# Patient Record
Sex: FEMALE | Race: WHITE | Employment: FULL TIME
[De-identification: names, ages, dates, MRNs, and addresses within clinical notes are randomized per-mention and may not be internally consistent; named-entity substitution may affect disease eponyms.]

---

## 2017-01-03 ENCOUNTER — OFFICE VISIT (OUTPATIENT)
Dept: OBGYN | Facility: CLINIC | Age: 42
End: 2017-01-03

## 2017-01-03 VITALS
TEMPERATURE: 96.3 F | WEIGHT: 214 LBS | DIASTOLIC BLOOD PRESSURE: 70 MMHG | HEART RATE: 80 BPM | BODY MASS INDEX: 37.91 KG/M2 | SYSTOLIC BLOOD PRESSURE: 121 MMHG

## 2017-01-03 DIAGNOSIS — Z48.89 POSTOPERATIVE VISIT: Primary | ICD-10-CM

## 2017-01-03 PROCEDURE — 99024 POSTOP FOLLOW-UP VISIT: CPT | Performed by: SPECIALIST

## 2017-01-03 RX ORDER — HYDROCODONE BITARTRATE AND ACETAMINOPHEN 5; 325 MG/1; MG/1
1-2 TABLET ORAL
COMMUNITY
Start: 2017-01-02 | End: 2017-02-23 | Stop reason: ALTCHOICE

## 2017-01-03 ASSESSMENT — ENCOUNTER SYMPTOMS
VOMITING: 0
EYE PAIN: 0
APNEA: 0
NAUSEA: 0
DIARRHEA: 0
ABDOMINAL DISTENTION: 0
CONSTIPATION: 0
COUGH: 0
ABDOMINAL PAIN: 0

## 2017-02-06 ENCOUNTER — HOSPITAL ENCOUNTER (OUTPATIENT)
Age: 42
Discharge: HOME | End: 2017-02-06
Payer: COMMERCIAL

## 2017-02-06 ENCOUNTER — HOSPITAL ENCOUNTER (OUTPATIENT)
Dept: GENERAL RADIOLOGY | Age: 42
Discharge: HOME | End: 2017-02-06
Payer: COMMERCIAL

## 2017-02-06 DIAGNOSIS — M25.511 RIGHT SHOULDER PAIN, UNSPECIFIED CHRONICITY: ICD-10-CM

## 2017-02-06 LAB
ABSOLUTE EOS #: 0.2 K/UL (ref 0–0.4)
ABSOLUTE LYMPH #: 2.4 K/UL (ref 1–4.8)
ABSOLUTE MONO #: 0.4 K/UL (ref 0.1–1.3)
ALBUMIN SERPL-MCNC: 4.3 G/DL (ref 3.5–5.2)
ALBUMIN/GLOBULIN RATIO: ABNORMAL (ref 1–2.5)
ALP BLD-CCNC: 96 U/L (ref 35–104)
ALT SERPL-CCNC: 30 U/L (ref 5–33)
ANION GAP SERPL CALCULATED.3IONS-SCNC: 15 MMOL/L (ref 9–17)
AST SERPL-CCNC: 23 U/L
BASOPHILS # BLD: 0 % (ref 0–2)
BASOPHILS ABSOLUTE: 0 K/UL (ref 0–0.2)
BILIRUB SERPL-MCNC: 0.37 MG/DL (ref 0.3–1.2)
BUN BLDV-MCNC: 12 MG/DL (ref 6–20)
BUN/CREAT BLD: ABNORMAL (ref 9–20)
CALCIUM SERPL-MCNC: 9.4 MG/DL (ref 8.6–10.4)
CHLORIDE BLD-SCNC: 102 MMOL/L (ref 98–107)
CHOLESTEROL/HDL RATIO: 3.6
CHOLESTEROL: 147 MG/DL
CO2: 20 MMOL/L (ref 20–31)
CREAT SERPL-MCNC: 0.62 MG/DL (ref 0.5–0.9)
DIFFERENTIAL TYPE: NORMAL
EOSINOPHILS RELATIVE PERCENT: 3 % (ref 0–4)
GFR AFRICAN AMERICAN: >60 ML/MIN
GFR NON-AFRICAN AMERICAN: >60 ML/MIN
GFR SERPL CREATININE-BSD FRML MDRD: ABNORMAL ML/MIN/{1.73_M2}
GFR SERPL CREATININE-BSD FRML MDRD: ABNORMAL ML/MIN/{1.73_M2}
GLUCOSE BLD-MCNC: 114 MG/DL (ref 70–99)
HCT VFR BLD CALC: 43.9 % (ref 36–46)
HDLC SERPL-MCNC: 41 MG/DL
HEMOGLOBIN: 14.9 G/DL (ref 12–16)
LDL CHOLESTEROL: 91 MG/DL (ref 0–130)
LYMPHOCYTES # BLD: 28 % (ref 24–44)
MCH RBC QN AUTO: 31.5 PG (ref 26–34)
MCHC RBC AUTO-ENTMCNC: 34 G/DL (ref 31–37)
MCV RBC AUTO: 92.8 FL (ref 80–100)
MONOCYTES # BLD: 5 % (ref 1–7)
PDW BLD-RTO: 13.4 % (ref 11.5–14.9)
PLATELET # BLD: 213 K/UL (ref 150–450)
PLATELET ESTIMATE: NORMAL
PMV BLD AUTO: 9.1 FL (ref 6–12)
POTASSIUM SERPL-SCNC: 4.2 MMOL/L (ref 3.7–5.3)
RBC # BLD: 4.73 M/UL (ref 4–5.2)
RBC # BLD: NORMAL 10*6/UL
SEG NEUTROPHILS: 64 % (ref 36–66)
SEGMENTED NEUTROPHILS ABSOLUTE COUNT: 5.4 K/UL (ref 1.3–9.1)
SODIUM BLD-SCNC: 137 MMOL/L (ref 135–144)
TOTAL PROTEIN: 7.4 G/DL (ref 6.4–8.3)
TRIGL SERPL-MCNC: 74 MG/DL
VLDLC SERPL CALC-MCNC: NORMAL MG/DL (ref 1–30)
WBC # BLD: 8.4 K/UL (ref 3.5–11)
WBC # BLD: NORMAL 10*3/UL

## 2017-02-06 PROCEDURE — 85025 COMPLETE CBC W/AUTO DIFF WBC: CPT

## 2017-02-06 PROCEDURE — 36415 COLL VENOUS BLD VENIPUNCTURE: CPT

## 2017-02-06 PROCEDURE — 80061 LIPID PANEL: CPT

## 2017-02-06 PROCEDURE — 80053 COMPREHEN METABOLIC PANEL: CPT

## 2017-02-06 PROCEDURE — 73030 X-RAY EXAM OF SHOULDER: CPT

## 2017-02-06 PROCEDURE — 73030 X-RAY EXAM OF SHOULDER: CPT | Performed by: RADIOLOGY

## 2017-02-08 ENCOUNTER — OFFICE VISIT (OUTPATIENT)
Dept: PODIATRY | Facility: CLINIC | Age: 42
End: 2017-02-08

## 2017-02-08 VITALS
WEIGHT: 216 LBS | BODY MASS INDEX: 38.27 KG/M2 | DIASTOLIC BLOOD PRESSURE: 87 MMHG | HEIGHT: 63 IN | HEART RATE: 85 BPM | SYSTOLIC BLOOD PRESSURE: 123 MMHG

## 2017-02-08 DIAGNOSIS — M77.31 HEEL SPUR, RIGHT: ICD-10-CM

## 2017-02-08 DIAGNOSIS — M77.8 ENTHESOPATHY OF FOOT: ICD-10-CM

## 2017-02-08 DIAGNOSIS — M72.2 PLANTAR FASCIITIS, RIGHT: Primary | ICD-10-CM

## 2017-02-08 PROCEDURE — L3020 FOOT LONGITUD/METATARSAL SUP: HCPCS | Performed by: PODIATRIST

## 2017-02-08 PROCEDURE — 99213 OFFICE O/P EST LOW 20 MIN: CPT | Performed by: PODIATRIST

## 2017-02-08 PROCEDURE — 73620 X-RAY EXAM OF FOOT: CPT | Performed by: PODIATRIST

## 2017-02-08 RX ORDER — IBUPROFEN AND FAMOTIDINE 26.6; 8 MG/1; MG/1
1 TABLET, FILM COATED ORAL 3 TIMES DAILY PRN
Qty: 90 TABLET | Refills: 1 | Status: SHIPPED | OUTPATIENT
Start: 2017-02-08 | End: 2017-02-23

## 2017-02-08 RX ORDER — IBUPROFEN AND FAMOTIDINE 26.6; 8 MG/1; MG/1
1 TABLET, FILM COATED ORAL 3 TIMES DAILY PRN
Qty: 27 TABLET | Refills: 0 | COMMUNITY
Start: 2017-02-08 | End: 2017-02-23

## 2017-02-08 ASSESSMENT — ENCOUNTER SYMPTOMS
DIARRHEA: 0
CONSTIPATION: 0
COLOR CHANGE: 0
NAUSEA: 0
VOMITING: 0

## 2017-02-09 ENCOUNTER — HOSPITAL ENCOUNTER (EMERGENCY)
Age: 42
Discharge: HOME | End: 2017-02-09
Attending: EMERGENCY MEDICINE
Payer: COMMERCIAL

## 2017-02-09 VITALS
OXYGEN SATURATION: 98 % | DIASTOLIC BLOOD PRESSURE: 79 MMHG | TEMPERATURE: 97.9 F | RESPIRATION RATE: 18 BRPM | BODY MASS INDEX: 38.27 KG/M2 | WEIGHT: 216 LBS | SYSTOLIC BLOOD PRESSURE: 150 MMHG | HEIGHT: 63 IN | HEART RATE: 80 BPM

## 2017-02-09 DIAGNOSIS — R21 RASH AND OTHER NONSPECIFIC SKIN ERUPTION: Primary | ICD-10-CM

## 2017-02-09 DIAGNOSIS — B37.9 YEAST INFECTION: ICD-10-CM

## 2017-02-09 PROCEDURE — 99282 EMERGENCY DEPT VISIT SF MDM: CPT

## 2017-02-09 PROCEDURE — 6370000000 HC RX 637 (ALT 250 FOR IP): Performed by: EMERGENCY MEDICINE

## 2017-02-09 RX ORDER — PREDNISONE 10 MG/1
1 TABLET ORAL DAILY
Qty: 21 EACH | Refills: 0 | Status: SHIPPED | OUTPATIENT
Start: 2017-02-09 | End: 2017-02-23 | Stop reason: ALTCHOICE

## 2017-02-09 RX ORDER — NYSTATIN 100000 U/G
CREAM TOPICAL
Qty: 1 TUBE | Refills: 0 | Status: SHIPPED | OUTPATIENT
Start: 2017-02-09 | End: 2017-02-23 | Stop reason: ALTCHOICE

## 2017-02-09 RX ORDER — NYSTATIN 100000 U/G
CREAM TOPICAL ONCE
Status: COMPLETED | OUTPATIENT
Start: 2017-02-09 | End: 2017-02-09

## 2017-02-09 RX ADMIN — NYSTATIN: 100000 CREAM TOPICAL at 22:31

## 2017-02-09 ASSESSMENT — ENCOUNTER SYMPTOMS
RESPIRATORY NEGATIVE: 1
GASTROINTESTINAL NEGATIVE: 1
BACK PAIN: 0
SHORTNESS OF BREATH: 0
ABDOMINAL PAIN: 0
COUGH: 0
EYES NEGATIVE: 1

## 2017-02-09 ASSESSMENT — PAIN DESCRIPTION - LOCATION: LOCATION: BREAST

## 2017-02-09 ASSESSMENT — PAIN SCALES - GENERAL: PAINLEVEL_OUTOF10: 6

## 2017-02-09 ASSESSMENT — PAIN DESCRIPTION - PAIN TYPE: TYPE: ACUTE PAIN

## 2017-02-09 ASSESSMENT — PAIN DESCRIPTION - ORIENTATION: ORIENTATION: RIGHT;LEFT

## 2017-02-14 DIAGNOSIS — M77.8 ENTHESOPATHY OF FOOT: ICD-10-CM

## 2017-02-14 DIAGNOSIS — M72.2 PLANTAR FASCIITIS, RIGHT: ICD-10-CM

## 2017-02-14 RX ORDER — PREDNISONE 10 MG/1
TABLET ORAL
Qty: 21 TABLET | OUTPATIENT
Start: 2017-02-14

## 2017-02-23 ENCOUNTER — HOSPITAL ENCOUNTER (EMERGENCY)
Age: 42
Discharge: HOME | End: 2017-02-23
Attending: EMERGENCY MEDICINE
Payer: COMMERCIAL

## 2017-02-23 ENCOUNTER — APPOINTMENT (OUTPATIENT)
Dept: CT IMAGING | Age: 42
End: 2017-02-23
Payer: COMMERCIAL

## 2017-02-23 VITALS
BODY MASS INDEX: 37.92 KG/M2 | WEIGHT: 214 LBS | OXYGEN SATURATION: 98 % | DIASTOLIC BLOOD PRESSURE: 94 MMHG | SYSTOLIC BLOOD PRESSURE: 138 MMHG | HEIGHT: 63 IN | RESPIRATION RATE: 16 BRPM | TEMPERATURE: 98 F | HEART RATE: 85 BPM

## 2017-02-23 VITALS
TEMPERATURE: 97.9 F | RESPIRATION RATE: 18 BRPM | SYSTOLIC BLOOD PRESSURE: 143 MMHG | BODY MASS INDEX: 37.92 KG/M2 | HEIGHT: 63 IN | HEART RATE: 94 BPM | OXYGEN SATURATION: 98 % | WEIGHT: 214 LBS | DIASTOLIC BLOOD PRESSURE: 98 MMHG

## 2017-02-23 DIAGNOSIS — R10.9 RIGHT FLANK PAIN: Primary | ICD-10-CM

## 2017-02-23 DIAGNOSIS — R74.8 ELEVATED LIVER ENZYMES: Primary | ICD-10-CM

## 2017-02-23 DIAGNOSIS — E27.8 ADRENAL MASS, LEFT (HCC): ICD-10-CM

## 2017-02-23 LAB
-: ABNORMAL
ABSOLUTE EOS #: 0.1 K/UL (ref 0–0.4)
ABSOLUTE LYMPH #: 2.1 K/UL (ref 1–4.8)
ABSOLUTE MONO #: 0.4 K/UL (ref 0.1–1.3)
ALBUMIN SERPL-MCNC: 4.1 G/DL (ref 3.5–5.2)
ALBUMIN/GLOBULIN RATIO: ABNORMAL (ref 1–2.5)
ALP BLD-CCNC: 83 U/L (ref 35–104)
ALT SERPL-CCNC: 57 U/L (ref 5–33)
AMORPHOUS: ABNORMAL
ANION GAP SERPL CALCULATED.3IONS-SCNC: 13 MMOL/L (ref 9–17)
AST SERPL-CCNC: 40 U/L
BACTERIA: ABNORMAL
BASOPHILS # BLD: 1 % (ref 0–2)
BASOPHILS ABSOLUTE: 0 K/UL (ref 0–0.2)
BILIRUB SERPL-MCNC: 0.56 MG/DL (ref 0.3–1.2)
BILIRUBIN URINE: ABNORMAL
BUN BLDV-MCNC: 10 MG/DL (ref 6–20)
BUN/CREAT BLD: ABNORMAL (ref 9–20)
CALCIUM SERPL-MCNC: 9.4 MG/DL (ref 8.6–10.4)
CASTS UA: ABNORMAL /LPF
CHLORIDE BLD-SCNC: 103 MMOL/L (ref 98–107)
CO2: 22 MMOL/L (ref 20–31)
COLOR: ABNORMAL
COMMENT UA: ABNORMAL
CREAT SERPL-MCNC: 0.6 MG/DL (ref 0.5–0.9)
CRYSTALS, UA: ABNORMAL /HPF
DIFFERENTIAL TYPE: NORMAL
EOSINOPHILS RELATIVE PERCENT: 2 % (ref 0–4)
EPITHELIAL CELLS UA: ABNORMAL /HPF
GFR AFRICAN AMERICAN: >60 ML/MIN
GFR NON-AFRICAN AMERICAN: >60 ML/MIN
GFR SERPL CREATININE-BSD FRML MDRD: ABNORMAL ML/MIN/{1.73_M2}
GFR SERPL CREATININE-BSD FRML MDRD: ABNORMAL ML/MIN/{1.73_M2}
GLUCOSE BLD-MCNC: 104 MG/DL (ref 70–99)
GLUCOSE URINE: NEGATIVE
HCG(URINE) PREGNANCY TEST: NEGATIVE
HCT VFR BLD CALC: 42.1 % (ref 36–46)
HEMOGLOBIN: 14.2 G/DL (ref 12–16)
KETONES, URINE: ABNORMAL
LEUKOCYTE ESTERASE, URINE: NEGATIVE
LYMPHOCYTES # BLD: 30 % (ref 24–44)
MCH RBC QN AUTO: 31.6 PG (ref 26–34)
MCHC RBC AUTO-ENTMCNC: 33.8 G/DL (ref 31–37)
MCV RBC AUTO: 93.6 FL (ref 80–100)
MONOCYTES # BLD: 6 % (ref 1–7)
MUCUS: ABNORMAL
NITRITE, URINE: NEGATIVE
OTHER OBSERVATIONS UA: ABNORMAL
PDW BLD-RTO: 13.1 % (ref 11.5–14.9)
PH UA: 5 (ref 5–8)
PLATELET # BLD: 208 K/UL (ref 150–450)
PLATELET ESTIMATE: NORMAL
PMV BLD AUTO: 8.8 FL (ref 6–12)
POTASSIUM SERPL-SCNC: 4.1 MMOL/L (ref 3.7–5.3)
PROTEIN UA: NEGATIVE
RBC # BLD: 4.5 M/UL (ref 4–5.2)
RBC # BLD: NORMAL 10*6/UL
RBC UA: ABNORMAL /HPF
RENAL EPITHELIAL, UA: ABNORMAL /HPF
SEG NEUTROPHILS: 61 % (ref 36–66)
SEGMENTED NEUTROPHILS ABSOLUTE COUNT: 4.5 K/UL (ref 1.3–9.1)
SODIUM BLD-SCNC: 138 MMOL/L (ref 135–144)
SPECIFIC GRAVITY UA: 1.04 (ref 1–1.03)
TOTAL PROTEIN: 7 G/DL (ref 6.4–8.3)
TRICHOMONAS: ABNORMAL
TURBIDITY: ABNORMAL
URINE HGB: NEGATIVE
UROBILINOGEN, URINE: ABNORMAL
WBC # BLD: 7.2 K/UL (ref 3.5–11)
WBC # BLD: NORMAL 10*3/UL
WBC UA: ABNORMAL /HPF
YEAST: ABNORMAL

## 2017-02-23 PROCEDURE — 84703 CHORIONIC GONADOTROPIN ASSAY: CPT

## 2017-02-23 PROCEDURE — 96372 THER/PROPH/DIAG INJ SC/IM: CPT

## 2017-02-23 PROCEDURE — 36415 COLL VENOUS BLD VENIPUNCTURE: CPT

## 2017-02-23 PROCEDURE — 80053 COMPREHEN METABOLIC PANEL: CPT

## 2017-02-23 PROCEDURE — 99283 EMERGENCY DEPT VISIT LOW MDM: CPT

## 2017-02-23 PROCEDURE — 74176 CT ABD & PELVIS W/O CONTRAST: CPT | Performed by: RADIOLOGY

## 2017-02-23 PROCEDURE — 6360000002 HC RX W HCPCS: Performed by: EMERGENCY MEDICINE

## 2017-02-23 PROCEDURE — 99284 EMERGENCY DEPT VISIT MOD MDM: CPT

## 2017-02-23 PROCEDURE — 85025 COMPLETE CBC W/AUTO DIFF WBC: CPT

## 2017-02-23 PROCEDURE — 74176 CT ABD & PELVIS W/O CONTRAST: CPT

## 2017-02-23 PROCEDURE — 81001 URINALYSIS AUTO W/SCOPE: CPT

## 2017-02-23 RX ORDER — ONDANSETRON 4 MG/1
4 TABLET, ORALLY DISINTEGRATING ORAL EVERY 8 HOURS PRN
Qty: 10 TABLET | Refills: 0 | Status: SHIPPED | OUTPATIENT
Start: 2017-02-23 | End: 2017-06-16 | Stop reason: ALTCHOICE

## 2017-02-23 RX ORDER — GABAPENTIN 300 MG/1
300 CAPSULE ORAL 3 TIMES DAILY
COMMUNITY
End: 2017-06-16 | Stop reason: ALTCHOICE

## 2017-02-23 RX ORDER — KETOROLAC TROMETHAMINE 30 MG/ML
30 INJECTION, SOLUTION INTRAMUSCULAR; INTRAVENOUS ONCE
Status: COMPLETED | OUTPATIENT
Start: 2017-02-23 | End: 2017-02-23

## 2017-02-23 RX ORDER — DICYCLOMINE HYDROCHLORIDE 10 MG/1
10 CAPSULE ORAL EVERY 6 HOURS PRN
Qty: 20 CAPSULE | Refills: 0 | Status: SHIPPED | OUTPATIENT
Start: 2017-02-23 | End: 2017-06-16 | Stop reason: ALTCHOICE

## 2017-02-23 RX ORDER — NAPROXEN 500 MG/1
500 TABLET ORAL 2 TIMES DAILY WITH MEALS
COMMUNITY
End: 2017-06-16 | Stop reason: ALTCHOICE

## 2017-02-23 RX ORDER — KETOROLAC TROMETHAMINE 30 MG/ML
30 INJECTION, SOLUTION INTRAMUSCULAR; INTRAVENOUS ONCE
Status: DISCONTINUED | OUTPATIENT
Start: 2017-02-23 | End: 2017-02-23

## 2017-02-23 RX ORDER — MELOXICAM 7.5 MG/1
7.5 TABLET ORAL DAILY
COMMUNITY
End: 2017-02-23 | Stop reason: ALTCHOICE

## 2017-02-23 RX ADMIN — KETOROLAC TROMETHAMINE 30 MG: 30 INJECTION, SOLUTION INTRAMUSCULAR at 19:36

## 2017-02-23 ASSESSMENT — ENCOUNTER SYMPTOMS
ABDOMINAL DISTENTION: 0
WHEEZING: 0
SHORTNESS OF BREATH: 0
CHEST TIGHTNESS: 0
BACK PAIN: 0
BLOOD IN STOOL: 0
SORE THROAT: 0
NAUSEA: 0
DIARRHEA: 0
COLOR CHANGE: 0
STRIDOR: 0
ABDOMINAL PAIN: 0
VOMITING: 0
COUGH: 0
PHOTOPHOBIA: 0

## 2017-02-23 ASSESSMENT — PAIN SCALES - GENERAL
PAINLEVEL_OUTOF10: 8
PAINLEVEL_OUTOF10: 8

## 2017-02-23 ASSESSMENT — PAIN DESCRIPTION - PAIN TYPE: TYPE: ACUTE PAIN

## 2017-02-23 ASSESSMENT — PAIN DESCRIPTION - FREQUENCY: FREQUENCY: CONTINUOUS

## 2017-02-23 ASSESSMENT — PAIN DESCRIPTION - ORIENTATION: ORIENTATION: RIGHT

## 2017-02-23 ASSESSMENT — PAIN DESCRIPTION - DESCRIPTORS: DESCRIPTORS: STABBING

## 2017-02-23 ASSESSMENT — PAIN DESCRIPTION - LOCATION: LOCATION: FLANK

## 2017-03-02 ENCOUNTER — NURSE ONLY (OUTPATIENT)
Dept: PODIATRY | Facility: CLINIC | Age: 42
End: 2017-03-02

## 2017-03-02 DIAGNOSIS — M77.8 ENTHESOPATHY OF FOOT: ICD-10-CM

## 2017-03-02 DIAGNOSIS — M79.674 PAIN IN TOES OF BOTH FEET: ICD-10-CM

## 2017-03-02 DIAGNOSIS — M77.31 HEEL SPUR, RIGHT: ICD-10-CM

## 2017-03-02 DIAGNOSIS — M72.2 PLANTAR FASCIITIS, RIGHT: Primary | ICD-10-CM

## 2017-03-02 DIAGNOSIS — M79.675 PAIN IN TOES OF BOTH FEET: ICD-10-CM

## 2017-06-16 ENCOUNTER — HOSPITAL ENCOUNTER (EMERGENCY)
Age: 42
Discharge: HOME | End: 2017-06-16
Attending: EMERGENCY MEDICINE
Payer: COMMERCIAL

## 2017-06-16 VITALS
WEIGHT: 206 LBS | BODY MASS INDEX: 36.5 KG/M2 | HEART RATE: 75 BPM | TEMPERATURE: 98.1 F | SYSTOLIC BLOOD PRESSURE: 134 MMHG | OXYGEN SATURATION: 97 % | HEIGHT: 63 IN | DIASTOLIC BLOOD PRESSURE: 85 MMHG | RESPIRATION RATE: 20 BRPM
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
## 2017-06-16 PROCEDURE — 99283 EMERGENCY DEPT VISIT LOW MDM: CPT

## 2017-06-16 PROCEDURE — 6370000000 HC RX 637 (ALT 250 FOR IP): Performed by: PHYSICIAN ASSISTANT

## 2017-06-16 RX ORDER — DOXYCYCLINE HYCLATE 100 MG
100 TABLET ORAL ONCE
Status: COMPLETED | OUTPATIENT
Start: 2017-06-16 | End: 2017-06-16

## 2017-06-16 RX ORDER — DOXYCYCLINE 100 MG/1
100 TABLET ORAL 2 TIMES DAILY
Qty: 14 TABLET | Refills: 0 | Status: SHIPPED | OUTPATIENT
Start: 2017-06-16 | End: 2017-06-23

## 2017-06-16 RX ADMIN — DOXYCYCLINE HYCLATE 100 MG: 100 TABLET, COATED ORAL at 20:07

## 2017-06-26 ENCOUNTER — OFFICE VISIT (OUTPATIENT)
Dept: OBGYN CLINIC | Age: 42
End: 2017-06-26
Payer: COMMERCIAL

## 2017-06-26 VITALS
HEIGHT: 63 IN | TEMPERATURE: 98.6 F | DIASTOLIC BLOOD PRESSURE: 82 MMHG | OXYGEN SATURATION: 99 % | RESPIRATION RATE: 17 BRPM | SYSTOLIC BLOOD PRESSURE: 116 MMHG | BODY MASS INDEX: 34.38 KG/M2 | HEART RATE: 78 BPM | WEIGHT: 194 LBS
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
## 2017-06-26 PROCEDURE — 99213 OFFICE O/P EST LOW 20 MIN: CPT | Performed by: SPECIALIST

## 2017-06-26 ASSESSMENT — ENCOUNTER SYMPTOMS
ABDOMINAL DISTENTION: 0
COUGH: 0
CONSTIPATION: 0
APNEA: 0
DIARRHEA: 0
NAUSEA: 0
ABDOMINAL PAIN: 0
VOMITING: 0
EYE PAIN: 0

## 2017-07-28 ENCOUNTER — HOSPITAL ENCOUNTER (EMERGENCY)
Age: 42
Discharge: HOME | End: 2017-07-28
Attending: EMERGENCY MEDICINE
Payer: COMMERCIAL

## 2017-07-28 VITALS
DIASTOLIC BLOOD PRESSURE: 83 MMHG | SYSTOLIC BLOOD PRESSURE: 134 MMHG | HEART RATE: 96 BPM | HEIGHT: 63 IN | TEMPERATURE: 98.2 F | BODY MASS INDEX: 34.91 KG/M2 | WEIGHT: 197 LBS | OXYGEN SATURATION: 98 % | RESPIRATION RATE: 16 BRPM

## 2017-07-28 DIAGNOSIS — M72.2 PLANTAR FASCIITIS, RIGHT: Primary | ICD-10-CM

## 2017-07-28 PROCEDURE — 99283 EMERGENCY DEPT VISIT LOW MDM: CPT

## 2017-07-28 RX ORDER — IBUPROFEN 800 MG/1
800 TABLET ORAL EVERY 8 HOURS PRN
Qty: 30 TABLET | Refills: 0 | Status: SHIPPED | OUTPATIENT
Start: 2017-07-28 | End: 2018-01-24

## 2017-07-28 ASSESSMENT — PAIN SCALES - GENERAL: PAINLEVEL_OUTOF10: 7

## 2017-07-28 ASSESSMENT — ENCOUNTER SYMPTOMS
ABDOMINAL PAIN: 0
BACK PAIN: 0
VOMITING: 0
SHORTNESS OF BREATH: 0
EYE REDNESS: 0
EYE DISCHARGE: 0
NAUSEA: 0
SORE THROAT: 0
COUGH: 0

## 2017-07-28 ASSESSMENT — PAIN DESCRIPTION - LOCATION: LOCATION: OTHER (COMMENT)

## 2017-07-28 ASSESSMENT — PAIN DESCRIPTION - DESCRIPTORS: DESCRIPTORS: SHOOTING

## 2017-07-28 ASSESSMENT — PAIN DESCRIPTION - ORIENTATION: ORIENTATION: RIGHT

## 2017-07-28 ASSESSMENT — PAIN DESCRIPTION - PAIN TYPE: TYPE: ACUTE PAIN

## 2017-07-28 ASSESSMENT — PAIN DESCRIPTION - FREQUENCY: FREQUENCY: INTERMITTENT

## 2017-07-31 ENCOUNTER — HOSPITAL ENCOUNTER (EMERGENCY)
Age: 42
Discharge: HOME | End: 2017-07-31
Attending: SPECIALIST
Payer: COMMERCIAL

## 2017-07-31 ENCOUNTER — APPOINTMENT (OUTPATIENT)
Dept: GENERAL RADIOLOGY | Age: 42
End: 2017-07-31
Payer: COMMERCIAL

## 2017-07-31 VITALS
DIASTOLIC BLOOD PRESSURE: 80 MMHG | RESPIRATION RATE: 16 BRPM | BODY MASS INDEX: 34.91 KG/M2 | SYSTOLIC BLOOD PRESSURE: 121 MMHG | HEART RATE: 94 BPM | HEIGHT: 63 IN | TEMPERATURE: 98.2 F | WEIGHT: 197 LBS | OXYGEN SATURATION: 96 %

## 2017-07-31 DIAGNOSIS — S90.32XA CONTUSION OF LEFT FOOT, INITIAL ENCOUNTER: Primary | ICD-10-CM

## 2017-07-31 PROCEDURE — 73630 X-RAY EXAM OF FOOT: CPT

## 2017-07-31 PROCEDURE — 99283 EMERGENCY DEPT VISIT LOW MDM: CPT

## 2017-07-31 ASSESSMENT — PAIN SCALES - GENERAL: PAINLEVEL_OUTOF10: 3

## 2017-07-31 ASSESSMENT — PAIN DESCRIPTION - DESCRIPTORS: DESCRIPTORS: THROBBING

## 2017-07-31 ASSESSMENT — PAIN DESCRIPTION - FREQUENCY: FREQUENCY: CONTINUOUS

## 2017-07-31 ASSESSMENT — PAIN DESCRIPTION - ORIENTATION: ORIENTATION: LEFT

## 2017-07-31 ASSESSMENT — PAIN DESCRIPTION - LOCATION: LOCATION: FOOT

## 2017-07-31 ASSESSMENT — PAIN DESCRIPTION - PAIN TYPE: TYPE: ACUTE PAIN

## 2017-08-07 ENCOUNTER — HOSPITAL ENCOUNTER (EMERGENCY)
Age: 42
Discharge: HOME | End: 2017-08-07
Attending: EMERGENCY MEDICINE
Payer: COMMERCIAL

## 2017-08-07 VITALS
WEIGHT: 185 LBS | HEART RATE: 71 BPM | HEIGHT: 63 IN | OXYGEN SATURATION: 99 % | SYSTOLIC BLOOD PRESSURE: 130 MMHG | BODY MASS INDEX: 32.78 KG/M2 | TEMPERATURE: 99 F | RESPIRATION RATE: 14 BRPM | DIASTOLIC BLOOD PRESSURE: 81 MMHG

## 2017-08-07 DIAGNOSIS — J02.9 ACUTE PHARYNGITIS, UNSPECIFIED ETIOLOGY: Primary | ICD-10-CM

## 2017-08-07 PROCEDURE — 99283 EMERGENCY DEPT VISIT LOW MDM: CPT

## 2017-08-07 PROCEDURE — 6370000000 HC RX 637 (ALT 250 FOR IP): Performed by: EMERGENCY MEDICINE

## 2017-08-07 RX ORDER — PREDNISONE 20 MG/1
60 TABLET ORAL ONCE
Status: COMPLETED | OUTPATIENT
Start: 2017-08-07 | End: 2017-08-07

## 2017-08-07 RX ORDER — AZITHROMYCIN 250 MG/1
500 TABLET, FILM COATED ORAL ONCE
Status: COMPLETED | OUTPATIENT
Start: 2017-08-07 | End: 2017-08-07

## 2017-08-07 RX ORDER — AZITHROMYCIN 250 MG/1
250 TABLET, FILM COATED ORAL DAILY
Qty: 4 TABLET | Refills: 0 | Status: SHIPPED | OUTPATIENT
Start: 2017-08-07 | End: 2018-02-25 | Stop reason: ALTCHOICE

## 2017-08-07 RX ORDER — PREDNISONE 20 MG/1
20 TABLET ORAL 2 TIMES DAILY
Qty: 20 TABLET | Refills: 0 | Status: SHIPPED | OUTPATIENT
Start: 2017-08-07 | End: 2017-08-17

## 2017-08-07 RX ADMIN — AZITHROMYCIN 500 MG: 250 TABLET, FILM COATED ORAL at 22:05

## 2017-08-07 RX ADMIN — PREDNISONE 60 MG: 20 TABLET ORAL at 22:05

## 2017-08-08 ASSESSMENT — ENCOUNTER SYMPTOMS
EYE REDNESS: 0
CHEST TIGHTNESS: 0
EYE PAIN: 0
COUGH: 0
SORE THROAT: 1
DIARRHEA: 0
EYE DISCHARGE: 0
WHEEZING: 0
NAUSEA: 0
SHORTNESS OF BREATH: 0
ABDOMINAL PAIN: 0
CONSTIPATION: 0
BACK PAIN: 1
RHINORRHEA: 0
COLOR CHANGE: 0

## 2017-08-11 ENCOUNTER — APPOINTMENT (OUTPATIENT)
Dept: GENERAL RADIOLOGY | Age: 42
End: 2017-08-11
Payer: COMMERCIAL

## 2017-08-11 ENCOUNTER — HOSPITAL ENCOUNTER (EMERGENCY)
Age: 42
Discharge: HOME | End: 2017-08-11
Attending: SPECIALIST
Payer: COMMERCIAL

## 2017-08-11 VITALS
WEIGHT: 180 LBS | OXYGEN SATURATION: 98 % | SYSTOLIC BLOOD PRESSURE: 142 MMHG | RESPIRATION RATE: 12 BRPM | TEMPERATURE: 97.9 F | DIASTOLIC BLOOD PRESSURE: 86 MMHG | HEART RATE: 64 BPM | BODY MASS INDEX: 31.89 KG/M2 | HEIGHT: 63 IN

## 2017-08-11 DIAGNOSIS — J02.9 VIRAL PHARYNGITIS: Primary | ICD-10-CM

## 2017-08-11 DIAGNOSIS — J20.8 VIRAL BRONCHITIS: ICD-10-CM

## 2017-08-11 DIAGNOSIS — J04.0 LARYNGITIS: ICD-10-CM

## 2017-08-11 LAB
DIRECT EXAM: NORMAL
Lab: NORMAL
SPECIMEN DESCRIPTION: NORMAL
STATUS: NORMAL

## 2017-08-11 PROCEDURE — 6370000000 HC RX 637 (ALT 250 FOR IP): Performed by: SPECIALIST

## 2017-08-11 PROCEDURE — 99283 EMERGENCY DEPT VISIT LOW MDM: CPT

## 2017-08-11 PROCEDURE — 87651 STREP A DNA AMP PROBE: CPT

## 2017-08-11 PROCEDURE — 71020 XR CHEST STANDARD TWO VW: CPT

## 2017-08-11 RX ORDER — ACETAMINOPHEN 325 MG/1
650 TABLET ORAL ONCE
Status: COMPLETED | OUTPATIENT
Start: 2017-08-11 | End: 2017-08-11

## 2017-08-11 RX ADMIN — ACETAMINOPHEN 650 MG: 325 TABLET ORAL at 15:50

## 2017-08-11 ASSESSMENT — PAIN SCALES - GENERAL
PAINLEVEL_OUTOF10: 5
PAINLEVEL_OUTOF10: 6

## 2017-08-11 ASSESSMENT — ENCOUNTER SYMPTOMS
TROUBLE SWALLOWING: 1
WHEEZING: 0
COUGH: 1
SORE THROAT: 1
SHORTNESS OF BREATH: 0
VOICE CHANGE: 1

## 2017-08-11 ASSESSMENT — PAIN DESCRIPTION - PROGRESSION: CLINICAL_PROGRESSION: GRADUALLY IMPROVING

## 2017-08-12 LAB
DIRECT EXAM: NORMAL
DIRECT EXAM: NORMAL
Lab: NORMAL
SPECIMEN DESCRIPTION: NORMAL
SPECIMEN DESCRIPTION: NORMAL
STATUS: NORMAL

## 2017-09-04 ENCOUNTER — HOSPITAL ENCOUNTER (EMERGENCY)
Age: 42
Discharge: HOME | End: 2017-09-04
Attending: EMERGENCY MEDICINE
Payer: COMMERCIAL

## 2017-09-04 ENCOUNTER — APPOINTMENT (OUTPATIENT)
Dept: GENERAL RADIOLOGY | Age: 42
End: 2017-09-04
Payer: COMMERCIAL

## 2017-09-04 VITALS
OXYGEN SATURATION: 99 % | WEIGHT: 194 LBS | HEART RATE: 82 BPM | RESPIRATION RATE: 16 BRPM | BODY MASS INDEX: 34.38 KG/M2 | SYSTOLIC BLOOD PRESSURE: 122 MMHG | DIASTOLIC BLOOD PRESSURE: 79 MMHG | TEMPERATURE: 98.2 F | HEIGHT: 63 IN

## 2017-09-04 DIAGNOSIS — M25.561 ACUTE PAIN OF RIGHT KNEE: Primary | ICD-10-CM

## 2017-09-04 PROCEDURE — 99283 EMERGENCY DEPT VISIT LOW MDM: CPT

## 2017-09-04 PROCEDURE — 73562 X-RAY EXAM OF KNEE 3: CPT

## 2017-09-04 RX ORDER — BUSPIRONE HYDROCHLORIDE 10 MG/1
45 TABLET ORAL 3 TIMES DAILY
COMMUNITY
End: 2021-02-02

## 2017-09-04 RX ORDER — NAPROXEN 500 MG/1
500 TABLET ORAL DAILY
Status: ON HOLD | COMMUNITY
End: 2018-11-29 | Stop reason: HOSPADM

## 2017-09-04 RX ORDER — KETOROLAC TROMETHAMINE 30 MG/ML
30 INJECTION, SOLUTION INTRAMUSCULAR; INTRAVENOUS ONCE
Status: DISCONTINUED | OUTPATIENT
Start: 2017-09-04 | End: 2017-09-04

## 2017-09-04 ASSESSMENT — PAIN DESCRIPTION - LOCATION: LOCATION: KNEE

## 2017-09-04 ASSESSMENT — PAIN DESCRIPTION - ORIENTATION: ORIENTATION: RIGHT

## 2017-09-04 ASSESSMENT — PAIN SCALES - GENERAL: PAINLEVEL_OUTOF10: 4

## 2017-09-27 ENCOUNTER — HOSPITAL ENCOUNTER (EMERGENCY)
Age: 42
Discharge: HOME | End: 2017-09-27
Attending: EMERGENCY MEDICINE
Payer: COMMERCIAL

## 2017-09-27 VITALS
SYSTOLIC BLOOD PRESSURE: 135 MMHG | HEART RATE: 93 BPM | TEMPERATURE: 99.6 F | WEIGHT: 198 LBS | HEIGHT: 63 IN | BODY MASS INDEX: 35.08 KG/M2 | RESPIRATION RATE: 16 BRPM | DIASTOLIC BLOOD PRESSURE: 77 MMHG | OXYGEN SATURATION: 96 %

## 2017-09-27 DIAGNOSIS — K29.70 VIRAL GASTRITIS: ICD-10-CM

## 2017-09-27 DIAGNOSIS — N39.0 URINARY TRACT INFECTION WITHOUT HEMATURIA, SITE UNSPECIFIED: Primary | ICD-10-CM

## 2017-09-27 LAB
-: ABNORMAL
AMORPHOUS: ABNORMAL
ANION GAP SERPL CALCULATED.3IONS-SCNC: 14 MMOL/L (ref 9–17)
BACTERIA: ABNORMAL
BILIRUBIN URINE: NEGATIVE
BUN BLDV-MCNC: 10 MG/DL (ref 6–20)
BUN/CREAT BLD: ABNORMAL (ref 9–20)
CALCIUM SERPL-MCNC: 9.3 MG/DL (ref 8.6–10.4)
CASTS UA: ABNORMAL /LPF (ref 0–8)
CHLORIDE BLD-SCNC: 100 MMOL/L (ref 98–107)
CO2: 19 MMOL/L (ref 20–31)
COLOR: YELLOW
CREAT SERPL-MCNC: 0.51 MG/DL (ref 0.5–0.9)
CRYSTALS, UA: ABNORMAL /HPF
EPITHELIAL CELLS UA: ABNORMAL /HPF (ref 0–5)
GFR AFRICAN AMERICAN: >60 ML/MIN
GFR NON-AFRICAN AMERICAN: >60 ML/MIN
GFR SERPL CREATININE-BSD FRML MDRD: ABNORMAL ML/MIN/{1.73_M2}
GFR SERPL CREATININE-BSD FRML MDRD: ABNORMAL ML/MIN/{1.73_M2}
GLUCOSE BLD-MCNC: 102 MG/DL (ref 70–99)
GLUCOSE URINE: NEGATIVE
KETONES, URINE: NEGATIVE
LEUKOCYTE ESTERASE, URINE: ABNORMAL
MUCUS: ABNORMAL
NITRITE, URINE: NEGATIVE
OTHER OBSERVATIONS UA: ABNORMAL
PH UA: 5 (ref 5–8)
POTASSIUM SERPL-SCNC: 4.2 MMOL/L (ref 3.7–5.3)
PROTEIN UA: NEGATIVE
RBC UA: ABNORMAL /HPF (ref 0–4)
RENAL EPITHELIAL, UA: ABNORMAL /HPF
SODIUM BLD-SCNC: 133 MMOL/L (ref 135–144)
SPECIFIC GRAVITY UA: 1.01 (ref 1–1.03)
TRICHOMONAS: ABNORMAL
TURBIDITY: CLEAR
URINE HGB: ABNORMAL
UROBILINOGEN, URINE: NORMAL
WBC UA: ABNORMAL /HPF (ref 0–5)
YEAST: ABNORMAL

## 2017-09-27 PROCEDURE — 96374 THER/PROPH/DIAG INJ IV PUSH: CPT

## 2017-09-27 PROCEDURE — 80048 BASIC METABOLIC PNL TOTAL CA: CPT

## 2017-09-27 PROCEDURE — 2580000003 HC RX 258: Performed by: EMERGENCY MEDICINE

## 2017-09-27 PROCEDURE — 6360000002 HC RX W HCPCS: Performed by: EMERGENCY MEDICINE

## 2017-09-27 PROCEDURE — 99284 EMERGENCY DEPT VISIT MOD MDM: CPT

## 2017-09-27 PROCEDURE — 6370000000 HC RX 637 (ALT 250 FOR IP): Performed by: EMERGENCY MEDICINE

## 2017-09-27 PROCEDURE — 81001 URINALYSIS AUTO W/SCOPE: CPT

## 2017-09-27 RX ORDER — ACETAMINOPHEN 500 MG
1000 TABLET ORAL ONCE
Status: COMPLETED | OUTPATIENT
Start: 2017-09-27 | End: 2017-09-27

## 2017-09-27 RX ORDER — 0.9 % SODIUM CHLORIDE 0.9 %
1000 INTRAVENOUS SOLUTION INTRAVENOUS ONCE
Status: COMPLETED | OUTPATIENT
Start: 2017-09-27 | End: 2017-09-27

## 2017-09-27 RX ORDER — CEPHALEXIN 500 MG/1
500 CAPSULE ORAL 3 TIMES DAILY
Qty: 30 CAPSULE | Refills: 0 | Status: SHIPPED | OUTPATIENT
Start: 2017-09-27 | End: 2017-10-07

## 2017-09-27 RX ORDER — CEPHALEXIN 500 MG/1
500 CAPSULE ORAL ONCE
Status: COMPLETED | OUTPATIENT
Start: 2017-09-27 | End: 2017-09-27

## 2017-09-27 RX ORDER — ONDANSETRON 2 MG/ML
4 INJECTION INTRAMUSCULAR; INTRAVENOUS ONCE
Status: COMPLETED | OUTPATIENT
Start: 2017-09-27 | End: 2017-09-27

## 2017-09-27 RX ORDER — ONDANSETRON 4 MG/1
4 TABLET, FILM COATED ORAL EVERY 8 HOURS PRN
Qty: 12 TABLET | Refills: 0 | Status: SHIPPED | OUTPATIENT
Start: 2017-09-27 | End: 2018-03-06

## 2017-09-27 RX ADMIN — SODIUM CHLORIDE 1000 ML: 9 INJECTION, SOLUTION INTRAVENOUS at 02:42

## 2017-09-27 RX ADMIN — ONDANSETRON 4 MG: 2 INJECTION, SOLUTION INTRAMUSCULAR; INTRAVENOUS at 02:42

## 2017-09-27 RX ADMIN — ACETAMINOPHEN 1000 MG: 500 TABLET ORAL at 04:23

## 2017-09-27 RX ADMIN — CEPHALEXIN 500 MG: 500 CAPSULE ORAL at 04:23

## 2017-09-27 ASSESSMENT — ENCOUNTER SYMPTOMS
SHORTNESS OF BREATH: 0
ABDOMINAL PAIN: 0
RHINORRHEA: 0
COUGH: 0
EYE REDNESS: 0
BLOOD IN STOOL: 0
DIARRHEA: 1
SORE THROAT: 0
NAUSEA: 1
EYE DISCHARGE: 0
CHEST TIGHTNESS: 0
VOMITING: 1

## 2017-10-15 ENCOUNTER — HOSPITAL ENCOUNTER (EMERGENCY)
Age: 42
Discharge: HOME | End: 2017-10-15
Attending: EMERGENCY MEDICINE
Payer: COMMERCIAL

## 2017-10-15 VITALS
HEART RATE: 88 BPM | HEIGHT: 63 IN | BODY MASS INDEX: 36.32 KG/M2 | RESPIRATION RATE: 18 BRPM | OXYGEN SATURATION: 100 % | TEMPERATURE: 98.4 F | SYSTOLIC BLOOD PRESSURE: 129 MMHG | WEIGHT: 205 LBS | DIASTOLIC BLOOD PRESSURE: 72 MMHG

## 2017-10-15 DIAGNOSIS — S91.312A LACERATION OF LEFT FOOT, INITIAL ENCOUNTER: Primary | ICD-10-CM

## 2017-10-15 PROCEDURE — 6370000000 HC RX 637 (ALT 250 FOR IP): Performed by: PHYSICIAN ASSISTANT

## 2017-10-15 PROCEDURE — 99283 EMERGENCY DEPT VISIT LOW MDM: CPT

## 2017-10-15 RX ORDER — IBUPROFEN 600 MG/1
600 TABLET ORAL ONCE
Status: COMPLETED | OUTPATIENT
Start: 2017-10-15 | End: 2017-10-15

## 2017-10-15 RX ADMIN — IBUPROFEN 600 MG: 600 TABLET, FILM COATED ORAL at 16:12

## 2017-10-15 ASSESSMENT — PAIN SCALES - GENERAL
PAINLEVEL_OUTOF10: 3
PAINLEVEL_OUTOF10: 3

## 2017-10-15 ASSESSMENT — ENCOUNTER SYMPTOMS: BACK PAIN: 0

## 2017-10-15 NOTE — ED NOTES
Pt c/o pain to right foot pt stepped on a piece of glass . Laceration noted to arch of foot approx 1 cm long .       Isis Luis RN  10/15/17 0526

## 2017-10-15 NOTE — ED PROVIDER NOTES
[address] ED  eMERGENCY dEPARTMENT eNCOUnter      Pt Name: Michael Andres  MRN: [id number]  [place] 1975  Date of evaluation: 10/15/17      CHIEF COMPLAINT       Chief Complaint   Patient presents with    Foreign Body in Skin     stepped on glass today; right foot         HISTORY OF PRESENT ILLNESS    Michael Andres is a 40 y.o. female who presents complaining of Stepped on a piece of glass at home earlier today   The history is provided by the patient. Foot Problem   Location:  Foot  Injury: yes    Mechanism of injury comment:  Patient reports she stepped on a piece of glass with her left foot causing a small laceration. Tetanus is up-to-date. She reports a glass was clear she was walking barefoot at home and her children must have broken a glass and not cleaned up all the mass  Foot location:  L foot  Pain details:     Quality:  Aching    Severity:  Mild    Onset quality:  Sudden    Duration:  1 hour    Timing:  Constant    Progression:  Unchanged  Chronicity:  New  Foreign body present:  No foreign bodies  Tetanus status:  Up to date  Prior injury to area:  Unable to specify  Relieved by:  Nothing  Worsened by:  Bearing weight  Ineffective treatments:  None tried  Associated symptoms: no back pain, no decreased ROM, no fatigue, no stiffness and no swelling        REVIEW OF SYSTEMS       Review of Systems   Constitutional: Negative for fatigue. Musculoskeletal: Negative for back pain and stiffness. Skin: Positive for wound (left medial plantar aspect of her foot stepped on a piece of glass just prior to arrival). All other systems reviewed and are negative.       PAST MEDICAL HISTORY     Past Medical History:   Diagnosis Date    Anxiety     Arthritis     OA    Depression     post partum    Diabetes, gestational     history of    Menarche @ 16 y/o    Neuropathy ([facility] [address]     CTS    Obesity     Parity       -    Restless leg syndrome     Sleep apnea     CPAP    UTI (lower urinary tract infection)     with pregnancy    Wears glasses        SURGICAL HISTORY       Past Surgical History:   Procedure Laterality Date    ANKLE SURGERY Right 1997     SECTION      CHOLECYSTECTOMY  06    COLONOSCOPY      with poly removal    DILATION AND CURETTAGE OF UTERUS  2014    ENDOMETRIAL ABLATION  14    with novasure    HYSTEROSCOPY  14    D&C    KNEE ARTHROSCOPY Left 6/29/15    KNEE SURGERY      left knee scoped on 17    MYRINGOTOMY Bilateral     with tubes    OTHER SURGICAL HISTORY  2016    removal multiple vulvar inclusion cysts    TONSILLECTOMY AND ADENOIDECTOMY      TUBAL LIGATION         CURRENT MEDICATIONS       Previous Medications    AZITHROMYCIN (ZITHROMAX) 250 MG TABLET    Take 1 tablet by mouth daily    BUSPIRONE (BUSPAR) 10 MG TABLET    Take 10 mg by mouth 2 times daily    FLUOXETINE (PROZAC) 20 MG CAPSULE    Take 60 mg by mouth nightly     IBUPROFEN (ADVIL;MOTRIN) 800 MG TABLET    Take 1 tablet by mouth every 8 hours as needed for Pain    NAPROXEN (NAPROSYN) 500 MG TABLET    Take 500 mg by mouth 2 times daily (with meals)    ONDANSETRON (ZOFRAN) 4 MG TABLET    Take 1 tablet by mouth every 8 hours as needed for Nausea    ROPINIROLE (REQUIP) 5 MG TABLET    Take 7.5 mg by mouth nightly        ALLERGIES     is allergic to bactrim [sulfamethoxazole-trimethoprim]; dicloxacillin; and pcn [penicillins]. FAMILY HISTORY     indicated that the status of her mother is unknown. She indicated that the status of her father is unknown. She indicated that the status of her paternal grandfather is unknown. SOCIAL HISTORY      reports that she has been smoking Cigarettes. She has a 9.00 pack-year smoking history. She has never used smokeless tobacco. She reports that she uses drugs, including Marijuana. She reports that she does not drink alcohol.     PHYSICAL EXAM     INITIAL VITALS: /72   Pulse 88   Temp 98.4 °F (36.9 °C) (Oral)   Resp 18   Ht

## 2017-11-18 NOTE — ED PROVIDER NOTES
[address] ED  eMERGENCY dEPARTMENT eNCOUnter   Independent Attestation     Pt Name: Sandrita Waters  MRN: [id number]  [place] 1975  Date of evaluation: 11/18/17       Sandrita Waters is a 40 y.o. female who presents with Foreign Body in Skin (stepped on glass today; right foot)        I was personally available for consultation in the Emergency Department. I have reviewed the chart and agree with the documentation as recorded by the [facility], including the assessment, treatment plan and disposition.     Steff Gomez MD, [facility]  Attending Emergency  Physician        Steff Gomez MD  11/18/17 9919

## 2018-01-24 ENCOUNTER — APPOINTMENT (OUTPATIENT)
Dept: CT IMAGING | Age: 43
End: 2018-01-24
Payer: COMMERCIAL

## 2018-01-24 ENCOUNTER — HOSPITAL ENCOUNTER (EMERGENCY)
Age: 43
Discharge: HOME | End: 2018-01-24
Attending: EMERGENCY MEDICINE
Payer: COMMERCIAL

## 2018-01-24 VITALS
TEMPERATURE: 98 F | HEART RATE: 91 BPM | WEIGHT: 204 LBS | RESPIRATION RATE: 16 BRPM | BODY MASS INDEX: 36.14 KG/M2 | OXYGEN SATURATION: 99 % | DIASTOLIC BLOOD PRESSURE: 87 MMHG | HEIGHT: 63 IN | SYSTOLIC BLOOD PRESSURE: 151 MMHG
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
## 2018-01-24 DIAGNOSIS — M54.2 NECK PAIN: Primary | ICD-10-CM

## 2018-01-24 DIAGNOSIS — S50.01XA CONTUSION OF RIGHT ELBOW, INITIAL ENCOUNTER: ICD-10-CM

## 2018-01-24 PROCEDURE — 72125 CT NECK SPINE W/O DYE: CPT

## 2018-01-24 PROCEDURE — 6370000000 HC RX 637 (ALT 250 FOR IP): Performed by: EMERGENCY MEDICINE

## 2018-01-24 PROCEDURE — 99283 EMERGENCY DEPT VISIT LOW MDM: CPT

## 2018-01-24 RX ORDER — IBUPROFEN 600 MG/1
600 TABLET ORAL EVERY 6 HOURS PRN
Qty: 30 TABLET | Refills: 0 | Status: SHIPPED | OUTPATIENT
Start: 2018-01-24 | End: 2018-03-13

## 2018-01-24 RX ORDER — IBUPROFEN 600 MG/1
600 TABLET ORAL ONCE
Status: COMPLETED | OUTPATIENT
Start: 2018-01-24 | End: 2018-01-24

## 2018-01-24 RX ADMIN — IBUPROFEN 600 MG: 600 TABLET, FILM COATED ORAL at 20:51

## 2018-01-24 ASSESSMENT — ENCOUNTER SYMPTOMS
RHINORRHEA: 0
NAUSEA: 0
COUGH: 0
BACK PAIN: 1
EYE PAIN: 0
SHORTNESS OF BREATH: 0
ABDOMINAL PAIN: 0
VOMITING: 0
EYE REDNESS: 0

## 2018-01-24 ASSESSMENT — PAIN DESCRIPTION - LOCATION: LOCATION: BACK;ELBOW;NECK

## 2018-01-24 ASSESSMENT — PAIN DESCRIPTION - PAIN TYPE: TYPE: ACUTE PAIN

## 2018-01-24 ASSESSMENT — PAIN SCALES - GENERAL
PAINLEVEL_OUTOF10: 7
PAINLEVEL_OUTOF10: 7

## 2018-01-25 NOTE — ED PROVIDER NOTES
urinary tract infection)     with pregnancy    Wears glasses        SURGICAL HISTORY       Past Surgical History:   Procedure Laterality Date    ANKLE SURGERY Right      SECTION      CHOLECYSTECTOMY  06    COLONOSCOPY      with poly removal    DILATION AND CURETTAGE OF UTERUS  2014    ENDOMETRIAL ABLATION  14    with novasure    HYSTEROSCOPY  14    D&C    KNEE ARTHROSCOPY Left 6/29/15    KNEE SURGERY      left knee scoped on 17    MYRINGOTOMY Bilateral     with tubes    OTHER SURGICAL HISTORY  2016    removal multiple vulvar inclusion cysts    TONSILLECTOMY AND ADENOIDECTOMY      TUBAL LIGATION         CURRENT MEDICATIONS       Previous Medications    AZITHROMYCIN (ZITHROMAX) 250 MG TABLET    Take 1 tablet by mouth daily    BUSPIRONE (BUSPAR) 10 MG TABLET    Take 10 mg by mouth 2 times daily    FLUOXETINE (PROZAC) 20 MG CAPSULE    Take 60 mg by mouth nightly     NAPROXEN (NAPROSYN) 500 MG TABLET    Take 500 mg by mouth 2 times daily (with meals)    ONDANSETRON (ZOFRAN) 4 MG TABLET    Take 1 tablet by mouth every 8 hours as needed for Nausea    ROPINIROLE (REQUIP) 5 MG TABLET    Take 7.5 mg by mouth 3 times daily        ALLERGIES     Bactrim [sulfamethoxazole-trimethoprim];  Dicloxacillin; and Pcn [penicillins]    FAMILY HISTORY       Family Status   Relation Status    Paternal Grandfather     Father     Mother       Family History   Problem Relation Age of Onset    Heart Disease Paternal Grandfather     Diabetes Father     Hypertension Father     Stroke Father     Hypertension Mother     Mental Illness Mother        SOCIAL HISTORY       Social History     Social History    Marital status:      Spouse name: N/A    Number of children: N/A    Years of education: N/A     Social History Main Topics    Smoking status: Current Every Day Smoker     Packs/day: 0.50     Years: 18.00     Types: Cigarettes    Smokeless tobacco: Never Used    Vitals:    01/24/18 1926   BP: (!) 151/87   Pulse: 91   Resp: 16   Temp: 98 °F (36.7 °C)   SpO2: 99%   Weight: 204 lb (92.5 kg)   Height: 5' 3\" (1.6 m)     -------------------------  BP: (!) 151/87, Temp: 98 °F (36.7 °C), Pulse: 91, Resp: 16      FINAL IMPRESSION      1. Neck pain    2. Fall, initial encounter    3.  Contusion of right elbow, initial encounter          DISPOSITION/PLAN    DISPOSITION Decision To Discharge 01/24/2018 08:51:18 PM      PATIENT REFERRED TO:  Mele Delcid, [address] [place] [address] [address]  [phone]    Call in 1 day        DISCHARGE MEDICATIONS:  New Prescriptions    IBUPROFEN (ADVIL;MOTRIN) 600 MG TABLET    Take 1 tablet by mouth every 6 hours as needed for Pain       (Please note that portions of this note were completed with a voice recognition program.  Efforts were made to edit the dictations but occasionally words are mis-transcribed.)    Miko Liz MD, MATT, [facility]  Attending Emergency Physician                     Miko Liz MD  01/24/18 2052

## 2018-02-19 ENCOUNTER — HOSPITAL ENCOUNTER (OUTPATIENT)
Age: 43
Discharge: HOME | End: 2018-02-19
Payer: COMMERCIAL

## 2018-02-19 LAB
ABSOLUTE EOS #: 0.2 K/UL (ref 0–0.4)
ABSOLUTE IMMATURE GRANULOCYTE: NORMAL K/UL (ref 0–0.3)
ABSOLUTE LYMPH #: 1.8 K/UL (ref 1–4.8)
ABSOLUTE MONO #: 0.3 K/UL (ref 0.1–1.3)
ALBUMIN SERPL-MCNC: 4.2 G/DL (ref 3.5–5.2)
ALBUMIN/GLOBULIN RATIO: ABNORMAL (ref 1–2.5)
ALP BLD-CCNC: 83 U/L (ref 35–104)
ALT SERPL-CCNC: 24 U/L (ref 5–33)
ANION GAP SERPL CALCULATED.3IONS-SCNC: 11 MMOL/L (ref 9–17)
AST SERPL-CCNC: 21 U/L
BASOPHILS # BLD: 0 % (ref 0–2)
BASOPHILS ABSOLUTE: 0 K/UL (ref 0–0.2)
BILIRUB SERPL-MCNC: 0.3 MG/DL (ref 0.3–1.2)
BUN BLDV-MCNC: 11 MG/DL (ref 6–20)
BUN/CREAT BLD: ABNORMAL (ref 9–20)
CALCIUM SERPL-MCNC: 9.1 MG/DL (ref 8.6–10.4)
CHLORIDE BLD-SCNC: 104 MMOL/L (ref 98–107)
CHOLESTEROL/HDL RATIO: 3.1
CHOLESTEROL: 147 MG/DL
CO2: 23 MMOL/L (ref 20–31)
CREAT SERPL-MCNC: 0.61 MG/DL (ref 0.5–0.9)
DIFFERENTIAL TYPE: NORMAL
EOSINOPHILS RELATIVE PERCENT: 4 % (ref 0–4)
ESTIMATED AVERAGE GLUCOSE: 111 MG/DL
GFR AFRICAN AMERICAN: >60 ML/MIN
GFR NON-AFRICAN AMERICAN: >60 ML/MIN
GFR SERPL CREATININE-BSD FRML MDRD: ABNORMAL ML/MIN/{1.73_M2}
GFR SERPL CREATININE-BSD FRML MDRD: ABNORMAL ML/MIN/{1.73_M2}
GLUCOSE BLD-MCNC: 104 MG/DL (ref 70–99)
HBA1C MFR BLD: 5.5 % (ref 4–6)
HCT VFR BLD CALC: 43.3 % (ref 36–46)
HDLC SERPL-MCNC: 48 MG/DL
HEMOGLOBIN: 14.7 G/DL (ref 12–16)
IMMATURE GRANULOCYTES: NORMAL %
INR BLD: 1
LDL CHOLESTEROL: 88 MG/DL (ref 0–130)
LYMPHOCYTES # BLD: 28 % (ref 24–44)
MCH RBC QN AUTO: 32.1 PG (ref 26–34)
MCHC RBC AUTO-ENTMCNC: 34 G/DL (ref 31–37)
MCV RBC AUTO: 94.6 FL (ref 80–100)
MONOCYTES # BLD: 5 % (ref 1–7)
NRBC AUTOMATED: NORMAL PER 100 WBC
PARTIAL THROMBOPLASTIN TIME: 29.1 SEC (ref 23–31)
PDW BLD-RTO: 13.5 % (ref 11.5–14.9)
PLATELET # BLD: 201 K/UL (ref 150–450)
PLATELET ESTIMATE: NORMAL
PMV BLD AUTO: 9.1 FL (ref 6–12)
POTASSIUM SERPL-SCNC: 4.8 MMOL/L (ref 3.7–5.3)
PROTHROMBIN TIME: 10.4 SEC (ref 9.7–12)
RBC # BLD: 4.57 M/UL (ref 4–5.2)
RBC # BLD: NORMAL 10*6/UL
SEG NEUTROPHILS: 63 % (ref 36–66)
SEGMENTED NEUTROPHILS ABSOLUTE COUNT: 4.1 K/UL (ref 1.3–9.1)
SODIUM BLD-SCNC: 138 MMOL/L (ref 135–144)
TOTAL PROTEIN: 7 G/DL (ref 6.4–8.3)
TRIGL SERPL-MCNC: 55 MG/DL
VLDLC SERPL CALC-MCNC: NORMAL MG/DL (ref 1–30)
WBC # BLD: 6.5 K/UL (ref 3.5–11)
WBC # BLD: NORMAL 10*3/UL

## 2018-02-19 PROCEDURE — 36415 COLL VENOUS BLD VENIPUNCTURE: CPT

## 2018-02-19 PROCEDURE — 80061 LIPID PANEL: CPT

## 2018-02-19 PROCEDURE — 85610 PROTHROMBIN TIME: CPT

## 2018-02-19 PROCEDURE — 85025 COMPLETE CBC W/AUTO DIFF WBC: CPT

## 2018-02-19 PROCEDURE — 85730 THROMBOPLASTIN TIME PARTIAL: CPT

## 2018-02-19 PROCEDURE — 80053 COMPREHEN METABOLIC PANEL: CPT

## 2018-02-19 PROCEDURE — 83036 HEMOGLOBIN GLYCOSYLATED A1C: CPT

## 2018-02-25 ENCOUNTER — HOSPITAL ENCOUNTER (EMERGENCY)
Age: 43
Discharge: HOME | End: 2018-02-25
Attending: EMERGENCY MEDICINE
Payer: COMMERCIAL

## 2018-02-25 ENCOUNTER — HOSPITAL ENCOUNTER (OUTPATIENT)
Dept: GENERAL RADIOLOGY | Age: 43
Discharge: HOME | End: 2018-02-27
Payer: COMMERCIAL

## 2018-02-25 ENCOUNTER — APPOINTMENT (OUTPATIENT)
Dept: GENERAL RADIOLOGY | Age: 43
End: 2018-02-25
Payer: COMMERCIAL

## 2018-02-25 VITALS
HEART RATE: 79 BPM | BODY MASS INDEX: 37.56 KG/M2 | WEIGHT: 212 LBS | TEMPERATURE: 98.1 F | DIASTOLIC BLOOD PRESSURE: 71 MMHG | OXYGEN SATURATION: 98 % | RESPIRATION RATE: 16 BRPM | SYSTOLIC BLOOD PRESSURE: 149 MMHG | HEIGHT: 63 IN

## 2018-02-25 DIAGNOSIS — S60.221A CONTUSION OF RIGHT HAND, INITIAL ENCOUNTER: Primary | ICD-10-CM

## 2018-02-25 DIAGNOSIS — M25.561 ACUTE PAIN OF RIGHT KNEE: ICD-10-CM

## 2018-02-25 DIAGNOSIS — M25.461 EFFUSION OF RIGHT KNEE: Primary | ICD-10-CM

## 2018-02-25 PROCEDURE — 99282 EMERGENCY DEPT VISIT SF MDM: CPT

## 2018-02-25 PROCEDURE — 73130 X-RAY EXAM OF HAND: CPT

## 2018-02-25 PROCEDURE — 99283 EMERGENCY DEPT VISIT LOW MDM: CPT

## 2018-02-25 PROCEDURE — 73562 X-RAY EXAM OF KNEE 3: CPT

## 2018-02-25 ASSESSMENT — PAIN SCALES - GENERAL: PAINLEVEL_OUTOF10: 5

## 2018-02-25 ASSESSMENT — PAIN DESCRIPTION - PAIN TYPE: TYPE: ACUTE PAIN

## 2018-02-25 ASSESSMENT — PAIN DESCRIPTION - ORIENTATION: ORIENTATION: RIGHT

## 2018-02-25 ASSESSMENT — PAIN DESCRIPTION - LOCATION: LOCATION: HAND

## 2018-02-25 NOTE — ED PROVIDER NOTES
Tonsillectomy and adenoidectomy; Ankle surgery (Right, 1997); Endometrial ablation (09/11/14); Knee arthroscopy (Left, 6/29/15); other surgical history (12/27/2016); and knee surgery. CURRENT MEDICATIONS       Discharge Medication List as of 2/25/2018  4:43 AM      CONTINUE these medications which have NOT CHANGED    Details   ibuprofen (ADVIL;MOTRIN) 600 MG tablet Take 1 tablet by mouth every 6 hours as needed for Pain, Disp-30 tablet, R-0Print      ondansetron (ZOFRAN) 4 MG tablet Take 1 tablet by mouth every 8 hours as needed for Nausea, Disp-12 tablet, R-0Print      busPIRone (BUSPAR) 10 MG tablet Take 10 mg by mouth 2 times dailyHistorical Med      naproxen (NAPROSYN) 500 MG tablet Take 500 mg by mouth 2 times daily (with meals)Historical Med      azithromycin (ZITHROMAX) 250 MG tablet Take 1 tablet by mouth daily, Disp-4 tablet, R-0Print      FLUoxetine (PROZAC) 20 MG capsule Take 60 mg by mouth nightly Historical Med      rOPINIRole (REQUIP) 5 MG tablet Take 7.5 mg by mouth 3 times daily Historical Med             ALLERGIES     is allergic to bactrim [sulfamethoxazole-trimethoprim]; dicloxacillin; and pcn [penicillins]. FAMILY HISTORY     indicated that the status of her mother is unknown. She indicated that the status of her father is unknown. She indicated that the status of her paternal grandfather is unknown.      family history includes Diabetes in her father; Heart Disease in her paternal grandfather; Hypertension in her father and mother; Mental Illness in her mother; Stroke in her father. SOCIAL HISTORY      reports that she has been smoking Cigarettes. She has a 9.00 pack-year smoking history. She has never used smokeless tobacco. She reports that she uses drugs, including Marijuana. She reports that she does not drink alcohol. PHYSICAL EXAM     INITIAL VITALS:  vitals were not taken for this visit.     Physical Exam   Constitutional: She is oriented to person, place, and time and well-developed, well-nourished, and in no distress. No distress. HENT:   Head: Normocephalic and atraumatic. Mouth/Throat: Oropharynx is clear and moist.   Eyes: Conjunctivae are normal. Pupils are equal, round, and reactive to light. Neck: Neck supple. Cardiovascular: Normal rate, regular rhythm, normal heart sounds and intact distal pulses. No murmur heard. Pulmonary/Chest: Effort normal and breath sounds normal. No respiratory distress. Abdominal: Soft. Bowel sounds are normal. She exhibits no distension. There is no tenderness. Musculoskeletal: She exhibits edema (Right knee) and tenderness. Right knee with very mild diffuse swelling with tenderness along the medial and lateral joint lines. No obvious fracture or dislocation. Skin is intact. Compartments are soft. MCL and LCL with no laxity. ACL with no laxity. Right Achilles tendon function is intact. Sensation is intact distally. DP and PT pulses are intact on the right side. Motor function is intact in right knee and right ankle. Lymphadenopathy:     She has no cervical adenopathy. Neurological: She is alert and oriented to person, place, and time. GCS score is 15. Skin: Skin is warm and dry. No rash noted. Psychiatric: Affect and judgment normal.   Nursing note and vitals reviewed. DIFFERENTIAL DIAGNOSIS/MDM:   Right knee sprain versus meniscal injury    80-year-old female presents with right knee pain and swelling. She had a mechanical fall onto the right knee. She does have mild swelling with tenderness along the medial and lateral joint lines. Suspect a soft tissue injury. We'll get x-ray and reassess.     DIAGNOSTIC RESULTS     EKG: All EKG's are interpreted by the Emergency Department Physician who either signs or Co-signs this chart in the absence of a cardiologist.        RADIOLOGY:   I directly visualized the following  images and reviewed the radiologist interpretations:  No orders to display     No

## 2018-02-25 NOTE — ED NOTES
Discharge instructions and paperwork given. Patient denies any questions or concerns.      Nat Paredes RN  02/25/18 0087

## 2018-02-25 NOTE — ED PROVIDER NOTES
[facility] ED  [address] [address]  Phone: [phone]  Fax: [phone]      eMERGENCY dEPARTMENT eNCOUnter      Pt Name: Alia Jones  MRN: [id number]  [place] 1975  Date of evaluation: 2018  Provider: Siddharth Kwong PA-C    CHIEF COMPLAINT       Chief Complaint   Patient presents with    Hand Injury     and wrist r/t fall yesterday           HISTORY OF PRESENT ILLNESS  (Location/Symptom, Timing/Onset, Context/Setting, Quality, Duration, Modifying Factors, Severity.)   Alia Jones is a 40 y.o. female who presents to the emergency department for evaluation of right hand pain/swelling that she noted after fall ~ 12 hrs prior to this. She had fall on right knee at 2pm yesterday, was evaluated at [facility] at 2am for her knee but didn't note any hand pain at that time. Pain was noticed once home from [facility]. She wrapped hand and went to bed, awoke to more pain and swelling in this area. No focal weakness/paresthesias. She had no other complaints. Nursing Notes were reviewed. REVIEW OF SYSTEMS    (2-9 systems for level 4, 10 or more for level 5)     Review of Systems   Constitutional: Negative. HENT: Negative. Eyes: Negative. Respiratory: Negative. Cardiovascular: Negative. Gastrointestinal: Negative. Musculoskeletal: Negative. Endocrine: Negative. Genitourinary: Negative. Skin: Negative. Allergic/Immunologic: Negative. Neurological: Negative. Hematological: Negative. Psychiatric/Behavioral: Negative. Except as noted above the remainder of the review of systems was reviewed and negative. PAST MEDICAL HISTORY   History reviewed.     Past Medical History:   Diagnosis Date    Anxiety     Arthritis     OA    Depression     post partum    Diabetes, gestational     history of    Menarche @ 16 y/o    Neuropathy ([facility] [address]     CTS    Obesity     Parity       -    Restless leg syndrome     Sleep apnea     CPAP    UTI (1.6 m)       [phone]  Attending discharged this patient after discussing all labwork/imaging results that were finalized. Treatment plan and recommended follow-up discussed with them as well. She declined ACE wrap. CONSULTS:  None    PROCEDURES:  None    Patient instructed to return to the emergency room if symptoms worsen, return, or any other concern right away which is agreed. FINAL IMPRESSION      1.  Contusion of right hand, initial encounter            DISPOSITION/PLAN   DISPOSITION Decision To Discharge      PATIENT REFERRED TO:  Janett Amador [facility] [address]  [phone]    Go to   for persistence or worsening of symptoms      DISCHARGE MEDICATIONS:  New Prescriptions    No medications on file       (Please note that portions of this note were completed with a voice recognition program.  Efforts were made to edit the dictations but occasionally words are mis-transcribed.)    ABRAHAM Boland PA-C  02/25/18 1531

## 2018-02-27 ENCOUNTER — HOSPITAL ENCOUNTER (EMERGENCY)
Age: 43
Discharge: HOME | End: 2018-02-27
Attending: EMERGENCY MEDICINE
Payer: COMMERCIAL

## 2018-02-27 VITALS
HEIGHT: 63 IN | SYSTOLIC BLOOD PRESSURE: 130 MMHG | OXYGEN SATURATION: 96 % | TEMPERATURE: 98.4 F | RESPIRATION RATE: 16 BRPM | BODY MASS INDEX: 37.56 KG/M2 | HEART RATE: 98 BPM | DIASTOLIC BLOOD PRESSURE: 89 MMHG | WEIGHT: 212 LBS

## 2018-02-27 DIAGNOSIS — J32.4 CHRONIC PANSINUSITIS: Primary | ICD-10-CM

## 2018-02-27 LAB
DIRECT EXAM: NORMAL
Lab: NORMAL
SPECIMEN DESCRIPTION: NORMAL
STATUS: NORMAL

## 2018-02-27 PROCEDURE — 6360000002 HC RX W HCPCS: Performed by: PHYSICIAN ASSISTANT

## 2018-02-27 PROCEDURE — 87804 INFLUENZA ASSAY W/OPTIC: CPT

## 2018-02-27 PROCEDURE — 96374 THER/PROPH/DIAG INJ IV PUSH: CPT

## 2018-02-27 PROCEDURE — 2580000003 HC RX 258: Performed by: PHYSICIAN ASSISTANT

## 2018-02-27 PROCEDURE — 99283 EMERGENCY DEPT VISIT LOW MDM: CPT

## 2018-02-27 RX ORDER — FLUTICASONE PROPIONATE 50 MCG
1 SPRAY, SUSPENSION (ML) NASAL DAILY
Qty: 1 BOTTLE | Refills: 0 | Status: SHIPPED | OUTPATIENT
Start: 2018-02-27 | End: 2018-05-02

## 2018-02-27 RX ORDER — KETOROLAC TROMETHAMINE 30 MG/ML
30 INJECTION, SOLUTION INTRAMUSCULAR; INTRAVENOUS ONCE
Status: COMPLETED | OUTPATIENT
Start: 2018-02-27 | End: 2018-02-27

## 2018-02-27 RX ORDER — 0.9 % SODIUM CHLORIDE 0.9 %
1000 INTRAVENOUS SOLUTION INTRAVENOUS ONCE
Status: COMPLETED | OUTPATIENT
Start: 2018-02-27 | End: 2018-02-27

## 2018-02-27 RX ORDER — BENZONATATE 100 MG/1
100 CAPSULE ORAL 3 TIMES DAILY PRN
Qty: 30 CAPSULE | Refills: 0 | Status: SHIPPED | OUTPATIENT
Start: 2018-02-27 | End: 2018-03-06

## 2018-02-27 RX ADMIN — KETOROLAC TROMETHAMINE 30 MG: 30 INJECTION, SOLUTION INTRAMUSCULAR; INTRAVENOUS at 21:46

## 2018-02-27 RX ADMIN — SODIUM CHLORIDE 1000 ML: 9 INJECTION, SOLUTION INTRAVENOUS at 21:47

## 2018-02-27 ASSESSMENT — PAIN DESCRIPTION - FREQUENCY: FREQUENCY: CONTINUOUS

## 2018-02-27 ASSESSMENT — PAIN SCALES - GENERAL
PAINLEVEL_OUTOF10: 3
PAINLEVEL_OUTOF10: 3

## 2018-02-27 ASSESSMENT — PAIN SCALES - WONG BAKER: WONGBAKER_NUMERICALRESPONSE: 2

## 2018-02-27 ASSESSMENT — PAIN DESCRIPTION - DESCRIPTORS: DESCRIPTORS: PRESSURE

## 2018-02-27 ASSESSMENT — PAIN DESCRIPTION - PAIN TYPE: TYPE: ACUTE PAIN

## 2018-02-27 ASSESSMENT — PAIN DESCRIPTION - LOCATION: LOCATION: HEAD

## 2018-02-28 NOTE — ED PROVIDER NOTES
SURGERY      left knee scoped on 1-2-17    MYRINGOTOMY Bilateral     with tubes    OTHER SURGICAL HISTORY  12/27/2016    removal multiple vulvar inclusion cysts    TONSILLECTOMY AND ADENOIDECTOMY      TUBAL LIGATION       None otherwise stated in nurses notes    CURRENT MEDICATIONS       Previous Medications    BUSPIRONE (BUSPAR) 10 MG TABLET    Take 10 mg by mouth 2 times daily    FLUOXETINE (PROZAC) 20 MG CAPSULE    Take 80 mg by mouth nightly     IBUPROFEN (ADVIL;MOTRIN) 600 MG TABLET    Take 1 tablet by mouth every 6 hours as needed for Pain    NAPROXEN (NAPROSYN) 500 MG TABLET    Take 500 mg by mouth 2 times daily (with meals)    ONDANSETRON (ZOFRAN) 4 MG TABLET    Take 1 tablet by mouth every 8 hours as needed for Nausea    ROPINIROLE (REQUIP) 5 MG TABLET    Take 7.5 mg by mouth 3 times daily        ALLERGIES     Bactrim [sulfamethoxazole-trimethoprim]; Dicloxacillin; and Pcn [penicillins]    FAMILY HISTORY           Problem Relation Age of Onset    Heart Disease Paternal Grandfather     Diabetes Father     Hypertension Father     Stroke Father     Hypertension Mother     Mental Illness Mother      Family Status   Relation Status    Paternal Grandfather     Father     Mother       None otherwise stated in nurses notes    SOCIAL HISTORY      reports that she has been smoking Cigarettes. She has a 9.00 pack-year smoking history. She has never used smokeless tobacco. She reports that she uses drugs, including Marijuana. She reports that she does not drink alcohol. lives at home with others     PHYSICAL EXAM    (up to 7 for level 4, 8 or more for level 5)     ED Triage Vitals [02/27/18 2122]   BP Temp Temp Source Pulse Resp SpO2 Height Weight   130/89 98.4 °F (36.9 °C) Oral 98 16 96 % 5' 3\" (1.6 m) 212 lb (96.2 kg)       Physical Exam   Nursing note and vitals reviewed. Constitutional: Oriented to person, place, and time and well-developed, well-nourished. HENT: Normocephalic and atraumatic. External ears normal. Nose normal and midline. there is tenderness to percussion to the frontal and maxillary sinuses bilaterally   Eyes: Conjunctivae and EOM are normal. Pupils are equal, round, and reactive to light. Throat: Posterior pharynx is without erythema or exudates, airway is patent, no swelling  Cardiovascular: Normal rate, regular rhythm, normal heart sounds and intact distal pulses. Pulmonary/Chest: Effort normal and breath sounds normal. No respiratory distress. no wheezes. No rales. No chest tenderness. Coughing during exam  Musculoskeletal: Normal range of motion. Skin: Skin is warm and dry. No rash noted. No erythema. No pallor. no diaphoresis             DIAGNOSTIC RESULTS     RADIOLOGY:   All plain film, CT, MRI, and formal ultrasound images (except ED bedside ultrasound) are read by the radiologist and the images and interpretations are directly viewed by the emergency physician. No orders to display           LABS:  Labs Reviewed   RAPID INFLUENZA A/B ANTIGENS       All other labs were within normal range or not returned as of this dictation. EMERGENCY DEPARTMENT COURSE and DIFFERENTIAL DIAGNOSIS/MDM:   Vitals:    Vitals:    02/27/18 2122   BP: 130/89   Pulse: 98   Resp: 16   Temp: 98.4 °F (36.9 °C)   TempSrc: Oral   SpO2: 96%   Weight: 212 lb (96.2 kg)   Height: 5' 3\" (1.6 m)       After treatment in the emergency room, patient's feeling much better, we'll discharge home, most likely viral sinusitis, we'll treat with medication and outpatient  Pt is resting comfortably, no difficulty breathing, no distress. Instructed to return to the emergency room if symptoms worsen, return, or any other concern right away which is agreed by the patient. Instructed to f/u with PCP in 2-3 days for re-evaluation.     ED MEDS:  Orders Placed This Encounter   Medications    0.9 % sodium chloride bolus    ketorolac (TORADOL) injection 30 mg    loratadine-pseudoephedrine (CLARITIN-D 12 HOUR) 5-120

## 2018-03-06 ENCOUNTER — HOSPITAL ENCOUNTER (EMERGENCY)
Age: 43
Discharge: HOME | End: 2018-03-06
Attending: EMERGENCY MEDICINE
Payer: COMMERCIAL

## 2018-03-06 ENCOUNTER — APPOINTMENT (OUTPATIENT)
Dept: GENERAL RADIOLOGY | Age: 43
End: 2018-03-06
Payer: COMMERCIAL

## 2018-03-06 ENCOUNTER — APPOINTMENT (OUTPATIENT)
Dept: CT IMAGING | Age: 43
End: 2018-03-06
Payer: COMMERCIAL

## 2018-03-06 VITALS
OXYGEN SATURATION: 98 % | WEIGHT: 212 LBS | RESPIRATION RATE: 20 BRPM | HEART RATE: 82 BPM | DIASTOLIC BLOOD PRESSURE: 80 MMHG | SYSTOLIC BLOOD PRESSURE: 118 MMHG | TEMPERATURE: 97.8 F | HEIGHT: 63 IN | BODY MASS INDEX: 37.56 KG/M2

## 2018-03-06 DIAGNOSIS — R05.8 PRODUCTIVE COUGH: ICD-10-CM

## 2018-03-06 DIAGNOSIS — R07.89 CHEST WALL PAIN: ICD-10-CM

## 2018-03-06 DIAGNOSIS — F17.200 SMOKING ADDICTION: ICD-10-CM

## 2018-03-06 DIAGNOSIS — J20.9 ACUTE BRONCHITIS, UNSPECIFIED ORGANISM: ICD-10-CM

## 2018-03-06 DIAGNOSIS — R07.9 CHEST PAIN, UNSPECIFIED TYPE: Primary | ICD-10-CM

## 2018-03-06 LAB
ABSOLUTE EOS #: 0.4 K/UL (ref 0–0.4)
ABSOLUTE IMMATURE GRANULOCYTE: ABNORMAL K/UL (ref 0–0.3)
ABSOLUTE LYMPH #: 3.6 K/UL (ref 1–4.8)
ABSOLUTE MONO #: 0.7 K/UL (ref 0.1–1.3)
ALBUMIN SERPL-MCNC: 4.2 G/DL (ref 3.5–5.2)
ALBUMIN/GLOBULIN RATIO: ABNORMAL (ref 1–2.5)
ALP BLD-CCNC: 85 U/L (ref 35–104)
ALT SERPL-CCNC: 24 U/L (ref 5–33)
ANION GAP SERPL CALCULATED.3IONS-SCNC: 12 MMOL/L (ref 9–17)
AST SERPL-CCNC: 22 U/L
BASOPHILS # BLD: 1 % (ref 0–2)
BASOPHILS ABSOLUTE: 0.1 K/UL (ref 0–0.2)
BILIRUB SERPL-MCNC: <0.15 MG/DL (ref 0.3–1.2)
BUN BLDV-MCNC: 13 MG/DL (ref 6–20)
BUN/CREAT BLD: ABNORMAL (ref 9–20)
CALCIUM SERPL-MCNC: 9.3 MG/DL (ref 8.6–10.4)
CHLORIDE BLD-SCNC: 104 MMOL/L (ref 98–107)
CO2: 21 MMOL/L (ref 20–31)
CREAT SERPL-MCNC: 0.57 MG/DL (ref 0.5–0.9)
D-DIMER QUANTITATIVE: 0.54 MG/L FEU
DIFFERENTIAL TYPE: ABNORMAL
EKG ATRIAL RATE: 86 BPM
EKG P AXIS: 37 DEGREES
EKG P-R INTERVAL: 146 MS
EKG Q-T INTERVAL: 338 MS
EKG QRS DURATION: 86 MS
EKG QTC CALCULATION (BAZETT): 404 MS
EKG R AXIS: 48 DEGREES
EKG T AXIS: 39 DEGREES
EKG VENTRICULAR RATE: 86 BPM
EOSINOPHILS RELATIVE PERCENT: 3 % (ref 0–4)
GFR AFRICAN AMERICAN: >60 ML/MIN
GFR NON-AFRICAN AMERICAN: >60 ML/MIN
GFR SERPL CREATININE-BSD FRML MDRD: ABNORMAL ML/MIN/{1.73_M2}
GFR SERPL CREATININE-BSD FRML MDRD: ABNORMAL ML/MIN/{1.73_M2}
GLUCOSE BLD-MCNC: 124 MG/DL (ref 70–99)
HCT VFR BLD CALC: 40.7 % (ref 36–46)
HEMOGLOBIN: 13.9 G/DL (ref 12–16)
IMMATURE GRANULOCYTES: ABNORMAL %
LYMPHOCYTES # BLD: 30 % (ref 24–44)
MCH RBC QN AUTO: 31.7 PG (ref 26–34)
MCHC RBC AUTO-ENTMCNC: 34 G/DL (ref 31–37)
MCV RBC AUTO: 93 FL (ref 80–100)
MONOCYTES # BLD: 5 % (ref 1–7)
NRBC AUTOMATED: ABNORMAL PER 100 WBC
PDW BLD-RTO: 13.3 % (ref 11.5–14.9)
PLATELET # BLD: 206 K/UL (ref 150–450)
PLATELET ESTIMATE: ABNORMAL
PMV BLD AUTO: 8.9 FL (ref 6–12)
POTASSIUM SERPL-SCNC: 3.4 MMOL/L (ref 3.7–5.3)
RBC # BLD: 4.38 M/UL (ref 4–5.2)
RBC # BLD: ABNORMAL 10*6/UL
SEG NEUTROPHILS: 61 % (ref 36–66)
SEGMENTED NEUTROPHILS ABSOLUTE COUNT: 7.3 K/UL (ref 1.3–9.1)
SODIUM BLD-SCNC: 137 MMOL/L (ref 135–144)
TOTAL PROTEIN: 6.8 G/DL (ref 6.4–8.3)
TROPONIN INTERP: NORMAL
TROPONIN INTERP: NORMAL
TROPONIN T: <0.03 NG/ML
TROPONIN T: <0.03 NG/ML
WBC # BLD: 12.1 K/UL (ref 3.5–11)
WBC # BLD: ABNORMAL 10*3/UL

## 2018-03-06 PROCEDURE — 36415 COLL VENOUS BLD VENIPUNCTURE: CPT

## 2018-03-06 PROCEDURE — 96374 THER/PROPH/DIAG INJ IV PUSH: CPT

## 2018-03-06 PROCEDURE — 80053 COMPREHEN METABOLIC PANEL: CPT

## 2018-03-06 PROCEDURE — 6370000000 HC RX 637 (ALT 250 FOR IP): Performed by: EMERGENCY MEDICINE

## 2018-03-06 PROCEDURE — 2580000003 HC RX 258: Performed by: EMERGENCY MEDICINE

## 2018-03-06 PROCEDURE — 84484 ASSAY OF TROPONIN QUANT: CPT

## 2018-03-06 PROCEDURE — 71260 CT THORAX DX C+: CPT

## 2018-03-06 PROCEDURE — 85379 FIBRIN DEGRADATION QUANT: CPT

## 2018-03-06 PROCEDURE — 99285 EMERGENCY DEPT VISIT HI MDM: CPT

## 2018-03-06 PROCEDURE — 6360000004 HC RX CONTRAST MEDICATION: Performed by: EMERGENCY MEDICINE

## 2018-03-06 PROCEDURE — 85025 COMPLETE CBC W/AUTO DIFF WBC: CPT

## 2018-03-06 PROCEDURE — 71046 X-RAY EXAM CHEST 2 VIEWS: CPT

## 2018-03-06 PROCEDURE — 6360000002 HC RX W HCPCS: Performed by: EMERGENCY MEDICINE

## 2018-03-06 PROCEDURE — 93005 ELECTROCARDIOGRAM TRACING: CPT

## 2018-03-06 RX ORDER — 0.9 % SODIUM CHLORIDE 0.9 %
100 INTRAVENOUS SOLUTION INTRAVENOUS ONCE
Status: COMPLETED | OUTPATIENT
Start: 2018-03-06 | End: 2018-03-06

## 2018-03-06 RX ORDER — AZITHROMYCIN 250 MG/1
250 TABLET, FILM COATED ORAL DAILY
Qty: 4 TABLET | Refills: 0 | Status: SHIPPED | OUTPATIENT
Start: 2018-03-06 | End: 2018-03-13

## 2018-03-06 RX ORDER — LORATADINE 10 MG/1
10 TABLET ORAL DAILY
Qty: 30 TABLET | Refills: 0 | Status: SHIPPED | OUTPATIENT
Start: 2018-03-06 | End: 2018-05-02

## 2018-03-06 RX ORDER — AZITHROMYCIN 250 MG/1
500 TABLET, FILM COATED ORAL ONCE
Status: COMPLETED | OUTPATIENT
Start: 2018-03-06 | End: 2018-03-06

## 2018-03-06 RX ORDER — BENZONATATE 100 MG/1
100 CAPSULE ORAL ONCE
Status: COMPLETED | OUTPATIENT
Start: 2018-03-06 | End: 2018-03-06

## 2018-03-06 RX ORDER — ALBUTEROL SULFATE 90 UG/1
2 AEROSOL, METERED RESPIRATORY (INHALATION) EVERY 6 HOURS PRN
Qty: 1 INHALER | Refills: 0 | Status: SHIPPED | OUTPATIENT
Start: 2018-03-06 | End: 2018-05-02

## 2018-03-06 RX ORDER — ONDANSETRON 2 MG/ML
4 INJECTION INTRAMUSCULAR; INTRAVENOUS ONCE
Status: COMPLETED | OUTPATIENT
Start: 2018-03-06 | End: 2018-03-06

## 2018-03-06 RX ORDER — BENZONATATE 100 MG/1
100 CAPSULE ORAL 3 TIMES DAILY PRN
Qty: 30 CAPSULE | Refills: 0 | Status: SHIPPED | OUTPATIENT
Start: 2018-03-06 | End: 2018-03-13

## 2018-03-06 RX ORDER — ASPIRIN 81 MG/1
324 TABLET, CHEWABLE ORAL ONCE
Status: COMPLETED | OUTPATIENT
Start: 2018-03-06 | End: 2018-03-06

## 2018-03-06 RX ORDER — SODIUM CHLORIDE 0.9 % (FLUSH) 0.9 %
10 SYRINGE (ML) INJECTION PRN
Status: DISCONTINUED | OUTPATIENT
Start: 2018-03-06 | End: 2018-03-06 | Stop reason: HOSPADM

## 2018-03-06 RX ORDER — MORPHINE SULFATE 4 MG/ML
4 INJECTION, SOLUTION INTRAMUSCULAR; INTRAVENOUS ONCE
Status: COMPLETED | OUTPATIENT
Start: 2018-03-06 | End: 2018-03-06

## 2018-03-06 RX ADMIN — Medication 10 ML: at 03:26

## 2018-03-06 RX ADMIN — SODIUM CHLORIDE 100 ML: 9 INJECTION, SOLUTION INTRAVENOUS at 03:23

## 2018-03-06 RX ADMIN — BENZONATATE 100 MG: 100 CAPSULE ORAL at 05:30

## 2018-03-06 RX ADMIN — MORPHINE SULFATE 4 MG: 4 INJECTION, SOLUTION INTRAMUSCULAR; INTRAVENOUS at 02:33

## 2018-03-06 RX ADMIN — IOPAMIDOL 100 ML: 755 INJECTION, SOLUTION INTRAVENOUS at 03:24

## 2018-03-06 RX ADMIN — ONDANSETRON 4 MG: 2 INJECTION INTRAMUSCULAR; INTRAVENOUS at 03:12

## 2018-03-06 RX ADMIN — ASPIRIN 81 MG 324 MG: 81 TABLET ORAL at 02:13

## 2018-03-06 RX ADMIN — AZITHROMYCIN 500 MG: 250 TABLET, FILM COATED ORAL at 06:03

## 2018-03-06 ASSESSMENT — ENCOUNTER SYMPTOMS
BACK PAIN: 0
DIARRHEA: 0
VOMITING: 0
COUGH: 1
ABDOMINAL PAIN: 0
SORE THROAT: 0
NAUSEA: 0
EYE PAIN: 0
SHORTNESS OF BREATH: 1

## 2018-03-06 ASSESSMENT — PAIN DESCRIPTION - DESCRIPTORS: DESCRIPTORS: CONSTANT

## 2018-03-06 ASSESSMENT — PAIN DESCRIPTION - LOCATION: LOCATION: CHEST

## 2018-03-06 ASSESSMENT — PAIN SCALES - GENERAL
PAINLEVEL_OUTOF10: 7
PAINLEVEL_OUTOF10: 8

## 2018-03-06 ASSESSMENT — PAIN DESCRIPTION - ORIENTATION: ORIENTATION: MID

## 2018-03-06 ASSESSMENT — PAIN DESCRIPTION - FREQUENCY: FREQUENCY: CONTINUOUS

## 2018-03-06 ASSESSMENT — PAIN DESCRIPTION - PAIN TYPE: TYPE: ACUTE PAIN

## 2018-03-06 NOTE — ED PROVIDER NOTES
Making:  Chest pain in the context of severe coughing and history of smoking. Patient is very demented at time of exam, therefore, We will broaden workup to include chest pain evaluation CBC BMP troponin EKG chest x-ray as well as d-dimer. The patient is nondiagnostic, normal EKG, borderline elevated white cell count, borderline glucose, mild hypokalemia, d-dimer is elevated we'll add on CT chest for pulmonary Embolism. Evaluation is nondiagnostic, low suspicion for ACS dissection or pulmonary embolism. Her scar shows low risk for ACS. Patient during his stay he has had significant coughing with episodes productive cough, in the context of smoking, concerning for bronchitis, we'll provide azithromycin, albuterol inhaler, Claritin, and Tessalon Perles. Patient instructed to follow-up with PCP or return to the nearest emergency department for uncontrolled fevers, vomiting, shortness of breath, chest pain, or any other concerns. Patient acknowledges plan, voices understanding of it and is in agreement with it. Pre-hypertension/Hypertension: Patient has been informed thatthey may have pre-hypertension or Hypertension based on a blood pressure reading in the Emergency Department. It was recommended patient calls the primary care provider listed on discharge instructions or a physician of patient's choice this week to arrange follow up for further evaluation of possible pre-hypertension or Hypertension. CRITICAL CARE:   The patient admits to smoking. 3 minutes of time was spent discussing how this can worsen underlying breathing problems, COPD hypertension and heart disease or cause them to develop.     HEART Risk Score for Chest Pain Patients                       Patient Score  History   Highly suspicious    2            Moderately suspicious   1    = 1    Slightly or non suspicious   0      ECG   Significant STD    2        Nonspecific repolarization   1     = 0    Normal (no change from

## 2018-03-13 ENCOUNTER — OFFICE VISIT (OUTPATIENT)
Dept: OBGYN CLINIC | Age: 43
End: 2018-03-13
Payer: COMMERCIAL

## 2018-03-13 ENCOUNTER — HOSPITAL ENCOUNTER (OUTPATIENT)
Age: 43
Setting detail: SPECIMEN
Discharge: HOME | End: 2018-03-13
Payer: COMMERCIAL

## 2018-03-13 VITALS
HEIGHT: 63 IN | WEIGHT: 215.6 LBS | HEART RATE: 82 BPM | SYSTOLIC BLOOD PRESSURE: 143 MMHG | BODY MASS INDEX: 38.2 KG/M2 | DIASTOLIC BLOOD PRESSURE: 97 MMHG

## 2018-03-13 DIAGNOSIS — N76.0 ACUTE VAGINITIS: ICD-10-CM

## 2018-03-13 DIAGNOSIS — Z01.419 WELL WOMAN EXAM: Primary | ICD-10-CM

## 2018-03-13 DIAGNOSIS — R32 URINARY INCONTINENCE, UNSPECIFIED TYPE: ICD-10-CM

## 2018-03-13 DIAGNOSIS — Z12.31 SCREENING MAMMOGRAM, ENCOUNTER FOR: ICD-10-CM

## 2018-03-13 DIAGNOSIS — Z12.4 CERVICAL CANCER SCREENING: ICD-10-CM

## 2018-03-13 PROCEDURE — 99396 PREV VISIT EST AGE 40-64: CPT | Performed by: SPECIALIST

## 2018-03-13 RX ORDER — METRONIDAZOLE 500 MG/1
500 TABLET ORAL 2 TIMES DAILY
Qty: 14 TABLET | Refills: 0 | Status: SHIPPED | OUTPATIENT
Start: 2018-03-13 | End: 2018-03-20

## 2018-03-13 RX ORDER — FLUOXETINE HYDROCHLORIDE 40 MG/1
80 CAPSULE ORAL DAILY
COMMUNITY
Start: 2018-02-19 | End: 2021-02-02

## 2018-03-13 RX ORDER — FLUCONAZOLE 100 MG/1
100 TABLET ORAL DAILY
Qty: 7 TABLET | Refills: 0 | Status: SHIPPED | OUTPATIENT
Start: 2018-03-13 | End: 2018-03-20

## 2018-03-13 ASSESSMENT — ENCOUNTER SYMPTOMS
NAUSEA: 0
CONSTIPATION: 0
ABDOMINAL DISTENTION: 0
ABDOMINAL PAIN: 0
DIARRHEA: 0
VOMITING: 0
EYE PAIN: 0
COUGH: 0
APNEA: 0

## 2018-03-13 NOTE — PROGRESS NOTES
PAP Smear    CHARO DIGITAL SCREEN W OR WO CAD BILATERAL    Acute vaginitis        Relevant Orders    PAP Smear    CHARO DIGITAL SCREEN W OR WO CAD BILATERAL    Urinary incontinence, unspecified type        Relevant Orders    PAP Smear    CHARO DIGITAL SCREEN W OR WO CAD BILATERAL    Screening mammogram, encounter for        Relevant Orders    PAP Smear    CHARO DIGITAL SCREEN W OR WO CAD BILATERAL    Cervical cancer screening        Relevant Orders    PAP Smear    CHARO DIGITAL SCREEN W OR WO CAD BILATERAL        Allergies   Allergen Reactions    Bactrim [Sulfamethoxazole-Trimethoprim] Hives and Itching    Dicloxacillin Hives    Pcn [Penicillins] Hives     Orders Placed This Encounter   Procedures    CHARO DIGITAL SCREEN W OR WO CAD BILATERAL     Standing Status:   Future     Standing Expiration Date:   2019    PAP Smear     Patient History:    No LMP recorded. Patient has had an ablation. OBGYN Status: Ablation  Past Surgical History:  : ANKLE SURGERY Right  No date:  SECTION  06: CHOLECYSTECTOMY  No date: COLONOSCOPY      Comment: with poly removal  2014: [address] UTERUS  14: ENDOMETRIAL ABLATION      Comment: with novasure  14: HYSTEROSCOPY      Comment: D&C  6/29/15: KNEE ARTHROSCOPY Left  No date: KNEE SURGERY      Comment: left knee scoped on 17  No date: MYRINGOTOMY Bilateral      Comment: with tubes  2016: OTHER SURGICAL HISTORY      Comment: removal multiple vulvar inclusion cysts  No date: TONSILLECTOMY AND ADENOIDECTOMY  No date: TUBAL LIGATION      Smoking status: Current Every Day Smoker                                                   Packs/day: 0.50      Years: 18.00        Types: Cigarettes  Smokeless tobacco: Never Used                           Order Specific Question:   Collection Type     Answer:    Thin Prep     Order Specific Question:   Prior Abnormal Pap Test     Answer:   No     Order Specific Question:   Screening or Diagnostic Answer:   Screening     Order Specific Question:   HPV Requested? Answer:   Yes -  If ASCUS Reflex HPV     Order Specific Question:   High Risk Patient     Answer:   N/A        Patient is here today for her annual exam.  Patient complains of peeing every time she coughs. She denies nausea, vomiting and fever. Review of Systems   Constitutional: Negative for activity change, appetite change and fever. HENT: Negative for ear discharge and ear pain. Eyes: Negative for pain and visual disturbance. Respiratory: Negative for apnea and cough. Cardiovascular: Negative for chest pain, palpitations and leg swelling. Gastrointestinal: Negative for abdominal distention, abdominal pain, constipation, diarrhea, nausea and vomiting. Endocrine: Negative. Genitourinary: Negative for difficulty urinating, dysuria, menstrual problem and pelvic pain. Urinary incontinence   Musculoskeletal: Negative for neck pain and neck stiffness. Skin: Negative. Neurological: Negative for light-headedness and numbness. Hematological: Negative. Does not bruise/bleed easily. Objective:   Physical Exam   Constitutional: She is oriented to person, place, and time. Vital signs are normal. She appears well-developed and well-nourished. HENT:   Head: Normocephalic and atraumatic. Neck: No thyroid mass and no thyromegaly present. Cardiovascular: Normal rate and regular rhythm. Pulmonary/Chest: Effort normal and breath sounds normal. Right breast exhibits no inverted nipple, no mass, no nipple discharge, no skin change and no tenderness. Left breast exhibits no inverted nipple, no mass, no nipple discharge, no skin change and no tenderness. Abdominal: Soft. Bowel sounds are normal. She exhibits no distension and no mass. There is no tenderness. There is no rebound and no guarding. Hernia confirmed negative in the right inguinal area and confirmed negative in the left inguinal area. kelly Leo. I reviewed the scribe's note and agree with the documented findings and plan of care. Any areas of disagreement are noted on the chart. I have personally evaluated this patient. Additional findings are as noted. I agree with the chief complaint, past medical history, past surgical history, allergies, medications, social and family history as documented unless otherwise noted below.      Electronically signed by Rachel Rapp MD on 3/14/2018 at 3:17 AM

## 2018-03-22 LAB — CYTOLOGY REPORT: NORMAL

## 2018-03-24 ENCOUNTER — HOSPITAL ENCOUNTER (EMERGENCY)
Age: 43
Discharge: HOME | End: 2018-03-24
Attending: EMERGENCY MEDICINE
Payer: COMMERCIAL

## 2018-03-24 ENCOUNTER — APPOINTMENT (OUTPATIENT)
Dept: GENERAL RADIOLOGY | Age: 43
End: 2018-03-24
Payer: COMMERCIAL

## 2018-03-24 VITALS
WEIGHT: 215 LBS | OXYGEN SATURATION: 97 % | BODY MASS INDEX: 38.09 KG/M2 | TEMPERATURE: 98.8 F | DIASTOLIC BLOOD PRESSURE: 91 MMHG | HEIGHT: 63 IN | SYSTOLIC BLOOD PRESSURE: 143 MMHG | HEART RATE: 83 BPM | RESPIRATION RATE: 16 BRPM

## 2018-03-24 DIAGNOSIS — S40.851A SUPERFICIAL FOREIGN BODY OF RIGHT UPPER ARM, INITIAL ENCOUNTER: ICD-10-CM
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
## 2018-03-24 PROCEDURE — 6360000002 HC RX W HCPCS: Performed by: PHYSICIAN ASSISTANT

## 2018-03-24 PROCEDURE — 73090 X-RAY EXAM OF FOREARM: CPT

## 2018-03-24 PROCEDURE — 90471 IMMUNIZATION ADMIN: CPT | Performed by: PHYSICIAN ASSISTANT

## 2018-03-24 PROCEDURE — 96372 THER/PROPH/DIAG INJ SC/IM: CPT

## 2018-03-24 PROCEDURE — 90675 RABIES VACCINE IM: CPT | Performed by: PHYSICIAN ASSISTANT

## 2018-03-24 PROCEDURE — 6370000000 HC RX 637 (ALT 250 FOR IP): Performed by: PHYSICIAN ASSISTANT

## 2018-03-24 PROCEDURE — 99283 EMERGENCY DEPT VISIT LOW MDM: CPT

## 2018-03-24 PROCEDURE — 90375 RABIES IG IM/SC: CPT | Performed by: PHYSICIAN ASSISTANT

## 2018-03-24 RX ORDER — DOXYCYCLINE HYCLATE 100 MG
100 TABLET ORAL 2 TIMES DAILY
Qty: 20 TABLET | Refills: 0 | Status: SHIPPED | OUTPATIENT
Start: 2018-03-24 | End: 2018-04-03

## 2018-03-24 RX ORDER — DOXYCYCLINE 100 MG/1
100 CAPSULE ORAL ONCE
Status: COMPLETED | OUTPATIENT
Start: 2018-03-24 | End: 2018-03-24

## 2018-03-24 RX ADMIN — RABIES VACCINE 1 ML: KIT at 22:57

## 2018-03-24 RX ADMIN — RABIES IMMUNE GLOBULIN (HUMAN) 1950 UNITS: 150 INJECTION INTRAMUSCULAR at 22:59

## 2018-03-24 RX ADMIN — DOXYCYCLINE 100 MG: 100 CAPSULE ORAL at 23:21

## 2018-03-24 ASSESSMENT — PAIN SCALES - GENERAL: PAINLEVEL_OUTOF10: 4

## 2018-03-25 NOTE — ED PROVIDER NOTES
with tubes    OTHER SURGICAL HISTORY  12/27/2016    removal multiple vulvar inclusion cysts    TONSILLECTOMY AND ADENOIDECTOMY      TUBAL LIGATION       None otherwise stated in nurses notes    CURRENT MEDICATIONS       Previous Medications    ALBUTEROL SULFATE HFA (VENTOLIN HFA) 108 (90 BASE) MCG/ACT INHALER    Inhale 2 puffs into the lungs every 6 hours as needed for Wheezing    BUSPIRONE (BUSPAR) 10 MG TABLET    Take 10 mg by mouth 2 times daily    FLUOXETINE (PROZAC) 40 MG CAPSULE        FLUTICASONE (FLONASE) 50 MCG/ACT NASAL SPRAY    1 spray by Nasal route daily    LORATADINE (CLARITIN) 10 MG TABLET    Take 1 tablet by mouth daily    NAPROXEN (NAPROSYN) 500 MG TABLET    Take 500 mg by mouth 2 times daily (with meals)    ROPINIROLE (REQUIP) 5 MG TABLET    Take 7.5 mg by mouth nightly        ALLERGIES     Bactrim [sulfamethoxazole-trimethoprim]; Dicloxacillin; and Pcn [penicillins]    FAMILY HISTORY           Problem Relation Age of Onset    Heart Disease Paternal Grandfather     Diabetes Father     Hypertension Father     Stroke Father     Hypertension Mother     Mental Illness Mother      Family Status   Relation Status    Paternal Grandfather     Father     Mother       None otherwise stated in nurses notes    SOCIAL HISTORY      reports that she has been smoking Cigarettes. She has a 9.00 pack-year smoking history. She has never used smokeless tobacco. She reports that she uses drugs, including Marijuana. She reports that she does not drink alcohol. lives at home with others     PHYSICAL EXAM    (up to 7 for level 4, 8 or more for level 5)     ED Triage Vitals [03/24/18 2147]   BP Temp Temp Source Pulse Resp SpO2 Height Weight   (!) 143/91 98.8 °F (37.1 °C) Oral 83 16 97 % 5' 3\" (1.6 m) 215 lb (97.5 kg)       Physical Exam   Nursing note and vitals reviewed. Constitutional: Oriented to person, place, and time and well-developed, well-nourished. Head: Normocephalic and atraumatic.    Ear: External ears normal.   Nose: Nose normal and midline. Eyes: Conjunctivae and EOM are normal. Pupils are equal, round, and reactive to light. Neck: Normal range of motion. Neck supple. Throat: Posterior pharynx is without erythema or exudates, airway is patent, no swelling  Cardiovascular: Normal rate, regular rhythm, normal heart sounds and intact distal pulses. Pulmonary/Chest: Effort normal and breath sounds normal. No respiratory distress. No wheezes. No rales. No chest tenderness. Abdominal: Soft. Bowel sounds are normal. No distension and no mass. There is no tenderness. There is no rebound and no guarding. Musculoskeletal: Examination of right arm reveals swelling, bruising, abrasion over dorsal aspect of distal forearm. Mild bleeding noted. Tenderness to palpitation. No hand, wrist, elbow pain. FROM. 5/5 strength. No visualized foreign body. 2/2 radial pulse. Distal sensation intact. Good  strength. Neurological: Alert and oriented to person, place, and time. GCS score is 15. Skin: Skin is warm and dry. No rash noted. No erythema. No pallor. see above. Psychiatric: Mood, memory, affect and judgment normal.           DIAGNOSTIC RESULTS     EKG: All EKG's are interpreted by the Emergency Department Physician who either signs or Co-signs this chart in the absence of a cardiologist.        RADIOLOGY:   All plain film, CT, MRI, and formal ultrasound images (except ED bedside ultrasound) are read by the radiologist, see reports below, unless otherwise noted in MDM or here. XR RADIUS ULNA RIGHT (2 VIEWS)   Final Result   No acute osseous abnormality.       Possible radiopaque foreign body measuring 4 mm in the lateral anterior soft   tissues             Xr Chest Standard (2 Vw)    Result Date: 3/6/2018  EXAMINATION: TWO VIEWS OF THE CHEST 3/6/2018 3:01 am COMPARISON: 08/11/2017 HISTORY: ORDERING SYSTEM PROVIDED HISTORY: CP TECHNOLOGIST PROVIDED HISTORY: Reason for exam:->CP Acuity:

## 2018-03-27 ENCOUNTER — HOSPITAL ENCOUNTER (EMERGENCY)
Age: 43
Discharge: HOME | End: 2018-03-27
Attending: EMERGENCY MEDICINE
Payer: COMMERCIAL

## 2018-03-27 VITALS
WEIGHT: 212 LBS | RESPIRATION RATE: 16 BRPM | HEIGHT: 63 IN | BODY MASS INDEX: 37.56 KG/M2 | TEMPERATURE: 97.9 F | DIASTOLIC BLOOD PRESSURE: 75 MMHG | OXYGEN SATURATION: 98 % | SYSTOLIC BLOOD PRESSURE: 134 MMHG | HEART RATE: 82 BPM

## 2018-03-27 DIAGNOSIS — Z23 NEED FOR PROPHYLACTIC VACCINATION AND INOCULATION AGAINST RABIES: Primary | ICD-10-CM

## 2018-03-27 PROCEDURE — 99281 EMR DPT VST MAYX REQ PHY/QHP: CPT

## 2018-03-27 PROCEDURE — 90675 RABIES VACCINE IM: CPT | Performed by: NURSE PRACTITIONER

## 2018-03-27 PROCEDURE — 90471 IMMUNIZATION ADMIN: CPT | Performed by: NURSE PRACTITIONER

## 2018-03-27 PROCEDURE — 6360000002 HC RX W HCPCS: Performed by: NURSE PRACTITIONER

## 2018-03-27 RX ADMIN — RABIES VACCINE 1 ML: KIT at 15:08

## 2018-03-27 ASSESSMENT — ENCOUNTER SYMPTOMS
VOMITING: 0
SHORTNESS OF BREATH: 0
NAUSEA: 0
TROUBLE SWALLOWING: 0
COUGH: 0

## 2018-03-27 NOTE — ED NOTES
Pt here for her second rabies vaccination .  Dog bite site to right forearm healing wnl , no s/s of infection      Ailyn Win RN  03/27/18 0850

## 2018-03-27 NOTE — ED PROVIDER NOTES
[address] ED  eMERGENCY dEPARTMENT eNCOUnter      Pt Name: Ninfa Dixon  MRN: [id number]  [place] 1975  Date of evaluation: 3/27/2018  Provider: John Kang CNP    CHIEF COMPLAINT     No chief complaint on file. HISTORY OF PRESENT ILLNESS  (Location/Symptom, Timing/Onset, Context/Setting, Quality, Duration, Modifying Factors, Severity.)   Ninfa Dixon is a 40 y.o. female who presents to the emergency department For second dose of rabies vaccine. Patient was bitten by a stray dog and rabies vaccination was initiated. Patient has dog bite to right forearm. States that wound is healing well. Denies any complaints at this time. Nursing Notes were reviewed and I agree. REVIEW OF SYSTEMS    (2-9 systems for level 4, 10 or more for level 5)     Review of Systems   Constitutional: Negative for chills and fever. HENT: Negative for trouble swallowing. Respiratory: Negative for cough and shortness of breath. Cardiovascular: Negative for chest pain and palpitations. Gastrointestinal: Negative for nausea and vomiting. Except as noted above the remainder of the review of systems was reviewed and negative. PAST MEDICAL HISTORY         Diagnosis Date    Anxiety     Arthritis     OA    Depression     post partum    Diabetes, gestational     history of    Menarche @ 16 y/o    Neuropathy ([address]     CTS    Obesity     Parity       -    Restless leg syndrome     Sleep apnea     CPAP    UTI (lower urinary tract infection)     with pregnancy    Wears glasses      Reviewed.   SURGICAL HISTORY           Procedure Laterality Date    ANKLE SURGERY Right      SECTION      CHOLECYSTECTOMY  06    COLONOSCOPY      with poly removal    DILATION AND CURETTAGE OF UTERUS  2014    ENDOMETRIAL ABLATION  14    with novasure    HYSTEROSCOPY  14    D&C    KNEE ARTHROSCOPY Left 6/29/15    KNEE SURGERY      left knee scoped on 17    [id number]  [phone]  In 4 days  For next dose of rabies vaccine      DISCHARGE MEDICATIONS:  New Prescriptions    No medications on file       (Please note that portions of this note were completed with a voice recognition program.  Efforts were made to edit the dictations but occasionally words are mis-transcribed.)    Yovana Piper 42, CNP  03/27/18 1836

## 2018-04-01 ENCOUNTER — HOSPITAL ENCOUNTER (EMERGENCY)
Age: 43
Discharge: HOME | End: 2018-04-01
Attending: EMERGENCY MEDICINE
Payer: COMMERCIAL

## 2018-04-01 VITALS
TEMPERATURE: 98.7 F | DIASTOLIC BLOOD PRESSURE: 76 MMHG | OXYGEN SATURATION: 97 % | BODY MASS INDEX: 37.56 KG/M2 | HEART RATE: 84 BPM | SYSTOLIC BLOOD PRESSURE: 128 MMHG | HEIGHT: 63 IN | WEIGHT: 212 LBS | RESPIRATION RATE: 16 BRPM

## 2018-04-01 DIAGNOSIS — Z20.3 NEED FOR POST EXPOSURE PROPHYLAXIS FOR RABIES: Primary | ICD-10-CM

## 2018-04-01 PROCEDURE — 99282 EMERGENCY DEPT VISIT SF MDM: CPT

## 2018-04-01 PROCEDURE — 6360000002 HC RX W HCPCS: Performed by: EMERGENCY MEDICINE

## 2018-04-01 PROCEDURE — 90675 RABIES VACCINE IM: CPT | Performed by: EMERGENCY MEDICINE

## 2018-04-01 PROCEDURE — 90471 IMMUNIZATION ADMIN: CPT | Performed by: EMERGENCY MEDICINE

## 2018-04-01 RX ADMIN — RABIES VACCINE 1 ML: KIT at 23:32

## 2018-04-01 ASSESSMENT — ENCOUNTER SYMPTOMS
RESPIRATORY NEGATIVE: 1
GASTROINTESTINAL NEGATIVE: 1
EYES NEGATIVE: 1
ALLERGIC/IMMUNOLOGIC NEGATIVE: 1

## 2018-04-05 ENCOUNTER — HOSPITAL ENCOUNTER (EMERGENCY)
Age: 43
Discharge: HOME | End: 2018-04-06
Attending: EMERGENCY MEDICINE
Payer: COMMERCIAL

## 2018-04-05 DIAGNOSIS — G44.209 ACUTE NON INTRACTABLE TENSION-TYPE HEADACHE: ICD-10-CM

## 2018-04-05 DIAGNOSIS — R11.2 NAUSEA AND VOMITING, INTRACTABILITY OF VOMITING NOT SPECIFIED, UNSPECIFIED VOMITING TYPE: Primary | ICD-10-CM

## 2018-04-05 DIAGNOSIS — D72.829 LEUKOCYTOSIS, UNSPECIFIED TYPE: ICD-10-CM

## 2018-04-05 LAB
ABSOLUTE EOS #: 0 K/UL (ref 0–0.4)
ABSOLUTE IMMATURE GRANULOCYTE: ABNORMAL K/UL (ref 0–0.3)
ABSOLUTE LYMPH #: 1.86 K/UL (ref 1–4.8)
ABSOLUTE MONO #: 0.74 K/UL (ref 0.1–1.3)
BASOPHILS # BLD: 0 % (ref 0–2)
BASOPHILS ABSOLUTE: 0 K/UL (ref 0–0.2)
DIFFERENTIAL TYPE: ABNORMAL
EOSINOPHILS RELATIVE PERCENT: 0 % (ref 0–4)
HCT VFR BLD CALC: 43.6 % (ref 36–46)
HEMOGLOBIN: 14.7 G/DL (ref 12–16)
IMMATURE GRANULOCYTES: ABNORMAL %
LACTIC ACID: 0.8 MMOL/L (ref 0.5–2.2)
LYMPHOCYTES # BLD: 10 % (ref 24–44)
MCH RBC QN AUTO: 31.4 PG (ref 26–34)
MCHC RBC AUTO-ENTMCNC: 33.8 G/DL (ref 31–37)
MCV RBC AUTO: 92.9 FL (ref 80–100)
MONOCYTES # BLD: 4 % (ref 1–7)
MORPHOLOGY: NORMAL
NRBC AUTOMATED: ABNORMAL PER 100 WBC
PDW BLD-RTO: 13 % (ref 11.5–14.9)
PLATELET # BLD: 191 K/UL (ref 150–450)
PLATELET ESTIMATE: ABNORMAL
PMV BLD AUTO: 8.5 FL (ref 6–12)
RBC # BLD: 4.69 M/UL (ref 4–5.2)
RBC # BLD: ABNORMAL 10*6/UL
SEG NEUTROPHILS: 86 % (ref 36–66)
SEGMENTED NEUTROPHILS ABSOLUTE COUNT: 16 K/UL (ref 1.3–9.1)
WBC # BLD: 18.6 K/UL (ref 3.5–11)
WBC # BLD: ABNORMAL 10*3/UL

## 2018-04-05 PROCEDURE — 80076 HEPATIC FUNCTION PANEL: CPT

## 2018-04-05 PROCEDURE — 99284 EMERGENCY DEPT VISIT MOD MDM: CPT

## 2018-04-05 PROCEDURE — 36415 COLL VENOUS BLD VENIPUNCTURE: CPT

## 2018-04-05 PROCEDURE — 6360000002 HC RX W HCPCS: Performed by: EMERGENCY MEDICINE

## 2018-04-05 PROCEDURE — 85025 COMPLETE CBC W/AUTO DIFF WBC: CPT

## 2018-04-05 PROCEDURE — 80048 BASIC METABOLIC PNL TOTAL CA: CPT

## 2018-04-05 PROCEDURE — 83605 ASSAY OF LACTIC ACID: CPT

## 2018-04-05 PROCEDURE — 81001 URINALYSIS AUTO W/SCOPE: CPT

## 2018-04-05 PROCEDURE — 96374 THER/PROPH/DIAG INJ IV PUSH: CPT

## 2018-04-05 PROCEDURE — 2580000003 HC RX 258: Performed by: EMERGENCY MEDICINE

## 2018-04-05 RX ORDER — FLUOXETINE HYDROCHLORIDE 20 MG/1
60 CAPSULE ORAL
COMMUNITY
Start: 2016-11-11 | End: 2018-05-17

## 2018-04-05 RX ORDER — PROMETHAZINE HYDROCHLORIDE 25 MG/ML
25 INJECTION, SOLUTION INTRAMUSCULAR; INTRAVENOUS ONCE
Status: COMPLETED | OUTPATIENT
Start: 2018-04-05 | End: 2018-04-05

## 2018-04-05 RX ORDER — 0.9 % SODIUM CHLORIDE 0.9 %
1000 INTRAVENOUS SOLUTION INTRAVENOUS ONCE
Status: COMPLETED | OUTPATIENT
Start: 2018-04-05 | End: 2018-04-06

## 2018-04-05 RX ADMIN — PROMETHAZINE HYDROCHLORIDE 25 MG: 25 INJECTION INTRAMUSCULAR; INTRAVENOUS at 23:39

## 2018-04-05 RX ADMIN — SODIUM CHLORIDE 1000 ML: 9 INJECTION, SOLUTION INTRAVENOUS at 23:40

## 2018-04-06 VITALS
TEMPERATURE: 98.4 F | WEIGHT: 212 LBS | DIASTOLIC BLOOD PRESSURE: 70 MMHG | SYSTOLIC BLOOD PRESSURE: 108 MMHG | OXYGEN SATURATION: 98 % | RESPIRATION RATE: 19 BRPM | BODY MASS INDEX: 37.55 KG/M2 | HEART RATE: 99 BPM

## 2018-04-06 LAB
-: ABNORMAL
-: NORMAL
ALBUMIN SERPL-MCNC: 4.2 G/DL (ref 3.5–5.2)
ALBUMIN/GLOBULIN RATIO: ABNORMAL (ref 1–2.5)
ALP BLD-CCNC: 74 U/L (ref 35–104)
ALT SERPL-CCNC: 32 U/L (ref 5–33)
AMORPHOUS: ABNORMAL
ANION GAP SERPL CALCULATED.3IONS-SCNC: 14 MMOL/L (ref 9–17)
AST SERPL-CCNC: 35 U/L
BACTERIA: ABNORMAL
BILIRUB SERPL-MCNC: 0.51 MG/DL (ref 0.3–1.2)
BILIRUBIN DIRECT: 0.17 MG/DL
BILIRUBIN URINE: NEGATIVE
BILIRUBIN, INDIRECT: 0.34 MG/DL (ref 0–1)
BUN BLDV-MCNC: 15 MG/DL (ref 6–20)
BUN/CREAT BLD: ABNORMAL (ref 9–20)
CALCIUM SERPL-MCNC: 8.9 MG/DL (ref 8.6–10.4)
CASTS UA: ABNORMAL /LPF
CHLORIDE BLD-SCNC: 102 MMOL/L (ref 98–107)
CO2: 20 MMOL/L (ref 20–31)
COLOR: YELLOW
COMMENT UA: ABNORMAL
CREAT SERPL-MCNC: 0.52 MG/DL (ref 0.5–0.9)
CRYSTALS, UA: ABNORMAL /HPF
EPITHELIAL CELLS UA: ABNORMAL /HPF
GFR AFRICAN AMERICAN: >60 ML/MIN
GFR NON-AFRICAN AMERICAN: >60 ML/MIN
GFR SERPL CREATININE-BSD FRML MDRD: ABNORMAL ML/MIN/{1.73_M2}
GFR SERPL CREATININE-BSD FRML MDRD: ABNORMAL ML/MIN/{1.73_M2}
GLOBULIN: ABNORMAL G/DL (ref 1.5–3.8)
GLUCOSE BLD-MCNC: 118 MG/DL (ref 70–99)
GLUCOSE URINE: NEGATIVE
HCG, PREGNANCY URINE (POC): NORMAL
KETONES, URINE: NEGATIVE
LEUKOCYTE ESTERASE, URINE: NEGATIVE
MUCUS: ABNORMAL
NITRITE, URINE: NEGATIVE
OTHER OBSERVATIONS UA: ABNORMAL
PH UA: 5.5 (ref 5–8)
POTASSIUM SERPL-SCNC: 3.7 MMOL/L (ref 3.7–5.3)
PROTEIN UA: NEGATIVE
RBC UA: ABNORMAL /HPF
RENAL EPITHELIAL, UA: ABNORMAL /HPF
SODIUM BLD-SCNC: 136 MMOL/L (ref 135–144)
SPECIFIC GRAVITY UA: 1.02 (ref 1–1.03)
TOTAL PROTEIN: 6.6 G/DL (ref 6.4–8.3)
TRICHOMONAS: ABNORMAL
TURBIDITY: ABNORMAL
URINE HGB: ABNORMAL
UROBILINOGEN, URINE: NORMAL
WBC UA: ABNORMAL /HPF
YEAST: ABNORMAL

## 2018-04-06 PROCEDURE — 6360000002 HC RX W HCPCS: Performed by: EMERGENCY MEDICINE

## 2018-04-06 PROCEDURE — 96375 TX/PRO/DX INJ NEW DRUG ADDON: CPT

## 2018-04-06 PROCEDURE — 84703 CHORIONIC GONADOTROPIN ASSAY: CPT

## 2018-04-06 RX ORDER — KETOROLAC TROMETHAMINE 30 MG/ML
30 INJECTION, SOLUTION INTRAMUSCULAR; INTRAVENOUS ONCE
Status: COMPLETED | OUTPATIENT
Start: 2018-04-06 | End: 2018-04-06

## 2018-04-06 RX ADMIN — KETOROLAC TROMETHAMINE 30 MG: 30 INJECTION, SOLUTION INTRAMUSCULAR; INTRAVENOUS at 00:30

## 2018-04-06 ASSESSMENT — ENCOUNTER SYMPTOMS
VOMITING: 1
DIARRHEA: 0
BACK PAIN: 0
CONSTIPATION: 0
SHORTNESS OF BREATH: 0
COUGH: 0
ABDOMINAL PAIN: 0
NAUSEA: 1

## 2018-04-06 ASSESSMENT — PAIN SCALES - GENERAL: PAINLEVEL_OUTOF10: 7

## 2018-04-07 ENCOUNTER — HOSPITAL ENCOUNTER (EMERGENCY)
Age: 43
Discharge: HOME | End: 2018-04-07
Attending: EMERGENCY MEDICINE
Payer: COMMERCIAL

## 2018-04-07 ENCOUNTER — APPOINTMENT (OUTPATIENT)
Dept: CT IMAGING | Age: 43
End: 2018-04-07
Payer: COMMERCIAL

## 2018-04-07 VITALS
DIASTOLIC BLOOD PRESSURE: 66 MMHG | HEIGHT: 63 IN | RESPIRATION RATE: 16 BRPM | HEART RATE: 76 BPM | WEIGHT: 205 LBS | SYSTOLIC BLOOD PRESSURE: 116 MMHG | TEMPERATURE: 97.5 F | BODY MASS INDEX: 36.32 KG/M2 | OXYGEN SATURATION: 95 %

## 2018-04-07 DIAGNOSIS — J18.9 COMMUNITY ACQUIRED PNEUMONIA, UNSPECIFIED LATERALITY: Primary | ICD-10-CM

## 2018-04-07 LAB
-: ABNORMAL
ABSOLUTE EOS #: 0.2 K/UL (ref 0–0.4)
ABSOLUTE IMMATURE GRANULOCYTE: NORMAL K/UL (ref 0–0.3)
ABSOLUTE LYMPH #: 2.2 K/UL (ref 1–4.8)
ABSOLUTE MONO #: 0.5 K/UL (ref 0.1–1.3)
ALBUMIN SERPL-MCNC: 4 G/DL (ref 3.5–5.2)
ALBUMIN/GLOBULIN RATIO: ABNORMAL (ref 1–2.5)
ALP BLD-CCNC: 61 U/L (ref 35–104)
ALT SERPL-CCNC: 27 U/L (ref 5–33)
AMORPHOUS: ABNORMAL
ANION GAP SERPL CALCULATED.3IONS-SCNC: 9 MMOL/L (ref 9–17)
AST SERPL-CCNC: 26 U/L
BACTERIA: ABNORMAL
BASOPHILS # BLD: 1 % (ref 0–2)
BASOPHILS ABSOLUTE: 0.1 K/UL (ref 0–0.2)
BILIRUB SERPL-MCNC: 0.63 MG/DL (ref 0.3–1.2)
BILIRUBIN URINE: NEGATIVE
BUN BLDV-MCNC: 10 MG/DL (ref 6–20)
BUN/CREAT BLD: ABNORMAL (ref 9–20)
CALCIUM SERPL-MCNC: 8.8 MG/DL (ref 8.6–10.4)
CASTS UA: ABNORMAL /LPF
CHLORIDE BLD-SCNC: 106 MMOL/L (ref 98–107)
CO2: 22 MMOL/L (ref 20–31)
COLOR: YELLOW
COMMENT UA: ABNORMAL
CREAT SERPL-MCNC: 0.48 MG/DL (ref 0.5–0.9)
CRYSTALS, UA: ABNORMAL /HPF
DIFFERENTIAL TYPE: NORMAL
EOSINOPHILS RELATIVE PERCENT: 2 % (ref 0–4)
EPITHELIAL CELLS UA: ABNORMAL /HPF
GFR AFRICAN AMERICAN: >60 ML/MIN
GFR NON-AFRICAN AMERICAN: >60 ML/MIN
GFR SERPL CREATININE-BSD FRML MDRD: ABNORMAL ML/MIN/{1.73_M2}
GFR SERPL CREATININE-BSD FRML MDRD: ABNORMAL ML/MIN/{1.73_M2}
GLUCOSE BLD-MCNC: 93 MG/DL (ref 70–99)
GLUCOSE URINE: NEGATIVE
HCG(URINE) PREGNANCY TEST: NEGATIVE
HCT VFR BLD CALC: 40.6 % (ref 36–46)
HEMOGLOBIN: 14 G/DL (ref 12–16)
IMMATURE GRANULOCYTES: NORMAL %
KETONES, URINE: NEGATIVE
LEUKOCYTE ESTERASE, URINE: ABNORMAL
LIPASE: 21 U/L (ref 13–60)
LYMPHOCYTES # BLD: 29 % (ref 24–44)
MCH RBC QN AUTO: 32.5 PG (ref 26–34)
MCHC RBC AUTO-ENTMCNC: 34.5 G/DL (ref 31–37)
MCV RBC AUTO: 94.2 FL (ref 80–100)
MONOCYTES # BLD: 7 % (ref 1–7)
MUCUS: ABNORMAL
NITRITE, URINE: NEGATIVE
NRBC AUTOMATED: NORMAL PER 100 WBC
OTHER OBSERVATIONS UA: ABNORMAL
PDW BLD-RTO: 13.2 % (ref 11.5–14.9)
PH UA: 5.5 (ref 5–8)
PLATELET # BLD: 194 K/UL (ref 150–450)
PLATELET ESTIMATE: NORMAL
PMV BLD AUTO: 8.9 FL (ref 6–12)
POTASSIUM SERPL-SCNC: 3.6 MMOL/L (ref 3.7–5.3)
PROTEIN UA: NEGATIVE
RBC # BLD: 4.31 M/UL (ref 4–5.2)
RBC # BLD: NORMAL 10*6/UL
RBC UA: ABNORMAL /HPF
RENAL EPITHELIAL, UA: ABNORMAL /HPF
SEG NEUTROPHILS: 61 % (ref 36–66)
SEGMENTED NEUTROPHILS ABSOLUTE COUNT: 4.7 K/UL (ref 1.3–9.1)
SODIUM BLD-SCNC: 137 MMOL/L (ref 135–144)
SPECIFIC GRAVITY UA: 1.02 (ref 1–1.03)
TOTAL PROTEIN: 6.6 G/DL (ref 6.4–8.3)
TRICHOMONAS: ABNORMAL
TURBIDITY: ABNORMAL
URINE HGB: ABNORMAL
UROBILINOGEN, URINE: ABNORMAL
WBC # BLD: 7.7 K/UL (ref 3.5–11)
WBC # BLD: NORMAL 10*3/UL
WBC UA: ABNORMAL /HPF
YEAST: ABNORMAL

## 2018-04-07 PROCEDURE — 96365 THER/PROPH/DIAG IV INF INIT: CPT

## 2018-04-07 PROCEDURE — 2580000003 HC RX 258: Performed by: EMERGENCY MEDICINE

## 2018-04-07 PROCEDURE — 36415 COLL VENOUS BLD VENIPUNCTURE: CPT

## 2018-04-07 PROCEDURE — 99284 EMERGENCY DEPT VISIT MOD MDM: CPT

## 2018-04-07 PROCEDURE — 6360000002 HC RX W HCPCS: Performed by: EMERGENCY MEDICINE

## 2018-04-07 PROCEDURE — 81001 URINALYSIS AUTO W/SCOPE: CPT

## 2018-04-07 PROCEDURE — 96366 THER/PROPH/DIAG IV INF ADDON: CPT

## 2018-04-07 PROCEDURE — 74176 CT ABD & PELVIS W/O CONTRAST: CPT

## 2018-04-07 PROCEDURE — 96375 TX/PRO/DX INJ NEW DRUG ADDON: CPT

## 2018-04-07 PROCEDURE — 85025 COMPLETE CBC W/AUTO DIFF WBC: CPT

## 2018-04-07 PROCEDURE — 80053 COMPREHEN METABOLIC PANEL: CPT

## 2018-04-07 PROCEDURE — 83690 ASSAY OF LIPASE: CPT

## 2018-04-07 PROCEDURE — 84703 CHORIONIC GONADOTROPIN ASSAY: CPT

## 2018-04-07 RX ORDER — LEVOFLOXACIN 5 MG/ML
750 INJECTION, SOLUTION INTRAVENOUS EVERY 24 HOURS
Status: DISCONTINUED | OUTPATIENT
Start: 2018-04-07 | End: 2018-04-07 | Stop reason: HOSPADM

## 2018-04-07 RX ORDER — KETOROLAC TROMETHAMINE 30 MG/ML
30 INJECTION, SOLUTION INTRAMUSCULAR; INTRAVENOUS ONCE
Status: DISCONTINUED | OUTPATIENT
Start: 2018-04-07 | End: 2018-04-07 | Stop reason: HOSPADM

## 2018-04-07 RX ORDER — 0.9 % SODIUM CHLORIDE 0.9 %
1000 INTRAVENOUS SOLUTION INTRAVENOUS ONCE
Status: COMPLETED | OUTPATIENT
Start: 2018-04-07 | End: 2018-04-07

## 2018-04-07 RX ORDER — LEVOFLOXACIN 750 MG/1
750 TABLET ORAL DAILY
Qty: 5 TABLET | Refills: 0 | Status: SHIPPED | OUTPATIENT
Start: 2018-04-07 | End: 2018-04-12

## 2018-04-07 RX ORDER — ONDANSETRON 2 MG/ML
4 INJECTION INTRAMUSCULAR; INTRAVENOUS ONCE
Status: COMPLETED | OUTPATIENT
Start: 2018-04-07 | End: 2018-04-07

## 2018-04-07 RX ADMIN — LEVOFLOXACIN 750 MG: 5 INJECTION, SOLUTION INTRAVENOUS at 15:20

## 2018-04-07 RX ADMIN — SODIUM CHLORIDE 1000 ML: 9 INJECTION, SOLUTION INTRAVENOUS at 14:25

## 2018-04-07 RX ADMIN — ONDANSETRON 4 MG: 2 INJECTION INTRAMUSCULAR; INTRAVENOUS at 15:09

## 2018-04-07 ASSESSMENT — ENCOUNTER SYMPTOMS
RHINORRHEA: 0
ABDOMINAL PAIN: 1
COUGH: 0
NAUSEA: 1
SHORTNESS OF BREATH: 0
VOMITING: 0
EYE REDNESS: 0
BACK PAIN: 0
EYE PAIN: 0

## 2018-04-07 ASSESSMENT — PAIN DESCRIPTION - LOCATION: LOCATION: ABDOMEN

## 2018-04-07 ASSESSMENT — PAIN SCALES - GENERAL: PAINLEVEL_OUTOF10: 2

## 2018-04-07 ASSESSMENT — PAIN DESCRIPTION - PAIN TYPE: TYPE: ACUTE PAIN

## 2018-04-09 ENCOUNTER — APPOINTMENT (OUTPATIENT)
Dept: GENERAL RADIOLOGY | Age: 43
End: 2018-04-09
Payer: COMMERCIAL

## 2018-04-09 ENCOUNTER — HOSPITAL ENCOUNTER (EMERGENCY)
Age: 43
Discharge: HOME | End: 2018-04-10
Attending: EMERGENCY MEDICINE
Payer: COMMERCIAL

## 2018-04-09 DIAGNOSIS — R06.02 SHORTNESS OF BREATH: ICD-10-CM

## 2018-04-09 DIAGNOSIS — B34.9 VIRAL ILLNESS: Primary | ICD-10-CM

## 2018-04-09 LAB
ABSOLUTE EOS #: 0.21 K/UL (ref 0–0.44)
ABSOLUTE IMMATURE GRANULOCYTE: <0.03 K/UL (ref 0–0.3)
ABSOLUTE LYMPH #: 2.34 K/UL (ref 1.1–3.7)
ABSOLUTE MONO #: 0.35 K/UL (ref 0.1–1.2)
ANION GAP SERPL CALCULATED.3IONS-SCNC: 11 MMOL/L (ref 9–17)
BASOPHILS # BLD: 1 % (ref 0–2)
BASOPHILS ABSOLUTE: 0.04 K/UL (ref 0–0.2)
BUN BLDV-MCNC: 14 MG/DL (ref 6–20)
BUN/CREAT BLD: ABNORMAL (ref 9–20)
CALCIUM SERPL-MCNC: 9.3 MG/DL (ref 8.6–10.4)
CHLORIDE BLD-SCNC: 104 MMOL/L (ref 98–107)
CO2: 19 MMOL/L (ref 20–31)
CREAT SERPL-MCNC: 0.49 MG/DL (ref 0.5–0.9)
DIFFERENTIAL TYPE: NORMAL
EKG ATRIAL RATE: 83 BPM
EKG P AXIS: 93 DEGREES
EKG P-R INTERVAL: 134 MS
EKG Q-T INTERVAL: 370 MS
EKG QRS DURATION: 80 MS
EKG QTC CALCULATION (BAZETT): 434 MS
EKG R AXIS: 47 DEGREES
EKG T AXIS: 54 DEGREES
EKG VENTRICULAR RATE: 83 BPM
EOSINOPHILS RELATIVE PERCENT: 3 % (ref 1–4)
GFR AFRICAN AMERICAN: >60 ML/MIN
GFR NON-AFRICAN AMERICAN: >60 ML/MIN
GFR SERPL CREATININE-BSD FRML MDRD: ABNORMAL ML/MIN/{1.73_M2}
GFR SERPL CREATININE-BSD FRML MDRD: ABNORMAL ML/MIN/{1.73_M2}
GLUCOSE BLD-MCNC: 105 MG/DL (ref 70–99)
HCT VFR BLD CALC: 40.2 % (ref 36.3–47.1)
HEMOGLOBIN: 13.3 G/DL (ref 11.9–15.1)
IMMATURE GRANULOCYTES: 0 %
LYMPHOCYTES # BLD: 29 % (ref 24–43)
MCH RBC QN AUTO: 30.7 PG (ref 25.2–33.5)
MCHC RBC AUTO-ENTMCNC: 33.1 G/DL (ref 28.4–34.8)
MCV RBC AUTO: 92.8 FL (ref 82.6–102.9)
MONOCYTES # BLD: 4 % (ref 3–12)
NRBC AUTOMATED: 0 PER 100 WBC
PDW BLD-RTO: 12.6 % (ref 11.8–14.4)
PLATELET # BLD: 243 K/UL (ref 138–453)
PLATELET ESTIMATE: NORMAL
PMV BLD AUTO: 10.7 FL (ref 8.1–13.5)
POTASSIUM SERPL-SCNC: 4.2 MMOL/L (ref 3.7–5.3)
RBC # BLD: 4.33 M/UL (ref 3.95–5.11)
RBC # BLD: NORMAL 10*6/UL
SEG NEUTROPHILS: 63 % (ref 36–65)
SEGMENTED NEUTROPHILS ABSOLUTE COUNT: 5.02 K/UL (ref 1.5–8.1)
SODIUM BLD-SCNC: 134 MMOL/L (ref 135–144)
WBC # BLD: 8 K/UL (ref 3.5–11.3)
WBC # BLD: NORMAL 10*3/UL

## 2018-04-09 PROCEDURE — 99285 EMERGENCY DEPT VISIT HI MDM: CPT

## 2018-04-09 PROCEDURE — 2580000003 HC RX 258: Performed by: EMERGENCY MEDICINE

## 2018-04-09 PROCEDURE — 71046 X-RAY EXAM CHEST 2 VIEWS: CPT

## 2018-04-09 PROCEDURE — 85025 COMPLETE CBC W/AUTO DIFF WBC: CPT

## 2018-04-09 PROCEDURE — 94640 AIRWAY INHALATION TREATMENT: CPT

## 2018-04-09 PROCEDURE — 80048 BASIC METABOLIC PNL TOTAL CA: CPT

## 2018-04-09 PROCEDURE — 6370000000 HC RX 637 (ALT 250 FOR IP): Performed by: EMERGENCY MEDICINE

## 2018-04-09 PROCEDURE — 93005 ELECTROCARDIOGRAM TRACING: CPT

## 2018-04-09 RX ORDER — 0.9 % SODIUM CHLORIDE 0.9 %
1000 INTRAVENOUS SOLUTION INTRAVENOUS ONCE
Status: COMPLETED | OUTPATIENT
Start: 2018-04-09 | End: 2018-04-10

## 2018-04-09 RX ORDER — BENZONATATE 100 MG/1
100 CAPSULE ORAL 3 TIMES DAILY PRN
Status: DISCONTINUED | OUTPATIENT
Start: 2018-04-09 | End: 2018-04-10 | Stop reason: HOSPADM

## 2018-04-09 RX ORDER — ACETAMINOPHEN 325 MG/1
650 TABLET ORAL ONCE
Status: COMPLETED | OUTPATIENT
Start: 2018-04-09 | End: 2018-04-09

## 2018-04-09 RX ORDER — ALBUTEROL SULFATE 90 UG/1
2 AEROSOL, METERED RESPIRATORY (INHALATION) EVERY 6 HOURS PRN
Status: DISCONTINUED | OUTPATIENT
Start: 2018-04-09 | End: 2018-04-10 | Stop reason: HOSPADM

## 2018-04-09 RX ORDER — BENZONATATE 100 MG/1
100 CAPSULE ORAL 3 TIMES DAILY PRN
Qty: 30 CAPSULE | Refills: 0 | Status: SHIPPED | OUTPATIENT
Start: 2018-04-09 | End: 2018-04-16

## 2018-04-09 RX ADMIN — BENZONATATE 100 MG: 100 CAPSULE ORAL at 22:35

## 2018-04-09 RX ADMIN — ALBUTEROL SULFATE 2 PUFF: 90 AEROSOL, METERED RESPIRATORY (INHALATION) at 22:43

## 2018-04-09 RX ADMIN — ACETAMINOPHEN 650 MG: 325 TABLET ORAL at 22:35

## 2018-04-09 RX ADMIN — SODIUM CHLORIDE 1000 ML: 9 INJECTION, SOLUTION INTRAVENOUS at 22:34

## 2018-04-09 ASSESSMENT — PAIN DESCRIPTION - FREQUENCY: FREQUENCY: CONTINUOUS

## 2018-04-09 ASSESSMENT — PAIN DESCRIPTION - ORIENTATION: ORIENTATION: MID

## 2018-04-09 ASSESSMENT — PAIN DESCRIPTION - PAIN TYPE: TYPE: ACUTE PAIN

## 2018-04-09 ASSESSMENT — PAIN SCALES - GENERAL
PAINLEVEL_OUTOF10: 3
PAINLEVEL_OUTOF10: 6

## 2018-04-09 ASSESSMENT — PAIN DESCRIPTION - LOCATION: LOCATION: CHEST

## 2018-04-10 VITALS
WEIGHT: 205 LBS | HEART RATE: 68 BPM | HEIGHT: 63 IN | OXYGEN SATURATION: 98 % | TEMPERATURE: 98.5 F | DIASTOLIC BLOOD PRESSURE: 83 MMHG | SYSTOLIC BLOOD PRESSURE: 125 MMHG | RESPIRATION RATE: 14 BRPM | BODY MASS INDEX: 36.32 KG/M2

## 2018-04-10 ASSESSMENT — ENCOUNTER SYMPTOMS
WHEEZING: 0
CONSTIPATION: 0
ABDOMINAL PAIN: 1
NAUSEA: 0
COLOR CHANGE: 0
COUGH: 1
SINUS PRESSURE: 0
VOMITING: 0
BACK PAIN: 0
SHORTNESS OF BREATH: 1
DIARRHEA: 0
CHEST TIGHTNESS: 0
SINUS PAIN: 0
PHOTOPHOBIA: 0
RHINORRHEA: 0

## 2018-04-19 ENCOUNTER — PROCEDURE VISIT (OUTPATIENT)
Dept: OBGYN CLINIC | Age: 43
End: 2018-04-19
Payer: COMMERCIAL

## 2018-04-19 DIAGNOSIS — R32 URINARY INCONTINENCE, UNSPECIFIED TYPE: Primary | ICD-10-CM

## 2018-04-19 PROCEDURE — 51726 COMPLEX CYSTOMETROGRAM: CPT | Performed by: SPECIALIST

## 2018-05-02 ENCOUNTER — APPOINTMENT (OUTPATIENT)
Dept: GENERAL RADIOLOGY | Age: 43
End: 2018-05-02
Payer: COMMERCIAL

## 2018-05-02 ENCOUNTER — HOSPITAL ENCOUNTER (EMERGENCY)
Age: 43
Discharge: HOME | End: 2018-05-02
Attending: EMERGENCY MEDICINE
Payer: COMMERCIAL

## 2018-05-02 VITALS
TEMPERATURE: 97.8 F | DIASTOLIC BLOOD PRESSURE: 85 MMHG | RESPIRATION RATE: 16 BRPM | HEART RATE: 86 BPM | HEIGHT: 63 IN | BODY MASS INDEX: 36.32 KG/M2 | SYSTOLIC BLOOD PRESSURE: 136 MMHG | WEIGHT: 205 LBS | OXYGEN SATURATION: 98 %

## 2018-05-02 DIAGNOSIS — J18.9 PNEUMONIA DUE TO ORGANISM: Primary | ICD-10-CM

## 2018-05-02 PROCEDURE — 71046 X-RAY EXAM CHEST 2 VIEWS: CPT

## 2018-05-02 PROCEDURE — 99283 EMERGENCY DEPT VISIT LOW MDM: CPT

## 2018-05-02 RX ORDER — AZITHROMYCIN 250 MG/1
TABLET, FILM COATED ORAL
Qty: 1 PACKET | Refills: 0 | Status: SHIPPED | OUTPATIENT
Start: 2018-05-02 | End: 2018-05-12

## 2018-05-02 ASSESSMENT — ENCOUNTER SYMPTOMS
CHEST TIGHTNESS: 0
BACK PAIN: 0
BLOOD IN STOOL: 0
VOICE CHANGE: 1
SHORTNESS OF BREATH: 0
TROUBLE SWALLOWING: 0
NAUSEA: 0
SINUS PRESSURE: 0
EYE REDNESS: 0
SORE THROAT: 1
WHEEZING: 0
COLOR CHANGE: 0
RHINORRHEA: 0
DIARRHEA: 0
EYE PAIN: 0
COUGH: 1
VOMITING: 0
FACIAL SWELLING: 0
EYE DISCHARGE: 0
CONSTIPATION: 0
ABDOMINAL PAIN: 0

## 2018-05-17 ENCOUNTER — OFFICE VISIT (OUTPATIENT)
Dept: OBGYN CLINIC | Age: 43
End: 2018-05-17
Payer: COMMERCIAL

## 2018-05-17 VITALS
WEIGHT: 207 LBS | SYSTOLIC BLOOD PRESSURE: 141 MMHG | HEART RATE: 82 BPM | DIASTOLIC BLOOD PRESSURE: 86 MMHG | BODY MASS INDEX: 36.68 KG/M2 | HEIGHT: 63 IN | RESPIRATION RATE: 18 BRPM

## 2018-05-17 DIAGNOSIS — N39.3 URINARY, INCONTINENCE, STRESS FEMALE: Primary | ICD-10-CM

## 2018-05-17 PROCEDURE — 99213 OFFICE O/P EST LOW 20 MIN: CPT | Performed by: SPECIALIST

## 2018-05-17 PROCEDURE — 4004F PT TOBACCO SCREEN RCVD TLK: CPT | Performed by: SPECIALIST

## 2018-05-17 PROCEDURE — G8417 CALC BMI ABV UP PARAM F/U: HCPCS | Performed by: SPECIALIST

## 2018-05-17 PROCEDURE — G8427 DOCREV CUR MEDS BY ELIG CLIN: HCPCS | Performed by: SPECIALIST

## 2018-05-17 RX ORDER — ROPINIROLE 1 MG/1
2 TABLET, FILM COATED ORAL 2 TIMES DAILY
COMMUNITY
Start: 2017-09-14 | End: 2020-05-24

## 2018-05-17 RX ORDER — TOLTERODINE 4 MG/1
4 CAPSULE, EXTENDED RELEASE ORAL DAILY
Qty: 30 CAPSULE | Refills: 2 | Status: SHIPPED | OUTPATIENT
Start: 2018-05-17 | End: 2018-10-25

## 2018-05-17 RX ORDER — ALBUTEROL SULFATE 90 UG/1
2 AEROSOL, METERED RESPIRATORY (INHALATION)
COMMUNITY
Start: 2018-05-03 | End: 2018-06-25 | Stop reason: HOSPADM

## 2018-05-17 ASSESSMENT — ENCOUNTER SYMPTOMS
VOMITING: 0
ABDOMINAL DISTENTION: 0
ABDOMINAL PAIN: 0
CONSTIPATION: 0
EYE PAIN: 0
NAUSEA: 0
APNEA: 0
DIARRHEA: 0
COUGH: 0

## 2018-05-26 ENCOUNTER — APPOINTMENT (OUTPATIENT)
Dept: GENERAL RADIOLOGY | Age: 43
End: 2018-05-26
Payer: COMMERCIAL

## 2018-05-26 ENCOUNTER — HOSPITAL ENCOUNTER (EMERGENCY)
Age: 43
Discharge: HOME | End: 2018-05-26
Attending: EMERGENCY MEDICINE
Payer: COMMERCIAL

## 2018-05-26 VITALS
SYSTOLIC BLOOD PRESSURE: 124 MMHG | WEIGHT: 210 LBS | HEART RATE: 86 BPM | HEIGHT: 63 IN | DIASTOLIC BLOOD PRESSURE: 77 MMHG | TEMPERATURE: 98.2 F | RESPIRATION RATE: 16 BRPM | BODY MASS INDEX: 37.21 KG/M2 | OXYGEN SATURATION: 98 %

## 2018-05-26 DIAGNOSIS — S80.11XA CONTUSION OF RIGHT LOWER EXTREMITY, INITIAL ENCOUNTER: Primary | ICD-10-CM

## 2018-05-26 PROCEDURE — 6370000000 HC RX 637 (ALT 250 FOR IP): Performed by: NURSE PRACTITIONER

## 2018-05-26 PROCEDURE — 73590 X-RAY EXAM OF LOWER LEG: CPT

## 2018-05-26 PROCEDURE — 73610 X-RAY EXAM OF ANKLE: CPT

## 2018-05-26 PROCEDURE — 99283 EMERGENCY DEPT VISIT LOW MDM: CPT

## 2018-05-26 RX ORDER — IBUPROFEN 800 MG/1
800 TABLET ORAL EVERY 8 HOURS PRN
Qty: 20 TABLET | Refills: 0 | Status: SHIPPED | OUTPATIENT
Start: 2018-05-26 | End: 2018-06-23

## 2018-05-26 RX ORDER — HYDROCODONE BITARTRATE AND ACETAMINOPHEN 5; 325 MG/1; MG/1
1 TABLET ORAL ONCE
Status: COMPLETED | OUTPATIENT
Start: 2018-05-26 | End: 2018-05-26

## 2018-05-26 RX ADMIN — HYDROCODONE BITARTRATE AND ACETAMINOPHEN 1 TABLET: 5; 325 TABLET ORAL at 19:48

## 2018-05-26 ASSESSMENT — PAIN SCALES - GENERAL
PAINLEVEL_OUTOF10: 4
PAINLEVEL_OUTOF10: 4

## 2018-05-26 ASSESSMENT — ENCOUNTER SYMPTOMS
NAUSEA: 0
COUGH: 0
VOMITING: 0
TROUBLE SWALLOWING: 0
SHORTNESS OF BREATH: 0

## 2018-06-05 ENCOUNTER — HOSPITAL ENCOUNTER (EMERGENCY)
Age: 43
Discharge: HOME | End: 2018-06-05
Attending: EMERGENCY MEDICINE
Payer: COMMERCIAL

## 2018-06-05 VITALS
SYSTOLIC BLOOD PRESSURE: 114 MMHG | WEIGHT: 204 LBS | RESPIRATION RATE: 16 BRPM | HEIGHT: 63 IN | BODY MASS INDEX: 36.14 KG/M2 | OXYGEN SATURATION: 100 % | TEMPERATURE: 98.4 F | HEART RATE: 98 BPM | DIASTOLIC BLOOD PRESSURE: 70 MMHG

## 2018-06-05 DIAGNOSIS — T30.0 ERYTHEMA DUE TO BURN (FIRST DEGREE): Primary | ICD-10-CM

## 2018-06-05 PROCEDURE — 99283 EMERGENCY DEPT VISIT LOW MDM: CPT

## 2018-06-05 PROCEDURE — 6370000000 HC RX 637 (ALT 250 FOR IP): Performed by: PHYSICIAN ASSISTANT

## 2018-06-05 RX ORDER — GINSENG 100 MG
CAPSULE ORAL 3 TIMES DAILY
Status: DISCONTINUED | OUTPATIENT
Start: 2018-06-05 | End: 2018-06-05 | Stop reason: HOSPADM

## 2018-06-05 RX ORDER — ACETAMINOPHEN AND CODEINE PHOSPHATE 300; 30 MG/1; MG/1
1 TABLET ORAL EVERY 4 HOURS PRN
Qty: 12 TABLET | Refills: 0 | Status: SHIPPED | OUTPATIENT
Start: 2018-06-05 | End: 2018-06-08

## 2018-06-05 RX ORDER — HYDROCODONE BITARTRATE AND ACETAMINOPHEN 5; 325 MG/1; MG/1
1 TABLET ORAL ONCE
Status: COMPLETED | OUTPATIENT
Start: 2018-06-05 | End: 2018-06-05

## 2018-06-05 RX ORDER — IBUPROFEN 800 MG/1
800 TABLET ORAL EVERY 8 HOURS PRN
Qty: 20 TABLET | Refills: 0 | Status: SHIPPED | OUTPATIENT
Start: 2018-06-05 | End: 2018-06-23

## 2018-06-05 RX ORDER — BACITRACIN ZINC AND POLYMYXIN B SULFATE 500; 1000 [USP'U]/G; [USP'U]/G
OINTMENT TOPICAL
Qty: 1 TUBE | Refills: 0 | Status: SHIPPED | OUTPATIENT
Start: 2018-06-05 | End: 2018-06-12

## 2018-06-05 RX ADMIN — HYDROCODONE BITARTRATE AND ACETAMINOPHEN 1 TABLET: 5; 325 TABLET ORAL at 19:34

## 2018-06-05 ASSESSMENT — PAIN DESCRIPTION - LOCATION: LOCATION: LEG

## 2018-06-05 ASSESSMENT — PAIN DESCRIPTION - DESCRIPTORS: DESCRIPTORS: BURNING

## 2018-06-05 ASSESSMENT — PAIN DESCRIPTION - PAIN TYPE: TYPE: ACUTE PAIN

## 2018-06-05 ASSESSMENT — PAIN DESCRIPTION - ORIENTATION: ORIENTATION: LEFT

## 2018-06-05 ASSESSMENT — PAIN SCALES - GENERAL
PAINLEVEL_OUTOF10: 7
PAINLEVEL_OUTOF10: 7

## 2018-06-23 ENCOUNTER — APPOINTMENT (OUTPATIENT)
Dept: GENERAL RADIOLOGY | Age: 43
DRG: 139 | End: 2018-06-23
Payer: COMMERCIAL

## 2018-06-23 ENCOUNTER — HOSPITAL ENCOUNTER (INPATIENT)
Age: 43
LOS: 2 days | Discharge: HOME | DRG: 139 | End: 2018-06-25
Attending: EMERGENCY MEDICINE | Admitting: INTERNAL MEDICINE
Payer: COMMERCIAL

## 2018-06-23 ENCOUNTER — APPOINTMENT (OUTPATIENT)
Dept: CT IMAGING | Age: 43
DRG: 139 | End: 2018-06-23
Payer: COMMERCIAL

## 2018-06-23 DIAGNOSIS — F17.200 SMOKING ADDICTION: ICD-10-CM

## 2018-06-23 DIAGNOSIS — R11.2 NAUSEA AND VOMITING IN ADULT: ICD-10-CM

## 2018-06-23 DIAGNOSIS — J18.9 PNEUMONIA OF BOTH LOWER LOBES DUE TO INFECTIOUS ORGANISM: Primary | ICD-10-CM

## 2018-06-23 DIAGNOSIS — D72.829 LEUKOCYTOSIS, UNSPECIFIED TYPE: ICD-10-CM

## 2018-06-23 DIAGNOSIS — F12.10 MARIJUANA ABUSE: ICD-10-CM

## 2018-06-23 LAB
-: ABNORMAL
ABSOLUTE EOS #: 0 K/UL (ref 0–0.4)
ABSOLUTE IMMATURE GRANULOCYTE: ABNORMAL K/UL (ref 0–0.3)
ABSOLUTE LYMPH #: 3.32 K/UL (ref 1–4.8)
ABSOLUTE MONO #: 0.78 K/UL (ref 0.1–1.3)
ALBUMIN SERPL-MCNC: 4.2 G/DL (ref 3.5–5.2)
ALBUMIN/GLOBULIN RATIO: ABNORMAL (ref 1–2.5)
ALP BLD-CCNC: 82 U/L (ref 35–104)
ALT SERPL-CCNC: 18 U/L (ref 5–33)
AMORPHOUS: ABNORMAL
AMPHETAMINE SCREEN URINE: NEGATIVE
ANION GAP SERPL CALCULATED.3IONS-SCNC: 13 MMOL/L (ref 9–17)
AST SERPL-CCNC: 24 U/L
BACTERIA: ABNORMAL
BARBITURATE SCREEN URINE: NEGATIVE
BASOPHILS # BLD: 1 % (ref 0–2)
BASOPHILS ABSOLUTE: 0.2 K/UL (ref 0–0.2)
BENZODIAZEPINE SCREEN, URINE: NEGATIVE
BILIRUB SERPL-MCNC: 0.32 MG/DL (ref 0.3–1.2)
BILIRUBIN URINE: NEGATIVE
BUN BLDV-MCNC: 12 MG/DL (ref 6–20)
BUN/CREAT BLD: ABNORMAL (ref 9–20)
BUPRENORPHINE URINE: ABNORMAL
C-REACTIVE PROTEIN: 58.5 MG/L (ref 0–5)
CALCIUM SERPL-MCNC: 9.3 MG/DL (ref 8.6–10.4)
CANNABINOID SCREEN URINE: POSITIVE
CASTS UA: ABNORMAL /LPF
CHLORIDE BLD-SCNC: 101 MMOL/L (ref 98–107)
CO2: 22 MMOL/L (ref 20–31)
COCAINE METABOLITE, URINE: NEGATIVE
COLOR: YELLOW
COMMENT UA: ABNORMAL
CREAT SERPL-MCNC: 0.69 MG/DL (ref 0.5–0.9)
CRYSTALS, UA: ABNORMAL /HPF
DIFFERENTIAL TYPE: ABNORMAL
DIRECT EXAM: NORMAL
EOSINOPHILS RELATIVE PERCENT: 0 % (ref 0–4)
EPITHELIAL CELLS UA: ABNORMAL /HPF
GFR AFRICAN AMERICAN: >60 ML/MIN
GFR NON-AFRICAN AMERICAN: >60 ML/MIN
GFR SERPL CREATININE-BSD FRML MDRD: ABNORMAL ML/MIN/{1.73_M2}
GFR SERPL CREATININE-BSD FRML MDRD: ABNORMAL ML/MIN/{1.73_M2}
GLUCOSE BLD-MCNC: 122 MG/DL (ref 70–99)
GLUCOSE URINE: NEGATIVE
HCT VFR BLD CALC: 40.7 % (ref 36–46)
HEMOGLOBIN: 14.2 G/DL (ref 12–16)
IMMATURE GRANULOCYTES: ABNORMAL %
KETONES, URINE: NEGATIVE
LEUKOCYTE ESTERASE, URINE: ABNORMAL
LYMPHOCYTES # BLD: 17 % (ref 24–44)
Lab: NORMAL
MCH RBC QN AUTO: 32.4 PG (ref 26–34)
MCHC RBC AUTO-ENTMCNC: 34.8 G/DL (ref 31–37)
MCV RBC AUTO: 93.1 FL (ref 80–100)
MDMA URINE: ABNORMAL
METHADONE SCREEN, URINE: NEGATIVE
METHAMPHETAMINE, URINE: ABNORMAL
MONOCYTES # BLD: 4 % (ref 1–7)
MORPHOLOGY: NORMAL
MUCUS: ABNORMAL
NITRITE, URINE: NEGATIVE
NRBC AUTOMATED: ABNORMAL PER 100 WBC
OPIATES, URINE: NEGATIVE
OTHER OBSERVATIONS UA: ABNORMAL
OXYCODONE SCREEN URINE: NEGATIVE
PDW BLD-RTO: 13.3 % (ref 11.5–14.9)
PH UA: 7 (ref 5–8)
PHENCYCLIDINE, URINE: NEGATIVE
PLATELET # BLD: 192 K/UL (ref 150–450)
PLATELET ESTIMATE: ABNORMAL
PMV BLD AUTO: 9.1 FL (ref 6–12)
POTASSIUM SERPL-SCNC: 3.6 MMOL/L (ref 3.7–5.3)
PROCALCITONIN: 0.26 NG/ML
PROPOXYPHENE, URINE: ABNORMAL
PROTEIN UA: NEGATIVE
RBC # BLD: 4.37 M/UL (ref 4–5.2)
RBC # BLD: ABNORMAL 10*6/UL
RBC UA: ABNORMAL /HPF
RENAL EPITHELIAL, UA: ABNORMAL /HPF
SEG NEUTROPHILS: 78 % (ref 36–66)
SEGMENTED NEUTROPHILS ABSOLUTE COUNT: 15.2 K/UL (ref 1.3–9.1)
SODIUM BLD-SCNC: 136 MMOL/L (ref 135–144)
SPECIFIC GRAVITY UA: 1.01 (ref 1–1.03)
SPECIMEN DESCRIPTION: NORMAL
STATUS: NORMAL
TEST INFORMATION: ABNORMAL
TOTAL PROTEIN: 6.9 G/DL (ref 6.4–8.3)
TRICHOMONAS: ABNORMAL
TRICYCLIC ANTIDEPRESSANTS, UR: ABNORMAL
TURBIDITY: CLEAR
URINE HGB: NEGATIVE
UROBILINOGEN, URINE: ABNORMAL
WBC # BLD: 19.5 K/UL (ref 3.5–11)
WBC # BLD: ABNORMAL 10*3/UL
WBC UA: ABNORMAL /HPF
YEAST: ABNORMAL

## 2018-06-23 PROCEDURE — 96375 TX/PRO/DX INJ NEW DRUG ADDON: CPT

## 2018-06-23 PROCEDURE — 80307 DRUG TEST PRSMV CHEM ANLYZR: CPT

## 2018-06-23 PROCEDURE — 87899 AGENT NOS ASSAY W/OPTIC: CPT

## 2018-06-23 PROCEDURE — 6360000002 HC RX W HCPCS: Performed by: STUDENT IN AN ORGANIZED HEALTH CARE EDUCATION/TRAINING PROGRAM

## 2018-06-23 PROCEDURE — 87205 SMEAR GRAM STAIN: CPT

## 2018-06-23 PROCEDURE — 96367 TX/PROPH/DG ADDL SEQ IV INF: CPT

## 2018-06-23 PROCEDURE — 2580000003 HC RX 258: Performed by: FAMILY MEDICINE

## 2018-06-23 PROCEDURE — 2580000003 HC RX 258: Performed by: EMERGENCY MEDICINE

## 2018-06-23 PROCEDURE — 87804 INFLUENZA ASSAY W/OPTIC: CPT

## 2018-06-23 PROCEDURE — 87086 URINE CULTURE/COLONY COUNT: CPT

## 2018-06-23 PROCEDURE — 99285 EMERGENCY DEPT VISIT HI MDM: CPT

## 2018-06-23 PROCEDURE — 86606 ASPERGILLUS ANTIBODY: CPT

## 2018-06-23 PROCEDURE — 96365 THER/PROPH/DIAG IV INF INIT: CPT

## 2018-06-23 PROCEDURE — 6370000000 HC RX 637 (ALT 250 FOR IP): Performed by: STUDENT IN AN ORGANIZED HEALTH CARE EDUCATION/TRAINING PROGRAM

## 2018-06-23 PROCEDURE — 86140 C-REACTIVE PROTEIN: CPT

## 2018-06-23 PROCEDURE — 86738 MYCOPLASMA ANTIBODY: CPT

## 2018-06-23 PROCEDURE — 99223 1ST HOSP IP/OBS HIGH 75: CPT | Performed by: INTERNAL MEDICINE

## 2018-06-23 PROCEDURE — 2580000003 HC RX 258: Performed by: STUDENT IN AN ORGANIZED HEALTH CARE EDUCATION/TRAINING PROGRAM

## 2018-06-23 PROCEDURE — 6360000002 HC RX W HCPCS: Performed by: EMERGENCY MEDICINE

## 2018-06-23 PROCEDURE — 6360000002 HC RX W HCPCS: Performed by: FAMILY MEDICINE

## 2018-06-23 PROCEDURE — 84145 PROCALCITONIN (PCT): CPT

## 2018-06-23 PROCEDURE — 87449 NOS EACH ORGANISM AG IA: CPT

## 2018-06-23 PROCEDURE — 85025 COMPLETE CBC W/AUTO DIFF WBC: CPT

## 2018-06-23 PROCEDURE — 81001 URINALYSIS AUTO W/SCOPE: CPT

## 2018-06-23 PROCEDURE — 87070 CULTURE OTHR SPECIMN AEROBIC: CPT

## 2018-06-23 PROCEDURE — 71046 X-RAY EXAM CHEST 2 VIEWS: CPT

## 2018-06-23 PROCEDURE — 6370000000 HC RX 637 (ALT 250 FOR IP): Performed by: INTERNAL MEDICINE

## 2018-06-23 PROCEDURE — G0378 HOSPITAL OBSERVATION PER HR: HCPCS

## 2018-06-23 PROCEDURE — 1200000000 HC SEMI PRIVATE

## 2018-06-23 PROCEDURE — 36415 COLL VENOUS BLD VENIPUNCTURE: CPT

## 2018-06-23 PROCEDURE — 71260 CT THORAX DX C+: CPT

## 2018-06-23 PROCEDURE — 86038 ANTINUCLEAR ANTIBODIES: CPT

## 2018-06-23 PROCEDURE — 80053 COMPREHEN METABOLIC PANEL: CPT

## 2018-06-23 PROCEDURE — 86480 TB TEST CELL IMMUN MEASURE: CPT

## 2018-06-23 PROCEDURE — 6360000004 HC RX CONTRAST MEDICATION: Performed by: EMERGENCY MEDICINE

## 2018-06-23 PROCEDURE — 6370000000 HC RX 637 (ALT 250 FOR IP): Performed by: EMERGENCY MEDICINE

## 2018-06-23 RX ORDER — BUSPIRONE HYDROCHLORIDE 10 MG/1
10 TABLET ORAL 2 TIMES DAILY
Status: DISCONTINUED | OUTPATIENT
Start: 2018-06-23 | End: 2018-06-23

## 2018-06-23 RX ORDER — KETOROLAC TROMETHAMINE 30 MG/ML
30 INJECTION, SOLUTION INTRAMUSCULAR; INTRAVENOUS ONCE
Status: COMPLETED | OUTPATIENT
Start: 2018-06-23 | End: 2018-06-23

## 2018-06-23 RX ORDER — ONDANSETRON 2 MG/ML
4 INJECTION INTRAMUSCULAR; INTRAVENOUS ONCE
Status: COMPLETED | OUTPATIENT
Start: 2018-06-23 | End: 2018-06-23

## 2018-06-23 RX ORDER — NAPROXEN 500 MG/1
500 TABLET ORAL 2 TIMES DAILY PRN
Status: DISCONTINUED | OUTPATIENT
Start: 2018-06-23 | End: 2018-06-25 | Stop reason: HOSPADM

## 2018-06-23 RX ORDER — ROPINIROLE 0.5 MG/1
1 TABLET, FILM COATED ORAL DAILY
Status: DISCONTINUED | OUTPATIENT
Start: 2018-06-23 | End: 2018-06-25 | Stop reason: HOSPADM

## 2018-06-23 RX ORDER — BUSPIRONE HYDROCHLORIDE 10 MG/1
10 TABLET ORAL DAILY
Status: DISCONTINUED | OUTPATIENT
Start: 2018-06-23 | End: 2018-06-25 | Stop reason: HOSPADM

## 2018-06-23 RX ORDER — ONDANSETRON 2 MG/ML
4 INJECTION INTRAMUSCULAR; INTRAVENOUS EVERY 6 HOURS PRN
Status: DISCONTINUED | OUTPATIENT
Start: 2018-06-23 | End: 2018-06-25 | Stop reason: HOSPADM

## 2018-06-23 RX ORDER — IPRATROPIUM BROMIDE AND ALBUTEROL SULFATE 2.5; .5 MG/3ML; MG/3ML
1 SOLUTION RESPIRATORY (INHALATION) EVERY 4 HOURS PRN
Status: DISCONTINUED | OUTPATIENT
Start: 2018-06-23 | End: 2018-06-25

## 2018-06-23 RX ORDER — FLUOXETINE HYDROCHLORIDE 20 MG/1
80 CAPSULE ORAL DAILY
Status: DISCONTINUED | OUTPATIENT
Start: 2018-06-23 | End: 2018-06-25 | Stop reason: HOSPADM

## 2018-06-23 RX ORDER — TROSPIUM CHLORIDE 20 MG/1
20 TABLET, FILM COATED ORAL NIGHTLY
Status: DISCONTINUED | OUTPATIENT
Start: 2018-06-23 | End: 2018-06-25 | Stop reason: HOSPADM

## 2018-06-23 RX ORDER — LEVOFLOXACIN 5 MG/ML
500 INJECTION, SOLUTION INTRAVENOUS EVERY 24 HOURS
Status: DISCONTINUED | OUTPATIENT
Start: 2018-06-23 | End: 2018-06-25 | Stop reason: HOSPADM

## 2018-06-23 RX ORDER — AZITHROMYCIN 250 MG/1
500 TABLET, FILM COATED ORAL ONCE
Status: COMPLETED | OUTPATIENT
Start: 2018-06-23 | End: 2018-06-23

## 2018-06-23 RX ORDER — SODIUM CHLORIDE 0.9 % (FLUSH) 0.9 %
10 SYRINGE (ML) INJECTION PRN
Status: DISCONTINUED | OUTPATIENT
Start: 2018-06-23 | End: 2018-06-25 | Stop reason: HOSPADM

## 2018-06-23 RX ORDER — SODIUM CHLORIDE 0.9 % (FLUSH) 0.9 %
10 SYRINGE (ML) INJECTION EVERY 12 HOURS SCHEDULED
Status: DISCONTINUED | OUTPATIENT
Start: 2018-06-23 | End: 2018-06-25 | Stop reason: HOSPADM

## 2018-06-23 RX ORDER — 0.9 % SODIUM CHLORIDE 0.9 %
1000 INTRAVENOUS SOLUTION INTRAVENOUS ONCE
Status: COMPLETED | OUTPATIENT
Start: 2018-06-23 | End: 2018-06-23

## 2018-06-23 RX ORDER — 0.9 % SODIUM CHLORIDE 0.9 %
80 INTRAVENOUS SOLUTION INTRAVENOUS ONCE
Status: COMPLETED | OUTPATIENT
Start: 2018-06-23 | End: 2018-06-23

## 2018-06-23 RX ADMIN — SODIUM CHLORIDE 80 ML: 9 INJECTION, SOLUTION INTRAVENOUS at 06:56

## 2018-06-23 RX ADMIN — TROSPIUM CHLORIDE 20 MG: 20 TABLET ORAL at 20:42

## 2018-06-23 RX ADMIN — AZITHROMYCIN MONOHYDRATE 500 MG: 250 TABLET ORAL at 07:45

## 2018-06-23 RX ADMIN — Medication 10 ML: at 06:56

## 2018-06-23 RX ADMIN — ROPINIROLE HYDROCHLORIDE 1 MG: 0.5 TABLET, FILM COATED ORAL at 10:24

## 2018-06-23 RX ADMIN — ROPINIROLE HYDROCHLORIDE 7.5 MG: 3 TABLET, FILM COATED ORAL at 20:41

## 2018-06-23 RX ADMIN — LEVOFLOXACIN 500 MG: 5 INJECTION, SOLUTION INTRAVENOUS at 16:25

## 2018-06-23 RX ADMIN — CEFEPIME HYDROCHLORIDE 2 G: 2 INJECTION, POWDER, FOR SOLUTION INTRAVENOUS at 20:25

## 2018-06-23 RX ADMIN — BUSPIRONE HYDROCHLORIDE 10 MG: 10 TABLET ORAL at 20:40

## 2018-06-23 RX ADMIN — CEFTRIAXONE SODIUM 1 G: 1 INJECTION, POWDER, FOR SOLUTION INTRAMUSCULAR; INTRAVENOUS at 07:45

## 2018-06-23 RX ADMIN — ENOXAPARIN SODIUM 40 MG: 40 INJECTION, SOLUTION INTRAVENOUS; SUBCUTANEOUS at 10:24

## 2018-06-23 RX ADMIN — FLUOXETINE 80 MG: 20 CAPSULE ORAL at 20:40

## 2018-06-23 RX ADMIN — Medication 10 ML: at 20:42

## 2018-06-23 RX ADMIN — IOPAMIDOL 75 ML: 755 INJECTION, SOLUTION INTRAVENOUS at 06:57

## 2018-06-23 RX ADMIN — SODIUM CHLORIDE 1000 ML: 9 INJECTION, SOLUTION INTRAVENOUS at 04:45

## 2018-06-23 RX ADMIN — KETOROLAC TROMETHAMINE 30 MG: 30 INJECTION, SOLUTION INTRAMUSCULAR at 04:45

## 2018-06-23 RX ADMIN — ONDANSETRON 4 MG: 2 INJECTION INTRAMUSCULAR; INTRAVENOUS at 04:45

## 2018-06-23 ASSESSMENT — ENCOUNTER SYMPTOMS
SORE THROAT: 0
DOUBLE VISION: 0
VOMITING: 1
BLURRED VISION: 0
HEMOPTYSIS: 0
EYE PAIN: 0
WHEEZING: 0
SHORTNESS OF BREATH: 0
BACK PAIN: 0
CONSTIPATION: 0
DIARRHEA: 0
NAUSEA: 1
SPUTUM PRODUCTION: 0
ABDOMINAL PAIN: 0
COUGH: 1

## 2018-06-23 ASSESSMENT — PAIN DESCRIPTION - PAIN TYPE: TYPE: ACUTE PAIN

## 2018-06-23 ASSESSMENT — PAIN SCALES - GENERAL
PAINLEVEL_OUTOF10: 5
PAINLEVEL_OUTOF10: 0
PAINLEVEL_OUTOF10: 3
PAINLEVEL_OUTOF10: 0

## 2018-06-23 ASSESSMENT — PAIN DESCRIPTION - LOCATION: LOCATION: HEAD

## 2018-06-23 ASSESSMENT — PAIN DESCRIPTION - DESCRIPTORS: DESCRIPTORS: ACHING

## 2018-06-24 LAB
ANION GAP SERPL CALCULATED.3IONS-SCNC: 12 MMOL/L (ref 9–17)
BUN BLDV-MCNC: 10 MG/DL (ref 6–20)
BUN/CREAT BLD: ABNORMAL (ref 9–20)
CALCIUM SERPL-MCNC: 9 MG/DL (ref 8.6–10.4)
CHLORIDE BLD-SCNC: 106 MMOL/L (ref 98–107)
CO2: 21 MMOL/L (ref 20–31)
CREAT SERPL-MCNC: 0.57 MG/DL (ref 0.5–0.9)
CULTURE: NORMAL
GFR AFRICAN AMERICAN: >60 ML/MIN
GFR NON-AFRICAN AMERICAN: >60 ML/MIN
GFR SERPL CREATININE-BSD FRML MDRD: ABNORMAL ML/MIN/{1.73_M2}
GFR SERPL CREATININE-BSD FRML MDRD: ABNORMAL ML/MIN/{1.73_M2}
GLUCOSE BLD-MCNC: 113 MG/DL (ref 70–99)
HCT VFR BLD CALC: 42.6 % (ref 36–46)
HEMOGLOBIN: 14.5 G/DL (ref 12–16)
Lab: NORMAL
MCH RBC QN AUTO: 32.4 PG (ref 26–34)
MCHC RBC AUTO-ENTMCNC: 34.1 G/DL (ref 31–37)
MCV RBC AUTO: 95 FL (ref 80–100)
NRBC AUTOMATED: NORMAL PER 100 WBC
PDW BLD-RTO: 13.2 % (ref 11.5–14.9)
PLATELET # BLD: 186 K/UL (ref 150–450)
PMV BLD AUTO: 8.9 FL (ref 6–12)
POTASSIUM SERPL-SCNC: 4.1 MMOL/L (ref 3.7–5.3)
RBC # BLD: 4.48 M/UL (ref 4–5.2)
SODIUM BLD-SCNC: 139 MMOL/L (ref 135–144)
SPECIMEN DESCRIPTION: NORMAL
STATUS: NORMAL
WBC # BLD: 7.6 K/UL (ref 3.5–11)

## 2018-06-24 PROCEDURE — 96367 TX/PROPH/DG ADDL SEQ IV INF: CPT

## 2018-06-24 PROCEDURE — G0378 HOSPITAL OBSERVATION PER HR: HCPCS

## 2018-06-24 PROCEDURE — 6360000002 HC RX W HCPCS: Performed by: STUDENT IN AN ORGANIZED HEALTH CARE EDUCATION/TRAINING PROGRAM

## 2018-06-24 PROCEDURE — 86738 MYCOPLASMA ANTIBODY: CPT

## 2018-06-24 PROCEDURE — 96366 THER/PROPH/DIAG IV INF ADDON: CPT

## 2018-06-24 PROCEDURE — 6370000000 HC RX 637 (ALT 250 FOR IP): Performed by: STUDENT IN AN ORGANIZED HEALTH CARE EDUCATION/TRAINING PROGRAM

## 2018-06-24 PROCEDURE — 6360000002 HC RX W HCPCS: Performed by: FAMILY MEDICINE

## 2018-06-24 PROCEDURE — 2580000003 HC RX 258: Performed by: STUDENT IN AN ORGANIZED HEALTH CARE EDUCATION/TRAINING PROGRAM

## 2018-06-24 PROCEDURE — 1200000000 HC SEMI PRIVATE

## 2018-06-24 PROCEDURE — 85027 COMPLETE CBC AUTOMATED: CPT

## 2018-06-24 PROCEDURE — 80048 BASIC METABOLIC PNL TOTAL CA: CPT

## 2018-06-24 PROCEDURE — 2580000003 HC RX 258: Performed by: FAMILY MEDICINE

## 2018-06-24 PROCEDURE — 36415 COLL VENOUS BLD VENIPUNCTURE: CPT

## 2018-06-24 PROCEDURE — 6370000000 HC RX 637 (ALT 250 FOR IP): Performed by: INTERNAL MEDICINE

## 2018-06-24 PROCEDURE — 99233 SBSQ HOSP IP/OBS HIGH 50: CPT | Performed by: INTERNAL MEDICINE

## 2018-06-24 RX ADMIN — ROPINIROLE HYDROCHLORIDE 7.5 MG: 3 TABLET, FILM COATED ORAL at 22:00

## 2018-06-24 RX ADMIN — Medication 10 ML: at 22:06

## 2018-06-24 RX ADMIN — FLUOXETINE 80 MG: 20 CAPSULE ORAL at 22:07

## 2018-06-24 RX ADMIN — BUSPIRONE HYDROCHLORIDE 10 MG: 10 TABLET ORAL at 22:08

## 2018-06-24 RX ADMIN — CEFEPIME HYDROCHLORIDE 2 G: 2 INJECTION, POWDER, FOR SOLUTION INTRAVENOUS at 21:58

## 2018-06-24 RX ADMIN — Medication 10 ML: at 22:01

## 2018-06-24 RX ADMIN — LEVOFLOXACIN 500 MG: 5 INJECTION, SOLUTION INTRAVENOUS at 16:10

## 2018-06-24 RX ADMIN — ROPINIROLE HYDROCHLORIDE 1 MG: 0.5 TABLET, FILM COATED ORAL at 08:25

## 2018-06-24 RX ADMIN — TROSPIUM CHLORIDE 20 MG: 20 TABLET ORAL at 22:00

## 2018-06-24 RX ADMIN — CEFEPIME HYDROCHLORIDE 2 G: 2 INJECTION, POWDER, FOR SOLUTION INTRAVENOUS at 08:25

## 2018-06-24 ASSESSMENT — PAIN SCALES - GENERAL: PAINLEVEL_OUTOF10: 0

## 2018-06-24 ASSESSMENT — ENCOUNTER SYMPTOMS
ABDOMINAL PAIN: 0
HEMOPTYSIS: 0
COUGH: 1
SHORTNESS OF BREATH: 1
SPUTUM PRODUCTION: 1
WHEEZING: 1

## 2018-06-25 VITALS
WEIGHT: 207 LBS | SYSTOLIC BLOOD PRESSURE: 116 MMHG | RESPIRATION RATE: 14 BRPM | OXYGEN SATURATION: 97 % | HEIGHT: 63 IN | DIASTOLIC BLOOD PRESSURE: 69 MMHG | BODY MASS INDEX: 36.68 KG/M2 | TEMPERATURE: 98.2 F | HEART RATE: 82 BPM

## 2018-06-25 LAB
ANION GAP SERPL CALCULATED.3IONS-SCNC: 10 MMOL/L (ref 9–17)
ANTI-NUCLEAR ANTIBODY (ANA): NEGATIVE
BUN BLDV-MCNC: 12 MG/DL (ref 6–20)
BUN/CREAT BLD: ABNORMAL (ref 9–20)
CALCIUM SERPL-MCNC: 9.4 MG/DL (ref 8.6–10.4)
CHLORIDE BLD-SCNC: 101 MMOL/L (ref 98–107)
CO2: 23 MMOL/L (ref 20–31)
CREAT SERPL-MCNC: 0.56 MG/DL (ref 0.5–0.9)
CULTURE: ABNORMAL
DIRECT EXAM: ABNORMAL
GFR AFRICAN AMERICAN: >60 ML/MIN
GFR NON-AFRICAN AMERICAN: >60 ML/MIN
GFR SERPL CREATININE-BSD FRML MDRD: ABNORMAL ML/MIN/{1.73_M2}
GFR SERPL CREATININE-BSD FRML MDRD: ABNORMAL ML/MIN/{1.73_M2}
GLUCOSE BLD-MCNC: 103 MG/DL (ref 70–99)
HCT VFR BLD CALC: 43.1 % (ref 36–46)
HEMOGLOBIN: 14.8 G/DL (ref 12–16)
Lab: ABNORMAL
MCH RBC QN AUTO: 32.5 PG (ref 26–34)
MCHC RBC AUTO-ENTMCNC: 34.3 G/DL (ref 31–37)
MCV RBC AUTO: 94.9 FL (ref 80–100)
MYCOPLASMA PNEUMONIAE IGM: 2.28
MYCOPLASMA PNEUMONIAE IGM: 2.3
NRBC AUTOMATED: NORMAL PER 100 WBC
PDW BLD-RTO: 13.3 % (ref 11.5–14.9)
PLATELET # BLD: 194 K/UL (ref 150–450)
PMV BLD AUTO: 9.1 FL (ref 6–12)
POTASSIUM SERPL-SCNC: 4.5 MMOL/L (ref 3.7–5.3)
RBC # BLD: 4.54 M/UL (ref 4–5.2)
SODIUM BLD-SCNC: 134 MMOL/L (ref 135–144)
SPECIMEN DESCRIPTION: ABNORMAL
STATUS: ABNORMAL
WBC # BLD: 8.5 K/UL (ref 3.5–11)

## 2018-06-25 PROCEDURE — G8997 SWALLOW GOAL STATUS: HCPCS

## 2018-06-25 PROCEDURE — 96366 THER/PROPH/DIAG IV INF ADDON: CPT

## 2018-06-25 PROCEDURE — 6370000000 HC RX 637 (ALT 250 FOR IP): Performed by: STUDENT IN AN ORGANIZED HEALTH CARE EDUCATION/TRAINING PROGRAM

## 2018-06-25 PROCEDURE — 92610 EVALUATE SWALLOWING FUNCTION: CPT

## 2018-06-25 PROCEDURE — G0378 HOSPITAL OBSERVATION PER HR: HCPCS

## 2018-06-25 PROCEDURE — G8996 SWALLOW CURRENT STATUS: HCPCS

## 2018-06-25 PROCEDURE — 6360000002 HC RX W HCPCS: Performed by: FAMILY MEDICINE

## 2018-06-25 PROCEDURE — 99239 HOSP IP/OBS DSCHRG MGMT >30: CPT | Performed by: INTERNAL MEDICINE

## 2018-06-25 PROCEDURE — 80048 BASIC METABOLIC PNL TOTAL CA: CPT

## 2018-06-25 PROCEDURE — 85027 COMPLETE CBC AUTOMATED: CPT

## 2018-06-25 PROCEDURE — G8998 SWALLOW D/C STATUS: HCPCS

## 2018-06-25 PROCEDURE — 2580000003 HC RX 258: Performed by: STUDENT IN AN ORGANIZED HEALTH CARE EDUCATION/TRAINING PROGRAM

## 2018-06-25 PROCEDURE — 2580000003 HC RX 258: Performed by: FAMILY MEDICINE

## 2018-06-25 PROCEDURE — 36415 COLL VENOUS BLD VENIPUNCTURE: CPT

## 2018-06-25 RX ORDER — ALBUTEROL SULFATE 90 UG/1
2 AEROSOL, METERED RESPIRATORY (INHALATION) EVERY 6 HOURS PRN
Status: DISCONTINUED | OUTPATIENT
Start: 2018-06-25 | End: 2018-06-25 | Stop reason: HOSPADM

## 2018-06-25 RX ORDER — LEVOFLOXACIN 750 MG/1
750 TABLET ORAL DAILY
Qty: 5 TABLET | Refills: 0 | Status: SHIPPED | OUTPATIENT
Start: 2018-06-25 | End: 2018-06-30

## 2018-06-25 RX ORDER — CEFUROXIME AXETIL 250 MG/1
250 TABLET ORAL 2 TIMES DAILY
Qty: 10 TABLET | Refills: 0 | Status: SHIPPED | OUTPATIENT
Start: 2018-06-25 | End: 2018-06-30

## 2018-06-25 RX ORDER — ALBUTEROL SULFATE 90 UG/1
2 AEROSOL, METERED RESPIRATORY (INHALATION) EVERY 6 HOURS PRN
Qty: 1 INHALER | Refills: 3 | Status: SHIPPED | OUTPATIENT
Start: 2018-06-25 | End: 2019-05-14

## 2018-06-25 RX ADMIN — CEFEPIME HYDROCHLORIDE 2 G: 2 INJECTION, POWDER, FOR SOLUTION INTRAVENOUS at 09:05

## 2018-06-25 RX ADMIN — NAPROXEN 500 MG: 500 TABLET ORAL at 02:09

## 2018-06-25 RX ADMIN — Medication 10 ML: at 09:05

## 2018-06-25 RX ADMIN — ROPINIROLE HYDROCHLORIDE 1 MG: 0.5 TABLET, FILM COATED ORAL at 09:05

## 2018-06-25 ASSESSMENT — ENCOUNTER SYMPTOMS
SHORTNESS OF BREATH: 1
COUGH: 1
SPUTUM PRODUCTION: 1
WHEEZING: 1
HEMOPTYSIS: 0
ABDOMINAL PAIN: 0

## 2018-06-25 ASSESSMENT — PAIN DESCRIPTION - PAIN TYPE: TYPE: ACUTE PAIN

## 2018-06-25 ASSESSMENT — PAIN DESCRIPTION - DESCRIPTORS: DESCRIPTORS: ACHING

## 2018-06-25 ASSESSMENT — PAIN SCALES - GENERAL
PAINLEVEL_OUTOF10: 0
PAINLEVEL_OUTOF10: 5
PAINLEVEL_OUTOF10: 0
PAINLEVEL_OUTOF10: 0

## 2018-06-25 ASSESSMENT — PAIN DESCRIPTION - LOCATION: LOCATION: SHOULDER

## 2018-06-25 ASSESSMENT — PAIN DESCRIPTION - ORIENTATION: ORIENTATION: LEFT

## 2018-06-27 LAB
ASPERGILLUS ANTIBODY BY ID: NORMAL
QUANTIFERON (R) TB GOLD (INCUBATED): NEGATIVE
QUANTIFERON MITOGEN: >10 IU/ML
QUANTIFERON NIL: 0.11 IU/ML
QUANTIFERON TB AG MINUS NIL: 0.02 IU/ML (ref 0–0.34)

## 2018-06-29 RX ORDER — CEFUROXIME AXETIL 250 MG/1
250 TABLET ORAL 2 TIMES DAILY
Qty: 10 TABLET | OUTPATIENT
Start: 2018-06-29 | End: 2018-07-04

## 2018-06-29 RX ORDER — LEVOFLOXACIN 750 MG/1
750 TABLET ORAL DAILY
Qty: 5 TABLET | OUTPATIENT
Start: 2018-06-29 | End: 2018-07-04

## 2018-07-07 ENCOUNTER — APPOINTMENT (OUTPATIENT)
Dept: GENERAL RADIOLOGY | Age: 43
End: 2018-07-07
Payer: COMMERCIAL

## 2018-07-07 ENCOUNTER — HOSPITAL ENCOUNTER (EMERGENCY)
Age: 43
Discharge: HOME | End: 2018-07-07
Attending: EMERGENCY MEDICINE
Payer: COMMERCIAL

## 2018-07-07 VITALS
RESPIRATION RATE: 22 BRPM | DIASTOLIC BLOOD PRESSURE: 36 MMHG | OXYGEN SATURATION: 95 % | SYSTOLIC BLOOD PRESSURE: 106 MMHG | WEIGHT: 209 LBS | HEART RATE: 96 BPM | BODY MASS INDEX: 37.02 KG/M2 | TEMPERATURE: 97.8 F

## 2018-07-07 DIAGNOSIS — J15.7 PNEUMONIA OF BOTH LOWER LOBES DUE TO MYCOPLASMA PNEUMONIAE: Primary | ICD-10-CM

## 2018-07-07 LAB
ABSOLUTE EOS #: 0.3 K/UL (ref 0–0.4)
ABSOLUTE IMMATURE GRANULOCYTE: ABNORMAL K/UL (ref 0–0.3)
ABSOLUTE LYMPH #: 1.4 K/UL (ref 1–4.8)
ABSOLUTE MONO #: 0.5 K/UL (ref 0.1–1.3)
ALBUMIN SERPL-MCNC: 4 G/DL (ref 3.5–5.2)
ALBUMIN/GLOBULIN RATIO: ABNORMAL (ref 1–2.5)
ALP BLD-CCNC: 80 U/L (ref 35–104)
ALT SERPL-CCNC: 20 U/L (ref 5–33)
ANION GAP SERPL CALCULATED.3IONS-SCNC: 12 MMOL/L (ref 9–17)
AST SERPL-CCNC: 21 U/L
BASOPHILS # BLD: 1 % (ref 0–2)
BASOPHILS ABSOLUTE: 0.1 K/UL (ref 0–0.2)
BILIRUB SERPL-MCNC: 0.22 MG/DL (ref 0.3–1.2)
BNP INTERPRETATION: NORMAL
BUN BLDV-MCNC: 14 MG/DL (ref 6–20)
BUN/CREAT BLD: ABNORMAL (ref 9–20)
CALCIUM SERPL-MCNC: 8.7 MG/DL (ref 8.6–10.4)
CHLORIDE BLD-SCNC: 101 MMOL/L (ref 98–107)
CO2: 24 MMOL/L (ref 20–31)
CREAT SERPL-MCNC: 0.55 MG/DL (ref 0.5–0.9)
DIFFERENTIAL TYPE: ABNORMAL
EKG ATRIAL RATE: 78 BPM
EKG P AXIS: 32 DEGREES
EKG P-R INTERVAL: 140 MS
EKG Q-T INTERVAL: 378 MS
EKG QRS DURATION: 86 MS
EKG QTC CALCULATION (BAZETT): 430 MS
EKG R AXIS: 55 DEGREES
EKG T AXIS: 57 DEGREES
EKG VENTRICULAR RATE: 78 BPM
EOSINOPHILS RELATIVE PERCENT: 2 % (ref 0–4)
GFR AFRICAN AMERICAN: >60 ML/MIN
GFR NON-AFRICAN AMERICAN: >60 ML/MIN
GFR SERPL CREATININE-BSD FRML MDRD: ABNORMAL ML/MIN/{1.73_M2}
GFR SERPL CREATININE-BSD FRML MDRD: ABNORMAL ML/MIN/{1.73_M2}
GLUCOSE BLD-MCNC: 110 MG/DL (ref 70–99)
HCT VFR BLD CALC: 40.8 % (ref 36–46)
HEMOGLOBIN: 13.7 G/DL (ref 12–16)
IMMATURE GRANULOCYTES: ABNORMAL %
LYMPHOCYTES # BLD: 10 % (ref 24–44)
MCH RBC QN AUTO: 31.4 PG (ref 26–34)
MCHC RBC AUTO-ENTMCNC: 33.7 G/DL (ref 31–37)
MCV RBC AUTO: 93.2 FL (ref 80–100)
MONOCYTES # BLD: 4 % (ref 1–7)
NRBC AUTOMATED: ABNORMAL PER 100 WBC
PDW BLD-RTO: 13.3 % (ref 11.5–14.9)
PLATELET # BLD: 207 K/UL (ref 150–450)
PLATELET ESTIMATE: ABNORMAL
PMV BLD AUTO: 8.4 FL (ref 6–12)
POTASSIUM SERPL-SCNC: 3.5 MMOL/L (ref 3.7–5.3)
PRO-BNP: 109 PG/ML
RBC # BLD: 4.38 M/UL (ref 4–5.2)
RBC # BLD: ABNORMAL 10*6/UL
SEG NEUTROPHILS: 83 % (ref 36–66)
SEGMENTED NEUTROPHILS ABSOLUTE COUNT: 12.3 K/UL (ref 1.3–9.1)
SODIUM BLD-SCNC: 137 MMOL/L (ref 135–144)
TOTAL PROTEIN: 6.5 G/DL (ref 6.4–8.3)
TROPONIN INTERP: NORMAL
TROPONIN T: <0.03 NG/ML
WBC # BLD: 14.6 K/UL (ref 3.5–11)
WBC # BLD: ABNORMAL 10*3/UL

## 2018-07-07 PROCEDURE — 36415 COLL VENOUS BLD VENIPUNCTURE: CPT

## 2018-07-07 PROCEDURE — 83880 ASSAY OF NATRIURETIC PEPTIDE: CPT

## 2018-07-07 PROCEDURE — 85025 COMPLETE CBC W/AUTO DIFF WBC: CPT

## 2018-07-07 PROCEDURE — 6370000000 HC RX 637 (ALT 250 FOR IP): Performed by: EMERGENCY MEDICINE

## 2018-07-07 PROCEDURE — 71046 X-RAY EXAM CHEST 2 VIEWS: CPT

## 2018-07-07 PROCEDURE — 94640 AIRWAY INHALATION TREATMENT: CPT

## 2018-07-07 PROCEDURE — 94664 DEMO&/EVAL PT USE INHALER: CPT

## 2018-07-07 PROCEDURE — 84484 ASSAY OF TROPONIN QUANT: CPT

## 2018-07-07 PROCEDURE — 80053 COMPREHEN METABOLIC PANEL: CPT

## 2018-07-07 PROCEDURE — 99285 EMERGENCY DEPT VISIT HI MDM: CPT

## 2018-07-07 PROCEDURE — 93005 ELECTROCARDIOGRAM TRACING: CPT

## 2018-07-07 RX ORDER — AZITHROMYCIN 250 MG/1
TABLET, FILM COATED ORAL
Qty: 1 PACKET | Refills: 0 | Status: SHIPPED | OUTPATIENT
Start: 2018-07-07 | End: 2018-07-17

## 2018-07-07 RX ORDER — IPRATROPIUM BROMIDE AND ALBUTEROL SULFATE 2.5; .5 MG/3ML; MG/3ML
1 SOLUTION RESPIRATORY (INHALATION) PRN
Status: DISCONTINUED | OUTPATIENT
Start: 2018-07-07 | End: 2018-07-07 | Stop reason: HOSPADM

## 2018-07-07 RX ORDER — AZITHROMYCIN 250 MG/1
500 TABLET, FILM COATED ORAL ONCE
Status: COMPLETED | OUTPATIENT
Start: 2018-07-07 | End: 2018-07-07

## 2018-07-07 RX ORDER — CODEINE PHOSPHATE AND GUAIFENESIN 10; 100 MG/5ML; MG/5ML
10 SOLUTION ORAL ONCE
Status: COMPLETED | OUTPATIENT
Start: 2018-07-07 | End: 2018-07-07

## 2018-07-07 RX ADMIN — AZITHROMYCIN MONOHYDRATE 500 MG: 250 TABLET ORAL at 04:18

## 2018-07-07 RX ADMIN — GUAIFENESIN AND CODEINE PHOSPHATE 10 ML: 100; 10 SOLUTION ORAL at 04:18

## 2018-07-07 RX ADMIN — IPRATROPIUM BROMIDE AND ALBUTEROL SULFATE 1 AMPULE: .5; 3 SOLUTION RESPIRATORY (INHALATION) at 02:52

## 2018-07-07 ASSESSMENT — ENCOUNTER SYMPTOMS
NAUSEA: 0
SORE THROAT: 1
COUGH: 1
ABDOMINAL PAIN: 0
VOMITING: 0
SHORTNESS OF BREATH: 1

## 2018-07-07 NOTE — ED PROVIDER NOTES
lmor - sent a letter of contact   ANKLE SURGERY Right 1997     SECTION      CHOLECYSTECTOMY  06    COLONOSCOPY      with poly removal    DILATION AND CURETTAGE OF UTERUS  2014    ENDOMETRIAL ABLATION  14    with novasure    HYSTEROSCOPY  14    D&C    KNEE ARTHROSCOPY Left 6/29/15    KNEE SURGERY      right knee scoped on 17    MYRINGOTOMY Bilateral     with tubes    OTHER SURGICAL HISTORY  2016    removal multiple vulvar inclusion cysts    TONSILLECTOMY AND ADENOIDECTOMY      TUBAL LIGATION         CURRENT MEDICATIONS       Previous Medications    ALBUTEROL SULFATE  (90 BASE) MCG/ACT INHALER    Inhale 2 puffs into the lungs every 6 hours as needed for Wheezing    BUSPIRONE (BUSPAR) 10 MG TABLET    Take 10 mg by mouth 2 times daily    FLUOXETINE (PROZAC) 40 MG CAPSULE    Take 80 mg by mouth daily     NAPROXEN (NAPROSYN) 500 MG TABLET    Take 500 mg by mouth daily     ROPINIROLE (REQUIP) 1 MG TABLET    Take 1 mg by mouth daily     ROPINIROLE (REQUIP) 5 MG TABLET    Take 7.5 mg by mouth nightly     TOLTERODINE (DETROL LA) 4 MG EXTENDED RELEASE CAPSULE    Take 1 capsule by mouth daily       ALLERGIES     is allergic to bactrim [sulfamethoxazole-trimethoprim]; dicloxacillin; and pcn [penicillins]. FAMILY HISTORY     indicated that the status of her mother is unknown. She indicated that the status of her father is unknown. She indicated that the status of her paternal grandfather is unknown. SOCIAL HISTORY      reports that she has been smoking Cigarettes. She has a 9.00 pack-year smoking history. She has never used smokeless tobacco. She reports that she uses drugs, including Marijuana. She reports that she does not drink alcohol.     PHYSICAL EXAM     INITIAL VITALS: BP (!) 106/36   Pulse 96   Temp 97.8 °F (36.6 °C) (Oral)   Resp 22   Wt 209 lb (94.8 kg)   SpO2 95%   BMI 37.02 kg/m²   Gen.: NAD  Head: Normocephalic, atraumatic  Eye: Pupils equal round reactive to light, no conjunctivitis  Neck: No adenopathy or JVD  Heart: Regular rate and rhythm no murmurs  Lungs: Clear to auscultation bilaterally, no respiratory distress  Chest wall: No crepitus, no tenderness palpation  Abdomen: Soft, nontender, nondistended, with no peritoneal signs  Neurologic: Patient is alert and oriented x3, motor and sensation is intact in all 4 extremities  Extremities: Full range of motion, no cyanosis, no edema, no signs of trauma, no tenderness to palpation    MEDICAL DECISION MAKING:     MDM 44year-old recent diagnosis of Mycoplasma pneumonia presenting with worsening cough and shortness of breath  Her vital signs are all stable here. We'll get an x-ray to make sure that there is no worsening of the infiltrates. We'll check her white count. We'll get a BNP EKG and troponin to make sure that there is no sign of heart failure. We'll give breathing treatments and then reassess. Hospital course:  After breathing treatment patient is resting comfortably. Chest x-ray actually showed resolving infiltrates. No other acute findings on chest x-ray. Laboratory studies reviewed the white count is 14.6. The remaining electrolytes are normal.  No BNP elevation. No troponin elevation, no sign of heart failure. I suspect that that the patient has a mild lingering pneumonia. We'll continue her on azithromycin for another 5 days. I advised that she stop smoking and not to prevent any secondhand smoke such as camp fires etc.    The findings of today's investigation and the importance of close follow up with their pcp and warning precautions which should prompt return to the ed were discussed with the patient. The patient verbalized understanding with teach back.     DIAGNOSTIC RESULTS     EKG: All EKG's are interpreted by the Emergency Department Physician who either signs or Co-signs this chart in the absence of a cardiologist.  EKG shows a normal sinus rhythm heart rate of 78, , QRS of 86, QTC of 430 no ST segment elevations or depressions no T-wave inversions and the axis is normal.      RADIOLOGY:All plain film, CT, MRI, and formal ultrasound images (except ED bedside ultrasound) are read by the radiologist and the images and interpretations are directly viewed by the emergency physician. XR CHEST STANDARD (2 VW)   Preliminary Result   No radiographic evidence of acute cardiopulmonary process. LABS: All lab results were reviewed by myself, and all abnormals are listed below. Labs Reviewed   CBC WITH AUTO DIFFERENTIAL - Abnormal; Notable for the following:        Result Value    WBC 14.6 (*)     Seg Neutrophils 83 (*)     Lymphocytes 10 (*)     Segs Absolute 12.30 (*)     All other components within normal limits   COMPREHENSIVE METABOLIC PANEL - Abnormal; Notable for the following:     Glucose 110 (*)     Potassium 3.5 (*)     Total Bilirubin 0.22 (*)     All other components within normal limits   BRAIN NATRIURETIC PEPTIDE   TROPONIN       EMERGENCY DEPARTMENT COURSE:   Vitals:    Vitals:    07/07/18 0315 07/07/18 0330 07/07/18 0345 07/07/18 0400   BP: 111/71 101/66 108/61 (!) 106/36   Pulse: 95 96 93 96   Resp: 22 18 18 22   Temp:       TempSrc:       SpO2: 94% 98% 93% 95%   Weight:           The patient was given the following medications while in the emergency department:  Orders Placed This Encounter   Medications    ipratropium-albuterol (DUONEB) nebulizer solution 1 ampule    guaiFENesin-codeine (GUAIFENESIN AC) 100-10 MG/5ML liquid 10 mL    azithromycin (ZITHROMAX) 250 MG tablet     Sig: Take 2 tablets (500 mg) on Day 1, followed by 1 tablet (250 mg) once daily on Days 2 through 5. Dispense:  1 packet     Refill:  0    azithromycin (ZITHROMAX) tablet 500 mg     -------------------------  CRITICAL CARE:   CONSULTS: None  PROCEDURES: Procedures     FINAL IMPRESSION      1.  Pneumonia of both lower lobes due to Mycoplasma pneumoniae          DISPOSITION/PLAN   DISPOSITION

## 2018-07-15 ENCOUNTER — APPOINTMENT (OUTPATIENT)
Dept: GENERAL RADIOLOGY | Age: 43
End: 2018-07-15
Payer: COMMERCIAL

## 2018-07-15 ENCOUNTER — HOSPITAL ENCOUNTER (EMERGENCY)
Age: 43
Discharge: HOME | End: 2018-07-15
Attending: EMERGENCY MEDICINE
Payer: COMMERCIAL

## 2018-07-15 VITALS
RESPIRATION RATE: 19 BRPM | TEMPERATURE: 98.1 F | SYSTOLIC BLOOD PRESSURE: 123 MMHG | WEIGHT: 209 LBS | HEART RATE: 83 BPM | DIASTOLIC BLOOD PRESSURE: 80 MMHG | BODY MASS INDEX: 37.03 KG/M2 | HEIGHT: 63 IN | OXYGEN SATURATION: 97 %

## 2018-07-15 DIAGNOSIS — M79.89 LEG SWELLING: Primary | ICD-10-CM

## 2018-07-15 LAB
ABSOLUTE EOS #: 0.3 K/UL (ref 0–0.4)
ABSOLUTE IMMATURE GRANULOCYTE: NORMAL K/UL (ref 0–0.3)
ABSOLUTE LYMPH #: 3.1 K/UL (ref 1–4.8)
ABSOLUTE MONO #: 0.6 K/UL (ref 0.1–1.3)
ANION GAP SERPL CALCULATED.3IONS-SCNC: 12 MMOL/L (ref 9–17)
BASOPHILS # BLD: 1 % (ref 0–2)
BASOPHILS ABSOLUTE: 0.1 K/UL (ref 0–0.2)
BNP INTERPRETATION: NORMAL
BUN BLDV-MCNC: 17 MG/DL (ref 6–20)
BUN/CREAT BLD: ABNORMAL (ref 9–20)
CALCIUM SERPL-MCNC: 8.9 MG/DL (ref 8.6–10.4)
CHLORIDE BLD-SCNC: 104 MMOL/L (ref 98–107)
CO2: 25 MMOL/L (ref 20–31)
CREAT SERPL-MCNC: 0.69 MG/DL (ref 0.5–0.9)
DIFFERENTIAL TYPE: NORMAL
EOSINOPHILS RELATIVE PERCENT: 2 % (ref 0–4)
GFR AFRICAN AMERICAN: >60 ML/MIN
GFR NON-AFRICAN AMERICAN: >60 ML/MIN
GFR SERPL CREATININE-BSD FRML MDRD: ABNORMAL ML/MIN/{1.73_M2}
GFR SERPL CREATININE-BSD FRML MDRD: ABNORMAL ML/MIN/{1.73_M2}
GLUCOSE BLD-MCNC: 90 MG/DL (ref 70–99)
HCT VFR BLD CALC: 39.7 % (ref 36–46)
HEMOGLOBIN: 13.5 G/DL (ref 12–16)
IMMATURE GRANULOCYTES: NORMAL %
LYMPHOCYTES # BLD: 29 % (ref 24–44)
MCH RBC QN AUTO: 32.1 PG (ref 26–34)
MCHC RBC AUTO-ENTMCNC: 34 G/DL (ref 31–37)
MCV RBC AUTO: 94.4 FL (ref 80–100)
MONOCYTES # BLD: 6 % (ref 1–7)
NRBC AUTOMATED: NORMAL PER 100 WBC
PDW BLD-RTO: 13.2 % (ref 11.5–14.9)
PLATELET # BLD: 218 K/UL (ref 150–450)
PLATELET ESTIMATE: NORMAL
PMV BLD AUTO: 9 FL (ref 6–12)
POTASSIUM SERPL-SCNC: 3.4 MMOL/L (ref 3.7–5.3)
PRO-BNP: 37 PG/ML
RBC # BLD: 4.21 M/UL (ref 4–5.2)
RBC # BLD: NORMAL 10*6/UL
SEG NEUTROPHILS: 62 % (ref 36–66)
SEGMENTED NEUTROPHILS ABSOLUTE COUNT: 6.9 K/UL (ref 1.3–9.1)
SODIUM BLD-SCNC: 141 MMOL/L (ref 135–144)
WBC # BLD: 11 K/UL (ref 3.5–11)
WBC # BLD: NORMAL 10*3/UL

## 2018-07-15 PROCEDURE — 80048 BASIC METABOLIC PNL TOTAL CA: CPT

## 2018-07-15 PROCEDURE — 85025 COMPLETE CBC W/AUTO DIFF WBC: CPT

## 2018-07-15 PROCEDURE — 99283 EMERGENCY DEPT VISIT LOW MDM: CPT

## 2018-07-15 PROCEDURE — 71046 X-RAY EXAM CHEST 2 VIEWS: CPT

## 2018-07-15 PROCEDURE — 36415 COLL VENOUS BLD VENIPUNCTURE: CPT

## 2018-07-15 PROCEDURE — 83880 ASSAY OF NATRIURETIC PEPTIDE: CPT

## 2018-07-15 ASSESSMENT — ENCOUNTER SYMPTOMS
CONSTIPATION: 0
BLURRED VISION: 0
COUGH: 1
WHEEZING: 0
VOMITING: 0
SHORTNESS OF BREATH: 0
ABDOMINAL PAIN: 0
SORE THROAT: 0
EYE DISCHARGE: 0
NAUSEA: 0
DIARRHEA: 0

## 2018-07-15 NOTE — ED PROVIDER NOTES
gestational; Menarche; Neuropathy ([facility] [address] Obesity; Parity; Restless leg syndrome; Sleep apnea; UTI (lower urinary tract infection); and Wears glasses. SURGICAL HISTORY      has a past surgical history that includes Cholecystectomy (06); Tubal ligation;  section; Colonoscopy; hysteroscopy (14); Dilation and curettage of uterus (2014); myringotomy (Bilateral); Tonsillectomy and adenoidectomy; Ankle surgery (Right, ); Endometrial ablation (14); Knee arthroscopy (Left, 6/29/15); other surgical history (2016); and knee surgery. CURRENT MEDICATIONS       Discharge Medication List as of 7/15/2018  4:17 AM      CONTINUE these medications which have NOT CHANGED    Details   azithromycin (ZITHROMAX) 250 MG tablet Take 2 tablets (500 mg) on Day 1, followed by 1 tablet (250 mg) once daily on Days 2 through 5., Disp-1 packet, R-0Print      albuterol sulfate  (90 Base) MCG/ACT inhaler Inhale 2 puffs into the lungs every 6 hours as needed for Wheezing, Disp-1 Inhaler, R-3Normal      !! rOPINIRole (REQUIP) 1 MG tablet Take 1 mg by mouth daily Historical Med      tolterodine (DETROL LA) 4 MG extended release capsule Take 1 capsule by mouth daily, Disp-30 capsule, R-2Normal      FLUoxetine (PROZAC) 40 MG capsule Take 80 mg by mouth daily Historical Med      busPIRone (BUSPAR) 10 MG tablet Take 10 mg by mouth 2 times dailyHistorical Med      naproxen (NAPROSYN) 500 MG tablet Take 500 mg by mouth daily Historical Med      !! rOPINIRole (REQUIP) 5 MG tablet Take 7.5 mg by mouth nightly Historical Med       !! - Potential duplicate medications found. Please discuss with provider. ALLERGIES     is allergic to bactrim [sulfamethoxazole-trimethoprim]; dicloxacillin; and pcn [penicillins]. FAMILY HISTORY     indicated that the status of her mother is unknown. She indicated that the status of her father is unknown.  She indicated that the status of her paternal grandfather is unknown.      family history includes Diabetes in her father; Heart Disease in her paternal grandfather; Hypertension in her father and mother; Mental Illness in her mother; Stroke in her father. SOCIAL HISTORY      reports that she has been smoking Cigarettes. She has a 9.00 pack-year smoking history. She has never used smokeless tobacco. She reports that she uses drugs, including Marijuana. She reports that she does not drink alcohol. PHYSICAL EXAM     INITIAL VITALS:  height is 5' 3\" (1.6 m) and weight is 209 lb (94.8 kg). Her oral temperature is 98.1 °F (36.7 °C). Her blood pressure is 123/80 and her pulse is 83. Her respiration is 19 and oxygen saturation is 97%. Physical Exam   Constitutional: She is oriented to person, place, and time and well-developed, well-nourished, and in no distress. No distress. HENT:   Head: Normocephalic and atraumatic. Eyes: Conjunctivae are normal. Pupils are equal, round, and reactive to light. Neck: Neck supple. Cardiovascular: Normal rate, regular rhythm, normal heart sounds and intact distal pulses. No murmur heard. Pulmonary/Chest: Effort normal and breath sounds normal. No respiratory distress. Abdominal: Soft. Bowel sounds are normal. She exhibits no distension. There is no tenderness. Musculoskeletal: She exhibits edema (Trace bilateral lower extremities). She exhibits no tenderness. Lymphadenopathy:     She has no cervical adenopathy. Neurological: She is alert and oriented to person, place, and time. GCS score is 15. Skin: Skin is warm and dry. No rash noted. Psychiatric: Affect and judgment normal.   Nursing note and vitals reviewed. DIFFERENTIAL DIAGNOSIS/MDM:   81-year-old female presents with bilateral leg swelling. She is afebrile and nontoxic in appearance. Vital signs within normal limits. She appears very well. She is playing a game on her phone while I am questioning her. She is in no acute distress.     Suspect as of 7/15/2018  4:17 AM          (Please note that portions of this note were completed with a voice recognition program.  Efforts were made to edit the dictations but occasionally words are mis-transcribed.)    Marcela Smyth DO  Attending Emergency Physician          Marcela Smyth DO  07/15/18 0530

## 2018-07-25 ENCOUNTER — HOSPITAL ENCOUNTER (EMERGENCY)
Age: 43
Discharge: HOME | End: 2018-07-25
Attending: EMERGENCY MEDICINE
Payer: COMMERCIAL

## 2018-07-25 VITALS
OXYGEN SATURATION: 97 % | BODY MASS INDEX: 37.03 KG/M2 | SYSTOLIC BLOOD PRESSURE: 130 MMHG | DIASTOLIC BLOOD PRESSURE: 82 MMHG | HEART RATE: 84 BPM | RESPIRATION RATE: 18 BRPM | WEIGHT: 209 LBS | TEMPERATURE: 97.9 F | HEIGHT: 63 IN

## 2018-07-25 DIAGNOSIS — S61.239A PUNCTURE WOUND OF FINGER OF RIGHT HAND, INITIAL ENCOUNTER: Primary | ICD-10-CM

## 2018-07-25 PROCEDURE — 6370000000 HC RX 637 (ALT 250 FOR IP): Performed by: PHYSICIAN ASSISTANT

## 2018-07-25 PROCEDURE — 99283 EMERGENCY DEPT VISIT LOW MDM: CPT

## 2018-07-25 RX ORDER — DOXYCYCLINE 100 MG/1
100 TABLET ORAL 2 TIMES DAILY
Qty: 20 TABLET | Refills: 0 | Status: SHIPPED | OUTPATIENT
Start: 2018-07-25 | End: 2018-08-04

## 2018-07-25 RX ORDER — DOXYCYCLINE 100 MG/1
100 CAPSULE ORAL ONCE
Status: COMPLETED | OUTPATIENT
Start: 2018-07-25 | End: 2018-07-25

## 2018-07-25 RX ORDER — IBUPROFEN 600 MG/1
600 TABLET ORAL ONCE
Status: COMPLETED | OUTPATIENT
Start: 2018-07-25 | End: 2018-07-25

## 2018-07-25 RX ORDER — IBUPROFEN 600 MG/1
600 TABLET ORAL EVERY 6 HOURS PRN
Qty: 30 TABLET | Refills: 0 | Status: SHIPPED | OUTPATIENT
Start: 2018-07-25 | End: 2018-10-25

## 2018-07-25 RX ADMIN — IBUPROFEN 600 MG: 600 TABLET ORAL at 00:43

## 2018-07-25 RX ADMIN — DOXYCYCLINE 100 MG: 100 CAPSULE ORAL at 00:43

## 2018-07-25 ASSESSMENT — PAIN SCALES - GENERAL
PAINLEVEL_OUTOF10: 6
PAINLEVEL_OUTOF10: 6

## 2018-07-25 ASSESSMENT — PAIN DESCRIPTION - DESCRIPTORS: DESCRIPTORS: SHOOTING

## 2018-07-25 ASSESSMENT — PAIN DESCRIPTION - LOCATION: LOCATION: HAND

## 2018-07-25 ASSESSMENT — PAIN DESCRIPTION - PAIN TYPE: TYPE: ACUTE PAIN

## 2018-07-25 ASSESSMENT — PAIN DESCRIPTION - FREQUENCY: FREQUENCY: CONTINUOUS

## 2018-07-25 ASSESSMENT — PAIN DESCRIPTION - ORIENTATION: ORIENTATION: RIGHT

## 2018-07-25 NOTE — ED PROVIDER NOTES
rate, regular rhythm and normal heart sounds. Pulmonary/Chest: Effort normal and breath sounds normal.   Musculoskeletal: She exhibits tenderness (small puncture to palm of right hand no bleeding, has full rom and strength distally). Neurological: She is alert and oriented to person, place, and time. Nursing note and vitals reviewed. MEDICAL DECISION MAKING:   Wound was cleansed extensively while in the emergency department with soap and water. He reports a dog's immunizations are up-to-date. She approached the dog stuck out her hand and dog bit her on the right hand causing a puncture to the palm of the right hand. Wound was examined no need for sutures at this time bulky dressing was applied after cleansing. Instructed take antibiotics as directed keep wound clean and watch for any signs of infection. Follow-up with primary care physician in one to 2 days for recheck. Return to emergency department for any worsening symptoms any other concerns. DIAGNOSTIC RESULTS     EKG: All EKG's are interpreted by the Emergency Department Physician who either signs or Co-signs this chart in the absence of a cardiologist.        RADIOLOGY:All plain film, CT, MRI, and formal ultrasound images (except ED bedside ultrasound) are read by the radiologist and the images and interpretations are directly viewed by the emergency physician. LABS: All lab results were reviewed by myself, and all abnormals are listed below.   Labs Reviewed - No data to display      EMERGENCY DEPARTMENT COURSE:   Vitals:    Vitals:    07/25/18 0008   BP: 130/82   Pulse: 84   Resp: 18   Temp: 97.9 °F (36.6 °C)   TempSrc: Oral   SpO2: 97%   Weight: 209 lb (94.8 kg)   Height: 5' 3\" (1.6 m)       The patient was given the following medications while in the emergency department:  Orders Placed This Encounter   Medications    doxycycline monohydrate (MONODOX) capsule 100 mg    ibuprofen (ADVIL;MOTRIN) tablet 600 mg    doxycycline

## 2018-07-25 NOTE — ED NOTES
Pt discharged in stable condition. Pt advised to follow up with PCP and return to ED if symptoms worsen. Pt is AOx4 and answering questions appropriately. Skin is PWD. Pt has steady gait upon discharge.       Diego Mancilla RN  07/25/18 4829

## 2018-08-09 ENCOUNTER — HOSPITAL ENCOUNTER (EMERGENCY)
Age: 43
Discharge: HOME | End: 2018-08-09
Payer: COMMERCIAL

## 2018-08-09 ENCOUNTER — HOSPITAL ENCOUNTER (EMERGENCY)
Age: 43
Discharge: HOME | End: 2018-08-09
Attending: EMERGENCY MEDICINE
Payer: COMMERCIAL

## 2018-08-09 ENCOUNTER — APPOINTMENT (OUTPATIENT)
Dept: GENERAL RADIOLOGY | Age: 43
End: 2018-08-09
Payer: COMMERCIAL

## 2018-08-09 VITALS
DIASTOLIC BLOOD PRESSURE: 86 MMHG | SYSTOLIC BLOOD PRESSURE: 132 MMHG | HEART RATE: 76 BPM | WEIGHT: 204 LBS | TEMPERATURE: 97.4 F | HEIGHT: 63 IN | OXYGEN SATURATION: 97 % | BODY MASS INDEX: 36.14 KG/M2 | RESPIRATION RATE: 18 BRPM

## 2018-08-09 VITALS
HEIGHT: 63 IN | HEART RATE: 78 BPM | DIASTOLIC BLOOD PRESSURE: 81 MMHG | OXYGEN SATURATION: 96 % | WEIGHT: 204 LBS | SYSTOLIC BLOOD PRESSURE: 130 MMHG | BODY MASS INDEX: 36.14 KG/M2 | TEMPERATURE: 98 F | RESPIRATION RATE: 16 BRPM

## 2018-08-09 DIAGNOSIS — J06.9 VIRAL URI: Primary | ICD-10-CM

## 2018-08-09 LAB
ABSOLUTE EOS #: 0.3 K/UL (ref 0–0.4)
ABSOLUTE IMMATURE GRANULOCYTE: NORMAL K/UL (ref 0–0.3)
ABSOLUTE LYMPH #: 2.5 K/UL (ref 1–4.8)
ABSOLUTE MONO #: 0.5 K/UL (ref 0.1–1.3)
ANION GAP SERPL CALCULATED.3IONS-SCNC: 10 MMOL/L (ref 9–17)
BASOPHILS # BLD: 1 % (ref 0–2)
BASOPHILS ABSOLUTE: 0 K/UL (ref 0–0.2)
BUN BLDV-MCNC: 12 MG/DL (ref 6–20)
BUN/CREAT BLD: ABNORMAL (ref 9–20)
CALCIUM SERPL-MCNC: 9.3 MG/DL (ref 8.6–10.4)
CHLORIDE BLD-SCNC: 105 MMOL/L (ref 98–107)
CO2: 24 MMOL/L (ref 20–31)
CREAT SERPL-MCNC: 0.62 MG/DL (ref 0.5–0.9)
DIFFERENTIAL TYPE: NORMAL
EKG ATRIAL RATE: 77 BPM
EKG P AXIS: 32 DEGREES
EKG P-R INTERVAL: 140 MS
EKG Q-T INTERVAL: 384 MS
EKG QRS DURATION: 82 MS
EKG QTC CALCULATION (BAZETT): 434 MS
EKG R AXIS: 39 DEGREES
EKG T AXIS: 37 DEGREES
EKG VENTRICULAR RATE: 77 BPM
EOSINOPHILS RELATIVE PERCENT: 3 % (ref 0–4)
GFR AFRICAN AMERICAN: >60 ML/MIN
GFR NON-AFRICAN AMERICAN: >60 ML/MIN
GFR SERPL CREATININE-BSD FRML MDRD: ABNORMAL ML/MIN/{1.73_M2}
GFR SERPL CREATININE-BSD FRML MDRD: ABNORMAL ML/MIN/{1.73_M2}
GLUCOSE BLD-MCNC: 133 MG/DL (ref 70–99)
HCT VFR BLD CALC: 41.1 % (ref 36–46)
HEMOGLOBIN: 14 G/DL (ref 12–16)
IMMATURE GRANULOCYTES: NORMAL %
LYMPHOCYTES # BLD: 29 % (ref 24–44)
MCH RBC QN AUTO: 32.2 PG (ref 26–34)
MCHC RBC AUTO-ENTMCNC: 34 G/DL (ref 31–37)
MCV RBC AUTO: 94.9 FL (ref 80–100)
MONOCYTES # BLD: 6 % (ref 1–7)
NRBC AUTOMATED: NORMAL PER 100 WBC
PDW BLD-RTO: 13.5 % (ref 11.5–14.9)
PLATELET # BLD: 227 K/UL (ref 150–450)
PLATELET ESTIMATE: NORMAL
PMV BLD AUTO: 8.4 FL (ref 6–12)
POTASSIUM SERPL-SCNC: 3.5 MMOL/L (ref 3.7–5.3)
RBC # BLD: 4.33 M/UL (ref 4–5.2)
RBC # BLD: NORMAL 10*6/UL
SEG NEUTROPHILS: 61 % (ref 36–66)
SEGMENTED NEUTROPHILS ABSOLUTE COUNT: 5.4 K/UL (ref 1.3–9.1)
SODIUM BLD-SCNC: 139 MMOL/L (ref 135–144)
TROPONIN INTERP: NORMAL
TROPONIN INTERP: NORMAL
TROPONIN T: <0.03 NG/ML
TROPONIN T: <0.03 NG/ML
WBC # BLD: 8.7 K/UL (ref 3.5–11)
WBC # BLD: NORMAL 10*3/UL

## 2018-08-09 PROCEDURE — 80048 BASIC METABOLIC PNL TOTAL CA: CPT

## 2018-08-09 PROCEDURE — 84484 ASSAY OF TROPONIN QUANT: CPT

## 2018-08-09 PROCEDURE — 6360000002 HC RX W HCPCS: Performed by: STUDENT IN AN ORGANIZED HEALTH CARE EDUCATION/TRAINING PROGRAM

## 2018-08-09 PROCEDURE — 93005 ELECTROCARDIOGRAM TRACING: CPT

## 2018-08-09 PROCEDURE — 96374 THER/PROPH/DIAG INJ IV PUSH: CPT

## 2018-08-09 PROCEDURE — 71046 X-RAY EXAM CHEST 2 VIEWS: CPT

## 2018-08-09 PROCEDURE — 85025 COMPLETE CBC W/AUTO DIFF WBC: CPT

## 2018-08-09 PROCEDURE — 99285 EMERGENCY DEPT VISIT HI MDM: CPT

## 2018-08-09 PROCEDURE — 36415 COLL VENOUS BLD VENIPUNCTURE: CPT

## 2018-08-09 RX ORDER — DEXAMETHASONE SODIUM PHOSPHATE 4 MG/ML
10 INJECTION, SOLUTION INTRA-ARTICULAR; INTRALESIONAL; INTRAMUSCULAR; INTRAVENOUS; SOFT TISSUE ONCE
Status: COMPLETED | OUTPATIENT
Start: 2018-08-09 | End: 2018-08-09

## 2018-08-09 RX ADMIN — DEXAMETHASONE SODIUM PHOSPHATE 10 MG: 4 INJECTION, SOLUTION INTRAMUSCULAR; INTRAVENOUS at 17:09

## 2018-08-09 ASSESSMENT — ENCOUNTER SYMPTOMS
EYE REDNESS: 0
COUGH: 1
RHINORRHEA: 0
DIARRHEA: 0
SHORTNESS OF BREATH: 1
BACK PAIN: 0
NAUSEA: 0
EYE ITCHING: 0
VOMITING: 0
ABDOMINAL PAIN: 0

## 2018-08-09 ASSESSMENT — PAIN SCALES - GENERAL: PAINLEVEL_OUTOF10: 3

## 2018-08-09 NOTE — ED PROVIDER NOTES
[address]  Emergency Department Encounter  Emergency Medicine Resident     Pt Name: Sherif Ramos  MRN: [id number]  [place] 1975  Date of evaluation: 18  PCP:  No primary care provider on file. CHIEF COMPLAINT       Chief Complaint   Patient presents with    Chest Pain    Shortness of Breath    Cough       HISTORY OF PRESENT ILLNESS  (Location/Symptom, Timing/Onset, Context/Setting, Quality, Duration, Modifying Factors, Severity.)      Sherif Ramos is a 44 y.o. female who presents with Three-day history of shortness of breath, intermittent coughing that is sometimes productive. Patient has a history of pneumonia, this is consistent with her prior episode of pneumonia last occurred in  of this year. She lost her voice this morning. Patient has no sick contacts. In addition she developed a substernal sharp chest pain that is worse with inspiration and cough over the last 2 hours. She has no leg swelling, history of venous thromboembolism or pulmonary embolism, no cancer diagnosis or recent immobilization. No fever, chills. No recent antibiotic use. PAST MEDICAL / SURGICAL / SOCIAL / FAMILY HISTORY      has a past medical history of Anxiety; Arthritis; Depression; Diabetes, gestational; Menarche; Neuropathy; Obesity; Parity; Restless leg syndrome; Sleep apnea; UTI (lower urinary tract infection); and Wears glasses. has a past surgical history that includes Cholecystectomy (06); Tubal ligation;  section; Colonoscopy; hysteroscopy (14); Dilation and curettage of uterus (2014); myringotomy (Bilateral); Tonsillectomy and adenoidectomy; Ankle surgery (Right, ); Endometrial ablation (14); Knee arthroscopy (Left, 6/29/15); other surgical history (2016); and knee surgery.     Social History     Social History    Marital status:      Spouse name: N/A    Number of children: N/A    Years of education: N/A     Occupational History  Not on file. Social History Main Topics    Smoking status: Current Every Day Smoker     Packs/day: 0.50     Years: 18.00     Types: Cigarettes    Smokeless tobacco: Never Used    Alcohol use No    Drug use: Yes     Types: Marijuana      Comment: occassionally     Sexual activity: Not on file     Other Topics Concern    Not on file     Social History Narrative    No narrative on file       Family History   Problem Relation Age of Onset    Heart Disease Paternal Grandfather     Diabetes Father     Hypertension Father     Stroke Father     Hypertension Mother     Mental Illness Mother        Allergies:  Bactrim [sulfamethoxazole-trimethoprim]; Dicloxacillin; and Pcn [penicillins]    Home Medications:  Prior to Admission medications    Medication Sig Start Date End Date Taking? Authorizing Provider   ibuprofen (ADVIL;MOTRIN) 600 MG tablet Take 1 tablet by mouth every 6 hours as needed for Pain 7/25/18  Yes Sagrario Cruz PA-C   rOPINIRole (REQUIP) 1 MG tablet Take 1 mg by mouth daily  9/14/17  Yes Historical Provider, MD   FLUoxetine (PROZAC) 40 MG capsule Take 80 mg by mouth daily  2/19/18  Yes Historical Provider, MD   busPIRone (BUSPAR) 10 MG tablet Take 10 mg by mouth 2 times daily   Yes Historical Provider, MD   naproxen (NAPROSYN) 500 MG tablet Take 500 mg by mouth daily    Yes Historical Provider, MD   rOPINIRole (REQUIP) 5 MG tablet Take 7.5 mg by mouth nightly  10/14/16  Yes Historical Provider, MD   albuterol sulfate  (90 Base) MCG/ACT inhaler Inhale 2 puffs into the lungs every 6 hours as needed for Wheezing 6/25/18   Ekaterina Mullen MD   tolterodine (DETROL LA) 4 MG extended release capsule Take 1 capsule by mouth daily 5/17/18   William Cui MD       REVIEW OF SYSTEMS    (2-9 systems for level 4, 10 or more for level 5)      Review of Systems   Constitutional: Negative for chills and fever. HENT: Negative for congestion and rhinorrhea.     Eyes: Negative for redness and itching. Respiratory: Positive for cough and shortness of breath. Cardiovascular: Positive for chest pain. Negative for palpitations and leg swelling. Gastrointestinal: Negative for abdominal pain, diarrhea, nausea and vomiting. Genitourinary: Negative for dysuria and frequency. Musculoskeletal: Negative for back pain, joint swelling and myalgias. Skin: Negative for pallor and rash. Neurological: Negative for dizziness, weakness, light-headedness, numbness and headaches. PHYSICAL EXAM   (up to 7 for level 4, 8 or more for level 5)      INITIAL VITALS:   /81   Pulse 78   Temp 98 °F (36.7 °C) (Oral)   Resp 16   Ht 5' 3\" (1.6 m)   Wt 204 lb (92.5 kg)   SpO2 96%   BMI 36.14 kg/m²     Physical Exam   Constitutional: She is oriented to person, place, and time. She appears well-developed and well-nourished. No distress. Soft voice without dysarthria or aphasia   HENT:   Head: Normocephalic and atraumatic. Eyes: EOM are normal. Pupils are equal, round, and reactive to light. Cardiovascular: Normal rate, regular rhythm and normal heart sounds. Pulmonary/Chest: Effort normal and breath sounds normal. No respiratory distress. Abdominal: Soft. Bowel sounds are normal. She exhibits no distension. There is no tenderness. Musculoskeletal: Normal range of motion. Neurological: She is alert and oriented to person, place, and time. Skin: Skin is warm. No rash noted.        DIFFERENTIAL  DIAGNOSIS     PLAN (LABS / IMAGING / EKG):  Orders Placed This Encounter   Procedures    XR CHEST STANDARD (2 VW)    Troponin    CBC Auto Differential    Basic Metabolic Panel    EKG 12 Lead       MEDICATIONS ORDERED:  Orders Placed This Encounter   Medications    Dexamethasone Sodium Phosphate injection 10 mg       DDX: Viral upper respiratory symptoms, ACS, pneumonia    DIAGNOSTIC RESULTS / EMERGENCY DEPARTMENT COURSE / MDM     LABS:  Results for orders placed or performed during the hospital Patient has clear lung sounds, does not appear to be restrained distress. Patient will receive Decadron for symptomatic relief of cough, BMP and CBC. If negative patient can be likely discharged, heart score of 2. EKG unremarkable. RADIOLOGY:  XR CHEST STANDARD (2 VW)   Final Result   No change from prior study. Stable bilateral perihilar opacities without   effusion. EKG  None    All EKG's are interpreted by the Emergency Department Physician who either signs or Co-signs this chart in the absence of a cardiologist.    EMERGENCY DEPARTMENT COURSE:  Chest x-ray unrevealing of a pneumonia. Troponin ×2 negative, laboratory workup unremarkable. Patient likely suffering from a viral upper respiratory infection, will be discharged to follow up with her PCP. PROCEDURES:  None    CONSULTS:  None    CRITICAL CARE:  None    FINAL IMPRESSION      1.  Viral URI          DISPOSITION / PLAN     DISPOSITION Decision To Discharge 08/09/2018 08:29:33 PM      PATIENT REFERRED TO:  Linda Tong MD  [address] [place] [phone]            DISCHARGE MEDICATIONS:  Discharge Medication List as of 8/9/2018  8:31 PM          Jesse Thomas MD  Emergency Medicine Resident    (Please note that portions of this note were completed with a voice recognition program.  Efforts were made to edit the dictations but occasionally words are mis-transcribed.)       Jesse Thomas MD  08/10/18 0221

## 2018-08-09 NOTE — ED NOTES
Pt states onset of s/s's was 3 days ago. Pt c/o nonproductive cough, midsternal chest pain, and hoarse voice. Pt arrives A+O x 4, GCS = 15, PMS x 4 intact, eupneic, and PWD. Lung sounds clear t/o bilat. Pulse is regular et strong. Pt speaks in complete sentences without difficulty.                Kristine Orourke RN  08/09/18 5831

## 2018-08-21 ENCOUNTER — APPOINTMENT (OUTPATIENT)
Dept: GENERAL RADIOLOGY | Age: 43
End: 2018-08-21
Payer: COMMERCIAL

## 2018-08-21 ENCOUNTER — HOSPITAL ENCOUNTER (EMERGENCY)
Age: 43
Discharge: HOME | End: 2018-08-21
Attending: EMERGENCY MEDICINE
Payer: COMMERCIAL

## 2018-08-21 VITALS
DIASTOLIC BLOOD PRESSURE: 79 MMHG | TEMPERATURE: 98.3 F | BODY MASS INDEX: 36.14 KG/M2 | SYSTOLIC BLOOD PRESSURE: 124 MMHG | RESPIRATION RATE: 16 BRPM | HEART RATE: 73 BPM | WEIGHT: 204 LBS | HEIGHT: 63 IN | OXYGEN SATURATION: 98 %

## 2018-08-21 DIAGNOSIS — S93.602A SPRAIN OF LEFT FOOT, INITIAL ENCOUNTER: Primary | ICD-10-CM

## 2018-08-21 PROCEDURE — 73630 X-RAY EXAM OF FOOT: CPT

## 2018-08-21 PROCEDURE — 99283 EMERGENCY DEPT VISIT LOW MDM: CPT

## 2018-08-21 ASSESSMENT — ENCOUNTER SYMPTOMS
BACK PAIN: 0
EYE PAIN: 0
EYE REDNESS: 0
NAUSEA: 0
VOMITING: 0
ABDOMINAL PAIN: 0
SHORTNESS OF BREATH: 0
RHINORRHEA: 0
COUGH: 0

## 2018-08-21 ASSESSMENT — PAIN - FUNCTIONAL ASSESSMENT: PAIN_FUNCTIONAL_ASSESSMENT: 0-10

## 2018-08-21 ASSESSMENT — PAIN DESCRIPTION - ORIENTATION: ORIENTATION: LEFT

## 2018-08-21 ASSESSMENT — PAIN DESCRIPTION - PAIN TYPE: TYPE: ACUTE PAIN

## 2018-08-21 ASSESSMENT — PAIN DESCRIPTION - LOCATION: LOCATION: FOOT;ANKLE

## 2018-08-21 ASSESSMENT — PAIN SCALES - GENERAL
PAINLEVEL_OUTOF10: 2
PAINLEVEL_OUTOF10: 4

## 2018-08-21 NOTE — ED PROVIDER NOTES
°C), Pulse: 73, Resp: 16      FINAL IMPRESSION      1.  Sprain of left foot, initial encounter          DISPOSITION/PLAN    DISPOSITION Decision To Discharge 08/21/2018 03:24:27 PM      PATIENT REFERRED TO:  your PCP            DISCHARGE MEDICATIONS:  New Prescriptions    No medications on file       (Please note that portions of this note were completed with a voice recognition program.  Efforts were made to edit the dictations but occasionally words are mis-transcribed.)    Gena Samayoa MD, MATT, [facility]  Attending Emergency Physician                     Gena Samayoa MD  08/21/18 [id number]

## 2018-08-30 ENCOUNTER — APPOINTMENT (OUTPATIENT)
Dept: GENERAL RADIOLOGY | Age: 43
End: 2018-08-30
Payer: COMMERCIAL

## 2018-08-30 ENCOUNTER — HOSPITAL ENCOUNTER (EMERGENCY)
Age: 43
Discharge: HOME | End: 2018-08-30
Attending: EMERGENCY MEDICINE
Payer: COMMERCIAL

## 2018-08-30 VITALS
SYSTOLIC BLOOD PRESSURE: 138 MMHG | TEMPERATURE: 98.5 F | WEIGHT: 204 LBS | OXYGEN SATURATION: 98 % | RESPIRATION RATE: 14 BRPM | HEIGHT: 63 IN | DIASTOLIC BLOOD PRESSURE: 72 MMHG | HEART RATE: 83 BPM | BODY MASS INDEX: 36.14 KG/M2

## 2018-08-30 DIAGNOSIS — M25.562 ACUTE PAIN OF LEFT KNEE: Primary | ICD-10-CM

## 2018-08-30 PROCEDURE — 99283 EMERGENCY DEPT VISIT LOW MDM: CPT

## 2018-08-30 PROCEDURE — 73564 X-RAY EXAM KNEE 4 OR MORE: CPT

## 2018-08-30 PROCEDURE — 6370000000 HC RX 637 (ALT 250 FOR IP): Performed by: EMERGENCY MEDICINE

## 2018-08-30 RX ORDER — ACETAMINOPHEN 500 MG
1000 TABLET ORAL ONCE
Status: COMPLETED | OUTPATIENT
Start: 2018-08-30 | End: 2018-08-30

## 2018-08-30 RX ADMIN — ACETAMINOPHEN 1000 MG: 500 TABLET, FILM COATED ORAL at 04:31

## 2018-08-30 ASSESSMENT — ENCOUNTER SYMPTOMS
SHORTNESS OF BREATH: 0
DIARRHEA: 0
COUGH: 0
EYE PAIN: 0
SORE THROAT: 0
CONSTIPATION: 0
VOMITING: 0
CHEST TIGHTNESS: 0
NAUSEA: 0
EYE REDNESS: 0
ABDOMINAL PAIN: 0
BACK PAIN: 0

## 2018-08-30 ASSESSMENT — PAIN DESCRIPTION - LOCATION: LOCATION: KNEE

## 2018-08-30 ASSESSMENT — PAIN SCALES - GENERAL
PAINLEVEL_OUTOF10: 3
PAINLEVEL_OUTOF10: 5

## 2018-08-30 ASSESSMENT — PAIN DESCRIPTION - ORIENTATION: ORIENTATION: LEFT

## 2018-08-30 ASSESSMENT — PAIN DESCRIPTION - PAIN TYPE: TYPE: ACUTE PAIN

## 2018-08-30 NOTE — ED NOTES
Pt discharged in stable condition with dc instructions. Pt ambulates to door with steady gait and without assistance.         Mo Sanchez RN  08/30/18 2772

## 2018-08-30 NOTE — ED PROVIDER NOTES
[address] ED  eMERGENCY dEPARTMENT eNCOUnter    Pt Name: Trae Lamar  MRN: [id number]  [place] 1975  Date of evaluation: 18  CHIEF COMPLAINT       Chief Complaint   Patient presents with    Knee Pain     left     HISTORY OF PRESENT ILLNESS   HPI   Patient presenting with left knee pain. Reports her knee gave out several times while walking overnight. Did not twist knee, fall or injure it otherwise. having pain throughout her knee and reports it feels swollen. Reports prior operations on the knee but can't tell me what. REVIEW OF SYSTEMS     Review of Systems   Constitutional: Negative for chills and fever. HENT: Negative for congestion, ear pain and sore throat. Eyes: Negative for pain, redness and visual disturbance. Respiratory: Negative for cough, chest tightness and shortness of breath. Cardiovascular: Negative for chest pain and palpitations. Gastrointestinal: Negative for abdominal pain, constipation, diarrhea, nausea and vomiting. Genitourinary: Negative for dysuria and vaginal discharge. Musculoskeletal: Positive for arthralgias. Negative for back pain and neck pain. Skin: Negative for rash and wound. Neurological: Negative for seizures, syncope and headaches.      PAST MEDICAL HISTORY     Past Medical History:   Diagnosis Date    Anxiety     Arthritis     OA    Depression     post partum    Diabetes, gestational     history of    Menarche @ 16 y/o    Neuropathy     CTS    Obesity     Parity       -    Restless leg syndrome     Sleep apnea     CPAP    UTI (lower urinary tract infection)     with pregnancy    Wears glasses      SURGICAL HISTORY       Past Surgical History:   Procedure Laterality Date    ANKLE SURGERY Right      SECTION      CHOLECYSTECTOMY  06    COLONOSCOPY      with poly removal    DILATION AND CURETTAGE OF UTERUS  2014    ENDOMETRIAL ABLATION  14    with novasure    HYSTEROSCOPY  14    D&C  KNEE ARTHROSCOPY Left 6/29/15    KNEE SURGERY      right knee scoped on 1-2-17    MYRINGOTOMY Bilateral     with tubes    OTHER SURGICAL HISTORY  12/27/2016    removal multiple vulvar inclusion cysts    TONSILLECTOMY AND ADENOIDECTOMY      TUBAL LIGATION       CURRENT MEDICATIONS       Discharge Medication List as of 8/30/2018  5:41 AM      CONTINUE these medications which have NOT CHANGED    Details   ibuprofen (ADVIL;MOTRIN) 600 MG tablet Take 1 tablet by mouth every 6 hours as needed for Pain, Disp-30 tablet, R-0Print      albuterol sulfate  (90 Base) MCG/ACT inhaler Inhale 2 puffs into the lungs every 6 hours as needed for Wheezing, Disp-1 Inhaler, R-3Normal      !! rOPINIRole (REQUIP) 1 MG tablet Take 1 mg by mouth daily Historical Med      tolterodine (DETROL LA) 4 MG extended release capsule Take 1 capsule by mouth daily, Disp-30 capsule, R-2Normal      FLUoxetine (PROZAC) 40 MG capsule Take 80 mg by mouth daily Historical Med      busPIRone (BUSPAR) 10 MG tablet Take 10 mg by mouth 2 times dailyHistorical Med      naproxen (NAPROSYN) 500 MG tablet Take 500 mg by mouth daily Historical Med      !! rOPINIRole (REQUIP) 5 MG tablet Take 7.5 mg by mouth nightly Historical Med       !! - Potential duplicate medications found. Please discuss with provider. ALLERGIES     is allergic to bactrim [sulfamethoxazole-trimethoprim]; dicloxacillin; and pcn [penicillins]. FAMILY HISTORY     indicated that the status of her mother is unknown. She indicated that the status of her father is unknown. She indicated that the status of her paternal grandfather is unknown. SOCIAL HISTORY      reports that she has been smoking Cigarettes. She has a 9.00 pack-year smoking history. She has never used smokeless tobacco. She reports that she uses drugs, including Marijuana. She reports that she does not drink alcohol.   PHYSICAL EXAM     INITIAL VITALS: /72   Pulse 83   Temp 98.5 °F (36.9 °C) (Oral)

## 2018-09-04 ENCOUNTER — APPOINTMENT (OUTPATIENT)
Dept: GENERAL RADIOLOGY | Age: 43
End: 2018-09-04
Payer: COMMERCIAL

## 2018-09-04 ENCOUNTER — HOSPITAL ENCOUNTER (EMERGENCY)
Age: 43
Discharge: HOME | End: 2018-09-04
Attending: EMERGENCY MEDICINE
Payer: COMMERCIAL

## 2018-09-04 VITALS
HEART RATE: 86 BPM | BODY MASS INDEX: 35.97 KG/M2 | DIASTOLIC BLOOD PRESSURE: 88 MMHG | TEMPERATURE: 98.3 F | RESPIRATION RATE: 16 BRPM | OXYGEN SATURATION: 97 % | WEIGHT: 203 LBS | SYSTOLIC BLOOD PRESSURE: 124 MMHG | HEIGHT: 63 IN

## 2018-09-04 DIAGNOSIS — M79.641 HAND PAIN, RIGHT: Primary | ICD-10-CM

## 2018-09-04 PROCEDURE — 73130 X-RAY EXAM OF HAND: CPT

## 2018-09-04 PROCEDURE — 6370000000 HC RX 637 (ALT 250 FOR IP): Performed by: PHYSICIAN ASSISTANT

## 2018-09-04 PROCEDURE — 99283 EMERGENCY DEPT VISIT LOW MDM: CPT

## 2018-09-04 RX ORDER — IBUPROFEN 800 MG/1
800 TABLET ORAL ONCE
Status: COMPLETED | OUTPATIENT
Start: 2018-09-04 | End: 2018-09-04

## 2018-09-04 RX ORDER — IBUPROFEN 800 MG/1
800 TABLET ORAL EVERY 8 HOURS PRN
Qty: 20 TABLET | Refills: 0 | Status: SHIPPED | OUTPATIENT
Start: 2018-09-04 | End: 2018-10-25

## 2018-09-04 RX ADMIN — IBUPROFEN 800 MG: 800 TABLET ORAL at 16:08

## 2018-09-04 ASSESSMENT — PAIN SCALES - GENERAL
PAINLEVEL_OUTOF10: 5
PAINLEVEL_OUTOF10: 5
PAINLEVEL_OUTOF10: 7

## 2018-09-04 ASSESSMENT — PAIN DESCRIPTION - ORIENTATION: ORIENTATION: RIGHT

## 2018-09-04 ASSESSMENT — PAIN DESCRIPTION - LOCATION: LOCATION: HAND

## 2018-09-04 ASSESSMENT — PAIN DESCRIPTION - PAIN TYPE: TYPE: ACUTE PAIN
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
## 2018-09-12 ENCOUNTER — HOSPITAL ENCOUNTER (EMERGENCY)
Age: 43
Discharge: HOME | End: 2018-09-12
Attending: EMERGENCY MEDICINE
Payer: COMMERCIAL

## 2018-09-12 VITALS
WEIGHT: 200 LBS | TEMPERATURE: 97.7 F | HEIGHT: 63 IN | OXYGEN SATURATION: 98 % | DIASTOLIC BLOOD PRESSURE: 90 MMHG | SYSTOLIC BLOOD PRESSURE: 141 MMHG | BODY MASS INDEX: 35.44 KG/M2 | RESPIRATION RATE: 16 BRPM | HEART RATE: 79 BPM

## 2018-09-12 DIAGNOSIS — R21 RASH AND OTHER NONSPECIFIC SKIN ERUPTION: Primary | ICD-10-CM

## 2018-09-12 PROCEDURE — 99282 EMERGENCY DEPT VISIT SF MDM: CPT

## 2018-09-12 RX ORDER — CLOTRIMAZOLE AND BETAMETHASONE DIPROPIONATE 10; .64 MG/G; MG/G
CREAM TOPICAL
Qty: 45 G | Refills: 0 | Status: SHIPPED | OUTPATIENT
Start: 2018-09-12 | End: 2018-10-25

## 2018-09-12 RX ORDER — PREDNISONE 20 MG/1
40 TABLET ORAL DAILY
Qty: 10 TABLET | Refills: 0 | Status: SHIPPED | OUTPATIENT
Start: 2018-09-12 | End: 2018-09-17

## 2018-09-12 RX ORDER — LORATADINE 10 MG/1
10 TABLET ORAL DAILY
Qty: 15 TABLET | Refills: 0 | Status: SHIPPED | OUTPATIENT
Start: 2018-09-12 | End: 2018-09-27

## 2018-09-12 ASSESSMENT — PAIN DESCRIPTION - LOCATION: LOCATION: GENERALIZED

## 2018-09-12 ASSESSMENT — PAIN SCALES - GENERAL: PAINLEVEL_OUTOF10: 5

## 2018-09-12 ASSESSMENT — PAIN DESCRIPTION - DESCRIPTORS: DESCRIPTORS: BURNING

## 2018-09-12 ASSESSMENT — PAIN DESCRIPTION - PAIN TYPE: TYPE: ACUTE PAIN

## 2018-09-13 ASSESSMENT — ENCOUNTER SYMPTOMS: WHEEZING: 0

## 2018-09-14 NOTE — ED PROVIDER NOTES
[address]  eMERGENCY dEPARTMENT eNCOUnter   Independent Attestation     Pt Name: Herman Mock  MRN: [id number]  [place] 1975  Date of evaluation: 9/14/18   Herman Mock is a 44 y.o. female who presents with Rash    Vitals:   Vitals:    09/12/18 1855   BP: (!) 141/90   Pulse: 79   Resp: 16   Temp: 97.7 °F (36.5 °C)   TempSrc: Oral   SpO2: 98%   Weight: 200 lb (90.7 kg)   Height: 5' 3\" (1.6 m)     Impression:   1. Rash and other nonspecific skin eruption      I was personally available for consultation in the Emergency Department. I have reviewed the chart and agree with the documentation as recorded by the [facility], including the assessment, treatment plan and disposition.   Filippo Boo MD  Attending Emergency  Physician                  Filippo Boo MD  09/14/18 4580

## 2018-09-14 NOTE — ED PROVIDER NOTES
 OTHER SURGICAL HISTORY  12/27/2016    removal multiple vulvar inclusion cysts    TONSILLECTOMY AND ADENOIDECTOMY      TUBAL LIGATION         CURRENT MEDICATIONS       Discharge Medication List as of 9/12/2018  7:03 PM      CONTINUE these medications which have NOT CHANGED    Details   !! ibuprofen (ADVIL;MOTRIN) 800 MG tablet Take 1 tablet by mouth every 8 hours as needed for Pain, Disp-20 tablet, R-0Print      !! ibuprofen (ADVIL;MOTRIN) 600 MG tablet Take 1 tablet by mouth every 6 hours as needed for Pain, Disp-30 tablet, R-0Print      albuterol sulfate  (90 Base) MCG/ACT inhaler Inhale 2 puffs into the lungs every 6 hours as needed for Wheezing, Disp-1 Inhaler, R-3Normal      !! rOPINIRole (REQUIP) 1 MG tablet Take 1 mg by mouth daily Historical Med      tolterodine (DETROL LA) 4 MG extended release capsule Take 1 capsule by mouth daily, Disp-30 capsule, R-2Normal      FLUoxetine (PROZAC) 40 MG capsule Take 80 mg by mouth daily Historical Med      busPIRone (BUSPAR) 10 MG tablet Take 10 mg by mouth 2 times dailyHistorical Med      naproxen (NAPROSYN) 500 MG tablet Take 500 mg by mouth daily Historical Med      !! rOPINIRole (REQUIP) 5 MG tablet Take 7.5 mg by mouth nightly Historical Med       !! - Potential duplicate medications found. Please discuss with provider. ALLERGIES     is allergic to bactrim [sulfamethoxazole-trimethoprim]; dicloxacillin; and pcn [penicillins]. FAMILY HISTORY     indicated that the status of her mother is unknown. She indicated that the status of her father is unknown. She indicated that the status of her paternal grandfather is unknown. SOCIAL HISTORY      reports that she has been smoking Cigarettes. She has a 9.00 pack-year smoking history. She has never used smokeless tobacco. She reports that she uses drugs, including Marijuana. She reports that she does not drink alcohol.     PHYSICAL EXAM     INITIAL VITALS: BP (!) 141/90   Pulse 79   Temp 97.7 °F (36.5 °C) (Oral)   Resp 16   Ht 5' 3\" (1.6 m)   Wt 200 lb (90.7 kg)   SpO2 98%   BMI 35.43 kg/m²      Physical Exam   Constitutional: She is oriented to person, place, and time. She appears well-developed and well-nourished. HENT:   Head: Normocephalic and atraumatic. Cardiovascular: Normal rate, regular rhythm and normal heart sounds. Pulmonary/Chest: Effort normal and breath sounds normal.   Neurological: She is alert and oriented to person, place, and time. Skin: Skin is warm and dry. There is erythema (redness to the midsternal area extends to the bilateral breast area. There is scattered vesicular rash noted to the forearm and right arm that is consistent with the plan dermatitis). Nursing note and vitals reviewed. MEDICAL DECISION MAKING:         DIAGNOSTIC RESULTS     EKG: All EKG's are interpreted by the Emergency Department Physician who either signs or Co-signs this chart in the absence of a cardiologist.        RADIOLOGY:All plain film, CT, MRI, and formal ultrasound images (except ED bedside ultrasound) are read by the radiologist and the images and interpretations are directly viewed by the emergency physician. LABS: All lab results were reviewed by myself, and all abnormals are listed below. Labs Reviewed - No data to display      EMERGENCY DEPARTMENT COURSE:   Vitals:    Vitals:    09/12/18 1855   BP: (!) 141/90   Pulse: 79   Resp: 16   Temp: 97.7 °F (36.5 °C)   TempSrc: Oral   SpO2: 98%   Weight: 200 lb (90.7 kg)   Height: 5' 3\" (1.6 m)       The patient was given the following medications while in the emergency department:  Orders Placed This Encounter   Medications    predniSONE (DELTASONE) 20 MG tablet     Sig: Take 2 tablets by mouth daily for 5 days     Dispense:  10 tablet     Refill:  0    clotrimazole-betamethasone (LOTRISONE) 1-0.05 % cream     Sig: Apply topically 2 times daily.      Dispense:  45 g     Refill:  0    loratadine (CLARITIN) 10 MG tablet Sig: Take 1 tablet by mouth daily for 15 days     Dispense:  15 tablet     Refill:  0       ------------------------    CRITICAL CARE:       CONSULTS:  None    PROCEDURES:  Procedures    FINAL IMPRESSION      1. Rash and other nonspecific skin eruption          DISPOSITION/PLAN   DISPOSITION Decision To Discharge 09/12/2018 07:00:48 PM      PATIENT REFERRED TO:  Lauren Weinstein    Schedule an appointment as soon as possible for a visit   If symptoms worsen      DISCHARGE MEDICATIONS:  Discharge Medication List as of 9/12/2018  7:03 PM      START taking these medications    Details   predniSONE (DELTASONE) 20 MG tablet Take 2 tablets by mouth daily for 5 days, Disp-10 tablet, R-0Print      clotrimazole-betamethasone (LOTRISONE) 1-0.05 % cream Apply topically 2 times daily. , Disp-45 g, R-0, Print      loratadine (CLARITIN) 10 MG tablet Take 1 tablet by mouth daily for 15 days, Disp-15 tablet, R-0Print             (Please note that portions of this note were completed with a voice recognition program.  Efforts were made to edit the dictations but occasionally words are mis-transcribed.)    ABRAHAM Castillo PA-C  09/13/18 [id number]

## 2018-10-01 ENCOUNTER — APPOINTMENT (OUTPATIENT)
Dept: GENERAL RADIOLOGY | Age: 43
End: 2018-10-01
Payer: COMMERCIAL

## 2018-10-01 ENCOUNTER — HOSPITAL ENCOUNTER (EMERGENCY)
Age: 43
Discharge: HOME | End: 2018-10-01
Attending: EMERGENCY MEDICINE
Payer: COMMERCIAL

## 2018-10-01 VITALS
SYSTOLIC BLOOD PRESSURE: 133 MMHG | RESPIRATION RATE: 18 BRPM | DIASTOLIC BLOOD PRESSURE: 88 MMHG | HEART RATE: 95 BPM | WEIGHT: 201 LBS | TEMPERATURE: 99 F | BODY MASS INDEX: 35.61 KG/M2 | OXYGEN SATURATION: 96 %

## 2018-10-01 DIAGNOSIS — J18.9 PNEUMONIA DUE TO ORGANISM: Primary | ICD-10-CM

## 2018-10-01 LAB
DIRECT EXAM: NORMAL
Lab: NORMAL
SPECIMEN DESCRIPTION: NORMAL
STATUS: NORMAL

## 2018-10-01 PROCEDURE — 99284 EMERGENCY DEPT VISIT MOD MDM: CPT

## 2018-10-01 PROCEDURE — 6370000000 HC RX 637 (ALT 250 FOR IP): Performed by: STUDENT IN AN ORGANIZED HEALTH CARE EDUCATION/TRAINING PROGRAM

## 2018-10-01 PROCEDURE — 71046 X-RAY EXAM CHEST 2 VIEWS: CPT

## 2018-10-01 PROCEDURE — 87804 INFLUENZA ASSAY W/OPTIC: CPT

## 2018-10-01 RX ORDER — GUAIFENESIN 600 MG/1
600 TABLET, EXTENDED RELEASE ORAL ONCE
Status: COMPLETED | OUTPATIENT
Start: 2018-10-01 | End: 2018-10-01

## 2018-10-01 RX ORDER — GUAIFENESIN 600 MG/1
600 TABLET, EXTENDED RELEASE ORAL 2 TIMES DAILY
Qty: 20 TABLET | Refills: 0 | Status: SHIPPED | OUTPATIENT
Start: 2018-10-01 | End: 2018-10-11

## 2018-10-01 RX ORDER — DOXYCYCLINE HYCLATE 100 MG
100 TABLET ORAL 2 TIMES DAILY
Qty: 14 TABLET | Refills: 0 | Status: SHIPPED | OUTPATIENT
Start: 2018-10-01 | End: 2018-10-08

## 2018-10-01 RX ORDER — DOXYCYCLINE HYCLATE 100 MG
100 TABLET ORAL ONCE
Status: COMPLETED | OUTPATIENT
Start: 2018-10-01 | End: 2018-10-01

## 2018-10-01 RX ORDER — ACETAMINOPHEN 500 MG
500 TABLET ORAL ONCE
Status: COMPLETED | OUTPATIENT
Start: 2018-10-01 | End: 2018-10-01

## 2018-10-01 RX ADMIN — ACETAMINOPHEN 500 MG: 500 TABLET ORAL at 03:35

## 2018-10-01 RX ADMIN — GUAIFENESIN 600 MG: 600 TABLET, EXTENDED RELEASE ORAL at 03:35

## 2018-10-01 RX ADMIN — DOXYCYCLINE HYCLATE 100 MG: 100 TABLET, COATED ORAL at 04:58

## 2018-10-01 ASSESSMENT — PAIN DESCRIPTION - DESCRIPTORS: DESCRIPTORS: POUNDING

## 2018-10-01 ASSESSMENT — ENCOUNTER SYMPTOMS
ABDOMINAL PAIN: 0
SORE THROAT: 0
BACK PAIN: 1
WHEEZING: 0
EYE ITCHING: 0
CONSTIPATION: 0
RHINORRHEA: 1
SHORTNESS OF BREATH: 1
NAUSEA: 1
COUGH: 1
VOMITING: 0
EYE PAIN: 0
SINUS PRESSURE: 0
DIARRHEA: 0
TROUBLE SWALLOWING: 0

## 2018-10-01 ASSESSMENT — PAIN DESCRIPTION - ORIENTATION: ORIENTATION: MID

## 2018-10-01 ASSESSMENT — PAIN DESCRIPTION - FREQUENCY: FREQUENCY: CONTINUOUS

## 2018-10-01 ASSESSMENT — PAIN SCALES - GENERAL
PAINLEVEL_OUTOF10: 4
PAINLEVEL_OUTOF10: 4

## 2018-10-01 ASSESSMENT — PAIN DESCRIPTION - LOCATION: LOCATION: CHEST

## 2018-10-01 ASSESSMENT — PAIN DESCRIPTION - PAIN TYPE: TYPE: ACUTE PAIN

## 2018-10-01 NOTE — ED NOTES
Pt. Medicated per orders, see MAR  Pt. Resting on stretcher, eyes closed, RR even and non-labored  Updated on POC will continue to monitor.       Monty Vega RN  10/01/18 1007

## 2018-10-01 NOTE — ED PROVIDER NOTES
tolterodine (DETROL LA) 4 MG extended release capsule Take 1 capsule by mouth daily 5/17/18   Octavio Concepcion MD   FLUoxetine (PROZAC) 40 MG capsule Take 80 mg by mouth daily  2/19/18   Historical Provider, MD   busPIRone (BUSPAR) 10 MG tablet Take 10 mg by mouth 2 times daily    Historical Provider, MD   naproxen (NAPROSYN) 500 MG tablet Take 500 mg by mouth daily     Historical Provider, MD   rOPINIRole (REQUIP) 5 MG tablet Take 7.5 mg by mouth nightly  10/14/16   Historical Provider, MD     REVIEW OF SYSTEMS    (2-9 systems for level 4, 10 or more for level 5)      Review of Systems   Constitutional: Positive for chills and fever. HENT: Positive for rhinorrhea. Negative for ear pain, sinus pressure, sore throat and trouble swallowing. Eyes: Negative for pain and itching. Respiratory: Positive for cough and shortness of breath. Negative for wheezing. Cardiovascular: Positive for chest pain. Negative for leg swelling. Gastrointestinal: Positive for nausea. Negative for abdominal pain, constipation, diarrhea and vomiting. Genitourinary: Negative for dysuria, frequency and hematuria. Musculoskeletal: Positive for back pain (low). Skin: Negative for rash. Neurological: Positive for weakness (generalized). Negative for numbness. Negative for saddle anesthesia. Negative for numbness/tingling/focal weakness     PHYSICAL EXAM   (up to 7 for level 4, 8 or more for level 5)      INITIAL VITALS:   ED Triage Vitals   BP Temp Temp Source Pulse Resp SpO2 Height Weight   10/01/18 0303 10/01/18 0303 10/01/18 0303 10/01/18 0303 10/01/18 0303 10/01/18 0303 -- 10/01/18 0302   133/88 99 °F (37.2 °C) Oral 95 18 96 %  201 lb (91.2 kg)     Physical Exam   Constitutional: She is oriented to person, place, and time. She appears well-developed and well-nourished. No distress. HENT:   Head: Normocephalic and atraumatic. Eyes: Conjunctivae are normal. No scleral icterus.    Cardiovascular: Normal rate, regular rhythm and normal heart sounds. Exam reveals no gallop and no friction rub. No murmur heard. Pulmonary/Chest: Effort normal and breath sounds normal. No stridor. No respiratory distress. She has no wheezes. She has no rales. She exhibits tenderness (reproducible). Patient reports that palpation of her chest reproduces her chest pain. Abdominal: Soft. She exhibits no distension and no mass. There is no tenderness. There is no rebound and no guarding. Musculoskeletal:   No lower show any edema. No calf pain to  palpation. Neurological: She is alert and oriented to person, place, and time. Skin: Skin is warm and dry. No pallor. Psychiatric: She has a normal mood and affect. Her behavior is normal.   Nursing note and vitals reviewed. DIFFERENTIAL  DIAGNOSIS     PLAN (LABS / IMAGING / EKG):  Orders Placed This Encounter   Procedures    RAPID INFLUENZA A/B ANTIGENS    XR CHEST STANDARD (2 VW)     MEDICATIONS ORDERED:  Orders Placed This Encounter   Medications    acetaminophen (TYLENOL) tablet 500 mg    guaiFENesin (MUCINEX) extended release tablet 600 mg    doxycycline hyclate (VIBRA-TABS) tablet 100 mg    doxycycline hyclate (VIBRA-TABS) 100 MG tablet     Sig: Take 1 tablet by mouth 2 times daily for 7 days     Dispense:  14 tablet     Refill:  0    guaiFENesin (MUCINEX) 600 MG extended release tablet     Sig: Take 1 tablet by mouth 2 times daily for 10 days     Dispense:  20 tablet     Refill:  0     Cough:  DDX: pneumonia, sinusitis, foreign body, URI, viral illness, asthma/ COPD, allergic rhinitis, GERD, ACE- inhibitor use, HIV (TB, PCP)    Initial MDM/Plan: 44 y.o. female who presents with  acute onset of occasional productive cough with associated upper respiratory tract infection symptoms. Low suspicion for ACS given chest pain is reproducible and is in the setting of URI symptoms. We'll obtain chest x-ray to rule out pneumonia. Flu swab to r/o influenza.     DIAGNOSTIC RESULTS

## 2018-10-01 NOTE — ED PROVIDER NOTES
therapy and follow-up. Melinda Lee.  Estefanía Valladares MD, [facility]  Attending Emergency  Physician                Willow Dockery MD  10/01/18 3388

## 2018-10-17 ENCOUNTER — HOSPITAL ENCOUNTER (OUTPATIENT)
Dept: PULMONOLOGY | Age: 43
Discharge: HOME | End: 2018-10-17
Payer: COMMERCIAL

## 2018-10-17 PROCEDURE — 94640 AIRWAY INHALATION TREATMENT: CPT

## 2018-10-17 PROCEDURE — 94729 DIFFUSING CAPACITY: CPT

## 2018-10-17 PROCEDURE — 94060 EVALUATION OF WHEEZING: CPT

## 2018-10-17 PROCEDURE — 94664 DEMO&/EVAL PT USE INHALER: CPT

## 2018-10-17 PROCEDURE — 94727 GAS DIL/WSHOT DETER LNG VOL: CPT

## 2018-10-17 PROCEDURE — 99406 BEHAV CHNG SMOKING 3-10 MIN: CPT

## 2018-10-17 PROCEDURE — 94726 PLETHYSMOGRAPHY LUNG VOLUMES: CPT

## 2018-10-22 ENCOUNTER — OFFICE VISIT (OUTPATIENT)
Dept: OBGYN CLINIC | Age: 43
End: 2018-10-22
Payer: COMMERCIAL

## 2018-10-22 VITALS
WEIGHT: 197.4 LBS | HEART RATE: 78 BPM | DIASTOLIC BLOOD PRESSURE: 85 MMHG | HEIGHT: 63 IN | BODY MASS INDEX: 34.98 KG/M2 | SYSTOLIC BLOOD PRESSURE: 119 MMHG

## 2018-10-22 DIAGNOSIS — R32 URINARY INCONTINENCE, UNSPECIFIED TYPE: Primary | ICD-10-CM

## 2018-10-22 PROCEDURE — 99213 OFFICE O/P EST LOW 20 MIN: CPT | Performed by: SPECIALIST

## 2018-10-22 PROCEDURE — 4004F PT TOBACCO SCREEN RCVD TLK: CPT | Performed by: SPECIALIST

## 2018-10-22 PROCEDURE — G8427 DOCREV CUR MEDS BY ELIG CLIN: HCPCS | Performed by: SPECIALIST

## 2018-10-22 PROCEDURE — G8484 FLU IMMUNIZE NO ADMIN: HCPCS | Performed by: SPECIALIST

## 2018-10-22 PROCEDURE — G8417 CALC BMI ABV UP PARAM F/U: HCPCS | Performed by: SPECIALIST

## 2018-10-22 ASSESSMENT — ENCOUNTER SYMPTOMS
COUGH: 0
EYE PAIN: 0
CONSTIPATION: 0
NAUSEA: 0
ABDOMINAL DISTENTION: 0
VOMITING: 0
DIARRHEA: 0
APNEA: 0
ABDOMINAL PAIN: 0

## 2018-10-25 ENCOUNTER — HOSPITAL ENCOUNTER (EMERGENCY)
Age: 43
Discharge: HOME | End: 2018-10-25
Attending: EMERGENCY MEDICINE
Payer: COMMERCIAL

## 2018-10-25 ENCOUNTER — APPOINTMENT (OUTPATIENT)
Dept: GENERAL RADIOLOGY | Age: 43
End: 2018-10-25
Payer: COMMERCIAL

## 2018-10-25 VITALS
BODY MASS INDEX: 34.91 KG/M2 | RESPIRATION RATE: 18 BRPM | HEART RATE: 75 BPM | WEIGHT: 197 LBS | HEIGHT: 63 IN | DIASTOLIC BLOOD PRESSURE: 67 MMHG | OXYGEN SATURATION: 96 % | SYSTOLIC BLOOD PRESSURE: 114 MMHG | TEMPERATURE: 98.1 F

## 2018-10-25 DIAGNOSIS — J18.9 PNEUMONIA DUE TO ORGANISM: Primary | ICD-10-CM

## 2018-10-25 DIAGNOSIS — R19.7 DIARRHEA, UNSPECIFIED TYPE: ICD-10-CM

## 2018-10-25 LAB
ABSOLUTE EOS #: 0.2 K/UL (ref 0–0.4)
ABSOLUTE IMMATURE GRANULOCYTE: ABNORMAL K/UL (ref 0–0.3)
ABSOLUTE LYMPH #: 2.4 K/UL (ref 1–4.8)
ABSOLUTE MONO #: 0.5 K/UL (ref 0.1–1.3)
ALBUMIN SERPL-MCNC: 3.9 G/DL (ref 3.5–5.2)
ALBUMIN/GLOBULIN RATIO: ABNORMAL (ref 1–2.5)
ALP BLD-CCNC: 83 U/L (ref 35–104)
ALT SERPL-CCNC: 17 U/L (ref 5–33)
ANION GAP SERPL CALCULATED.3IONS-SCNC: 11 MMOL/L (ref 9–17)
AST SERPL-CCNC: 25 U/L
BASOPHILS # BLD: 1 % (ref 0–2)
BASOPHILS ABSOLUTE: 0.1 K/UL (ref 0–0.2)
BILIRUB SERPL-MCNC: 0.38 MG/DL (ref 0.3–1.2)
BUN BLDV-MCNC: 14 MG/DL (ref 6–20)
BUN/CREAT BLD: ABNORMAL (ref 9–20)
CALCIUM SERPL-MCNC: 9.5 MG/DL (ref 8.6–10.4)
CHLORIDE BLD-SCNC: 103 MMOL/L (ref 98–107)
CO2: 24 MMOL/L (ref 20–31)
CREAT SERPL-MCNC: 0.7 MG/DL (ref 0.5–0.9)
DIFFERENTIAL TYPE: ABNORMAL
EKG ATRIAL RATE: 79 BPM
EKG P AXIS: 23 DEGREES
EKG P-R INTERVAL: 136 MS
EKG Q-T INTERVAL: 380 MS
EKG QRS DURATION: 86 MS
EKG QTC CALCULATION (BAZETT): 435 MS
EKG R AXIS: 36 DEGREES
EKG T AXIS: 29 DEGREES
EKG VENTRICULAR RATE: 79 BPM
EOSINOPHILS RELATIVE PERCENT: 2 % (ref 0–4)
GFR AFRICAN AMERICAN: >60 ML/MIN
GFR NON-AFRICAN AMERICAN: >60 ML/MIN
GFR SERPL CREATININE-BSD FRML MDRD: ABNORMAL ML/MIN/{1.73_M2}
GFR SERPL CREATININE-BSD FRML MDRD: ABNORMAL ML/MIN/{1.73_M2}
GLUCOSE BLD-MCNC: 127 MG/DL (ref 70–99)
HCT VFR BLD CALC: 40.5 % (ref 36–46)
HEMOGLOBIN: 14 G/DL (ref 12–16)
IMMATURE GRANULOCYTES: ABNORMAL %
LACTIC ACID, WHOLE BLOOD: NORMAL MMOL/L (ref 0.7–2.1)
LACTIC ACID: 1 MMOL/L (ref 0.5–2.2)
LIPASE: 27 U/L (ref 13–60)
LYMPHOCYTES # BLD: 18 % (ref 24–44)
MCH RBC QN AUTO: 32.2 PG (ref 26–34)
MCHC RBC AUTO-ENTMCNC: 34.6 G/DL (ref 31–37)
MCV RBC AUTO: 93.1 FL (ref 80–100)
MONOCYTES # BLD: 4 % (ref 1–7)
NRBC AUTOMATED: ABNORMAL PER 100 WBC
PDW BLD-RTO: 13.6 % (ref 11.5–14.9)
PLATELET # BLD: 210 K/UL (ref 150–450)
PLATELET ESTIMATE: ABNORMAL
PMV BLD AUTO: 8.6 FL (ref 6–12)
POTASSIUM SERPL-SCNC: 3.8 MMOL/L (ref 3.7–5.3)
RBC # BLD: 4.36 M/UL (ref 4–5.2)
RBC # BLD: ABNORMAL 10*6/UL
SEG NEUTROPHILS: 75 % (ref 36–66)
SEGMENTED NEUTROPHILS ABSOLUTE COUNT: 10.2 K/UL (ref 1.3–9.1)
SODIUM BLD-SCNC: 138 MMOL/L (ref 135–144)
TOTAL PROTEIN: 6.7 G/DL (ref 6.4–8.3)
TROPONIN INTERP: NORMAL
TROPONIN INTERP: NORMAL
TROPONIN T: <0.03 NG/ML
TROPONIN T: <0.03 NG/ML
WBC # BLD: 13.4 K/UL (ref 3.5–11)
WBC # BLD: ABNORMAL 10*3/UL

## 2018-10-25 PROCEDURE — 83605 ASSAY OF LACTIC ACID: CPT

## 2018-10-25 PROCEDURE — 99285 EMERGENCY DEPT VISIT HI MDM: CPT

## 2018-10-25 PROCEDURE — 83690 ASSAY OF LIPASE: CPT

## 2018-10-25 PROCEDURE — 84484 ASSAY OF TROPONIN QUANT: CPT

## 2018-10-25 PROCEDURE — 80053 COMPREHEN METABOLIC PANEL: CPT

## 2018-10-25 PROCEDURE — 85025 COMPLETE CBC W/AUTO DIFF WBC: CPT

## 2018-10-25 PROCEDURE — 36415 COLL VENOUS BLD VENIPUNCTURE: CPT

## 2018-10-25 PROCEDURE — 2580000003 HC RX 258: Performed by: EMERGENCY MEDICINE

## 2018-10-25 PROCEDURE — 71046 X-RAY EXAM CHEST 2 VIEWS: CPT

## 2018-10-25 PROCEDURE — 6360000002 HC RX W HCPCS: Performed by: EMERGENCY MEDICINE

## 2018-10-25 PROCEDURE — 96374 THER/PROPH/DIAG INJ IV PUSH: CPT

## 2018-10-25 PROCEDURE — 93005 ELECTROCARDIOGRAM TRACING: CPT

## 2018-10-25 RX ORDER — DICYCLOMINE HYDROCHLORIDE 10 MG/1
10 CAPSULE ORAL EVERY 6 HOURS PRN
Qty: 20 CAPSULE | Refills: 0 | Status: SHIPPED | OUTPATIENT
Start: 2018-10-25 | End: 2018-11-15

## 2018-10-25 RX ORDER — 0.9 % SODIUM CHLORIDE 0.9 %
1000 INTRAVENOUS SOLUTION INTRAVENOUS ONCE
Status: COMPLETED | OUTPATIENT
Start: 2018-10-25 | End: 2018-10-25

## 2018-10-25 RX ORDER — LEVOFLOXACIN 750 MG/1
750 TABLET ORAL DAILY
Qty: 7 TABLET | Refills: 0 | Status: SHIPPED | OUTPATIENT
Start: 2018-10-25 | End: 2018-11-01

## 2018-10-25 RX ORDER — ONDANSETRON 2 MG/ML
4 INJECTION INTRAMUSCULAR; INTRAVENOUS ONCE
Status: COMPLETED | OUTPATIENT
Start: 2018-10-25 | End: 2018-10-25

## 2018-10-25 RX ADMIN — SODIUM CHLORIDE 1000 ML: 9 INJECTION, SOLUTION INTRAVENOUS at 01:38

## 2018-10-25 RX ADMIN — ONDANSETRON 4 MG: 2 INJECTION INTRAMUSCULAR; INTRAVENOUS at 01:38

## 2018-10-25 ASSESSMENT — ENCOUNTER SYMPTOMS
TROUBLE SWALLOWING: 0
CONSTIPATION: 0
ABDOMINAL PAIN: 0
COUGH: 1
SHORTNESS OF BREATH: 1
COLOR CHANGE: 0
BLOOD IN STOOL: 0
VOMITING: 0
BACK PAIN: 0
SORE THROAT: 0
DIARRHEA: 1
NAUSEA: 0

## 2018-10-25 ASSESSMENT — PAIN DESCRIPTION - DESCRIPTORS: DESCRIPTORS: CONSTANT;HEAVINESS

## 2018-10-25 ASSESSMENT — PAIN DESCRIPTION - FREQUENCY: FREQUENCY: INTERMITTENT

## 2018-10-25 ASSESSMENT — PAIN DESCRIPTION - LOCATION: LOCATION: CHEST

## 2018-10-25 ASSESSMENT — PAIN SCALES - GENERAL: PAINLEVEL_OUTOF10: 4

## 2018-10-25 ASSESSMENT — PAIN DESCRIPTION - PAIN TYPE: TYPE: ACUTE PAIN

## 2018-10-25 NOTE — ED PROVIDER NOTES
[address] ED  eMERGENCY dEPARTMENT eNCOUnter    Pt Name: Dennie Minium  MRN: [id number]  YOB: 1975  Date of evaluation:10/25/18  PCP: Rajat Moreno       Chief Complaint   Patient presents with    Emesis    Diarrhea    Shortness of Breath       HISTORY OF PRESENT ILLNESS    Dennie Minium is a 44 y.o. female who presents With multiple complaints including a productive cough, shortness of breath and diarrhea. Patient states 2 weeks ago she was diagnosed with pneumonia. States she finished her antibiotics last week. It's her shorts of breath and cough has gotten worse over the past several days. She is unclear whether or not she really ever had total improvement of her symptoms. States that over the past 24 hours she is also developed nonbloody diarrhea. Denies any actual abdominal pain. She has had intermittent chest pain as well but she is not expressing chest pain at this time. Symptoms are 4 out of 10 in severity. She reports a fever of 100°F at home. Nothing makes her symptoms better or worse. Symptoms are acute on chronic. Patient has no other additional complaints at this time. REVIEW OF SYSTEMS       Review of Systems   Constitutional: Positive for fever. Negative for chills and fatigue. HENT: Negative for congestion, ear pain, sore throat and trouble swallowing. Eyes: Negative for visual disturbance. Respiratory: Positive for cough and shortness of breath. Cardiovascular: Negative for chest pain, palpitations and leg swelling. Gastrointestinal: Positive for diarrhea. Negative for abdominal pain, blood in stool, constipation, nausea and vomiting. Genitourinary: Negative for dysuria and flank pain. Musculoskeletal: Negative for arthralgias, back pain, myalgias and neck pain. Skin: Negative for color change, rash and wound. Neurological: Negative for dizziness, weakness, light-headedness, numbness and headaches. mother; Mental Illness in her mother; Stroke in her father. SOCIAL HISTORY      reports that she has been smoking Cigarettes. She has a 18.00 pack-year smoking history. She has never used smokeless tobacco. She reports that she uses drugs, including Marijuana. She reports that she does not drink alcohol. PHYSICAL EXAM     INITIAL VITALS:  height is 5' 3\" (1.6 m) and weight is 197 lb (89.4 kg). Her oral temperature is 97.9 °F (36.6 °C). Her blood pressure is 102/64 and her pulse is 76. Her respiration is 16 and oxygen saturation is 96%. Physical Exam   Constitutional: She is oriented to person, place, and time. No distress. HENT:   Head: Normocephalic and atraumatic. Eyes: Pupils are equal, round, and reactive to light. Conjunctivae are normal.   Neck: Neck supple. Cardiovascular: Normal rate, regular rhythm, normal heart sounds and intact distal pulses. No murmur heard. Pulmonary/Chest: Effort normal. No respiratory distress. She has decreased breath sounds. She has no wheezes. Abdominal: Soft. Bowel sounds are normal. She exhibits no distension. There is no tenderness. Musculoskeletal: She exhibits no edema or tenderness. Lymphadenopathy:     She has no cervical adenopathy. Neurological: She is alert and oriented to person, place, and time. Skin: Skin is warm and dry. No rash noted. Psychiatric: Judgment normal.   Nursing note and vitals reviewed. DIFFERENTIAL DIAGNOSIS/MDM:   78-year-old female presents with shortness of breath, cough and diarrhea. She is afebrile and nontoxic. Vital signs are within normal limits. She does appear ill but nontoxic. Differential includes pneumonia, sepsis, viral syndrome, pulmonary edema, diarrhea secondary to antibiotics, dehydration, about abnormality. Will get broad septic and metabolic workup. We'll give IV fluids, antiemetics and reassess.     DIAGNOSTIC RESULTS     EKG: All EKG's are interpreted by the Emergency Department Physician who either signs or Co-signs this chart in the absence of a cardiologist.        RADIOLOGY:   I directly visualized the following  images and reviewed the radiologist interpretations:  XR CHEST STANDARD (2 VW)   Final Result   Stable bilateral airspace disease compatible with pneumonia or edema. ED BEDSIDE ULTRASOUND:      LABS:  Labs Reviewed   CBC WITH AUTO DIFFERENTIAL - Abnormal; Notable for the following:        Result Value    WBC 13.4 (*)     Seg Neutrophils 75 (*)     Lymphocytes 18 (*)     Segs Absolute 10.20 (*)     All other components within normal limits   COMPREHENSIVE METABOLIC PANEL - Abnormal; Notable for the following:     Glucose 127 (*)     All other components within normal limits   LIPASE   TROPONIN   TROPONIN   LACTIC ACID, PLASMA         EMERGENCY DEPARTMENT COURSE:   Vitals:    Vitals:    10/25/18 0330 10/25/18 0345 10/25/18 0400 10/25/18 0415   BP: 103/70 108/66 113/70 102/64   Pulse:       Resp:       Temp:       TempSrc:       SpO2: 94% 96% 97% 96%   Weight:       Height:         4:30 AM  Chest x-ray similar to prior. Patient still with pneumonia on chest x-ray. She is well appearing otherwise. Blood work is unremarkable. She is resting comfortably on the examination. We'll switch antibiotics to Levaquin. I did strongly advised patient to follow up with her PCP and return over the next several days if she does not begin to feel better. Also prescribe Bentyl for diarrhea. Patient is agreeable to plan will be discharged home today. CRITICALCARE:      CONSULTS:  None      PROCEDURES:      FINAL IMPRESSION      1. Pneumonia due to organism    2.  Diarrhea, unspecified type            DISPOSITION/PLAN   DISPOSITION Decision To Discharge 10/25/2018 04:20:42 AM          PATIENT REFERRED TO:  Bright De León    Schedule an appointment as soon as possible for a visit       [facility] ED  [address]  [address] [facility] [address]  [phone]    As needed, If symptoms worsen      DISCHARGE MEDICATIONS:  New Prescriptions    DICYCLOMINE (BENTYL) 10 MG CAPSULE    Take 1 capsule by mouth every 6 hours as needed (cramps)    LEVOFLOXACIN (LEVAQUIN) 750 MG TABLET    Take 1 tablet by mouth daily for 7 days       (Please note that portions ofthis note were completed with a voice recognition program.  Efforts were made to edit the dictations but occasionally words are mis-transcribed.)    Atif Garcia DO  Attending Emergency Physician          Atif Garcia DO  10/25/18 5099

## 2018-10-29 ENCOUNTER — TELEPHONE (OUTPATIENT)
Dept: OBGYN CLINIC | Age: 43
End: 2018-10-29

## 2018-11-15 ENCOUNTER — HOSPITAL ENCOUNTER (OUTPATIENT)
Dept: PREADMISSION TESTING | Age: 43
Discharge: HOME | End: 2018-11-19
Payer: COMMERCIAL

## 2018-11-15 VITALS
RESPIRATION RATE: 22 BRPM | BODY MASS INDEX: 34.73 KG/M2 | HEART RATE: 80 BPM | TEMPERATURE: 97.9 F | HEIGHT: 63 IN | SYSTOLIC BLOOD PRESSURE: 124 MMHG | WEIGHT: 196 LBS | OXYGEN SATURATION: 98 % | DIASTOLIC BLOOD PRESSURE: 72 MMHG

## 2018-11-15 LAB
ABSOLUTE EOS #: 0.3 K/UL (ref 0–0.4)
ABSOLUTE IMMATURE GRANULOCYTE: NORMAL K/UL (ref 0–0.3)
ABSOLUTE LYMPH #: 2.1 K/UL (ref 1–4.8)
ABSOLUTE MONO #: 0.3 K/UL (ref 0.1–1.3)
ANION GAP SERPL CALCULATED.3IONS-SCNC: 11 MMOL/L (ref 9–17)
BASOPHILS # BLD: 1 % (ref 0–2)
BASOPHILS ABSOLUTE: 0 K/UL (ref 0–0.2)
BUN BLDV-MCNC: 12 MG/DL (ref 6–20)
BUN/CREAT BLD: ABNORMAL (ref 9–20)
CALCIUM SERPL-MCNC: 9.2 MG/DL (ref 8.6–10.4)
CHLORIDE BLD-SCNC: 104 MMOL/L (ref 98–107)
CO2: 24 MMOL/L (ref 20–31)
CREAT SERPL-MCNC: 0.63 MG/DL (ref 0.5–0.9)
DIFFERENTIAL TYPE: NORMAL
EOSINOPHILS RELATIVE PERCENT: 4 % (ref 0–4)
GFR AFRICAN AMERICAN: >60 ML/MIN
GFR NON-AFRICAN AMERICAN: >60 ML/MIN
GFR SERPL CREATININE-BSD FRML MDRD: ABNORMAL ML/MIN/{1.73_M2}
GFR SERPL CREATININE-BSD FRML MDRD: ABNORMAL ML/MIN/{1.73_M2}
GLUCOSE BLD-MCNC: 145 MG/DL (ref 70–99)
HCT VFR BLD CALC: 43.9 % (ref 36–46)
HEMOGLOBIN: 14.8 G/DL (ref 12–16)
IMMATURE GRANULOCYTES: NORMAL %
LYMPHOCYTES # BLD: 30 % (ref 24–44)
MCH RBC QN AUTO: 32.2 PG (ref 26–34)
MCHC RBC AUTO-ENTMCNC: 33.7 G/DL (ref 31–37)
MCV RBC AUTO: 95.6 FL (ref 80–100)
MONOCYTES # BLD: 5 % (ref 1–7)
NRBC AUTOMATED: NORMAL PER 100 WBC
PDW BLD-RTO: 13.5 % (ref 11.5–14.9)
PLATELET # BLD: 220 K/UL (ref 150–450)
PLATELET ESTIMATE: NORMAL
PMV BLD AUTO: 9.1 FL (ref 6–12)
POTASSIUM SERPL-SCNC: 4.3 MMOL/L (ref 3.7–5.3)
RBC # BLD: 4.59 M/UL (ref 4–5.2)
RBC # BLD: NORMAL 10*6/UL
SEG NEUTROPHILS: 60 % (ref 36–66)
SEGMENTED NEUTROPHILS ABSOLUTE COUNT: 4.2 K/UL (ref 1.3–9.1)
SODIUM BLD-SCNC: 139 MMOL/L (ref 135–144)
WBC # BLD: 7 K/UL (ref 3.5–11)
WBC # BLD: NORMAL 10*3/UL

## 2018-11-15 PROCEDURE — 85025 COMPLETE CBC W/AUTO DIFF WBC: CPT

## 2018-11-15 PROCEDURE — 36415 COLL VENOUS BLD VENIPUNCTURE: CPT

## 2018-11-15 PROCEDURE — 80048 BASIC METABOLIC PNL TOTAL CA: CPT

## 2018-11-15 NOTE — H&P
HISTORY and [address]       NAME:  Brandan Haji  MRN: [id number]   YOB: 1975   Date: 11/15/2018   Age: 44 y.o. Gender: female       COMPLAINT AND PRESENT HISTORY:     Brandan Haji is 44 y.o.,  female, here for    [address] Patient has a hx of Urinary incontinence. Patient has had several GYN surgeries in the past.   Patient reports urinary urgency with  leaking urine all the time and often unable to make it to the bathroom without having a container underneath her. Pt  Reports stress incontinence with coughing and sneezing. Pt is on Detrol LA. No burning on micturition. No hesitancy or  discoloration of urine. No pelvic pain. No fever or chills. The symptoms started 1 year. No recent falls or trauma. No redness, swelling or rashes. Pt denies any other symptoms. Patient is a current smoker. PAST MEDICAL HISTORY     Past Medical History:   Diagnosis Date    Anxiety     Arthritis     OA    Depression     post partum    Diabetes, gestational     history of    Menarche @ 16 y/o    Neuropathy     CTS    Obesity     Parity       -    Pneumonia 2018    several times this year    Restless leg syndrome     Sleep apnea     doesn't use CPAP    UTI (lower urinary tract infection)     with pregnancy    Wears glasses      Pt denies any history of Diabetes mellitus type 2, hypertension, stroke, heart disease, COPD, Asthma, GERD, HLD, Cancer, Seizures,Thyroid disease, Kidney Disease, Hepatitis, TB or Substance abuse.     SURGICAL HISTORY       Past Surgical History:   Procedure Laterality Date    ANKLE SURGERY Right      SECTION      CHOLECYSTECTOMY  06    COLONOSCOPY      with poly removal    DILATION AND CURETTAGE OF UTERUS  2014    ENDOMETRIAL ABLATION  14    with novasure    HYSTEROSCOPY  14    D&C    KNEE ARTHROSCOPY Left 6/29/15    KNEE SURGERY      right knee scoped on 18 or sore throat. Neck:  No pain, stiffness or masses. Cardiovascular/Respiratory system:  No chest pain, palpitation or shortness of breath. Gastrointestinal tract: No abdominal pain, nausea, vomiting, diarrhea or constipation. Genitourinary:  See HPI. Locomotor:  No bone or joint pains. No swelling. Neuropsychiatric:  No referable complaints. GENERAL PHYSICAL EXAM:     Vitals: /72   Pulse 80   Temp 97.9 °F (36.6 °C)   Resp 22   Ht 5' 3\" (1.6 m)   Wt 196 lb (88.9 kg)   LMP 09/15/2014   SpO2 98%   BMI 34.72 kg/m²  Body mass index is 34.72 kg/m². GENERAL APPEARANCE:   Lauren Siddiqui is 44 y.o.,  female, moderately obese, nourished, conscious, alert. Does not appear to be distress or pain at this time. Poor dental care. SKIN:  Warm, dry, no cyanosis or jaundice. Scattered red spots to the chest.               HEAD:  Normocephalic, atraumatic, no swelling or tenderness. EYES:  Pupils equal, reactive to light. EARS:  No discharge, no marked hearing loss. Excessive cerumen benjy. NOSE:  No rhinorrhea, epistaxis or septal deformity. THROAT:  Not congested. No ulceration bleeding or discharge. NECK:  No stiffness, trachea central.  No palpable masses or L.N.                 CHEST:  Symmetrical and equal on expansion. HEART:  RRR S1 > S2. No audible murmurs or gallops. LUNGS:  Equal on expansion, normal breath sounds. No adventitious sounds. ABDOMEN:  Obese. Soft on palpation. No localized tenderness. No guarding or rigidity. No palpable hepatosplenomegaly. LYMPHATICS:  No palpable cervical lymphadenopathy. LOCOMOTOR, BACK AND SPINE:  No tenderness or deformities. EXTREMITIES:  Symmetrical, no pretibial edema.

## 2018-11-28 ENCOUNTER — ANESTHESIA EVENT (OUTPATIENT)
Dept: OPERATING ROOM | Age: 43
End: 2018-11-28
Payer: COMMERCIAL

## 2018-11-29 ENCOUNTER — ANESTHESIA (OUTPATIENT)
Dept: OPERATING ROOM | Age: 43
End: 2018-11-29
Payer: COMMERCIAL

## 2018-11-29 ENCOUNTER — HOSPITAL ENCOUNTER (OUTPATIENT)
Age: 43
Setting detail: OUTPATIENT SURGERY
Discharge: HOME | End: 2018-11-29
Attending: SPECIALIST | Admitting: SPECIALIST
Payer: COMMERCIAL

## 2018-11-29 VITALS
HEIGHT: 63 IN | HEART RATE: 72 BPM | RESPIRATION RATE: 16 BRPM | WEIGHT: 196 LBS | SYSTOLIC BLOOD PRESSURE: 143 MMHG | TEMPERATURE: 97.4 F | BODY MASS INDEX: 34.73 KG/M2 | DIASTOLIC BLOOD PRESSURE: 77 MMHG | OXYGEN SATURATION: 97 %

## 2018-11-29 VITALS — DIASTOLIC BLOOD PRESSURE: 63 MMHG | OXYGEN SATURATION: 96 % | TEMPERATURE: 94.6 F | SYSTOLIC BLOOD PRESSURE: 99 MMHG

## 2018-11-29 DIAGNOSIS — Z87.448 HISTORY OF BLADDER SUSPENSION PROCEDURE: Primary | ICD-10-CM

## 2018-11-29 DIAGNOSIS — Z98.890 HISTORY OF BLADDER SUSPENSION PROCEDURE: Primary | ICD-10-CM

## 2018-11-29 LAB
-: NORMAL
HCG, PREGNANCY URINE (POC): NEGATIVE

## 2018-11-29 PROCEDURE — 6360000002 HC RX W HCPCS: Performed by: ANESTHESIOLOGY

## 2018-11-29 PROCEDURE — 7100000030 HC ASPR PHASE II RECOVERY - FIRST 15 MIN: Performed by: SPECIALIST

## 2018-11-29 PROCEDURE — 3700000000 HC ANESTHESIA ATTENDED CARE: Performed by: SPECIALIST

## 2018-11-29 PROCEDURE — C1771 REP DEV, URINARY, W/SLING: HCPCS | Performed by: SPECIALIST

## 2018-11-29 PROCEDURE — 3600000003 HC SURGERY LEVEL 3 BASE: Performed by: SPECIALIST

## 2018-11-29 PROCEDURE — 3600000013 HC SURGERY LEVEL 3 ADDTL 15MIN: Performed by: SPECIALIST

## 2018-11-29 PROCEDURE — 2709999900 HC NON-CHARGEABLE SUPPLY: Performed by: SPECIALIST

## 2018-11-29 PROCEDURE — 6370000000 HC RX 637 (ALT 250 FOR IP): Performed by: ANESTHESIOLOGY

## 2018-11-29 PROCEDURE — 2580000003 HC RX 258: Performed by: ANESTHESIOLOGY

## 2018-11-29 PROCEDURE — 7100000000 HC PACU RECOVERY - FIRST 15 MIN: Performed by: SPECIALIST

## 2018-11-29 PROCEDURE — 7100000031 HC ASPR PHASE II RECOVERY - ADDTL 15 MIN: Performed by: SPECIALIST

## 2018-11-29 PROCEDURE — 6360000002 HC RX W HCPCS: Performed by: NURSE ANESTHETIST, CERTIFIED REGISTERED

## 2018-11-29 PROCEDURE — 7100000011 HC PHASE II RECOVERY - ADDTL 15 MIN: Performed by: SPECIALIST

## 2018-11-29 PROCEDURE — 2500000003 HC RX 250 WO HCPCS: Performed by: NURSE ANESTHETIST, CERTIFIED REGISTERED

## 2018-11-29 PROCEDURE — 3700000001 HC ADD 15 MINUTES (ANESTHESIA): Performed by: SPECIALIST

## 2018-11-29 PROCEDURE — 7100000001 HC PACU RECOVERY - ADDTL 15 MIN: Performed by: SPECIALIST

## 2018-11-29 PROCEDURE — 7100000010 HC PHASE II RECOVERY - FIRST 15 MIN: Performed by: SPECIALIST

## 2018-11-29 PROCEDURE — 84703 CHORIONIC GONADOTROPIN ASSAY: CPT

## 2018-11-29 DEVICE — TRANSOBTURATOR MID-URETHRAL SYSTEM
Type: IMPLANTABLE DEVICE | Status: FUNCTIONAL
Brand: OBTRYX™ SYSTEM - HALO

## 2018-11-29 RX ORDER — SODIUM CHLORIDE, SODIUM LACTATE, POTASSIUM CHLORIDE, CALCIUM CHLORIDE 600; 310; 30; 20 MG/100ML; MG/100ML; MG/100ML; MG/100ML
INJECTION, SOLUTION INTRAVENOUS CONTINUOUS
Status: DISCONTINUED | OUTPATIENT
Start: 2018-11-29 | End: 2018-11-29 | Stop reason: HOSPADM

## 2018-11-29 RX ORDER — HYDRALAZINE HYDROCHLORIDE 20 MG/ML
5 INJECTION INTRAMUSCULAR; INTRAVENOUS EVERY 10 MIN PRN
Status: DISCONTINUED | OUTPATIENT
Start: 2018-11-29 | End: 2018-11-29 | Stop reason: HOSPADM

## 2018-11-29 RX ORDER — FENTANYL CITRATE 50 UG/ML
25 INJECTION, SOLUTION INTRAMUSCULAR; INTRAVENOUS EVERY 5 MIN PRN
Status: DISCONTINUED | OUTPATIENT
Start: 2018-11-29 | End: 2018-11-29 | Stop reason: HOSPADM

## 2018-11-29 RX ORDER — KETOROLAC TROMETHAMINE 30 MG/ML
INJECTION, SOLUTION INTRAMUSCULAR; INTRAVENOUS PRN
Status: DISCONTINUED | OUTPATIENT
Start: 2018-11-29 | End: 2018-11-29 | Stop reason: SDUPTHER

## 2018-11-29 RX ORDER — FENTANYL CITRATE 50 UG/ML
50 INJECTION, SOLUTION INTRAMUSCULAR; INTRAVENOUS EVERY 5 MIN PRN
Status: DISCONTINUED | OUTPATIENT
Start: 2018-11-29 | End: 2018-11-29 | Stop reason: HOSPADM

## 2018-11-29 RX ORDER — ROCURONIUM BROMIDE 10 MG/ML
INJECTION, SOLUTION INTRAVENOUS PRN
Status: DISCONTINUED | OUTPATIENT
Start: 2018-11-29 | End: 2018-11-29 | Stop reason: SDUPTHER

## 2018-11-29 RX ORDER — EPHEDRINE SULFATE 50 MG/ML
INJECTION, SOLUTION INTRAVENOUS PRN
Status: DISCONTINUED | OUTPATIENT
Start: 2018-11-29 | End: 2018-11-29 | Stop reason: SDUPTHER

## 2018-11-29 RX ORDER — IBUPROFEN 600 MG/1
600 TABLET ORAL EVERY 6 HOURS PRN
Qty: 30 TABLET | Refills: 1 | Status: ON HOLD | OUTPATIENT
Start: 2018-11-29 | End: 2019-01-18 | Stop reason: HOSPADM

## 2018-11-29 RX ORDER — SODIUM CHLORIDE 0.9 % (FLUSH) 0.9 %
10 SYRINGE (ML) INJECTION PRN
Status: DISCONTINUED | OUTPATIENT
Start: 2018-11-29 | End: 2018-11-29 | Stop reason: HOSPADM

## 2018-11-29 RX ORDER — METOCLOPRAMIDE HYDROCHLORIDE 5 MG/ML
10 INJECTION INTRAMUSCULAR; INTRAVENOUS
Status: DISCONTINUED | OUTPATIENT
Start: 2018-11-29 | End: 2018-11-29 | Stop reason: HOSPADM

## 2018-11-29 RX ORDER — NEOSTIGMINE METHYLSULFATE 1 MG/ML
INJECTION, SOLUTION INTRAVENOUS PRN
Status: DISCONTINUED | OUTPATIENT
Start: 2018-11-29 | End: 2018-11-29 | Stop reason: SDUPTHER

## 2018-11-29 RX ORDER — LIDOCAINE HYDROCHLORIDE 10 MG/ML
INJECTION, SOLUTION EPIDURAL; INFILTRATION; INTRACAUDAL; PERINEURAL PRN
Status: DISCONTINUED | OUTPATIENT
Start: 2018-11-29 | End: 2018-11-29 | Stop reason: SDUPTHER

## 2018-11-29 RX ORDER — MIDAZOLAM HYDROCHLORIDE 1 MG/ML
INJECTION INTRAMUSCULAR; INTRAVENOUS PRN
Status: DISCONTINUED | OUTPATIENT
Start: 2018-11-29 | End: 2018-11-29 | Stop reason: SDUPTHER

## 2018-11-29 RX ORDER — HYDROCODONE BITARTRATE AND ACETAMINOPHEN 5; 325 MG/1; MG/1
2 TABLET ORAL PRN
Status: COMPLETED | OUTPATIENT
Start: 2018-11-29 | End: 2018-11-29

## 2018-11-29 RX ORDER — GLYCOPYRROLATE 1 MG/5 ML
SYRINGE (ML) INTRAVENOUS PRN
Status: DISCONTINUED | OUTPATIENT
Start: 2018-11-29 | End: 2018-11-29 | Stop reason: SDUPTHER

## 2018-11-29 RX ORDER — HYDROCODONE BITARTRATE AND ACETAMINOPHEN 5; 325 MG/1; MG/1
1 TABLET ORAL PRN
Status: COMPLETED | OUTPATIENT
Start: 2018-11-29 | End: 2018-11-29

## 2018-11-29 RX ORDER — DIPHENHYDRAMINE HYDROCHLORIDE 50 MG/ML
12.5 INJECTION INTRAMUSCULAR; INTRAVENOUS
Status: DISCONTINUED | OUTPATIENT
Start: 2018-11-29 | End: 2018-11-29 | Stop reason: HOSPADM

## 2018-11-29 RX ORDER — DEXAMETHASONE SODIUM PHOSPHATE 4 MG/ML
INJECTION, SOLUTION INTRA-ARTICULAR; INTRALESIONAL; INTRAMUSCULAR; INTRAVENOUS; SOFT TISSUE PRN
Status: DISCONTINUED | OUTPATIENT
Start: 2018-11-29 | End: 2018-11-29 | Stop reason: SDUPTHER

## 2018-11-29 RX ORDER — SODIUM CHLORIDE 0.9 % (FLUSH) 0.9 %
10 SYRINGE (ML) INJECTION EVERY 12 HOURS SCHEDULED
Status: DISCONTINUED | OUTPATIENT
Start: 2018-11-29 | End: 2018-11-29 | Stop reason: HOSPADM

## 2018-11-29 RX ORDER — ONDANSETRON 2 MG/ML
INJECTION INTRAMUSCULAR; INTRAVENOUS PRN
Status: DISCONTINUED | OUTPATIENT
Start: 2018-11-29 | End: 2018-11-29 | Stop reason: SDUPTHER

## 2018-11-29 RX ORDER — MEPERIDINE HYDROCHLORIDE 50 MG/ML
12.5 INJECTION INTRAMUSCULAR; INTRAVENOUS; SUBCUTANEOUS EVERY 5 MIN PRN
Status: DISCONTINUED | OUTPATIENT
Start: 2018-11-29 | End: 2018-11-29 | Stop reason: HOSPADM

## 2018-11-29 RX ORDER — HYDROCODONE BITARTRATE AND ACETAMINOPHEN 5; 325 MG/1; MG/1
1 TABLET ORAL EVERY 6 HOURS PRN
Qty: 12 TABLET | Refills: 0 | Status: SHIPPED | OUTPATIENT
Start: 2018-11-29 | End: 2018-12-02

## 2018-11-29 RX ORDER — ONDANSETRON 2 MG/ML
4 INJECTION INTRAMUSCULAR; INTRAVENOUS
Status: DISCONTINUED | OUTPATIENT
Start: 2018-11-29 | End: 2018-11-29 | Stop reason: HOSPADM

## 2018-11-29 RX ORDER — MORPHINE SULFATE 2 MG/ML
2 INJECTION, SOLUTION INTRAMUSCULAR; INTRAVENOUS EVERY 5 MIN PRN
Status: DISCONTINUED | OUTPATIENT
Start: 2018-11-29 | End: 2018-11-29 | Stop reason: HOSPADM

## 2018-11-29 RX ORDER — SUCCINYLCHOLINE CHLORIDE 20 MG/ML
INJECTION INTRAMUSCULAR; INTRAVENOUS PRN
Status: DISCONTINUED | OUTPATIENT
Start: 2018-11-29 | End: 2018-11-29 | Stop reason: SDUPTHER

## 2018-11-29 RX ORDER — FENTANYL CITRATE 50 UG/ML
INJECTION, SOLUTION INTRAMUSCULAR; INTRAVENOUS PRN
Status: DISCONTINUED | OUTPATIENT
Start: 2018-11-29 | End: 2018-11-29 | Stop reason: SDUPTHER

## 2018-11-29 RX ORDER — PROPOFOL 10 MG/ML
INJECTION, EMULSION INTRAVENOUS PRN
Status: DISCONTINUED | OUTPATIENT
Start: 2018-11-29 | End: 2018-11-29 | Stop reason: SDUPTHER

## 2018-11-29 RX ORDER — LABETALOL HYDROCHLORIDE 5 MG/ML
5 INJECTION, SOLUTION INTRAVENOUS EVERY 10 MIN PRN
Status: DISCONTINUED | OUTPATIENT
Start: 2018-11-29 | End: 2018-11-29 | Stop reason: HOSPADM

## 2018-11-29 RX ADMIN — HYDROCODONE BITARTRATE AND ACETAMINOPHEN 1 TABLET: 5; 325 TABLET ORAL at 11:04

## 2018-11-29 RX ADMIN — PROPOFOL 150 MG: 10 INJECTION, EMULSION INTRAVENOUS at 08:23

## 2018-11-29 RX ADMIN — KETOROLAC TROMETHAMINE 30 MG: 30 INJECTION, SOLUTION INTRAMUSCULAR at 09:22

## 2018-11-29 RX ADMIN — MEPERIDINE HYDROCHLORIDE 12.5 MG: 50 INJECTION INTRAMUSCULAR; INTRAVENOUS; SUBCUTANEOUS at 10:11

## 2018-11-29 RX ADMIN — EPHEDRINE SULFATE 10 MG: 50 INJECTION INTRAMUSCULAR; INTRAVENOUS; SUBCUTANEOUS at 09:20

## 2018-11-29 RX ADMIN — ROCURONIUM BROMIDE 40 MG: 10 INJECTION INTRAVENOUS at 08:27

## 2018-11-29 RX ADMIN — Medication 0.6 MG: at 09:22

## 2018-11-29 RX ADMIN — FENTANYL CITRATE 50 MCG: 50 INJECTION, SOLUTION INTRAMUSCULAR; INTRAVENOUS at 09:30

## 2018-11-29 RX ADMIN — DEXAMETHASONE SODIUM PHOSPHATE 4 MG: 4 INJECTION, SOLUTION INTRAMUSCULAR; INTRAVENOUS at 08:27

## 2018-11-29 RX ADMIN — FENTANYL CITRATE 50 MCG: 50 INJECTION, SOLUTION INTRAMUSCULAR; INTRAVENOUS at 08:51

## 2018-11-29 RX ADMIN — LIDOCAINE HYDROCHLORIDE 50 MG: 10 INJECTION, SOLUTION EPIDURAL; INFILTRATION; INTRACAUDAL; PERINEURAL at 08:23

## 2018-11-29 RX ADMIN — SODIUM CHLORIDE, POTASSIUM CHLORIDE, SODIUM LACTATE AND CALCIUM CHLORIDE: 600; 310; 30; 20 INJECTION, SOLUTION INTRAVENOUS at 11:30

## 2018-11-29 RX ADMIN — ROCURONIUM BROMIDE 10 MG: 10 INJECTION INTRAVENOUS at 08:23

## 2018-11-29 RX ADMIN — FENTANYL CITRATE 50 MCG: 50 INJECTION, SOLUTION INTRAMUSCULAR; INTRAVENOUS at 08:40

## 2018-11-29 RX ADMIN — NEOSTIGMINE METHYLSULFATE 4 MG: 1 INJECTION, SOLUTION INTRAVENOUS at 09:22

## 2018-11-29 RX ADMIN — MIDAZOLAM 2 MG: 1 INJECTION INTRAMUSCULAR; INTRAVENOUS at 08:14

## 2018-11-29 RX ADMIN — ONDANSETRON 4 MG: 2 INJECTION INTRAMUSCULAR; INTRAVENOUS at 09:22

## 2018-11-29 RX ADMIN — SODIUM CHLORIDE, POTASSIUM CHLORIDE, SODIUM LACTATE AND CALCIUM CHLORIDE: 600; 310; 30; 20 INJECTION, SOLUTION INTRAVENOUS at 06:57

## 2018-11-29 RX ADMIN — SODIUM CHLORIDE, POTASSIUM CHLORIDE, SODIUM LACTATE AND CALCIUM CHLORIDE: 600; 310; 30; 20 INJECTION, SOLUTION INTRAVENOUS at 09:20

## 2018-11-29 RX ADMIN — SUCCINYLCHOLINE CHLORIDE 120 MG: 20 INJECTION INTRAMUSCULAR; INTRAVENOUS at 08:23

## 2018-11-29 ASSESSMENT — PULMONARY FUNCTION TESTS
PIF_VALUE: 21
PIF_VALUE: 23
PIF_VALUE: 21
PIF_VALUE: 22
PIF_VALUE: 21
PIF_VALUE: 21
PIF_VALUE: 22
PIF_VALUE: 20
PIF_VALUE: 5
PIF_VALUE: 20
PIF_VALUE: 21
PIF_VALUE: 20
PIF_VALUE: 4
PIF_VALUE: 0
PIF_VALUE: 23
PIF_VALUE: 20
PIF_VALUE: 23
PIF_VALUE: 21
PIF_VALUE: 20
PIF_VALUE: 23
PIF_VALUE: 20
PIF_VALUE: 22
PIF_VALUE: 27
PIF_VALUE: 23
PIF_VALUE: 23
PIF_VALUE: 1
PIF_VALUE: 22
PIF_VALUE: 27
PIF_VALUE: 23
PIF_VALUE: 22
PIF_VALUE: 3
PIF_VALUE: 23
PIF_VALUE: 21
PIF_VALUE: 22
PIF_VALUE: 23
PIF_VALUE: 3
PIF_VALUE: 21
PIF_VALUE: 23
PIF_VALUE: 22
PIF_VALUE: 22
PIF_VALUE: 20
PIF_VALUE: 23
PIF_VALUE: 22
PIF_VALUE: 23
PIF_VALUE: 1
PIF_VALUE: 22
PIF_VALUE: 22
PIF_VALUE: 21
PIF_VALUE: 22
PIF_VALUE: 20
PIF_VALUE: 23
PIF_VALUE: 22
PIF_VALUE: 22
PIF_VALUE: 23
PIF_VALUE: 0
PIF_VALUE: 22
PIF_VALUE: 22
PIF_VALUE: 23
PIF_VALUE: 21
PIF_VALUE: 22
PIF_VALUE: 23
PIF_VALUE: 22
PIF_VALUE: 21
PIF_VALUE: 2
PIF_VALUE: 0
PIF_VALUE: 20
PIF_VALUE: 1
PIF_VALUE: 22
PIF_VALUE: 22
PIF_VALUE: 1
PIF_VALUE: 22
PIF_VALUE: 1

## 2018-11-29 ASSESSMENT — PAIN SCALES - GENERAL
PAINLEVEL_OUTOF10: 6

## 2018-11-29 ASSESSMENT — PAIN DESCRIPTION - LOCATION: LOCATION: VAGINA

## 2018-11-29 ASSESSMENT — PAIN - FUNCTIONAL ASSESSMENT: PAIN_FUNCTIONAL_ASSESSMENT: 0-10

## 2018-11-29 ASSESSMENT — PAIN DESCRIPTION - PAIN TYPE
TYPE: SURGICAL PAIN
TYPE: SURGICAL PAIN

## 2018-11-29 NOTE — H&P
HISTORY and [address]47       NAME:  Radha Xie  MRN: [id number]   YOB: 1975   Date: 11/29/2018   Age: 44 y.o. Gender: female     H&P Update Note    H&P from 11/15/2018 reviewed and updated. Patient examined. Patient has some scattered pimples with erythema and urticaria. INTERVAL HISTORY:     Patient is feeling well today, denies any fever/chills, chest pain, shortness of breath. No interval changes. GENERAL PHYSICAL EXAM:     Vitals: /79   Pulse 72   Temp 97.5 °F (36.4 °C) (Oral)   Resp 18   Ht 5' 3\" (1.6 m)   Wt 196 lb (88.9 kg)   LMP 09/15/2014 Comment: urine pregnancy negative 11/29/18  SpO2 98%   BMI 34.72 kg/m²  Body mass index is 34.72 kg/m². Patient is alert and oriented, in no distress. Normotensive. Heart rate and rhythm are regular. Lungs clear to auscultation bilaterally. No pedal edema. No interval changes. I concur with the findings. BRICE AMADOR - CNP on 11/29/2018 at 7:07 AM    BRICE العلي CNP Advanced Practice Nurse Signed Internal Medicine  H&P Date of Service: 11/15/2018  8:39 AM   Related encounter: Pre-Admission Testing Visit from 11/15/2018 in [address] [place] [address] Collapse All    []Manual[]Template  []Copied        HISTORY and Juliann Rocha [address]47         NAME:  Radha Xie  MRN: [id number]   YOB: 1975   Date: 11/15/2018   Age: 44 y.o. Gender: female         COMPLAINT AND PRESENT HISTORY:      Radha Xie is 44 y.o.,  female, here for    BLADDER SUSPENSION. Patient has a hx of Urinary incontinence. Patient has had several GYN surgeries in the past.   Patient reports urinary urgency with  leaking urine all the time and often unable to make it to the bathroom without having a container underneath her. Pt  Reports stress incontinence with coughing and sneezing. Pt is on Detrol LA. No burning on micturition.   No hesitancy    GENERAL PHYSICAL EXAM:      Vitals: /72   Pulse 80   Temp 97.9 °F (36.6 °C)   Resp 22   Ht 5' 3\" (1.6 m)   Wt 196 lb (88.9 kg)   LMP 09/15/2014   SpO2 98%   BMI 34.72 kg/m²  Body mass index is 34.72 kg/m².         GENERAL APPEARANCE:   Brandan Haji is 44 y.o.,  female, moderately obese, nourished, conscious, alert. Does not appear to be distress or pain at this time. Poor dental care. SKIN:  Warm, dry, no cyanosis or jaundice. Scattered red spots to the chest.               HEAD:  Normocephalic, atraumatic, no swelling or tenderness. EYES:  Pupils equal, reactive to light. EARS:  No discharge, no marked hearing loss. Excessive cerumen benjy. NOSE:  No rhinorrhea, epistaxis or septal deformity. THROAT:  Not congested. No ulceration bleeding or discharge. NECK:  No stiffness, trachea central.  No palpable masses or L.N.                 CHEST:  Symmetrical and equal on expansion. HEART:  RRR S1 > S2. No audible murmurs or gallops. LUNGS:  Equal on expansion, normal breath sounds. No adventitious sounds. ABDOMEN:  Obese. Soft on palpation. No localized tenderness. No guarding or rigidity. No palpable hepatosplenomegaly. LYMPHATICS:  No palpable cervical lymphadenopathy.      LOCOMOTOR, BACK AND SPINE:  No tenderness or deformities. EXTREMITIES:  Symmetrical, no pretibial edema. Candys sign negative. No discoloration or ulcerations.     NEUROLOGIC:  The patient is conscious, alert, oriented, No apparent focal sensory or motor deficits.                                                                                      PROVISIONAL DIAGNOSES / SURGERY:        Urinary incontinence,         BLADDER SUSPENSION TRANSOBTURATOR TAPE          Patient Active Problem List     Diagnosis Date Noted    Pneumonia of both

## 2018-11-30 ENCOUNTER — HOSPITAL ENCOUNTER (EMERGENCY)
Age: 43
Discharge: HOME | End: 2018-11-30
Attending: EMERGENCY MEDICINE
Payer: COMMERCIAL

## 2018-11-30 VITALS
BODY MASS INDEX: 34.73 KG/M2 | WEIGHT: 196 LBS | RESPIRATION RATE: 18 BRPM | HEIGHT: 63 IN | DIASTOLIC BLOOD PRESSURE: 89 MMHG | OXYGEN SATURATION: 97 % | SYSTOLIC BLOOD PRESSURE: 155 MMHG | HEART RATE: 72 BPM | TEMPERATURE: 97.6 F

## 2018-11-30 DIAGNOSIS — H01.001 BLEPHARITIS OF RIGHT UPPER EYELID, UNSPECIFIED TYPE: Primary | ICD-10-CM

## 2018-11-30 PROCEDURE — 99283 EMERGENCY DEPT VISIT LOW MDM: CPT

## 2018-11-30 RX ORDER — ERYTHROMYCIN 5 MG/G
OINTMENT OPHTHALMIC
Qty: 1 TUBE | Refills: 0 | Status: ON HOLD | OUTPATIENT
Start: 2018-11-30 | End: 2019-01-18 | Stop reason: ALTCHOICE

## 2018-11-30 ASSESSMENT — ENCOUNTER SYMPTOMS
COLOR CHANGE: 0
VOMITING: 0
ABDOMINAL PAIN: 0
SHORTNESS OF BREATH: 0
EYE DISCHARGE: 0
EYE REDNESS: 0
DIARRHEA: 0
COUGH: 0
CONSTIPATION: 0
FACIAL SWELLING: 0

## 2018-11-30 NOTE — ED NOTES
Patient education flyer [facility] ([facility]), Taking antibiotics: What you need to know was provided and reviewed. Questions answered and understanding was verbalized by the patient and/or family.         Alicia Lomax RN  11/30/18 2458

## 2018-11-30 NOTE — ED PROVIDER NOTES
-    Pneumonia 2018    several times this year    Restless leg syndrome     Sleep apnea     doesn't use CPAP    UTI (lower urinary tract infection)     with pregnancy    Wears glasses        SURGICAL HISTORY           Procedure Laterality Date    ANKLE SURGERY Right 1997     SECTION      CHOLECYSTECTOMY  06    COLONOSCOPY      with poly removal    DILATION AND CURETTAGE OF UTERUS  2014    ENDOMETRIAL ABLATION  14    with novasure    HYSTEROSCOPY  14    D&C    KNEE ARTHROSCOPY Left 6/29/15    KNEE SURGERY      right knee scoped on 17    MYRINGOTOMY Bilateral     with tubes    OTHER SURGICAL HISTORY  2016    removal multiple vulvar inclusion cysts    SD SLING OPER STRES INCONTINENCE N/A 2018    BLADDER SUSPENSION TRANSOBTURATOR TAPE AND CYSTO performed by Nevaeh Graham MD at [address] [facility] HISTORY       Family History   Problem Relation Age of Onset    Heart Disease Paternal Grandfather     Diabetes Father     Hypertension Father     Stroke Father     Hypertension Mother     Mental Illness Mother      Family Status   Relation Status    PGF (Not Specified)    Father (Not Specified)    Mother (Not Specified)        SOCIAL HISTORY      reports that she has been smoking Cigarettes. She has a 9.00 pack-year smoking history. She has never used smokeless tobacco. She reports that she uses drugs, including Marijuana. She reports that she does not drink alcohol. REVIEW OF SYSTEMS    (2-9 systems for level 4, 10 or more for level 5)     Review of Systems   Constitutional: Negative for chills, fatigue and fever. HENT: Negative for congestion, ear discharge and facial swelling. Eyes: Negative for discharge and redness. Respiratory: Negative for cough and shortness of breath. Cardiovascular: Negative for chest pain.    Gastrointestinal: Negative for abdominal pain,

## 2018-12-06 ENCOUNTER — APPOINTMENT (OUTPATIENT)
Dept: CT IMAGING | Age: 43
End: 2018-12-06
Payer: COMMERCIAL

## 2018-12-06 ENCOUNTER — APPOINTMENT (OUTPATIENT)
Dept: GENERAL RADIOLOGY | Age: 43
End: 2018-12-06
Payer: COMMERCIAL

## 2018-12-06 ENCOUNTER — HOSPITAL ENCOUNTER (EMERGENCY)
Age: 43
Discharge: HOME | End: 2018-12-06
Attending: EMERGENCY MEDICINE
Payer: COMMERCIAL

## 2018-12-06 VITALS
OXYGEN SATURATION: 98 % | WEIGHT: 195 LBS | BODY MASS INDEX: 34.55 KG/M2 | HEIGHT: 63 IN | DIASTOLIC BLOOD PRESSURE: 88 MMHG | SYSTOLIC BLOOD PRESSURE: 141 MMHG | HEART RATE: 89 BPM | RESPIRATION RATE: 16 BRPM | TEMPERATURE: 98.4 F

## 2018-12-06 DIAGNOSIS — R05.9 COUGH: ICD-10-CM

## 2018-12-06 DIAGNOSIS — R42 DIZZINESS: Primary | ICD-10-CM

## 2018-12-06 LAB
-: ABNORMAL
ABSOLUTE EOS #: 0.31 K/UL (ref 0–0.4)
ABSOLUTE IMMATURE GRANULOCYTE: ABNORMAL K/UL (ref 0–0.3)
ABSOLUTE LYMPH #: 2.03 K/UL (ref 1–4.8)
ABSOLUTE MONO #: 0.94 K/UL (ref 0.1–1.3)
ALBUMIN SERPL-MCNC: 4.1 G/DL (ref 3.5–5.2)
ALBUMIN/GLOBULIN RATIO: ABNORMAL (ref 1–2.5)
ALP BLD-CCNC: 90 U/L (ref 35–104)
ALT SERPL-CCNC: 19 U/L (ref 5–33)
AMORPHOUS: ABNORMAL
ANION GAP SERPL CALCULATED.3IONS-SCNC: 8 MMOL/L (ref 9–17)
AST SERPL-CCNC: 18 U/L
BACTERIA: ABNORMAL
BASOPHILS # BLD: 0 % (ref 0–2)
BASOPHILS ABSOLUTE: 0 K/UL (ref 0–0.2)
BILIRUB SERPL-MCNC: 0.25 MG/DL (ref 0.3–1.2)
BILIRUBIN URINE: NEGATIVE
BNP INTERPRETATION: NORMAL
BUN BLDV-MCNC: 15 MG/DL (ref 6–20)
BUN/CREAT BLD: ABNORMAL (ref 9–20)
CALCIUM SERPL-MCNC: 9 MG/DL (ref 8.6–10.4)
CASTS UA: ABNORMAL /LPF
CHLORIDE BLD-SCNC: 105 MMOL/L (ref 98–107)
CO2: 22 MMOL/L (ref 20–31)
COLOR: YELLOW
COMMENT UA: ABNORMAL
CREAT SERPL-MCNC: 0.63 MG/DL (ref 0.5–0.9)
CRYSTALS, UA: ABNORMAL /HPF
D-DIMER QUANTITATIVE: 0.58 MG/L FEU
DIFFERENTIAL TYPE: ABNORMAL
EKG ATRIAL RATE: 87 BPM
EKG P AXIS: 25 DEGREES
EKG P-R INTERVAL: 132 MS
EKG Q-T INTERVAL: 350 MS
EKG QRS DURATION: 84 MS
EKG QTC CALCULATION (BAZETT): 421 MS
EKG R AXIS: 21 DEGREES
EKG T AXIS: 31 DEGREES
EKG VENTRICULAR RATE: 87 BPM
EOSINOPHILS RELATIVE PERCENT: 2 % (ref 0–4)
EPITHELIAL CELLS UA: ABNORMAL /HPF
GFR AFRICAN AMERICAN: >60 ML/MIN
GFR NON-AFRICAN AMERICAN: >60 ML/MIN
GFR SERPL CREATININE-BSD FRML MDRD: ABNORMAL ML/MIN/{1.73_M2}
GFR SERPL CREATININE-BSD FRML MDRD: ABNORMAL ML/MIN/{1.73_M2}
GLUCOSE BLD-MCNC: 105 MG/DL (ref 70–99)
GLUCOSE URINE: NEGATIVE
HCG QUALITATIVE: NEGATIVE
HCT VFR BLD CALC: 42.4 % (ref 36–46)
HEMOGLOBIN: 14.4 G/DL (ref 12–16)
IMMATURE GRANULOCYTES: ABNORMAL %
KETONES, URINE: NEGATIVE
LEUKOCYTE ESTERASE, URINE: ABNORMAL
LYMPHOCYTES # BLD: 13 % (ref 24–44)
MCH RBC QN AUTO: 31.9 PG (ref 26–34)
MCHC RBC AUTO-ENTMCNC: 34.1 G/DL (ref 31–37)
MCV RBC AUTO: 93.5 FL (ref 80–100)
MONOCYTES # BLD: 6 % (ref 1–7)
MORPHOLOGY: NORMAL
MUCUS: ABNORMAL
NITRITE, URINE: NEGATIVE
NRBC AUTOMATED: ABNORMAL PER 100 WBC
OTHER OBSERVATIONS UA: ABNORMAL
PDW BLD-RTO: 13.4 % (ref 11.5–14.9)
PH UA: 6.5 (ref 5–8)
PLATELET # BLD: 219 K/UL (ref 150–450)
PLATELET ESTIMATE: ABNORMAL
PMV BLD AUTO: 8.6 FL (ref 6–12)
POTASSIUM SERPL-SCNC: 4.1 MMOL/L (ref 3.7–5.3)
PRO-BNP: 62 PG/ML
PROTEIN UA: NEGATIVE
RBC # BLD: 4.53 M/UL (ref 4–5.2)
RBC # BLD: ABNORMAL 10*6/UL
RBC UA: ABNORMAL /HPF
RENAL EPITHELIAL, UA: ABNORMAL /HPF
SEG NEUTROPHILS: 79 % (ref 36–66)
SEGMENTED NEUTROPHILS ABSOLUTE COUNT: 12.32 K/UL (ref 1.3–9.1)
SODIUM BLD-SCNC: 135 MMOL/L (ref 135–144)
SPECIFIC GRAVITY UA: 1.02 (ref 1–1.03)
TOTAL PROTEIN: 6.7 G/DL (ref 6.4–8.3)
TRICHOMONAS: ABNORMAL
TROPONIN INTERP: NORMAL
TROPONIN T: <0.03 NG/ML
TURBIDITY: ABNORMAL
URINE HGB: ABNORMAL
UROBILINOGEN, URINE: NORMAL
WBC # BLD: 15.6 K/UL (ref 3.5–11)
WBC # BLD: ABNORMAL 10*3/UL
WBC UA: ABNORMAL /HPF
YEAST: ABNORMAL

## 2018-12-06 PROCEDURE — 81001 URINALYSIS AUTO W/SCOPE: CPT

## 2018-12-06 PROCEDURE — 99284 EMERGENCY DEPT VISIT MOD MDM: CPT

## 2018-12-06 PROCEDURE — 84484 ASSAY OF TROPONIN QUANT: CPT

## 2018-12-06 PROCEDURE — 2580000003 HC RX 258: Performed by: EMERGENCY MEDICINE

## 2018-12-06 PROCEDURE — 93005 ELECTROCARDIOGRAM TRACING: CPT

## 2018-12-06 PROCEDURE — 85379 FIBRIN DEGRADATION QUANT: CPT

## 2018-12-06 PROCEDURE — 6360000004 HC RX CONTRAST MEDICATION: Performed by: EMERGENCY MEDICINE

## 2018-12-06 PROCEDURE — 83880 ASSAY OF NATRIURETIC PEPTIDE: CPT

## 2018-12-06 PROCEDURE — 6370000000 HC RX 637 (ALT 250 FOR IP): Performed by: EMERGENCY MEDICINE

## 2018-12-06 PROCEDURE — 87086 URINE CULTURE/COLONY COUNT: CPT

## 2018-12-06 PROCEDURE — 84703 CHORIONIC GONADOTROPIN ASSAY: CPT

## 2018-12-06 PROCEDURE — 36415 COLL VENOUS BLD VENIPUNCTURE: CPT

## 2018-12-06 PROCEDURE — 71260 CT THORAX DX C+: CPT

## 2018-12-06 PROCEDURE — 80053 COMPREHEN METABOLIC PANEL: CPT

## 2018-12-06 PROCEDURE — 71046 X-RAY EXAM CHEST 2 VIEWS: CPT

## 2018-12-06 PROCEDURE — 85025 COMPLETE CBC W/AUTO DIFF WBC: CPT

## 2018-12-06 RX ORDER — SODIUM CHLORIDE, SODIUM LACTATE, POTASSIUM CHLORIDE, CALCIUM CHLORIDE 600; 310; 30; 20 MG/100ML; MG/100ML; MG/100ML; MG/100ML
1000 INJECTION, SOLUTION INTRAVENOUS ONCE
Status: COMPLETED | OUTPATIENT
Start: 2018-12-06 | End: 2018-12-06

## 2018-12-06 RX ORDER — ROPINIROLE 0.5 MG/1
0.5 TABLET, FILM COATED ORAL 3 TIMES DAILY
Status: DISCONTINUED | OUTPATIENT
Start: 2018-12-06 | End: 2018-12-06 | Stop reason: HOSPADM

## 2018-12-06 RX ORDER — ROPINIROLE 0.5 MG/1
0.5 TABLET, FILM COATED ORAL 3 TIMES DAILY
Status: DISCONTINUED | OUTPATIENT
Start: 2018-12-06 | End: 2018-12-06

## 2018-12-06 RX ORDER — PREDNISONE 20 MG/1
60 TABLET ORAL DAILY
Qty: 15 TABLET | Refills: 0 | Status: SHIPPED | OUTPATIENT
Start: 2018-12-06 | End: 2018-12-07

## 2018-12-06 RX ORDER — DOXYCYCLINE HYCLATE 100 MG
100 TABLET ORAL 2 TIMES DAILY
Qty: 14 TABLET | Refills: 0 | Status: SHIPPED | OUTPATIENT
Start: 2018-12-06 | End: 2018-12-07

## 2018-12-06 RX ORDER — SODIUM CHLORIDE 0.9 % (FLUSH) 0.9 %
10 SYRINGE (ML) INJECTION PRN
Status: DISCONTINUED | OUTPATIENT
Start: 2018-12-06 | End: 2018-12-06 | Stop reason: HOSPADM

## 2018-12-06 RX ORDER — 0.9 % SODIUM CHLORIDE 0.9 %
80 INTRAVENOUS SOLUTION INTRAVENOUS ONCE
Status: COMPLETED | OUTPATIENT
Start: 2018-12-06 | End: 2018-12-06

## 2018-12-06 RX ADMIN — SODIUM CHLORIDE 80 ML: 9 INJECTION, SOLUTION INTRAVENOUS at 02:30

## 2018-12-06 RX ADMIN — SODIUM CHLORIDE, POTASSIUM CHLORIDE, SODIUM LACTATE AND CALCIUM CHLORIDE 1000 ML: 600; 310; 30; 20 INJECTION, SOLUTION INTRAVENOUS at 01:34

## 2018-12-06 RX ADMIN — IOPAMIDOL 100 ML: 755 INJECTION, SOLUTION INTRAVENOUS at 02:30

## 2018-12-06 RX ADMIN — ROPINIROLE HYDROCHLORIDE 0.5 MG: 0.5 TABLET, FILM COATED ORAL at 02:07

## 2018-12-06 RX ADMIN — Medication 10 ML: at 02:30

## 2018-12-07 ENCOUNTER — OFFICE VISIT (OUTPATIENT)
Dept: PULMONOLOGY | Age: 43
End: 2018-12-07
Payer: COMMERCIAL

## 2018-12-07 VITALS
HEART RATE: 75 BPM | SYSTOLIC BLOOD PRESSURE: 130 MMHG | BODY MASS INDEX: 34.2 KG/M2 | RESPIRATION RATE: 16 BRPM | WEIGHT: 193 LBS | DIASTOLIC BLOOD PRESSURE: 89 MMHG | OXYGEN SATURATION: 97 % | HEIGHT: 63 IN

## 2018-12-07 DIAGNOSIS — F17.210 LIGHT CIGARETTE SMOKER (1-9 CIGARETTES PER DAY): ICD-10-CM

## 2018-12-07 DIAGNOSIS — G25.81 RESTLESS LEG SYNDROME: ICD-10-CM

## 2018-12-07 DIAGNOSIS — Z87.01 HISTORY OF MYCOPLASMA PNEUMONIA: ICD-10-CM

## 2018-12-07 DIAGNOSIS — R91.8 GROUND GLASS OPACITY PRESENT ON IMAGING OF LUNG: Primary | ICD-10-CM

## 2018-12-07 DIAGNOSIS — G47.33 OSA (OBSTRUCTIVE SLEEP APNEA): ICD-10-CM

## 2018-12-07 DIAGNOSIS — E66.9 OBESITY (BMI 30.0-34.9): ICD-10-CM

## 2018-12-07 LAB
CULTURE: NORMAL
Lab: NORMAL
SPECIMEN DESCRIPTION: NORMAL
STATUS: NORMAL

## 2018-12-07 PROCEDURE — 99204 OFFICE O/P NEW MOD 45 MIN: CPT | Performed by: INTERNAL MEDICINE

## 2018-12-07 PROCEDURE — 90471 IMMUNIZATION ADMIN: CPT | Performed by: INTERNAL MEDICINE

## 2018-12-07 PROCEDURE — G8417 CALC BMI ABV UP PARAM F/U: HCPCS | Performed by: INTERNAL MEDICINE

## 2018-12-07 PROCEDURE — 4004F PT TOBACCO SCREEN RCVD TLK: CPT | Performed by: INTERNAL MEDICINE

## 2018-12-07 PROCEDURE — G8427 DOCREV CUR MEDS BY ELIG CLIN: HCPCS | Performed by: INTERNAL MEDICINE

## 2018-12-07 PROCEDURE — 90732 PPSV23 VACC 2 YRS+ SUBQ/IM: CPT | Performed by: INTERNAL MEDICINE

## 2018-12-07 PROCEDURE — G8484 FLU IMMUNIZE NO ADMIN: HCPCS | Performed by: INTERNAL MEDICINE

## 2018-12-07 RX ORDER — BUPROPION HYDROCHLORIDE 150 MG/1
150 TABLET, EXTENDED RELEASE ORAL 2 TIMES DAILY
Qty: 60 TABLET | Refills: 3 | Status: SHIPPED | OUTPATIENT
Start: 2018-12-07 | End: 2019-06-06 | Stop reason: ALTCHOICE

## 2018-12-11 ENCOUNTER — HOSPITAL ENCOUNTER (EMERGENCY)
Age: 43
Discharge: HOME | End: 2018-12-11
Attending: EMERGENCY MEDICINE
Payer: COMMERCIAL

## 2018-12-11 ENCOUNTER — APPOINTMENT (OUTPATIENT)
Dept: GENERAL RADIOLOGY | Age: 43
End: 2018-12-11
Payer: COMMERCIAL

## 2018-12-11 VITALS
OXYGEN SATURATION: 99 % | DIASTOLIC BLOOD PRESSURE: 93 MMHG | HEART RATE: 78 BPM | RESPIRATION RATE: 18 BRPM | SYSTOLIC BLOOD PRESSURE: 143 MMHG | TEMPERATURE: 98 F

## 2018-12-11 DIAGNOSIS — J06.9 UPPER RESPIRATORY TRACT INFECTION, UNSPECIFIED TYPE: Primary | ICD-10-CM

## 2018-12-11 LAB
DIRECT EXAM: NORMAL
Lab: NORMAL
Lab: NORMAL
SPECIMEN DESCRIPTION: NORMAL
SPECIMEN DESCRIPTION: NORMAL
STATUS: NORMAL
STATUS: NORMAL

## 2018-12-11 PROCEDURE — 87804 INFLUENZA ASSAY W/OPTIC: CPT

## 2018-12-11 PROCEDURE — 99285 EMERGENCY DEPT VISIT HI MDM: CPT

## 2018-12-11 PROCEDURE — 87880 STREP A ASSAY W/OPTIC: CPT

## 2018-12-11 PROCEDURE — 71046 X-RAY EXAM CHEST 2 VIEWS: CPT

## 2018-12-11 RX ORDER — PREDNISONE 20 MG/1
20 TABLET ORAL DAILY
Qty: 5 TABLET | Refills: 0 | Status: SHIPPED | OUTPATIENT
Start: 2018-12-11 | End: 2018-12-16

## 2018-12-11 RX ORDER — FLUTICASONE PROPIONATE 50 MCG
1 SPRAY, SUSPENSION (ML) NASAL DAILY
Qty: 1 BOTTLE | Refills: 0 | Status: SHIPPED | OUTPATIENT
Start: 2018-12-11 | End: 2019-05-14

## 2018-12-11 RX ORDER — LORATADINE 10 MG/1
10 TABLET ORAL DAILY
Qty: 30 TABLET | Refills: 0 | Status: SHIPPED | OUTPATIENT
Start: 2018-12-11 | End: 2019-05-14

## 2018-12-11 RX ORDER — AZITHROMYCIN 250 MG/1
TABLET, FILM COATED ORAL
Qty: 1 PACKET | Refills: 0 | Status: SHIPPED | OUTPATIENT
Start: 2018-12-11 | End: 2018-12-21

## 2018-12-11 ASSESSMENT — ENCOUNTER SYMPTOMS
SORE THROAT: 1
WHEEZING: 0
RHINORRHEA: 1
COUGH: 1

## 2018-12-11 NOTE — ED NOTES
Preformed SBIRT screening with the patient and the patient states that she smokes marijuana occasionally for sleep. The patient declined drug treatment resources.       [facility]  12/11/18 2620

## 2018-12-11 NOTE — ED PROVIDER NOTES
Resp 18   LMP 09/15/2014 Comment: urine pregnancy negative 11/29/18  SpO2 99%      Physical Exam   Constitutional: She appears well-developed. No distress. HENT:   Head: Normocephalic. Right Ear: Hearing, tympanic membrane, external ear and ear canal normal.   Left Ear: Hearing, tympanic membrane, external ear and ear canal normal.   Nose: Mucosal edema (worse on the left) and rhinorrhea present. Mouth/Throat: Uvula is midline, oropharynx is clear and moist and mucous membranes are normal. No oropharyngeal exudate, posterior oropharyngeal edema, posterior oropharyngeal erythema or tonsillar abscesses. Neck: Normal range of motion. Cardiovascular: Normal rate, regular rhythm and normal heart sounds. Pulmonary/Chest: Effort normal. No respiratory distress. Abdominal: Soft. Musculoskeletal: Normal range of motion. She exhibits no edema or tenderness. Lymphadenopathy:     She has no cervical adenopathy. Neurological: She is alert. Skin: Skin is warm and dry. She is not diaphoretic. Nursing note and vitals reviewed. MEDICAL DECISION MAKING:   Persistent cough and runny nose and sore throat and nasal congestion. Patient was seen in the emergency department promptly 5 days ago a  CT of the chest shows some groundglass opacities bilateral is similar to x-ray that she has done previously. She was placed on antibiotics and did not get them filled she did not take the medication she did see her pulmonologist recommend she not take the medication at this time. Repeat chest x-ray today shows groundglass opacities bilateral.  We did check for influenza a and B which were negative also strep as she's had a sore throat. Physical exam shows some mucosal edema especially on the left with some postnasal drip noted. This may be contributing to the cough. Will try Zithromycin and low dose steroid. We will start her on Flonase Claritin recommend cool mist humidifier in room at night.   Avoid smoking hot tea and honey for cough and drink plenty of fluids. Reviewed last lab results and CT chest x-ray that she had done probably 5 days ago. Follow-up with primary care provider or pulmonology in one to 2 days for recheck. Please return to emergency department for any worsening symptoms or any other concerns. Patient verbalizes understanding of discharge instructions and is agreeable to plan. DIAGNOSTIC RESULTS     EKG: All EKG's are interpreted by the Emergency Department Physician who either signs or Co-signs this chart in the absence of acardiologist.        RADIOLOGY:[place] film, CT, MRI, and formal ultrasound images (except ED bedside ultrasound) are read by the radiologist and the images and interpretations are directly viewed by the emergency physician. LABS:All lab results were reviewed by myself, and all abnormals are listed below. Labs Reviewed   STREP SCREEN GROUP A THROAT   RAPID INFLUENZA A/B ANTIGENS         EMERGENCY DEPARTMENT COURSE:   Vitals:    Vitals:    18 1740   BP: (!) 143/93   Pulse: 78   Resp: 18   Temp: 98 °F (36.7 °C)   TempSrc: Oral   SpO2: 99%       The patient was given the following medications while in the emergency department:  Orders Placed This Encounter   Medications    fluticasone (FLONASE) 50 MCG/ACT nasal spray     Si spray by Each Nare route daily     Dispense:  1 Bottle     Refill:  0    loratadine (CLARITIN) 10 MG tablet     Sig: Take 1 tablet by mouth daily     Dispense:  30 tablet     Refill:  0    azithromycin (ZITHROMAX) 250 MG tablet     Sig: Take 2 tablets (500 mg) on Day 1, followed by 1 tablet (250 mg) once daily on Days 2 through 5. Dispense:  1 packet     Refill:  0    predniSONE (DELTASONE) 20 MG tablet     Sig: Take 1 tablet by mouth daily for 5 days     Dispense:  5 tablet     Refill:  0       -------------------------      CRITICAL CARE:    CONSULTS:  None    PROCEDURES:  Procedures    FINAL IMPRESSION      1.  Upper respiratory tract infection, unspecified type          DISPOSITION/PLAN   DISPOSITION Decision To Discharge 12/11/2018 07:02:37 PM      PATIENT REFERREDTO:  Tori Cox    Schedule an appointment as soon as possible for a visit in 1 day      [address] [facility] [address] [id number]  [phone]    If symptoms worsen      DISCHARGEMEDICATIONS:  New Prescriptions    AZITHROMYCIN (ZITHROMAX) 250 MG TABLET    Take 2 tablets (500 mg) on Day 1, followed by 1 tablet (250 mg) once daily on Days 2 through 5.     FLUTICASONE (FLONASE) 50 MCG/ACT NASAL SPRAY    1 spray by Each Nare route daily    LORATADINE (CLARITIN) 10 MG TABLET    Take 1 tablet by mouth daily    PREDNISONE (DELTASONE) 20 MG TABLET    Take 1 tablet by mouth daily for 5 days       (Please note that portions of this note were completed with a voice recognition program.  Efforts were made to edit thedictations but occasionally words are mis-transcribed.)    ABRAHAM Arce PA-C  12/11/18 1908

## 2018-12-17 ENCOUNTER — TELEPHONE (OUTPATIENT)
Dept: PULMONOLOGY | Age: 43
End: 2018-12-17

## 2018-12-19 ENCOUNTER — HOSPITAL ENCOUNTER (EMERGENCY)
Age: 43
Discharge: HOME | End: 2018-12-19
Attending: EMERGENCY MEDICINE
Payer: COMMERCIAL

## 2018-12-19 ENCOUNTER — APPOINTMENT (OUTPATIENT)
Dept: CT IMAGING | Age: 43
End: 2018-12-19
Payer: COMMERCIAL

## 2018-12-19 ENCOUNTER — OFFICE VISIT (OUTPATIENT)
Dept: OBGYN CLINIC | Age: 43
End: 2018-12-19

## 2018-12-19 VITALS
RESPIRATION RATE: 20 BRPM | DIASTOLIC BLOOD PRESSURE: 67 MMHG | WEIGHT: 193.2 LBS | HEART RATE: 74 BPM | HEIGHT: 63 IN | SYSTOLIC BLOOD PRESSURE: 98 MMHG | BODY MASS INDEX: 34.23 KG/M2

## 2018-12-19 VITALS
HEART RATE: 84 BPM | WEIGHT: 193 LBS | SYSTOLIC BLOOD PRESSURE: 135 MMHG | HEIGHT: 63 IN | TEMPERATURE: 97.8 F | DIASTOLIC BLOOD PRESSURE: 81 MMHG | OXYGEN SATURATION: 97 % | RESPIRATION RATE: 17 BRPM | BODY MASS INDEX: 34.2 KG/M2

## 2018-12-19 DIAGNOSIS — Z48.89 POSTOPERATIVE VISIT: Primary | ICD-10-CM

## 2018-12-19 DIAGNOSIS — N93.9 VAGINAL BLEEDING: Primary | ICD-10-CM

## 2018-12-19 LAB
ABO/RH: NORMAL
ABSOLUTE EOS #: 0.1 K/UL (ref 0–0.4)
ABSOLUTE IMMATURE GRANULOCYTE: ABNORMAL K/UL (ref 0–0.3)
ABSOLUTE LYMPH #: 2.04 K/UL (ref 1–4.8)
ABSOLUTE MONO #: 0.39 K/UL (ref 0.1–1.3)
ALBUMIN SERPL-MCNC: 4 G/DL (ref 3.5–5.2)
ALBUMIN/GLOBULIN RATIO: ABNORMAL (ref 1–2.5)
ALP BLD-CCNC: 102 U/L (ref 35–104)
ALT SERPL-CCNC: 16 U/L (ref 5–33)
ANION GAP SERPL CALCULATED.3IONS-SCNC: 11 MMOL/L (ref 9–17)
ANTIBODY SCREEN: NEGATIVE
ARM BAND NUMBER: NORMAL
AST SERPL-CCNC: 19 U/L
BASOPHILS # BLD: 0 % (ref 0–2)
BASOPHILS ABSOLUTE: 0 K/UL (ref 0–0.2)
BILIRUB SERPL-MCNC: 0.28 MG/DL (ref 0.3–1.2)
BUN BLDV-MCNC: 13 MG/DL (ref 6–20)
BUN/CREAT BLD: ABNORMAL (ref 9–20)
CALCIUM SERPL-MCNC: 9.1 MG/DL (ref 8.6–10.4)
CHLORIDE BLD-SCNC: 105 MMOL/L (ref 98–107)
CO2: 23 MMOL/L (ref 20–31)
CREAT SERPL-MCNC: 0.77 MG/DL (ref 0.5–0.9)
DIFFERENTIAL TYPE: ABNORMAL
DIRECT EXAM: NORMAL
EOSINOPHILS RELATIVE PERCENT: 1 % (ref 0–4)
EXPIRATION DATE: NORMAL
GFR AFRICAN AMERICAN: >60 ML/MIN
GFR NON-AFRICAN AMERICAN: >60 ML/MIN
GFR SERPL CREATININE-BSD FRML MDRD: ABNORMAL ML/MIN/{1.73_M2}
GFR SERPL CREATININE-BSD FRML MDRD: ABNORMAL ML/MIN/{1.73_M2}
GLUCOSE BLD-MCNC: 154 MG/DL (ref 70–99)
HCG QUANTITATIVE: <1 IU/L
HCT VFR BLD CALC: 42.4 % (ref 36–46)
HEMOGLOBIN: 14.5 G/DL (ref 12–16)
IMMATURE GRANULOCYTES: ABNORMAL %
INR BLD: 1
LYMPHOCYTES # BLD: 21 % (ref 24–44)
Lab: NORMAL
MAGNESIUM: 2.3 MG/DL (ref 1.6–2.6)
MCH RBC QN AUTO: 32.6 PG (ref 26–34)
MCHC RBC AUTO-ENTMCNC: 34.3 G/DL (ref 31–37)
MCV RBC AUTO: 95.2 FL (ref 80–100)
MONOCYTES # BLD: 4 % (ref 1–7)
MORPHOLOGY: NORMAL
NRBC AUTOMATED: ABNORMAL PER 100 WBC
PARTIAL THROMBOPLASTIN TIME: 28.5 SEC (ref 23–31)
PDW BLD-RTO: 13.5 % (ref 11.5–14.9)
PLATELET # BLD: 214 K/UL (ref 150–450)
PLATELET ESTIMATE: ABNORMAL
PMV BLD AUTO: 8.6 FL (ref 6–12)
POTASSIUM SERPL-SCNC: 3.4 MMOL/L (ref 3.7–5.3)
PROTHROMBIN TIME: 10.6 SEC (ref 9.7–12)
RBC # BLD: 4.45 M/UL (ref 4–5.2)
RBC # BLD: ABNORMAL 10*6/UL
SEG NEUTROPHILS: 74 % (ref 36–66)
SEGMENTED NEUTROPHILS ABSOLUTE COUNT: 7.17 K/UL (ref 1.3–9.1)
SODIUM BLD-SCNC: 139 MMOL/L (ref 135–144)
SPECIMEN DESCRIPTION: NORMAL
STATUS: NORMAL
TOTAL PROTEIN: 6.8 G/DL (ref 6.4–8.3)
WBC # BLD: 9.7 K/UL (ref 3.5–11)
WBC # BLD: ABNORMAL 10*3/UL

## 2018-12-19 PROCEDURE — 85730 THROMBOPLASTIN TIME PARTIAL: CPT

## 2018-12-19 PROCEDURE — 86900 BLOOD TYPING SEROLOGIC ABO: CPT

## 2018-12-19 PROCEDURE — 87480 CANDIDA DNA DIR PROBE: CPT

## 2018-12-19 PROCEDURE — 83735 ASSAY OF MAGNESIUM: CPT

## 2018-12-19 PROCEDURE — 99284 EMERGENCY DEPT VISIT MOD MDM: CPT

## 2018-12-19 PROCEDURE — 36415 COLL VENOUS BLD VENIPUNCTURE: CPT

## 2018-12-19 PROCEDURE — 87660 TRICHOMONAS VAGIN DIR PROBE: CPT

## 2018-12-19 PROCEDURE — 4004F PT TOBACCO SCREEN RCVD TLK: CPT | Performed by: SPECIALIST

## 2018-12-19 PROCEDURE — 99024 POSTOP FOLLOW-UP VISIT: CPT | Performed by: SPECIALIST

## 2018-12-19 PROCEDURE — 84702 CHORIONIC GONADOTROPIN TEST: CPT

## 2018-12-19 PROCEDURE — 85610 PROTHROMBIN TIME: CPT

## 2018-12-19 PROCEDURE — 80053 COMPREHEN METABOLIC PANEL: CPT

## 2018-12-19 PROCEDURE — G8427 DOCREV CUR MEDS BY ELIG CLIN: HCPCS | Performed by: SPECIALIST

## 2018-12-19 PROCEDURE — G8484 FLU IMMUNIZE NO ADMIN: HCPCS | Performed by: SPECIALIST

## 2018-12-19 PROCEDURE — 86850 RBC ANTIBODY SCREEN: CPT

## 2018-12-19 PROCEDURE — 87491 CHLMYD TRACH DNA AMP PROBE: CPT

## 2018-12-19 PROCEDURE — 87591 N.GONORRHOEAE DNA AMP PROB: CPT

## 2018-12-19 PROCEDURE — 85025 COMPLETE CBC W/AUTO DIFF WBC: CPT

## 2018-12-19 PROCEDURE — G8417 CALC BMI ABV UP PARAM F/U: HCPCS | Performed by: SPECIALIST

## 2018-12-19 PROCEDURE — 87510 GARDNER VAG DNA DIR PROBE: CPT

## 2018-12-19 PROCEDURE — 86901 BLOOD TYPING SEROLOGIC RH(D): CPT

## 2018-12-19 RX ORDER — SODIUM CHLORIDE 0.9 % (FLUSH) 0.9 %
10 SYRINGE (ML) INJECTION PRN
Status: DISCONTINUED | OUTPATIENT
Start: 2018-12-19 | End: 2018-12-19

## 2018-12-19 RX ORDER — 0.9 % SODIUM CHLORIDE 0.9 %
80 INTRAVENOUS SOLUTION INTRAVENOUS ONCE
Status: DISCONTINUED | OUTPATIENT
Start: 2018-12-19 | End: 2018-12-19 | Stop reason: HOSPADM

## 2018-12-19 RX ORDER — NAPROXEN 500 MG/1
500 TABLET ORAL DAILY
Status: ON HOLD | COMMUNITY
End: 2019-01-18 | Stop reason: HOSPADM

## 2018-12-19 ASSESSMENT — PAIN DESCRIPTION - DESCRIPTORS: DESCRIPTORS: BURNING

## 2018-12-19 ASSESSMENT — ENCOUNTER SYMPTOMS
VOMITING: 0
ABDOMINAL PAIN: 0
APNEA: 0
CONSTIPATION: 0
DIARRHEA: 0
NAUSEA: 0
EYE PAIN: 0
COUGH: 0
ABDOMINAL DISTENTION: 0

## 2018-12-19 ASSESSMENT — PAIN SCALES - GENERAL: PAINLEVEL_OUTOF10: 4

## 2018-12-19 ASSESSMENT — PAIN DESCRIPTION - PAIN TYPE: TYPE: ACUTE PAIN

## 2018-12-19 ASSESSMENT — PAIN DESCRIPTION - LOCATION: LOCATION: VAGINA

## 2018-12-19 NOTE — ED PROVIDER NOTES
eMERGENCY dEPARTMENT eNCOUnter    Pt Name: Maribel Paez  MRN: [id number]  [place] 1975  Date of evaluation: 12/19/18  CHIEF COMPLAINT       Chief Complaint   Patient presents with    Vaginal Bleeding     HISTORY OF PRESENT ILLNESS   HPI  HISTORY OF PRESENT ILLNESS:  Past medical history of recent bladder surgery presents for chief complaint of vaginal plain and hematuria. For the past few hours. Patient states she is not supposed to have intercourse but did and then is now experiencing vaginal pain. She denies any fevers or chills. No diffuse abdominal pain. Pain is only located in the vaginal canal.  Severity is moderate. No aggravating or relieving factors. Timing is one day. Course is constant.   Context is recent surgery  -----------------------  -----------------------  REVIEW OF SYSTEMS  ED Caveat: [none]  Gen:  No fever, no chills  CV: No CP, no palpitations  Resp: No SOB, no respiratory distress  GI: No V/D  : No dysuria, no increased frequency  Skin: No rash, no purulent lesions  Eyes: No blurry vision, No double vision  MSK: No back pain, no joint pain  Neuro: No HA, no sensation changes  Psych: No SI/HI  -----------------------  -----------------------  ALLERGIES  -per nursing records, reviewed    PAST MEDICAL HISTORY  -See HPI    SOCIAL HISTORY  -No daily drinking, no IV drugs  -----------------------  -----------------------  PHYSICAL EXAM  Gen: Alert, no acute distress  Skin: Warm, no rashes  Head: Normocephalic, atraumatic  Neck: No midline tenderness, no nuchal rigidity  Eye: EOMI, PERRLA, normal conjunctiva  ENT: Mucous membranes moist, no pharyngeal erythema  CV: Normal rate, no rubs  Resp: Respirations unlabored, lungs clear to auscultation  GI: Soft, non distended, no large abdominal masses, non tender  MSK: No midline back pain, no large joint effusions  Neuro: Alert and oriented, no focal neurological deficits observed  Psych: Cooperative, appropriate mood and affect  -----------------------  -----------------------  MEDICAL DECISION MAKING  Differential Diagnosis:  - Consideration is given for   appendicitis, cholecystitis,  diverticulitis, SBO, urinary tract infection, nephrolithiasis, pancreatitis, dissection, ischemia to reproductive organs, STD, ischemic colitis, perforation, intra abdominal bleeding, GI bleed, postop complication, vaginal laceration  -  #Impression/Plan:  -Clinically most consistent with a postop complication secondary to intercourse. Given patient's presentation spoke with ObGyn who will evaluate the patient and give recommendations  -  ##Reevaluation/Conversations on care:  - per OB resident, Dr. Francisco Mcgrath wants pt in their office at [address] [place] [address] today  -   ##Diagnosis:  -Abdominal pain  -  -----------------------  -----------------------  Yumiko Morrow MD, [facility]  Emergency Medicine Attending  Questions? Please contact my cell phone anytime.   [phone]  *This charting supersedes any ED resident or staff charting and was written using speech recognition software  PASTMEDICAL HISTORY     Past Medical History:   Diagnosis Date    Anxiety     Arthritis     OA    Depression     post partum    Diabetes, gestational     history of    Menarche @ 16 y/o    Neuropathy     CTS    Obesity     Parity       -    Pneumonia 2018    several times this year    Restless leg syndrome     Sleep apnea     doesn't use CPAP    UTI (lower urinary tract infection)     with pregnancy    Wears glasses      SURGICAL HISTORY       Past Surgical History:   Procedure Laterality Date    ANKLE SURGERY Right     BLADDER SUSPENSION       SECTION      CHOLECYSTECTOMY  06    COLONOSCOPY      with poly removal    DILATION AND CURETTAGE OF UTERUS  2014    ENDOMETRIAL ABLATION  14    with novasure    HYSTEROSCOPY  14    D&C    KNEE ARTHROSCOPY Left 6/29/15    KNEE SURGERY      right knee scoped on 17    MYRINGOTOMY Bilateral

## 2018-12-19 NOTE — CONSULTS
MG extended release tablet Take 1 tablet by mouth 2 times daily Use is 150 mg daily for 3 day then 150 mg twice daily 60 tablet 3    albuterol sulfate  (90 Base) MCG/ACT inhaler Inhale 2 puffs into the lungs every 6 hours as needed for Wheezing 1 Inhaler 3    rOPINIRole (REQUIP) 1 MG tablet Take 1 mg by mouth 2 times daily Morning and lunch      FLUoxetine (PROZAC) 40 MG capsule Take 80 mg by mouth daily       busPIRone (BUSPAR) 10 MG tablet Take 10 mg by mouth 2 times daily      rOPINIRole (REQUIP) 5 MG tablet Take 7.5 mg by mouth nightly       azithromycin (ZITHROMAX) 250 MG tablet Take 2 tablets (500 mg) on Day 1, followed by 1 tablet (250 mg) once daily on Days 2 through 5. 1 packet 0    erythromycin (ROMYCIN) 5 MG/GM ophthalmic ointment Apply to affected eye 3 times a day for 5-7 days until symptoms clear. 1 Tube 0    ibuprofen (ADVIL;MOTRIN) 600 MG tablet Take 1 tablet by mouth every 6 hours as needed for Pain 30 tablet 1       FAMILY HISTORY:  family history includes Diabetes in her father; Heart Disease in her paternal grandfather; Hypertension in her father and mother; Mental Illness in her mother; Stroke in her father. SOCIAL HISTORY:   reports that she has been smoking Cigarettes. She started smoking about 27 years ago. She has a 9.00 pack-year smoking history. She has never used smokeless tobacco. She reports that she uses drugs, including Marijuana. She reports that she does not drink alcohol.  _________________________                                    Rachelle Recio:  Vitals:    12/19/18 0444   BP: 135/81   Pulse: 84   Resp: 17   Temp: 97.8 °F (36.6 °C)   TempSrc: Oral   SpO2: 97%   Weight: 193 lb (87.5 kg)   Height: 5' 3\" (1.6 m)                                                  INPUT/OUTPUT:  No intake/output data recorded. No intake/output data recorded.                                                                                                                                PHYSICAL EXAM:     General Appearance: Appears healthy. Alert; in no acute distress. Pleasant. Skin: Skin color, texture, turgor normal. No rashes or lesions. Lymphatic: No abnormally enlarged lymph nodes. Neck and EENT: normal atraumatic, no neck masses, normal thyroid, no jvd  Respiratory: Normal expansion. Clear to auscultation. No rales, rhonchi, or wheezing. Cardiovascular: regular rate and rhythm  Abdomen: soft, mild tenderness to palpation in suprapubic region, non-distended, no right upper quadrant tenderness and no CVA tenderness  Pelvic Exam:   External genitalia: General appearance; normal, Hair distribution; normal, Lesions absent  Urinary system: urethral meatus normal  Vaginal: normal appearing vaginal mucosa, scant amount of dark old blood noted in vaginal vault, no active bleeding noted, mesh visualized in anterior aspect of vaginal canal  Cervix: normal appearing cervix without discharge or lesions, no active bleeding noted from cervical os   Adnexa: normal adnexa in size, nontender and no masses  Uterus: normal single, nontender  Rectal Exam: exam declined by patient  Musculoskeletal: no gross abnormalities  Extremities: non-tender BLE and non-edematous  Psych:  oriented to time, place and person     OMM EXAM:  The patient did not complain of a Chief complaint requiring OMM.   Chief Complaint:none    Structural Exam: No Interest    LAB RESULTS:    O POSITIVE      Lab Results   Component Value Date    PREGTESTUR NEGATIVE 04/07/2018    HCG NEGATIVE 11/29/2018    HCGQUANT <1 12/19/2018       Lab Results   Component Value Date    WBC 9.7 12/19/2018    HGB 14.5 12/19/2018    HCT 42.4 12/19/2018    MCV 95.2 12/19/2018     12/19/2018       Lab Results   Component Value Date     12/19/2018    K 3.4 12/19/2018     12/19/2018    CO2 23 12/19/2018    BUN 13 12/19/2018    CREATININE 0.77 12/19/2018    GLUCOSE 154 12/19/2018    CALCIUM 9.1 12/19/2018        DIAGNOSTICS:    Xr Chest Standard (2

## 2018-12-19 NOTE — PROGRESS NOTES
MG tablet Take 1 mg by mouth 2 times daily Morning and lunch      FLUoxetine (PROZAC) 40 MG capsule Take 80 mg by mouth daily       busPIRone (BUSPAR) 10 MG tablet Take 10 mg by mouth 2 times daily      rOPINIRole (REQUIP) 5 MG tablet Take 7.5 mg by mouth nightly        No current Epic-ordered facility-administered medications on file. Problem List Items Addressed This Visit     None      Visit Diagnoses     Postoperative visit    -  Primary        Allergies   Allergen Reactions    Bactrim [Sulfamethoxazole-Trimethoprim] Hives and Itching    Dicloxacillin Hives    Pcn [Penicillins] Hives     No orders of the defined types were placed in this encounter. Patient is here following Bladder Suspension Transobturator Tape on 11/29/18; 3 weeks ago. Patient states that she had sex last night and it really started hurting. She went to the ER and was told to come to the office today for an exam.  She says that she is still in a lot of pain. Patient states that her partner also experienced discomfort and hickey like sore on his penis. Patient states that she is urinating normally without leakage, except for one time when she waited to long and then coughed and she peed everywhere. She denies nausea, vomiting and fever. Review of Systems   Constitutional: Negative for activity change, appetite change and fever. HENT: Negative for ear discharge and ear pain. Eyes: Negative for pain and visual disturbance. Respiratory: Negative for apnea and cough. Cardiovascular: Negative for chest pain, palpitations and leg swelling. Gastrointestinal: Negative for abdominal distention, abdominal pain, constipation, diarrhea, nausea and vomiting. Endocrine: Negative. Genitourinary: Negative for difficulty urinating, dysuria, menstrual problem and pelvic pain. Pain   Musculoskeletal: Negative for neck pain and neck stiffness. Skin: Negative.     Neurological: Negative for light-headedness and

## 2018-12-20 LAB
C TRACH DNA GENITAL QL NAA+PROBE: NEGATIVE
N. GONORRHOEAE DNA: NEGATIVE

## 2019-01-07 ENCOUNTER — OFFICE VISIT (OUTPATIENT)
Dept: OBGYN CLINIC | Age: 44
End: 2019-01-07

## 2019-01-07 VITALS
RESPIRATION RATE: 18 BRPM | BODY MASS INDEX: 34.62 KG/M2 | WEIGHT: 195.4 LBS | DIASTOLIC BLOOD PRESSURE: 85 MMHG | HEIGHT: 63 IN | SYSTOLIC BLOOD PRESSURE: 132 MMHG | HEART RATE: 88 BPM

## 2019-01-07 DIAGNOSIS — Z48.89 POSTOPERATIVE VISIT: Primary | ICD-10-CM

## 2019-01-07 DIAGNOSIS — N76.0 ACUTE VAGINITIS: ICD-10-CM

## 2019-01-07 PROCEDURE — 99024 POSTOP FOLLOW-UP VISIT: CPT | Performed by: SPECIALIST

## 2019-01-07 RX ORDER — FLUCONAZOLE 100 MG/1
100 TABLET ORAL DAILY
Qty: 7 TABLET | Refills: 0 | Status: SHIPPED | OUTPATIENT
Start: 2019-01-07 | End: 2019-01-14

## 2019-01-07 RX ORDER — METRONIDAZOLE 500 MG/1
500 TABLET ORAL 2 TIMES DAILY
Qty: 14 TABLET | Refills: 0 | Status: SHIPPED | OUTPATIENT
Start: 2019-01-07 | End: 2019-01-14

## 2019-01-07 ASSESSMENT — ENCOUNTER SYMPTOMS
VOMITING: 0
ABDOMINAL DISTENTION: 0
EYE PAIN: 0
APNEA: 0
CONSTIPATION: 0
ABDOMINAL PAIN: 0
NAUSEA: 0
DIARRHEA: 0
COUGH: 0

## 2019-01-08 ENCOUNTER — TELEPHONE (OUTPATIENT)
Dept: OBGYN CLINIC | Age: 44
End: 2019-01-08

## 2019-01-14 ENCOUNTER — APPOINTMENT (OUTPATIENT)
Dept: GENERAL RADIOLOGY | Age: 44
End: 2019-01-14
Payer: COMMERCIAL

## 2019-01-14 ENCOUNTER — HOSPITAL ENCOUNTER (EMERGENCY)
Age: 44
Discharge: HOME | End: 2019-01-14
Attending: EMERGENCY MEDICINE
Payer: COMMERCIAL

## 2019-01-14 VITALS
WEIGHT: 193 LBS | SYSTOLIC BLOOD PRESSURE: 131 MMHG | RESPIRATION RATE: 16 BRPM | HEART RATE: 89 BPM | DIASTOLIC BLOOD PRESSURE: 85 MMHG | TEMPERATURE: 98.1 F | BODY MASS INDEX: 34.2 KG/M2 | HEIGHT: 63 IN | OXYGEN SATURATION: 97 %

## 2019-01-14 DIAGNOSIS — S80.02XA CONTUSION OF LEFT KNEE, INITIAL ENCOUNTER: Primary | ICD-10-CM

## 2019-01-14 PROCEDURE — 73562 X-RAY EXAM OF KNEE 3: CPT

## 2019-01-14 PROCEDURE — 99283 EMERGENCY DEPT VISIT LOW MDM: CPT

## 2019-01-14 RX ORDER — IBUPROFEN 600 MG/1
600 TABLET ORAL ONCE
Status: DISCONTINUED | OUTPATIENT
Start: 2019-01-14 | End: 2019-01-14 | Stop reason: HOSPADM

## 2019-01-14 ASSESSMENT — PAIN DESCRIPTION - PAIN TYPE: TYPE: ACUTE PAIN

## 2019-01-14 ASSESSMENT — PAIN SCALES - GENERAL: PAINLEVEL_OUTOF10: 5

## 2019-01-14 ASSESSMENT — PAIN DESCRIPTION - LOCATION: LOCATION: KNEE

## 2019-01-14 ASSESSMENT — PAIN DESCRIPTION - ORIENTATION: ORIENTATION: LEFT

## 2019-01-15 ENCOUNTER — TELEPHONE (OUTPATIENT)
Dept: OBGYN CLINIC | Age: 44
End: 2019-01-15

## 2019-01-17 ENCOUNTER — ANESTHESIA EVENT (OUTPATIENT)
Dept: OPERATING ROOM | Age: 44
End: 2019-01-17
Payer: COMMERCIAL

## 2019-01-18 ENCOUNTER — HOSPITAL ENCOUNTER (OUTPATIENT)
Age: 44
Setting detail: OUTPATIENT SURGERY
Discharge: HOME | End: 2019-01-18
Attending: SPECIALIST | Admitting: SPECIALIST
Payer: COMMERCIAL

## 2019-01-18 ENCOUNTER — ANESTHESIA (OUTPATIENT)
Dept: OPERATING ROOM | Age: 44
End: 2019-01-18
Payer: COMMERCIAL

## 2019-01-18 VITALS — OXYGEN SATURATION: 93 % | TEMPERATURE: 96.6 F | SYSTOLIC BLOOD PRESSURE: 142 MMHG | DIASTOLIC BLOOD PRESSURE: 87 MMHG

## 2019-01-18 VITALS
HEART RATE: 65 BPM | BODY MASS INDEX: 34.2 KG/M2 | WEIGHT: 193 LBS | SYSTOLIC BLOOD PRESSURE: 119 MMHG | TEMPERATURE: 97.7 F | RESPIRATION RATE: 18 BRPM | OXYGEN SATURATION: 94 % | DIASTOLIC BLOOD PRESSURE: 70 MMHG | HEIGHT: 63 IN

## 2019-01-18 DIAGNOSIS — G89.18 POSTOPERATIVE PAIN: Primary | ICD-10-CM

## 2019-01-18 LAB
-: NORMAL
HCG, PREGNANCY URINE (POC): NEGATIVE

## 2019-01-18 PROCEDURE — 6360000002 HC RX W HCPCS: Performed by: NURSE ANESTHETIST, CERTIFIED REGISTERED

## 2019-01-18 PROCEDURE — 7100000001 HC PACU RECOVERY - ADDTL 15 MIN: Performed by: SPECIALIST

## 2019-01-18 PROCEDURE — 2500000003 HC RX 250 WO HCPCS: Performed by: NURSE ANESTHETIST, CERTIFIED REGISTERED

## 2019-01-18 PROCEDURE — 3600000003 HC SURGERY LEVEL 3 BASE: Performed by: SPECIALIST

## 2019-01-18 PROCEDURE — 84703 CHORIONIC GONADOTROPIN ASSAY: CPT

## 2019-01-18 PROCEDURE — 3700000000 HC ANESTHESIA ATTENDED CARE: Performed by: SPECIALIST

## 2019-01-18 PROCEDURE — 3700000001 HC ADD 15 MINUTES (ANESTHESIA): Performed by: SPECIALIST

## 2019-01-18 PROCEDURE — 57287 REVISE/REMOVE SLING REPAIR: CPT | Performed by: SPECIALIST

## 2019-01-18 PROCEDURE — 7100000031 HC ASPR PHASE II RECOVERY - ADDTL 15 MIN: Performed by: SPECIALIST

## 2019-01-18 PROCEDURE — 3600000013 HC SURGERY LEVEL 3 ADDTL 15MIN: Performed by: SPECIALIST

## 2019-01-18 PROCEDURE — 7100000000 HC PACU RECOVERY - FIRST 15 MIN: Performed by: SPECIALIST

## 2019-01-18 PROCEDURE — 2709999900 HC NON-CHARGEABLE SUPPLY: Performed by: SPECIALIST

## 2019-01-18 PROCEDURE — 2580000003 HC RX 258: Performed by: ANESTHESIOLOGY

## 2019-01-18 PROCEDURE — 7100000030 HC ASPR PHASE II RECOVERY - FIRST 15 MIN: Performed by: SPECIALIST

## 2019-01-18 PROCEDURE — 6370000000 HC RX 637 (ALT 250 FOR IP): Performed by: ANESTHESIOLOGY

## 2019-01-18 RX ORDER — HYDRALAZINE HYDROCHLORIDE 20 MG/ML
5 INJECTION INTRAMUSCULAR; INTRAVENOUS EVERY 10 MIN PRN
Status: DISCONTINUED | OUTPATIENT
Start: 2019-01-18 | End: 2019-01-18 | Stop reason: HOSPADM

## 2019-01-18 RX ORDER — HYDROCODONE BITARTRATE AND ACETAMINOPHEN 5; 325 MG/1; MG/1
2 TABLET ORAL PRN
Status: COMPLETED | OUTPATIENT
Start: 2019-01-18 | End: 2019-01-18

## 2019-01-18 RX ORDER — MORPHINE SULFATE 2 MG/ML
2 INJECTION, SOLUTION INTRAMUSCULAR; INTRAVENOUS EVERY 5 MIN PRN
Status: DISCONTINUED | OUTPATIENT
Start: 2019-01-18 | End: 2019-01-18 | Stop reason: HOSPADM

## 2019-01-18 RX ORDER — SODIUM CHLORIDE 0.9 % (FLUSH) 0.9 %
10 SYRINGE (ML) INJECTION PRN
Status: DISCONTINUED | OUTPATIENT
Start: 2019-01-18 | End: 2019-01-18 | Stop reason: HOSPADM

## 2019-01-18 RX ORDER — LIDOCAINE HYDROCHLORIDE 10 MG/ML
INJECTION, SOLUTION EPIDURAL; INFILTRATION; INTRACAUDAL; PERINEURAL PRN
Status: DISCONTINUED | OUTPATIENT
Start: 2019-01-18 | End: 2019-01-18 | Stop reason: SDUPTHER

## 2019-01-18 RX ORDER — SODIUM CHLORIDE, SODIUM LACTATE, POTASSIUM CHLORIDE, CALCIUM CHLORIDE 600; 310; 30; 20 MG/100ML; MG/100ML; MG/100ML; MG/100ML
INJECTION, SOLUTION INTRAVENOUS CONTINUOUS
Status: DISCONTINUED | OUTPATIENT
Start: 2019-01-18 | End: 2019-01-18 | Stop reason: HOSPADM

## 2019-01-18 RX ORDER — HYDROCODONE BITARTRATE AND ACETAMINOPHEN 5; 325 MG/1; MG/1
1 TABLET ORAL PRN
Status: COMPLETED | OUTPATIENT
Start: 2019-01-18 | End: 2019-01-18

## 2019-01-18 RX ORDER — MEPERIDINE HYDROCHLORIDE 50 MG/ML
12.5 INJECTION INTRAMUSCULAR; INTRAVENOUS; SUBCUTANEOUS EVERY 5 MIN PRN
Status: DISCONTINUED | OUTPATIENT
Start: 2019-01-18 | End: 2019-01-18 | Stop reason: HOSPADM

## 2019-01-18 RX ORDER — FENTANYL CITRATE 50 UG/ML
INJECTION, SOLUTION INTRAMUSCULAR; INTRAVENOUS PRN
Status: DISCONTINUED | OUTPATIENT
Start: 2019-01-18 | End: 2019-01-18 | Stop reason: SDUPTHER

## 2019-01-18 RX ORDER — SODIUM CHLORIDE 0.9 % (FLUSH) 0.9 %
10 SYRINGE (ML) INJECTION EVERY 12 HOURS SCHEDULED
Status: DISCONTINUED | OUTPATIENT
Start: 2019-01-18 | End: 2019-01-18 | Stop reason: HOSPADM

## 2019-01-18 RX ORDER — IBUPROFEN 800 MG/1
800 TABLET ORAL EVERY 8 HOURS PRN
Qty: 30 TABLET | Refills: 0 | Status: SHIPPED | OUTPATIENT
Start: 2019-01-18 | End: 2019-05-14

## 2019-01-18 RX ORDER — FENTANYL CITRATE 50 UG/ML
25 INJECTION, SOLUTION INTRAMUSCULAR; INTRAVENOUS EVERY 5 MIN PRN
Status: DISCONTINUED | OUTPATIENT
Start: 2019-01-18 | End: 2019-01-18 | Stop reason: HOSPADM

## 2019-01-18 RX ORDER — GLYCOPYRROLATE 1 MG/5 ML
SYRINGE (ML) INTRAVENOUS PRN
Status: DISCONTINUED | OUTPATIENT
Start: 2019-01-18 | End: 2019-01-18 | Stop reason: SDUPTHER

## 2019-01-18 RX ORDER — MIDAZOLAM HYDROCHLORIDE 1 MG/ML
INJECTION INTRAMUSCULAR; INTRAVENOUS PRN
Status: DISCONTINUED | OUTPATIENT
Start: 2019-01-18 | End: 2019-01-18 | Stop reason: SDUPTHER

## 2019-01-18 RX ORDER — HYDROCODONE BITARTRATE AND ACETAMINOPHEN 5; 325 MG/1; MG/1
1 TABLET ORAL EVERY 6 HOURS PRN
Qty: 10 TABLET | Refills: 0 | Status: SHIPPED | OUTPATIENT
Start: 2019-01-18 | End: 2019-01-23

## 2019-01-18 RX ORDER — METOCLOPRAMIDE HYDROCHLORIDE 5 MG/ML
10 INJECTION INTRAMUSCULAR; INTRAVENOUS
Status: DISCONTINUED | OUTPATIENT
Start: 2019-01-18 | End: 2019-01-18 | Stop reason: HOSPADM

## 2019-01-18 RX ORDER — LABETALOL HYDROCHLORIDE 5 MG/ML
5 INJECTION, SOLUTION INTRAVENOUS EVERY 10 MIN PRN
Status: DISCONTINUED | OUTPATIENT
Start: 2019-01-18 | End: 2019-01-18 | Stop reason: HOSPADM

## 2019-01-18 RX ORDER — DEXAMETHASONE SODIUM PHOSPHATE 4 MG/ML
INJECTION, SOLUTION INTRA-ARTICULAR; INTRALESIONAL; INTRAMUSCULAR; INTRAVENOUS; SOFT TISSUE PRN
Status: DISCONTINUED | OUTPATIENT
Start: 2019-01-18 | End: 2019-01-18 | Stop reason: SDUPTHER

## 2019-01-18 RX ORDER — ONDANSETRON 2 MG/ML
INJECTION INTRAMUSCULAR; INTRAVENOUS PRN
Status: DISCONTINUED | OUTPATIENT
Start: 2019-01-18 | End: 2019-01-18 | Stop reason: SDUPTHER

## 2019-01-18 RX ORDER — DIPHENHYDRAMINE HYDROCHLORIDE 50 MG/ML
12.5 INJECTION INTRAMUSCULAR; INTRAVENOUS
Status: DISCONTINUED | OUTPATIENT
Start: 2019-01-18 | End: 2019-01-18 | Stop reason: HOSPADM

## 2019-01-18 RX ORDER — FENTANYL CITRATE 50 UG/ML
50 INJECTION, SOLUTION INTRAMUSCULAR; INTRAVENOUS EVERY 5 MIN PRN
Status: DISCONTINUED | OUTPATIENT
Start: 2019-01-18 | End: 2019-01-18 | Stop reason: HOSPADM

## 2019-01-18 RX ORDER — ROCURONIUM BROMIDE 10 MG/ML
INJECTION, SOLUTION INTRAVENOUS PRN
Status: DISCONTINUED | OUTPATIENT
Start: 2019-01-18 | End: 2019-01-18 | Stop reason: SDUPTHER

## 2019-01-18 RX ORDER — ONDANSETRON 2 MG/ML
4 INJECTION INTRAMUSCULAR; INTRAVENOUS
Status: DISCONTINUED | OUTPATIENT
Start: 2019-01-18 | End: 2019-01-18 | Stop reason: HOSPADM

## 2019-01-18 RX ORDER — PROPOFOL 10 MG/ML
INJECTION, EMULSION INTRAVENOUS PRN
Status: DISCONTINUED | OUTPATIENT
Start: 2019-01-18 | End: 2019-01-18 | Stop reason: SDUPTHER

## 2019-01-18 RX ORDER — NEOSTIGMINE METHYLSULFATE 1 MG/ML
INJECTION, SOLUTION INTRAVENOUS PRN
Status: DISCONTINUED | OUTPATIENT
Start: 2019-01-18 | End: 2019-01-18 | Stop reason: SDUPTHER

## 2019-01-18 RX ADMIN — ROCURONIUM BROMIDE 10 MG: 10 INJECTION INTRAVENOUS at 08:20

## 2019-01-18 RX ADMIN — SODIUM CHLORIDE, POTASSIUM CHLORIDE, SODIUM LACTATE AND CALCIUM CHLORIDE: 600; 310; 30; 20 INJECTION, SOLUTION INTRAVENOUS at 06:43

## 2019-01-18 RX ADMIN — FENTANYL CITRATE 50 MCG: 50 INJECTION, SOLUTION INTRAMUSCULAR; INTRAVENOUS at 08:20

## 2019-01-18 RX ADMIN — FENTANYL CITRATE 150 MCG: 50 INJECTION, SOLUTION INTRAMUSCULAR; INTRAVENOUS at 07:40

## 2019-01-18 RX ADMIN — Medication 0.4 MG: at 09:08

## 2019-01-18 RX ADMIN — ROCURONIUM BROMIDE 40 MG: 10 INJECTION INTRAVENOUS at 07:40

## 2019-01-18 RX ADMIN — LIDOCAINE HYDROCHLORIDE 50 MG: 10 INJECTION, SOLUTION EPIDURAL; INFILTRATION; INTRACAUDAL; PERINEURAL at 07:40

## 2019-01-18 RX ADMIN — MIDAZOLAM 1 MG: 1 INJECTION INTRAMUSCULAR; INTRAVENOUS at 07:33

## 2019-01-18 RX ADMIN — MIDAZOLAM 1 MG: 1 INJECTION INTRAMUSCULAR; INTRAVENOUS at 07:36

## 2019-01-18 RX ADMIN — FENTANYL CITRATE 50 MCG: 50 INJECTION, SOLUTION INTRAMUSCULAR; INTRAVENOUS at 09:05

## 2019-01-18 RX ADMIN — ONDANSETRON 4 MG: 2 INJECTION INTRAMUSCULAR; INTRAVENOUS at 09:08

## 2019-01-18 RX ADMIN — PROPOFOL 140 MG: 10 INJECTION, EMULSION INTRAVENOUS at 07:40

## 2019-01-18 RX ADMIN — DEXAMETHASONE SODIUM PHOSPHATE 8 MG: 4 INJECTION, SOLUTION INTRAMUSCULAR; INTRAVENOUS at 07:40

## 2019-01-18 RX ADMIN — HYDROCODONE BITARTRATE AND ACETAMINOPHEN 1 TABLET: 5; 325 TABLET ORAL at 10:49

## 2019-01-18 RX ADMIN — NEOSTIGMINE METHYLSULFATE 3 MG: 1 INJECTION, SOLUTION INTRAVENOUS at 09:08

## 2019-01-18 RX ADMIN — PROPOFOL 40 MG: 10 INJECTION, EMULSION INTRAVENOUS at 07:41

## 2019-01-18 ASSESSMENT — PULMONARY FUNCTION TESTS
PIF_VALUE: 25
PIF_VALUE: 3
PIF_VALUE: 25
PIF_VALUE: 21
PIF_VALUE: 25
PIF_VALUE: 24
PIF_VALUE: 21
PIF_VALUE: 21
PIF_VALUE: 30
PIF_VALUE: 20
PIF_VALUE: 26
PIF_VALUE: 24
PIF_VALUE: 25
PIF_VALUE: 25
PIF_VALUE: 24
PIF_VALUE: 25
PIF_VALUE: 21
PIF_VALUE: 25
PIF_VALUE: 19
PIF_VALUE: 25
PIF_VALUE: 24
PIF_VALUE: 4
PIF_VALUE: 24
PIF_VALUE: 21
PIF_VALUE: 24
PIF_VALUE: 25
PIF_VALUE: 25
PIF_VALUE: 26
PIF_VALUE: 25
PIF_VALUE: 3
PIF_VALUE: 25
PIF_VALUE: 20
PIF_VALUE: 23
PIF_VALUE: 20
PIF_VALUE: 26
PIF_VALUE: 27
PIF_VALUE: 25
PIF_VALUE: 1
PIF_VALUE: 25
PIF_VALUE: 24
PIF_VALUE: 25
PIF_VALUE: 21
PIF_VALUE: 24
PIF_VALUE: 18
PIF_VALUE: 24
PIF_VALUE: 25
PIF_VALUE: 5
PIF_VALUE: 24
PIF_VALUE: 25
PIF_VALUE: 25
PIF_VALUE: 20
PIF_VALUE: 18
PIF_VALUE: 24
PIF_VALUE: 20
PIF_VALUE: 25
PIF_VALUE: 25
PIF_VALUE: 28
PIF_VALUE: 24
PIF_VALUE: 1
PIF_VALUE: 18
PIF_VALUE: 25
PIF_VALUE: 24
PIF_VALUE: 30
PIF_VALUE: 4
PIF_VALUE: 20
PIF_VALUE: 27
PIF_VALUE: 28
PIF_VALUE: 0
PIF_VALUE: 20
PIF_VALUE: 25
PIF_VALUE: 26
PIF_VALUE: 24
PIF_VALUE: 24
PIF_VALUE: 25
PIF_VALUE: 24
PIF_VALUE: 1
PIF_VALUE: 25
PIF_VALUE: 26
PIF_VALUE: 25
PIF_VALUE: 24
PIF_VALUE: 26
PIF_VALUE: 21
PIF_VALUE: 24
PIF_VALUE: 22
PIF_VALUE: 17
PIF_VALUE: 19
PIF_VALUE: 21

## 2019-01-18 ASSESSMENT — PAIN DESCRIPTION - ORIENTATION
ORIENTATION: ANTERIOR
ORIENTATION: ANTERIOR

## 2019-01-18 ASSESSMENT — PAIN SCALES - GENERAL
PAINLEVEL_OUTOF10: 5
PAINLEVEL_OUTOF10: 0
PAINLEVEL_OUTOF10: 5
PAINLEVEL_OUTOF10: 0
PAINLEVEL_OUTOF10: 2
PAINLEVEL_OUTOF10: 0

## 2019-01-18 ASSESSMENT — PAIN DESCRIPTION - DESCRIPTORS
DESCRIPTORS: PRESSURE
DESCRIPTORS: PRESSURE

## 2019-01-18 ASSESSMENT — LIFESTYLE VARIABLES: SMOKING_STATUS: 1

## 2019-01-18 ASSESSMENT — PAIN DESCRIPTION - PAIN TYPE
TYPE: SURGICAL PAIN
TYPE: SURGICAL PAIN

## 2019-01-18 ASSESSMENT — PAIN - FUNCTIONAL ASSESSMENT: PAIN_FUNCTIONAL_ASSESSMENT: 0-10

## 2019-01-18 ASSESSMENT — PAIN DESCRIPTION - LOCATION
LOCATION: ABDOMEN
LOCATION: ABDOMEN

## 2019-01-26 ENCOUNTER — HOSPITAL ENCOUNTER (EMERGENCY)
Age: 44
Discharge: HOME | End: 2019-01-26
Attending: EMERGENCY MEDICINE
Payer: COMMERCIAL

## 2019-01-26 ENCOUNTER — APPOINTMENT (OUTPATIENT)
Dept: CT IMAGING | Age: 44
End: 2019-01-26
Payer: COMMERCIAL

## 2019-01-26 VITALS
SYSTOLIC BLOOD PRESSURE: 127 MMHG | HEART RATE: 90 BPM | BODY MASS INDEX: 34.2 KG/M2 | DIASTOLIC BLOOD PRESSURE: 75 MMHG | TEMPERATURE: 97.9 F | OXYGEN SATURATION: 98 % | RESPIRATION RATE: 16 BRPM | HEIGHT: 63 IN | WEIGHT: 193 LBS

## 2019-01-26 DIAGNOSIS — R10.31 RIGHT LOWER QUADRANT ABDOMINAL PAIN: Primary | ICD-10-CM

## 2019-01-26 LAB
-: ABNORMAL
ABSOLUTE EOS #: 0.3 K/UL (ref 0–0.4)
ABSOLUTE IMMATURE GRANULOCYTE: ABNORMAL K/UL (ref 0–0.3)
ABSOLUTE LYMPH #: 2.6 K/UL (ref 1–4.8)
ABSOLUTE MONO #: 0.8 K/UL (ref 0.1–1.3)
ALBUMIN SERPL-MCNC: 3.9 G/DL (ref 3.5–5.2)
ALBUMIN/GLOBULIN RATIO: ABNORMAL (ref 1–2.5)
ALP BLD-CCNC: 97 U/L (ref 35–104)
ALT SERPL-CCNC: 13 U/L (ref 5–33)
AMORPHOUS: ABNORMAL
ANION GAP SERPL CALCULATED.3IONS-SCNC: 12 MMOL/L (ref 9–17)
AST SERPL-CCNC: 18 U/L
BACTERIA: ABNORMAL
BASOPHILS # BLD: 1 % (ref 0–2)
BASOPHILS ABSOLUTE: 0.1 K/UL (ref 0–0.2)
BILIRUB SERPL-MCNC: 0.25 MG/DL (ref 0.3–1.2)
BILIRUBIN URINE: NEGATIVE
BUN BLDV-MCNC: 13 MG/DL (ref 6–20)
BUN/CREAT BLD: ABNORMAL (ref 9–20)
CALCIUM SERPL-MCNC: 9.4 MG/DL (ref 8.6–10.4)
CASTS UA: ABNORMAL /LPF
CHLORIDE BLD-SCNC: 101 MMOL/L (ref 98–107)
CO2: 23 MMOL/L (ref 20–31)
COLOR: YELLOW
COMMENT UA: ABNORMAL
CREAT SERPL-MCNC: 0.59 MG/DL (ref 0.5–0.9)
CRYSTALS, UA: ABNORMAL /HPF
DIFFERENTIAL TYPE: ABNORMAL
EOSINOPHILS RELATIVE PERCENT: 2 % (ref 0–4)
EPITHELIAL CELLS UA: ABNORMAL /HPF
GFR AFRICAN AMERICAN: >60 ML/MIN
GFR NON-AFRICAN AMERICAN: >60 ML/MIN
GFR SERPL CREATININE-BSD FRML MDRD: ABNORMAL ML/MIN/{1.73_M2}
GFR SERPL CREATININE-BSD FRML MDRD: ABNORMAL ML/MIN/{1.73_M2}
GLUCOSE BLD-MCNC: 97 MG/DL (ref 70–99)
GLUCOSE URINE: NEGATIVE
HCG QUALITATIVE: NEGATIVE
HCT VFR BLD CALC: 41.7 % (ref 36–46)
HEMOGLOBIN: 14.1 G/DL (ref 12–16)
IMMATURE GRANULOCYTES: ABNORMAL %
KETONES, URINE: NEGATIVE
LACTIC ACID: 0.9 MMOL/L (ref 0.5–2.2)
LEUKOCYTE ESTERASE, URINE: ABNORMAL
LIPASE: 41 U/L (ref 13–60)
LYMPHOCYTES # BLD: 17 % (ref 24–44)
MCH RBC QN AUTO: 31.9 PG (ref 26–34)
MCHC RBC AUTO-ENTMCNC: 33.8 G/DL (ref 31–37)
MCV RBC AUTO: 94.5 FL (ref 80–100)
MONOCYTES # BLD: 5 % (ref 1–7)
MUCUS: ABNORMAL
NITRITE, URINE: NEGATIVE
NRBC AUTOMATED: ABNORMAL PER 100 WBC
OTHER OBSERVATIONS UA: ABNORMAL
PDW BLD-RTO: 13.3 % (ref 11.5–14.9)
PH UA: 6.5 (ref 5–8)
PLATELET # BLD: 218 K/UL (ref 150–450)
PLATELET ESTIMATE: ABNORMAL
PMV BLD AUTO: 8.3 FL (ref 6–12)
POTASSIUM SERPL-SCNC: 4.2 MMOL/L (ref 3.7–5.3)
PROTEIN UA: NEGATIVE
RBC # BLD: 4.41 M/UL (ref 4–5.2)
RBC # BLD: ABNORMAL 10*6/UL
RBC UA: ABNORMAL /HPF
RENAL EPITHELIAL, UA: ABNORMAL /HPF
SEG NEUTROPHILS: 75 % (ref 36–66)
SEGMENTED NEUTROPHILS ABSOLUTE COUNT: 11.1 K/UL (ref 1.3–9.1)
SODIUM BLD-SCNC: 136 MMOL/L (ref 135–144)
SPECIFIC GRAVITY UA: 1.02 (ref 1–1.03)
TOTAL PROTEIN: 6.9 G/DL (ref 6.4–8.3)
TRICHOMONAS: ABNORMAL
TURBIDITY: ABNORMAL
URINE HGB: ABNORMAL
UROBILINOGEN, URINE: NORMAL
WBC # BLD: 14.9 K/UL (ref 3.5–11)
WBC # BLD: ABNORMAL 10*3/UL
WBC UA: ABNORMAL /HPF
YEAST: ABNORMAL

## 2019-01-26 PROCEDURE — 83690 ASSAY OF LIPASE: CPT

## 2019-01-26 PROCEDURE — 6360000002 HC RX W HCPCS: Performed by: EMERGENCY MEDICINE

## 2019-01-26 PROCEDURE — 99284 EMERGENCY DEPT VISIT MOD MDM: CPT

## 2019-01-26 PROCEDURE — 81001 URINALYSIS AUTO W/SCOPE: CPT

## 2019-01-26 PROCEDURE — 84703 CHORIONIC GONADOTROPIN ASSAY: CPT

## 2019-01-26 PROCEDURE — 74177 CT ABD & PELVIS W/CONTRAST: CPT

## 2019-01-26 PROCEDURE — 87086 URINE CULTURE/COLONY COUNT: CPT

## 2019-01-26 PROCEDURE — 6360000004 HC RX CONTRAST MEDICATION: Performed by: EMERGENCY MEDICINE

## 2019-01-26 PROCEDURE — 80053 COMPREHEN METABOLIC PANEL: CPT

## 2019-01-26 PROCEDURE — 2580000003 HC RX 258: Performed by: EMERGENCY MEDICINE

## 2019-01-26 PROCEDURE — 83605 ASSAY OF LACTIC ACID: CPT

## 2019-01-26 PROCEDURE — 96375 TX/PRO/DX INJ NEW DRUG ADDON: CPT

## 2019-01-26 PROCEDURE — 96374 THER/PROPH/DIAG INJ IV PUSH: CPT

## 2019-01-26 PROCEDURE — 36415 COLL VENOUS BLD VENIPUNCTURE: CPT

## 2019-01-26 PROCEDURE — 85025 COMPLETE CBC W/AUTO DIFF WBC: CPT

## 2019-01-26 RX ORDER — NAPROXEN 500 MG/1
500 TABLET ORAL NIGHTLY
COMMUNITY

## 2019-01-26 RX ORDER — 0.9 % SODIUM CHLORIDE 0.9 %
1000 INTRAVENOUS SOLUTION INTRAVENOUS ONCE
Status: COMPLETED | OUTPATIENT
Start: 2019-01-26 | End: 2019-01-26

## 2019-01-26 RX ORDER — 0.9 % SODIUM CHLORIDE 0.9 %
80 INTRAVENOUS SOLUTION INTRAVENOUS ONCE
Status: COMPLETED | OUTPATIENT
Start: 2019-01-26 | End: 2019-01-26

## 2019-01-26 RX ORDER — KETOROLAC TROMETHAMINE 30 MG/ML
30 INJECTION, SOLUTION INTRAMUSCULAR; INTRAVENOUS ONCE
Status: COMPLETED | OUTPATIENT
Start: 2019-01-26 | End: 2019-01-26

## 2019-01-26 RX ORDER — SODIUM CHLORIDE 0.9 % (FLUSH) 0.9 %
10 SYRINGE (ML) INJECTION PRN
Status: DISCONTINUED | OUTPATIENT
Start: 2019-01-26 | End: 2019-01-27 | Stop reason: HOSPADM

## 2019-01-26 RX ORDER — ONDANSETRON 2 MG/ML
4 INJECTION INTRAMUSCULAR; INTRAVENOUS ONCE
Status: DISCONTINUED | OUTPATIENT
Start: 2019-01-26 | End: 2019-01-27 | Stop reason: HOSPADM

## 2019-01-26 RX ADMIN — SODIUM CHLORIDE 80 ML: 9 INJECTION, SOLUTION INTRAVENOUS at 21:27

## 2019-01-26 RX ADMIN — SODIUM CHLORIDE 1000 ML: 9 INJECTION, SOLUTION INTRAVENOUS at 20:46

## 2019-01-26 RX ADMIN — IOVERSOL 75 ML: 741 INJECTION INTRA-ARTERIAL; INTRAVENOUS at 21:27

## 2019-01-26 RX ADMIN — Medication 10 ML: at 21:27

## 2019-01-26 RX ADMIN — KETOROLAC TROMETHAMINE 30 MG: 30 INJECTION, SOLUTION INTRAMUSCULAR; INTRAVENOUS at 20:46

## 2019-01-26 ASSESSMENT — PAIN DESCRIPTION - ORIENTATION: ORIENTATION: LOWER

## 2019-01-26 ASSESSMENT — PAIN SCALES - GENERAL
PAINLEVEL_OUTOF10: 5
PAINLEVEL_OUTOF10: 5

## 2019-01-26 ASSESSMENT — PAIN DESCRIPTION - LOCATION: LOCATION: ABDOMEN

## 2019-01-26 ASSESSMENT — PAIN DESCRIPTION - FREQUENCY: FREQUENCY: CONTINUOUS

## 2019-01-26 ASSESSMENT — PAIN DESCRIPTION - DESCRIPTORS: DESCRIPTORS: ACHING

## 2019-01-27 LAB
CULTURE: NORMAL
Lab: NORMAL
SPECIMEN DESCRIPTION: NORMAL
STATUS: NORMAL

## 2019-03-06 ENCOUNTER — APPOINTMENT (OUTPATIENT)
Dept: GENERAL RADIOLOGY | Age: 44
End: 2019-03-06
Payer: COMMERCIAL

## 2019-03-06 ENCOUNTER — HOSPITAL ENCOUNTER (EMERGENCY)
Age: 44
Discharge: HOME | End: 2019-03-06
Attending: EMERGENCY MEDICINE
Payer: COMMERCIAL

## 2019-03-06 VITALS
WEIGHT: 192 LBS | DIASTOLIC BLOOD PRESSURE: 85 MMHG | HEART RATE: 102 BPM | OXYGEN SATURATION: 96 % | TEMPERATURE: 98.6 F | RESPIRATION RATE: 18 BRPM | BODY MASS INDEX: 34.02 KG/M2 | HEIGHT: 63 IN | SYSTOLIC BLOOD PRESSURE: 146 MMHG

## 2019-03-06 DIAGNOSIS — J10.1 INFLUENZA A: Primary | ICD-10-CM

## 2019-03-06 LAB
DIRECT EXAM: ABNORMAL
DIRECT EXAM: ABNORMAL
Lab: ABNORMAL
SPECIMEN DESCRIPTION: ABNORMAL

## 2019-03-06 PROCEDURE — 96372 THER/PROPH/DIAG INJ SC/IM: CPT

## 2019-03-06 PROCEDURE — 99284 EMERGENCY DEPT VISIT MOD MDM: CPT

## 2019-03-06 PROCEDURE — 6360000002 HC RX W HCPCS: Performed by: EMERGENCY MEDICINE

## 2019-03-06 PROCEDURE — 87804 INFLUENZA ASSAY W/OPTIC: CPT

## 2019-03-06 PROCEDURE — 71046 X-RAY EXAM CHEST 2 VIEWS: CPT

## 2019-03-06 RX ORDER — DEXAMETHASONE 4 MG/1
10 TABLET ORAL ONCE
Status: DISCONTINUED | OUTPATIENT
Start: 2019-03-06 | End: 2019-03-06 | Stop reason: HOSPADM

## 2019-03-06 RX ORDER — KETOROLAC TROMETHAMINE 30 MG/ML
30 INJECTION, SOLUTION INTRAMUSCULAR; INTRAVENOUS ONCE
Status: COMPLETED | OUTPATIENT
Start: 2019-03-06 | End: 2019-03-06

## 2019-03-06 RX ADMIN — KETOROLAC TROMETHAMINE 30 MG: 30 INJECTION, SOLUTION INTRAMUSCULAR; INTRAVENOUS at 01:07

## 2019-03-06 ASSESSMENT — ENCOUNTER SYMPTOMS
EYES NEGATIVE: 1
COUGH: 1
ABDOMINAL PAIN: 0
GASTROINTESTINAL NEGATIVE: 1
BACK PAIN: 0
RHINORRHEA: 1

## 2019-03-06 ASSESSMENT — PAIN DESCRIPTION - PAIN TYPE: TYPE: ACUTE PAIN

## 2019-03-06 ASSESSMENT — PAIN SCALES - GENERAL
PAINLEVEL_OUTOF10: 2
PAINLEVEL_OUTOF10: 1
PAINLEVEL_OUTOF10: 2

## 2019-03-06 ASSESSMENT — PAIN DESCRIPTION - FREQUENCY: FREQUENCY: CONTINUOUS

## 2019-03-06 ASSESSMENT — PAIN DESCRIPTION - LOCATION: LOCATION: GENERALIZED

## 2019-03-06 ASSESSMENT — PAIN DESCRIPTION - DESCRIPTORS: DESCRIPTORS: ACHING

## 2019-05-12 ENCOUNTER — HOSPITAL ENCOUNTER (EMERGENCY)
Age: 44
Discharge: HOME | End: 2019-05-12
Attending: EMERGENCY MEDICINE
Payer: COMMERCIAL

## 2019-05-12 ENCOUNTER — APPOINTMENT (OUTPATIENT)
Dept: CT IMAGING | Age: 44
End: 2019-05-12
Payer: COMMERCIAL

## 2019-05-12 VITALS
HEART RATE: 87 BPM | TEMPERATURE: 97.9 F | BODY MASS INDEX: 34.93 KG/M2 | DIASTOLIC BLOOD PRESSURE: 86 MMHG | OXYGEN SATURATION: 93 % | WEIGHT: 197.13 LBS | HEIGHT: 63 IN | RESPIRATION RATE: 16 BRPM | SYSTOLIC BLOOD PRESSURE: 140 MMHG

## 2019-05-12 DIAGNOSIS — N76.0 VAGINITIS AND VULVOVAGINITIS: Primary | ICD-10-CM

## 2019-05-12 DIAGNOSIS — R10.30 LOWER ABDOMINAL PAIN: ICD-10-CM

## 2019-05-12 DIAGNOSIS — N72 CERVICITIS AND ENDOCERVICITIS: ICD-10-CM

## 2019-05-12 LAB
-: ABNORMAL
ABSOLUTE EOS #: 0.29 K/UL (ref 0–0.44)
ABSOLUTE IMMATURE GRANULOCYTE: 0.05 K/UL (ref 0–0.3)
ABSOLUTE LYMPH #: 2.51 K/UL (ref 1.1–3.7)
ABSOLUTE MONO #: 0.79 K/UL (ref 0.1–1.2)
AMORPHOUS: ABNORMAL
ANION GAP SERPL CALCULATED.3IONS-SCNC: 11 MMOL/L (ref 9–17)
BACTERIA: ABNORMAL
BASOPHILS # BLD: 1 % (ref 0–2)
BASOPHILS ABSOLUTE: 0.06 K/UL (ref 0–0.2)
BILIRUBIN URINE: NEGATIVE
BUN BLDV-MCNC: 17 MG/DL (ref 6–20)
BUN/CREAT BLD: 21 (ref 9–20)
CALCIUM SERPL-MCNC: 9.5 MG/DL (ref 8.6–10.4)
CASTS UA: ABNORMAL /LPF
CHLORIDE BLD-SCNC: 104 MMOL/L (ref 98–107)
CHP ED QC CHECK: NORMAL
CO2: 24 MMOL/L (ref 20–31)
COLOR: YELLOW
COMMENT UA: ABNORMAL
CREAT SERPL-MCNC: 0.81 MG/DL (ref 0.5–0.9)
CRYSTALS, UA: ABNORMAL /HPF
DIFFERENTIAL TYPE: ABNORMAL
DIRECT EXAM: ABNORMAL
EOSINOPHILS RELATIVE PERCENT: 2 % (ref 1–4)
EPITHELIAL CELLS UA: ABNORMAL /HPF (ref 0–5)
GFR AFRICAN AMERICAN: >60 ML/MIN
GFR NON-AFRICAN AMERICAN: >60 ML/MIN
GFR SERPL CREATININE-BSD FRML MDRD: ABNORMAL ML/MIN/{1.73_M2}
GFR SERPL CREATININE-BSD FRML MDRD: ABNORMAL ML/MIN/{1.73_M2}
GLUCOSE BLD-MCNC: 100 MG/DL (ref 70–99)
GLUCOSE URINE: NEGATIVE
HCT VFR BLD CALC: 43.4 % (ref 36.3–47.1)
HEMOGLOBIN: 14.4 G/DL (ref 11.9–15.1)
IMMATURE GRANULOCYTES: 0 %
KETONES, URINE: NEGATIVE
LEUKOCYTE ESTERASE, URINE: ABNORMAL
LYMPHOCYTES # BLD: 19 % (ref 24–43)
Lab: ABNORMAL
MCH RBC QN AUTO: 31.2 PG (ref 25.2–33.5)
MCHC RBC AUTO-ENTMCNC: 33.2 G/DL (ref 28.4–34.8)
MCV RBC AUTO: 94.1 FL (ref 82.6–102.9)
MONOCYTES # BLD: 6 % (ref 3–12)
MUCUS: ABNORMAL
NITRITE, URINE: NEGATIVE
NRBC AUTOMATED: 0 PER 100 WBC
OTHER OBSERVATIONS UA: ABNORMAL
PDW BLD-RTO: 12.9 % (ref 11.8–14.4)
PH UA: 5 (ref 5–8)
PLATELET # BLD: 230 K/UL (ref 138–453)
PLATELET ESTIMATE: ABNORMAL
PMV BLD AUTO: 10.1 FL (ref 8.1–13.5)
POTASSIUM SERPL-SCNC: 3.8 MMOL/L (ref 3.7–5.3)
PREGNANCY TEST URINE, POC: NORMAL
PROTEIN UA: NEGATIVE
RBC # BLD: 4.61 M/UL (ref 3.95–5.11)
RBC # BLD: ABNORMAL 10*6/UL
RBC UA: ABNORMAL /HPF (ref 0–2)
RENAL EPITHELIAL, UA: ABNORMAL /HPF
SEG NEUTROPHILS: 72 % (ref 36–65)
SEGMENTED NEUTROPHILS ABSOLUTE COUNT: 9.27 K/UL (ref 1.5–8.1)
SODIUM BLD-SCNC: 139 MMOL/L (ref 135–144)
SPECIFIC GRAVITY UA: 1.02 (ref 1–1.03)
SPECIMEN DESCRIPTION: ABNORMAL
TRICHOMONAS: ABNORMAL
TURBIDITY: CLEAR
URINE HGB: ABNORMAL
UROBILINOGEN, URINE: NORMAL
WBC # BLD: 13 K/UL (ref 3.5–11.3)
WBC # BLD: ABNORMAL 10*3/UL
WBC UA: ABNORMAL /HPF (ref 0–5)
YEAST: ABNORMAL

## 2019-05-12 PROCEDURE — 87480 CANDIDA DNA DIR PROBE: CPT

## 2019-05-12 PROCEDURE — 6360000004 HC RX CONTRAST MEDICATION: Performed by: EMERGENCY MEDICINE

## 2019-05-12 PROCEDURE — 6370000000 HC RX 637 (ALT 250 FOR IP): Performed by: EMERGENCY MEDICINE

## 2019-05-12 PROCEDURE — 96376 TX/PRO/DX INJ SAME DRUG ADON: CPT

## 2019-05-12 PROCEDURE — 96375 TX/PRO/DX INJ NEW DRUG ADDON: CPT

## 2019-05-12 PROCEDURE — 74177 CT ABD & PELVIS W/CONTRAST: CPT

## 2019-05-12 PROCEDURE — 85025 COMPLETE CBC W/AUTO DIFF WBC: CPT

## 2019-05-12 PROCEDURE — 81001 URINALYSIS AUTO W/SCOPE: CPT

## 2019-05-12 PROCEDURE — 6360000002 HC RX W HCPCS: Performed by: EMERGENCY MEDICINE

## 2019-05-12 PROCEDURE — 84703 CHORIONIC GONADOTROPIN ASSAY: CPT

## 2019-05-12 PROCEDURE — 96374 THER/PROPH/DIAG INJ IV PUSH: CPT

## 2019-05-12 PROCEDURE — 80048 BASIC METABOLIC PNL TOTAL CA: CPT

## 2019-05-12 PROCEDURE — 87591 N.GONORRHOEAE DNA AMP PROB: CPT

## 2019-05-12 PROCEDURE — 87510 GARDNER VAG DNA DIR PROBE: CPT

## 2019-05-12 PROCEDURE — 87086 URINE CULTURE/COLONY COUNT: CPT

## 2019-05-12 PROCEDURE — 2580000003 HC RX 258: Performed by: EMERGENCY MEDICINE

## 2019-05-12 PROCEDURE — 99284 EMERGENCY DEPT VISIT MOD MDM: CPT

## 2019-05-12 PROCEDURE — 87491 CHLMYD TRACH DNA AMP PROBE: CPT

## 2019-05-12 PROCEDURE — 87660 TRICHOMONAS VAGIN DIR PROBE: CPT

## 2019-05-12 RX ORDER — METRONIDAZOLE 500 MG/1
500 TABLET ORAL 2 TIMES DAILY
Qty: 14 TABLET | Refills: 0 | Status: SHIPPED | OUTPATIENT
Start: 2019-05-12 | End: 2019-05-19

## 2019-05-12 RX ORDER — SODIUM CHLORIDE 0.9 % (FLUSH) 0.9 %
10 SYRINGE (ML) INJECTION PRN
Status: DISCONTINUED | OUTPATIENT
Start: 2019-05-12 | End: 2019-05-12 | Stop reason: HOSPADM

## 2019-05-12 RX ORDER — 0.9 % SODIUM CHLORIDE 0.9 %
80 INTRAVENOUS SOLUTION INTRAVENOUS ONCE
Status: COMPLETED | OUTPATIENT
Start: 2019-05-12 | End: 2019-05-12

## 2019-05-12 RX ORDER — HYDROMORPHONE HYDROCHLORIDE 1 MG/ML
1 INJECTION, SOLUTION INTRAMUSCULAR; INTRAVENOUS; SUBCUTANEOUS ONCE
Status: COMPLETED | OUTPATIENT
Start: 2019-05-12 | End: 2019-05-12

## 2019-05-12 RX ORDER — 0.9 % SODIUM CHLORIDE 0.9 %
1000 INTRAVENOUS SOLUTION INTRAVENOUS ONCE
Status: DISCONTINUED | OUTPATIENT
Start: 2019-05-12 | End: 2019-05-12 | Stop reason: HOSPADM

## 2019-05-12 RX ORDER — DICYCLOMINE HYDROCHLORIDE 10 MG/1
10 CAPSULE ORAL EVERY 6 HOURS PRN
Qty: 20 CAPSULE | Refills: 0 | Status: SHIPPED | OUTPATIENT
Start: 2019-05-12 | End: 2019-06-06 | Stop reason: ALTCHOICE

## 2019-05-12 RX ORDER — ONDANSETRON 2 MG/ML
4 INJECTION INTRAMUSCULAR; INTRAVENOUS ONCE
Status: COMPLETED | OUTPATIENT
Start: 2019-05-12 | End: 2019-05-12

## 2019-05-12 RX ORDER — AZITHROMYCIN 250 MG/1
1000 TABLET, FILM COATED ORAL ONCE
Status: COMPLETED | OUTPATIENT
Start: 2019-05-12 | End: 2019-05-12

## 2019-05-12 RX ADMIN — IOPAMIDOL 75 ML: 755 INJECTION, SOLUTION INTRAVENOUS at 01:13

## 2019-05-12 RX ADMIN — AZITHROMYCIN MONOHYDRATE 1000 MG: 250 TABLET ORAL at 02:04

## 2019-05-12 RX ADMIN — HYDROMORPHONE HYDROCHLORIDE 1 MG: 1 INJECTION, SOLUTION INTRAMUSCULAR; INTRAVENOUS; SUBCUTANEOUS at 01:03

## 2019-05-12 RX ADMIN — SODIUM CHLORIDE 80 ML: 9 INJECTION, SOLUTION INTRAVENOUS at 01:14

## 2019-05-12 RX ADMIN — ONDANSETRON 4 MG: 2 INJECTION INTRAMUSCULAR; INTRAVENOUS at 02:03

## 2019-05-12 RX ADMIN — Medication 10 ML: at 01:14

## 2019-05-12 RX ADMIN — ONDANSETRON 4 MG: 2 INJECTION INTRAMUSCULAR; INTRAVENOUS at 01:03

## 2019-05-12 ASSESSMENT — PAIN SCALES - GENERAL: PAINLEVEL_OUTOF10: 7

## 2019-05-12 NOTE — ED PROVIDER NOTES
[address] [place] [address] ED  eMERGENCY dEPARTMENT eNCOUnter      Pt Name: Brittnee Lockhart  MRN: [id number]  [place] 1975  Date of evaluation: 5/12/2019  Provider: Steve Hadley MD    [address] [place] [address]       Chief Complaint   Patient presents with    Vaginal Bleeding    Pelvic Pain         HISTORY OF PRESENT ILLNESS  (Location/Symptom, Timing/Onset, Context/Setting, Quality, Duration, Modifying Factors, Severity.)   Brittnee Lockhart is a 44 y.o. female who presents to the emergency department  for evaluation lower abdominal pain. Patient states she was having relations with her significant other. He stated he \"felt something that shouldn't be there\". She had onset of severe pain. She is concerned that she has had previous bladder procedure and mesh placement's she also has IUD. She states that she feels something \"tearing\". Nursing Notes were reviewed. ALLERGIES     Bactrim [sulfamethoxazole-trimethoprim];  Dicloxacillin; and Pcn [penicillins]    CURRENT MEDICATIONS       Previous Medications    ALBUTEROL SULFATE  (90 BASE) MCG/ACT INHALER    Inhale 2 puffs into the lungs every 6 hours as needed for Wheezing    BUPROPION (WELLBUTRIN SR) 150 MG EXTENDED RELEASE TABLET    Take 1 tablet by mouth 2 times daily Use is 150 mg daily for 3 day then 150 mg twice daily    BUSPIRONE (BUSPAR) 10 MG TABLET    Take 15 mg by mouth 2 times daily     FLUOXETINE (PROZAC) 40 MG CAPSULE    Take 80 mg by mouth daily     FLUTICASONE (FLONASE) 50 MCG/ACT NASAL SPRAY    1 spray by Each Nare route daily    IBUPROFEN (ADVIL;MOTRIN) 800 MG TABLET    Take 1 tablet by mouth every 8 hours as needed for Pain    LORATADINE (CLARITIN) 10 MG TABLET    Take 1 tablet by mouth daily    NAPROXEN (NAPROSYN) 500 MG TABLET    Take 500 mg by mouth daily    ROPINIROLE (REQUIP) 1 MG TABLET    Take 1 mg by mouth 2 times daily Morning and lunch    ROPINIROLE (REQUIP) 5 MG TABLET    Take 7.5 mg by mouth nightly        PAST MEDICAL HISTORY Diagnosis Date    Anxiety     Arthritis     OA    Depression     post partum    Diabetes, gestational     history of    Ground glass opacity present on imaging of lung     History of mycoplasma pneumonia     Light cigarette smoker (1-9 cigarettes per day)     Menarche @ 16 y/o    Neuropathy     CTS    Obesity     Obesity (BMI 30.0-34. 9)     Parity       -    Pneumonia 2018    several times this year    Restless leg syndrome     Sleep apnea     doesn't use CPAP    UTI (lower urinary tract infection)     with pregnancy    Wears glasses        SURGICAL HISTORY           Procedure Laterality Date    ANKLE SURGERY Right 1997    BLADDER SUSPENSION       SECTION      CHOLECYSTECTOMY  06    COLONOSCOPY      with poly removal    DILATION AND CURETTAGE OF UTERUS  2014    ENDOMETRIAL ABLATION  14    with novasure    HYSTEROSCOPY  14    D&C    KNEE ARTHROSCOPY Left 6/29/15    KNEE SURGERY      right knee scoped on 17    MYRINGOTOMY Bilateral     with tubes    OTHER SURGICAL HISTORY  2016    removal multiple vulvar inclusion cysts    [place] SLING OPER STRES INCONTINENCE N/A 2018    BLADDER SUSPENSION TRANSOBTURATOR TAPE AND CYSTO performed by Husam Restrepo MD at [address] [place] [address] Drive N/A 2019    VAGINAL SLING DISSECTION OF VAGINAL MUCOSA WITH REINSERTION OF  TRANSOBTURATOR TAPE  CYSTOSCOPY performed by [facility]am [facility] MD at [facility]           Problem Relation Age of Onset    Heart Disease Paternal Grandfather     Diabetes Father     Hypertension Father     Stroke Father     Hypertension Mother     Mental Illness Mother      Family Status   Relation Name Status    PGF  (Not Specified)    Father  (Not Specified)    Mother  (Not Specified)        SOCIAL HISTORY      reports that she has been smoking cigarettes.   She started smoking about 28 years ago. She has a 9.00 pack-year smoking history. She has never used smokeless tobacco. She reports that she has current or past drug history. Drug: Marijuana. She reports that she does not drink alcohol. REVIEW OF SYSTEMS    (2-9 systems for level 4, 10 or more for level 5)     Review of Systems   All other systems reviewed and are negative. Except as noted above the remainder of the review of systems was reviewed and negative. PHYSICAL EXAM    (up to 7 for level 4, 8 or more for level 5)     Vitals:    05/12/19 0018 05/12/19 0023   BP: (!) 140/86    Pulse: 87    Resp: 16    Temp: 97.9 °F (36.6 °C)    TempSrc: Oral    SpO2: 93%    Weight:  197 lb 2 oz (89.4 kg)   Height:  5' 3\" (1.6 m)       Physical exam reflects an agitated female. She does appear to be very uncomfortable. She is afebrile with otherwise stable vital signs to include pulse ox of 100% on room air. She is not hypoxic. She is alert and conversive. Oropharyngeal exam is without lesion. Heart regular rate and rhythm normal S1-S2 no murmurs rubs gallops. Abdominal exam is mildly obese she has severe pain to palpation anywhere. Pelvic exam demonstrates scattered chronic skin lesions on the lower extremities and perineal region. No focal abscess. Pelvic exam demonstrates a thick white vaginal discharge although no evidence of vaginal bleeding vaginal foreign body or cervical abnormality.   She however endorses severe pain on exam.      DIAGNOSTIC RESULTS       RADIOLOGY:   Non-plain film images such as CT, Ultrasound and MRI are read by the radiologist. Plain radiographic images are visualized and preliminarily interpreted by the emergency physician with the below findings:    CT ABDOMEN PELVIS W IV CONTRAST   Status: Final result   Order Providers     Authorizing Billing   MD Jhonathan Dong MD          Signed by     Signed Date/Time  Phone Pager   Yamini BLANCO 5/12/2019 01:36 [phone]    [facility] Radiologists     Read Date Phone Pager   Teddy Atkinson May 12, 2019 [phone]    Radiation Dose Estimates     No radiation information found for this patient   Narrative   EXAMINATION:   CT OF THE ABDOMEN AND PELVIS WITH CONTRAST 5/12/2019 12:50 am       TECHNIQUE:   CT of the abdomen and pelvis was performed with the administration of   intravenous contrast. Multiplanar reformatted images are provided for review. Dose modulation, iterative reconstruction, and/or weight based adjustment of   the mA/kV was utilized to reduce the radiation dose to as low as reasonably   achievable.       COMPARISON:   01/26/2019       HISTORY:   ORDERING SYSTEM PROVIDED HISTORY: ABDOMINAL PAIN   TECHNOLOGIST PROVIDED HISTORY:   IV Only Contrast       FINDINGS:   Lower Chest: Minor atelectatic changes in the dependent lung bases.       Organs: Cholecystectomy.  Enhanced parenchymal attenuation pattern the liver   spleen pancreas and adrenal shows no acute findings.  Stable thickening of   the adrenal glands.  The kidneys appear normal in size and demonstrate   symmetric enhancement.  No focal renal mass.  No hydronephrosis. No   perinephric stranding.       GI/Bowel: No bowel dilatation to suggest obstruction.  No evidence of acute   appendicitis.       Pelvis: The urinary bladder appears unremarkable.  The pelvic organs   demonstrate no acute abnormality.  There has been tubal occlusion.       Peritoneum/Retroperitoneum: The abdominal aorta is normal in caliber.  No   gross lymphadenopathy.  No fluid collection.  No free air.       Bones/Soft Tissues: No acute findings.           Impression   No acute findings.                 Interpretation per the Radiologist below, if available at the time of this note:    CT ABDOMEN PELVIS W IV CONTRAST   Final Result   No acute findings.                LABS:  Labs Reviewed   VAGINITIS DNA PROBE - Abnormal; Notable for the following components:       Result Value    Direct Exam POSITIVE for Gardnerella vaginalis. (*)     Direct Exam POSITIVE for Candida sp. (*)     All other components within normal limits   BASIC METABOLIC PANEL - Abnormal; Notable for the following components:    Glucose 100 (*)     Bun/Cre Ratio 21 (*)     All other components within normal limits   CBC WITH AUTO DIFFERENTIAL - Abnormal; Notable for the following components:    WBC 13.0 (*)     Seg Neutrophils 72 (*)     Lymphocytes 19 (*)     Segs Absolute 9.27 (*)     All other components within normal limits   URINALYSIS - Abnormal; Notable for the following components:    Urine Hgb 3+ (*)     Leukocyte Esterase, Urine SMALL (*)     All other components within normal limits   MICROSCOPIC URINALYSIS - Abnormal; Notable for the following components:    Bacteria, UA FEW (*)     All other components within normal limits   POCT URINE PREGNANCY - Normal   URINE CULTURE   C.TRACHOMATIS N.GONORRHOEAE DNA   Results for Marianela Aguilera (MRN [id number]) as of 5/12/2019 01:39   Ref. Range 5/12/2019 00:24 5/12/2019 00:51 5/12/2019 01:05 5/12/2019 01:14   Sodium Latest Ref Range: 135 - 144 mmol/L   139    Potassium Latest Ref Range: 3.7 - 5.3 mmol/L   3.8    Chloride Latest Ref Range: 98 - 107 mmol/L   104    CO2 Latest Ref Range: 20 - 31 mmol/L   24    BUN Latest Ref Range: 6 - 20 mg/dL   17    Creatinine Latest Ref Range: 0.50 - 0.90 mg/dL   0.81    Bun/Cre Ratio Latest Ref Range: 9 - 20    21 (H)    Anion Gap Latest Ref Range: 9 - 17 mmol/L   11    GFR Non- Latest Ref Range: >60 mL/min   >60    GFR  Latest Ref Range: >60 mL/min   >60    Glucose Latest Ref Range: 70 - 99 mg/dL   100 (H)    Calcium Latest Ref Range: 8.6 - 10.4 mg/dL   9.5    GFR Comment Unknown   Pend    GFR Staging Unknown   NOT REPORTED    QC OK?  Unknown  ok     WBC Latest Ref Range: 3.5 - 11.3 k/uL   13.0 (H)    RBC Latest Ref Range: 3.95 - 5.11 m/uL   4.61    Hemoglobin Quant Latest Ref Range: 11.9 - 15.1 g/dL   14.4    Hematocrit Latest Ref Range: 36.3 - 47.1 %   43.4    MCV Latest Ref Range: 82.6 - 102.9 fL   94.1    MCH Latest Ref Range: 25.2 - 33.5 pg   31.2    MCHC Latest Ref Range: 28.4 - 34.8 g/dL   33.2    MPV Latest Ref Range: 8.1 - 13.5 fL   10.1    RDW Latest Ref Range: 11.8 - 14.4 %   12.9    Platelet Count Latest Ref Range: 138 - 453 k/uL   230    Platelet Estimate Unknown   NOT REPORTED    Absolute Mono # Latest Ref Range: 0.10 - 1.20 k/uL   0.79    Eosinophils % Latest Ref Range: 1 - 4 %   2    Basophils # Latest Ref Range: 0.00 - 0.20 k/uL   0.06    Differential Type Unknown   NOT REPORTED    Seg Neutrophils Latest Ref Range: 36 - 65 %   72 (H)    Segs Absolute Latest Ref Range: 1.50 - 8.10 k/uL   9.27 (H)    Lymphocytes Latest Ref Range: 24 - 43 %   19 (L)    Absolute Lymph # Latest Ref Range: 1.10 - 3.70 k/uL   2.51    Monocytes Latest Ref Range: 3 - 12 %   6    Absolute Eos # Latest Ref Range: 0.00 - 0.44 k/uL   0.29    Basophils Latest Ref Range: 0 - 2 %   1    Immature Granulocytes Latest Ref Range: 0 %   0    WBC Morphology Unknown   NOT REPORTED    RBC Morphology Unknown   NOT REPORTED    URINE CULTURE Unknown Rpt      Color, UA Latest Ref Range: YELLOW  YELLOW      Turbidity UA Latest Ref Range: CLEAR  CLEAR      Glucose, UA Latest Ref Range: NEGATIVE  NEGATIVE      Bilirubin, Urine Latest Ref Range: NEGATIVE  NEGATIVE      Ketones, Urine Latest Ref Range: NEGATIVE  NEGATIVE      Specific Gravity, UA Latest Ref Range: 1.005 - 1.030  1.023      pH, UA Latest Ref Range: 5.0 - 8.0  5.0      Protein, UA Latest Ref Range: NEGATIVE  NEGATIVE      Urobilinogen, Urine Latest Ref Range: Normal  Normal      Nitrite, Urine Latest Ref Range: NEGATIVE  NEGATIVE      Leukocyte Esterase, Urine Latest Ref Range: NEGATIVE  SMALL (A)      Urinalysis Comments Unknown NOT REPORTED      Casts UA Latest Units: /LPF NOT REPORTED      Mucus, UA Latest Ref Range: None  NOT REPORTED      WBC, UA Latest Ref Range: 0 - 5 /HPF 2 TO 5      RBC, UA Latest Ref

## 2019-05-12 NOTE — ED NOTES
Patient education flyer \"[facility] What you [facility] to know\" was provided and reviewed. Questions answered and understanding was verbalized by the patient and/or family.           Marquis Harper RN  05/12/19 9193

## 2019-05-13 LAB
C TRACH DNA GENITAL QL NAA+PROBE: NEGATIVE
CULTURE: NORMAL
HCG, PREGNANCY URINE (POC): NEGATIVE
Lab: NORMAL
N. GONORRHOEAE DNA: NEGATIVE
SPECIMEN DESCRIPTION: NORMAL
SPECIMEN DESCRIPTION: NORMAL

## 2019-05-14 ENCOUNTER — OFFICE VISIT (OUTPATIENT)
Dept: OBGYN CLINIC | Age: 44
End: 2019-05-14
Payer: COMMERCIAL

## 2019-05-14 VITALS
BODY MASS INDEX: 34.98 KG/M2 | HEART RATE: 78 BPM | TEMPERATURE: 98.1 F | HEIGHT: 63 IN | RESPIRATION RATE: 18 BRPM | DIASTOLIC BLOOD PRESSURE: 78 MMHG | WEIGHT: 197.4 LBS | SYSTOLIC BLOOD PRESSURE: 115 MMHG

## 2019-05-14 DIAGNOSIS — T83.721A EXPOSURE OF IMPLANTED VAGINAL MESH, INITIAL ENCOUNTER (HCC): Primary | ICD-10-CM

## 2019-05-14 PROCEDURE — G8427 DOCREV CUR MEDS BY ELIG CLIN: HCPCS | Performed by: CLINICAL NURSE SPECIALIST

## 2019-05-14 PROCEDURE — 99213 OFFICE O/P EST LOW 20 MIN: CPT | Performed by: CLINICAL NURSE SPECIALIST

## 2019-05-14 PROCEDURE — 4004F PT TOBACCO SCREEN RCVD TLK: CPT | Performed by: CLINICAL NURSE SPECIALIST

## 2019-05-14 PROCEDURE — G8417 CALC BMI ABV UP PARAM F/U: HCPCS | Performed by: CLINICAL NURSE SPECIALIST

## 2019-05-14 ASSESSMENT — ENCOUNTER SYMPTOMS
ALLERGIC/IMMUNOLOGIC NEGATIVE: 1
EYES NEGATIVE: 1
GASTROINTESTINAL NEGATIVE: 1
RESPIRATORY NEGATIVE: 1

## 2019-05-14 NOTE — PROGRESS NOTES
normal mood and affect. Her behavior is normal. Judgment and thought content normal.   Vitals reviewed. Assessment:      Diagnosis Orders   1. Exposure of implanted vaginal mesh, initial encounter [facility])             Plan:    Dr. Tony Gillis to room and bimanual done and mesh exposure noted  Plan is to take her back to surgery and remove mesh and leave out and if she continues to have issues will refer to Dr. Gonsalez [facility] [phone]    Return for as needed. Patient was seen with total face to face time of 15 minutes. More than 50% of this visit was on counseling andeducation regarding the problems listed below and her options. She was also counseled on her preventative health maintenance recommendations and follow-up.     Electronically signed by: Tereza Mack CNP

## 2019-05-17 ENCOUNTER — TELEPHONE (OUTPATIENT)
Dept: OBGYN CLINIC | Age: 44
End: 2019-05-17

## 2019-05-17 NOTE — TELEPHONE ENCOUNTER
Pt. Called with concerns about results in her Mychart. Lab results were from an ED visit on 5/12/2019. Pt states she is unable to see scanned report. With proper verification , results were given to pt. Results were that of a UA specimen. Pt verbalized understanding, and states she feels more at ease now.

## 2019-06-05 ENCOUNTER — TELEPHONE (OUTPATIENT)
Dept: OBGYN CLINIC | Age: 44
End: 2019-06-05

## 2019-06-05 NOTE — TELEPHONE ENCOUNTER
Patients surgery time needed to change. Patient was informed to be at [facility] on 6.20 at 6:30am for surgery at 830am. Patient understood.

## 2019-06-06 ENCOUNTER — HOSPITAL ENCOUNTER (OUTPATIENT)
Dept: PREADMISSION TESTING | Age: 44
Discharge: HOME | End: 2019-06-10
Payer: COMMERCIAL

## 2019-06-06 VITALS
HEIGHT: 63 IN | HEART RATE: 73 BPM | WEIGHT: 199.3 LBS | BODY MASS INDEX: 35.31 KG/M2 | SYSTOLIC BLOOD PRESSURE: 119 MMHG | DIASTOLIC BLOOD PRESSURE: 75 MMHG | OXYGEN SATURATION: 99 % | TEMPERATURE: 97.7 F | RESPIRATION RATE: 18 BRPM

## 2019-06-06 LAB
ABSOLUTE EOS #: 0.2 K/UL (ref 0–0.4)
ABSOLUTE IMMATURE GRANULOCYTE: ABNORMAL K/UL (ref 0–0.3)
ABSOLUTE LYMPH #: 2 K/UL (ref 1–4.8)
ABSOLUTE MONO #: 0.3 K/UL (ref 0.1–1.3)
ANION GAP SERPL CALCULATED.3IONS-SCNC: 11 MMOL/L (ref 9–17)
BASOPHILS # BLD: 1 % (ref 0–2)
BASOPHILS ABSOLUTE: 0 K/UL (ref 0–0.2)
BUN BLDV-MCNC: 12 MG/DL (ref 6–20)
BUN/CREAT BLD: NORMAL (ref 9–20)
CALCIUM SERPL-MCNC: 9 MG/DL (ref 8.6–10.4)
CHLORIDE BLD-SCNC: 105 MMOL/L (ref 98–107)
CO2: 23 MMOL/L (ref 20–31)
CREAT SERPL-MCNC: 0.66 MG/DL (ref 0.5–0.9)
DIFFERENTIAL TYPE: ABNORMAL
EOSINOPHILS RELATIVE PERCENT: 2 % (ref 0–4)
GFR AFRICAN AMERICAN: >60 ML/MIN
GFR NON-AFRICAN AMERICAN: >60 ML/MIN
GFR SERPL CREATININE-BSD FRML MDRD: NORMAL ML/MIN/{1.73_M2}
GFR SERPL CREATININE-BSD FRML MDRD: NORMAL ML/MIN/{1.73_M2}
GLUCOSE BLD-MCNC: 95 MG/DL (ref 70–99)
HCT VFR BLD CALC: 40.6 % (ref 36–46)
HEMOGLOBIN: 14.1 G/DL (ref 12–16)
IMMATURE GRANULOCYTES: ABNORMAL %
LYMPHOCYTES # BLD: 22 % (ref 24–44)
MCH RBC QN AUTO: 32.9 PG (ref 26–34)
MCHC RBC AUTO-ENTMCNC: 34.6 G/DL (ref 31–37)
MCV RBC AUTO: 95 FL (ref 80–100)
MONOCYTES # BLD: 4 % (ref 1–7)
NRBC AUTOMATED: ABNORMAL PER 100 WBC
PDW BLD-RTO: 13.4 % (ref 11.5–14.9)
PLATELET # BLD: 216 K/UL (ref 150–450)
PLATELET ESTIMATE: ABNORMAL
PMV BLD AUTO: 8.9 FL (ref 6–12)
POTASSIUM SERPL-SCNC: 4.1 MMOL/L (ref 3.7–5.3)
RBC # BLD: 4.27 M/UL (ref 4–5.2)
RBC # BLD: ABNORMAL 10*6/UL
SEG NEUTROPHILS: 71 % (ref 36–66)
SEGMENTED NEUTROPHILS ABSOLUTE COUNT: 6.4 K/UL (ref 1.3–9.1)
SODIUM BLD-SCNC: 139 MMOL/L (ref 135–144)
WBC # BLD: 9 K/UL (ref 3.5–11)
WBC # BLD: ABNORMAL 10*3/UL

## 2019-06-06 PROCEDURE — 36415 COLL VENOUS BLD VENIPUNCTURE: CPT

## 2019-06-06 PROCEDURE — 85025 COMPLETE CBC W/AUTO DIFF WBC: CPT

## 2019-06-06 PROCEDURE — 80048 BASIC METABOLIC PNL TOTAL CA: CPT

## 2019-06-06 PROCEDURE — 93005 ELECTROCARDIOGRAM TRACING: CPT | Performed by: ANESTHESIOLOGY

## 2019-06-06 NOTE — H&P
HISTORY and Juliann Rocha 5747         NAME:  Mc Person  MRN: [id number]   YOB: 1975   Date: 2019   Age: 44 y.o. Gender: female       COMPLAINT AND PRESENT HISTORY:    Jerrell Calderon is a 44 yr old female who is to have urinary bladder mesh removed on    She had it placed in 2018 along with a bladder suspension, , She had intercourse very soon post op before she was healed,  She did not gain urinary control, She had a second surgery in , That time  she avoided intercourse until May , The first time she had intercourse she had vaginal bleeding , That was May 11 2019 . She went to the ED, She then saw Dr Jacek Willingham she said the Mesh has slid down . Last UTI May 11 2019  G 2 P 2 LMP 4 yr ago, Had an ablation done    DIAGNOSTIC RESULTS   Radiology:     EKG:   Labs:    U/A:  Lab Results   Component Value Date    COLORU YELLOW 2019    WBCUA 2 TO 5 2019    RBCUA 10 TO 20 2019    MUCUS NOT REPORTED 2019    BACTERIA FEW 2019    SPECGRAV 1.023 2019    LEUKOCYTESUR SMALL 2019    GLUCOSEU NEGATIVE 2019    AMORPHOUS NOT REPORTED 2019       PAST MEDICAL HISTORY     Past Medical History:   Diagnosis Date    Anxiety     Arthritis     OA    Depression     post partum    Diabetes, gestational     history of    Ground glass opacity present on imaging of lung     History of mycoplasma pneumonia     Light cigarette smoker (1-9 cigarettes per day)     Menarche @ 16 y/o    Neuropathy     CTS    Obesity     Obesity (BMI 30.0-34. 9)     Parity       -    Pneumonia 2018    several times this year, had pneumonia last 10-    Restless leg syndrome     Sleep apnea     doesn't use CPAP    UTI (lower urinary tract infection)     with pregnancy    Wears glasses        Pt denies any history of Diabetes mellitus type 2, hypertension, stroke, heart disease, COPD, Asthma, GERD, HLD, Cancer, Seizures,Thyroid disease, Kidney Disease, Hepatitis, TB, Psychiatric Disorders or Substance abuse. SURGICAL HISTORY       Past Surgical History:   Procedure Laterality Date    ANKLE SURGERY Right 1997    BLADDER SUSPENSION       SECTION      CHOLECYSTECTOMY  06    COLONOSCOPY      with poly removal    DILATION AND CURETTAGE OF UTERUS  2014    ENDOMETRIAL ABLATION  14    with novasure    HYSTEROSCOPY  14    D&C    KNEE ARTHROSCOPY Left 2015    total x2 scopes.     KNEE SURGERY      right knee scoped on 17    MYRINGOTOMY Bilateral     with tubes    OTHER SURGICAL HISTORY  2016    removal multiple vulvar inclusion cysts    [place] SLING OPER STRES INCONTINENCE N/A 2018    BLADDER SUSPENSION TRANSOBTURATOR TAPE AND CYSTO performed by Onur Jin MD at [address] [place] [address]      VAGINA SURGERY N/A 2019    VAGINAL SLING DISSECTION OF VAGINAL MUCOSA WITH REINSERTION OF  TRANSOBTURATOR TAPE  CYSTOSCOPY performed [facility]       Family History   Problem Relation Age of Onset    Heart Disease Paternal Grandfather     Diabetes Father     Hypertension Father     Stroke Father     Hypertension Mother     Mental Illness Mother        SOCIAL HISTORY       Social History     Socioeconomic History    Marital status:      Spouse name: None    Number of children: None    Years of education: None    Highest education level: None   Occupational History    None   Social Needs    Financial resource strain: None    Food insecurity:     Worry: None     Inability: None    Transportation needs:     Medical: None     Non-medical: None   Tobacco Use    Smoking status: Current Every Day Smoker     Packs/day: 0.25     Years: 18.00     Pack years: 4.50     Types: Cigarettes     Start date:     Smokeless tobacco: Never Used   Substance and Sexual Activity    Alcohol use: No    Drug use: Yes     Types: Marijuana     Comment: occassionally     Sexual activity: Yes     Partners: Male   Lifestyle    Physical activity:     Days per week: None     Minutes per session: None    Stress: None   Relationships    Social connections:     Talks on phone: None     Gets together: None     Attends Taoism service: None     Active member of club or organization: None     Attends meetings of clubs or organizations: None     Relationship status: None    Intimate partner violence:     Fear of current or ex partner: None     Emotionally abused: None     Physically abused: None     Forced sexual activity: None   Other Topics Concern    None   Social History Narrative    None           REVIEW OF SYSTEMS      Allergies   Allergen Reactions    Bactrim [Sulfamethoxazole-Trimethoprim] Hives and Itching    Dicloxacillin Hives    Pcn [Penicillins] Hives       Current Outpatient Medications on File Prior to Encounter   Medication Sig Dispense Refill    naproxen (NAPROSYN) 500 MG tablet Take 500 mg by mouth daily      rOPINIRole (REQUIP) 1 MG tablet Take 2 mg by mouth 2 times daily Morning and lunch      FLUoxetine (PROZAC) 40 MG capsule Take 80 mg by mouth daily       busPIRone (BUSPAR) 10 MG tablet Take 15 mg by mouth 3 times daily       rOPINIRole (REQUIP) 5 MG tablet Take 7.5 mg by mouth nightly        No current facility-administered medications on file prior to encounter. General health:  Patient states health is overall good, She peterson better urine control than she did last yr bgut she has mesh protruding into her vagina on Physical exam .                 Skin:  Skin intact     Head, eyes, ears, nose, throat:  Has no head injury and no headaches  Denies hearing loss, No recent epistaxis or sinus problems or sore throat. Neck:  No pain, stiffness or masses. Cardiovascular/Respiratory system:  Denies any chest pain.   No dyspnea,                Gastrointestinal tract:

## 2019-06-07 LAB
EKG ATRIAL RATE: 67 BPM
EKG P AXIS: 26 DEGREES
EKG P-R INTERVAL: 130 MS
EKG Q-T INTERVAL: 394 MS
EKG QRS DURATION: 84 MS
EKG QTC CALCULATION (BAZETT): 416 MS
EKG R AXIS: 39 DEGREES
EKG T AXIS: 29 DEGREES
EKG VENTRICULAR RATE: 67 BPM

## 2019-06-07 PROCEDURE — 93010 ELECTROCARDIOGRAM REPORT: CPT | Performed by: INTERNAL MEDICINE

## 2019-06-10 ENCOUNTER — TELEPHONE (OUTPATIENT)
Dept: OBGYN CLINIC | Age: 44
End: 2019-06-10

## 2019-06-10 NOTE — TELEPHONE ENCOUNTER
Patient needs pulmonary clearance before  surgery that is schedule for 6/20/19 per the anestesia , Call patient ,she already has appt. with [facility] on 6/14/19 at 10:45am , I asked the patient to make sure they send the clearance note over on Friday .  patient was informed and undrstand.,

## 2019-06-19 ENCOUNTER — TELEPHONE (OUTPATIENT)
Dept: OBGYN CLINIC | Age: 44
End: 2019-06-19

## 2019-06-19 NOTE — TELEPHONE ENCOUNTER
Left message for pt to call back. Patient was supposed to have pulmonary clearance for surgery tomorrow and it looks like she no showed for her 6/14/19 appt.  Surgery has been cancelled

## 2019-06-29 ENCOUNTER — HOSPITAL ENCOUNTER (EMERGENCY)
Age: 44
Discharge: HOME | End: 2019-06-29
Attending: EMERGENCY MEDICINE

## 2019-06-29 VITALS
WEIGHT: 190 LBS | HEART RATE: 92 BPM | BODY MASS INDEX: 33.66 KG/M2 | HEIGHT: 63 IN | SYSTOLIC BLOOD PRESSURE: 150 MMHG | TEMPERATURE: 99.1 F | DIASTOLIC BLOOD PRESSURE: 90 MMHG | RESPIRATION RATE: 16 BRPM | OXYGEN SATURATION: 98 %

## 2019-06-29 DIAGNOSIS — L02.91 ABSCESS: Primary | ICD-10-CM

## 2019-06-29 PROCEDURE — 10060 I&D ABSCESS SIMPLE/SINGLE: CPT

## 2019-06-29 PROCEDURE — 6370000000 HC RX 637 (ALT 250 FOR IP): Performed by: EMERGENCY MEDICINE

## 2019-06-29 PROCEDURE — 99282 EMERGENCY DEPT VISIT SF MDM: CPT

## 2019-06-29 RX ORDER — DOXYCYCLINE HYCLATE 100 MG
100 TABLET ORAL 2 TIMES DAILY
Qty: 14 TABLET | Refills: 0 | Status: SHIPPED | OUTPATIENT
Start: 2019-06-29 | End: 2019-07-06

## 2019-06-29 RX ORDER — DOXYCYCLINE 100 MG/1
100 CAPSULE ORAL ONCE
Status: COMPLETED | OUTPATIENT
Start: 2019-06-29 | End: 2019-06-29

## 2019-06-29 RX ADMIN — DOXYCYCLINE 100 MG: 100 CAPSULE ORAL at 23:41

## 2019-06-30 NOTE — ED PROVIDER NOTES
right knee scoped on 1-2-17    MYRINGOTOMY Bilateral     with tubes    OTHER SURGICAL HISTORY  12/27/2016    removal multiple vulvar inclusion cysts    [place] SLING OPER STRES INCONTINENCE N/A 11/29/2018    BLADDER SUSPENSION TRANSOBTURATOR TAPE AND CYSTO performed by Ramonita Callahan MD at [address] N/A 1/18/2019    VAGINAL SLING DISSECTION OF VAGINAL MUCOSA WITH REINSERTION OF  TRANSOBTURATOR TAPE  CYSTOSCOPY performed by Ramonita Callahan MD at [address] [facility]       Previous Medications    BUSPIRONE (BUSPAR) 10 MG TABLET    Take 15 mg by mouth 3 times daily     FLUOXETINE (PROZAC) 40 MG CAPSULE    Take 80 mg by mouth daily     NAPROXEN (NAPROSYN) 500 MG TABLET    Take 500 mg by mouth daily    ROPINIROLE (REQUIP) 1 MG TABLET    Take 2 mg by mouth 2 times daily Morning and lunch    ROPINIROLE (REQUIP) 5 MG TABLET    Take 7.5 mg by mouth nightly      ALLERGIES     is allergic to bactrim [sulfamethoxazole-trimethoprim]; dicloxacillin; and pcn [penicillins]. FAMILY HISTORY     indicated that the status of her mother is unknown. She indicated that the status of her father is unknown. She indicated that the status of her paternal grandfather is unknown.      SOCIAL HISTORY       Social History     Tobacco Use    Smoking status: Current Every Day Smoker     Packs/day: 0.25     Years: 18.00     Pack years: 4.50     Types: Cigarettes     Start date: 13    Smokeless tobacco: Never Used   Substance Use Topics    Alcohol use: No    Drug use: Yes     Types: Marijuana     Comment: occassionally      PHYSICAL EXAM     INITIAL VITALS: BP (!) 150/90   Pulse 92   Temp 99.1 °F (37.3 °C)   Resp 16   Ht 5' 3\" (1.6 m)   Wt 190 lb (86.2 kg)   SpO2 98%   BMI 33.66 kg/m²     Physical Exam    MEDICAL DECISION MAKING:            Labs Reviewed - No data to display  EMERGENCY DEPARTMENTCOURSE:         Vitals:    Vitals:    06/29/19 2208

## 2019-07-23 ENCOUNTER — TELEPHONE (OUTPATIENT)
Dept: OBGYN CLINIC | Age: 44
End: 2019-07-23

## 2019-08-09 ENCOUNTER — TELEPHONE (OUTPATIENT)
Dept: OBGYN CLINIC | Age: 44
End: 2019-08-09

## 2019-08-15 ENCOUNTER — APPOINTMENT (OUTPATIENT)
Dept: GENERAL RADIOLOGY | Age: 44
End: 2019-08-15
Payer: COMMERCIAL

## 2019-08-15 ENCOUNTER — HOSPITAL ENCOUNTER (EMERGENCY)
Age: 44
Discharge: HOME | End: 2019-08-15
Attending: EMERGENCY MEDICINE
Payer: COMMERCIAL

## 2019-08-15 VITALS
TEMPERATURE: 98.4 F | OXYGEN SATURATION: 98 % | SYSTOLIC BLOOD PRESSURE: 119 MMHG | HEIGHT: 63 IN | HEART RATE: 88 BPM | WEIGHT: 194 LBS | RESPIRATION RATE: 16 BRPM | DIASTOLIC BLOOD PRESSURE: 85 MMHG | BODY MASS INDEX: 34.38 KG/M2

## 2019-08-15 DIAGNOSIS — S80.02XA CONTUSION OF LEFT KNEE, INITIAL ENCOUNTER: Primary | ICD-10-CM

## 2019-08-15 DIAGNOSIS — M79.671 RIGHT FOOT PAIN: ICD-10-CM

## 2019-08-15 PROCEDURE — 73630 X-RAY EXAM OF FOOT: CPT

## 2019-08-15 PROCEDURE — 99283 EMERGENCY DEPT VISIT LOW MDM: CPT

## 2019-08-15 PROCEDURE — 6370000000 HC RX 637 (ALT 250 FOR IP): Performed by: STUDENT IN AN ORGANIZED HEALTH CARE EDUCATION/TRAINING PROGRAM

## 2019-08-15 PROCEDURE — 73610 X-RAY EXAM OF ANKLE: CPT

## 2019-08-15 PROCEDURE — 73562 X-RAY EXAM OF KNEE 3: CPT

## 2019-08-15 RX ORDER — GINSENG 100 MG
CAPSULE ORAL ONCE
Status: COMPLETED | OUTPATIENT
Start: 2019-08-15 | End: 2019-08-15

## 2019-08-15 RX ADMIN — BACITRACIN: 500 OINTMENT TOPICAL at 22:58

## 2019-08-15 ASSESSMENT — ENCOUNTER SYMPTOMS
NAUSEA: 0
ABDOMINAL PAIN: 0
CHEST TIGHTNESS: 0
SHORTNESS OF BREATH: 0

## 2019-08-15 ASSESSMENT — PAIN SCALES - GENERAL: PAINLEVEL_OUTOF10: 8

## 2019-09-03 ENCOUNTER — HOSPITAL ENCOUNTER (EMERGENCY)
Age: 44
Discharge: HOME | End: 2019-09-03
Attending: EMERGENCY MEDICINE
Payer: COMMERCIAL

## 2019-09-03 ENCOUNTER — APPOINTMENT (OUTPATIENT)
Dept: GENERAL RADIOLOGY | Age: 44
End: 2019-09-03
Payer: COMMERCIAL

## 2019-09-03 VITALS
SYSTOLIC BLOOD PRESSURE: 122 MMHG | TEMPERATURE: 97.7 F | DIASTOLIC BLOOD PRESSURE: 82 MMHG | WEIGHT: 194 LBS | RESPIRATION RATE: 16 BRPM | OXYGEN SATURATION: 99 % | HEART RATE: 89 BPM | HEIGHT: 63 IN | BODY MASS INDEX: 34.38 KG/M2

## 2019-09-03 DIAGNOSIS — S69.91XA INJURY OF RIGHT HAND, INITIAL ENCOUNTER: Primary | ICD-10-CM

## 2019-09-03 PROCEDURE — 73130 X-RAY EXAM OF HAND: CPT

## 2019-09-03 PROCEDURE — 99283 EMERGENCY DEPT VISIT LOW MDM: CPT

## 2019-09-03 ASSESSMENT — ENCOUNTER SYMPTOMS
CONSTIPATION: 0
BACK PAIN: 0
VOMITING: 0
COUGH: 0
SORE THROAT: 0
NAUSEA: 0
SHORTNESS OF BREATH: 0
CHEST TIGHTNESS: 0
EYE REDNESS: 0
EYE PAIN: 0
DIARRHEA: 0
ABDOMINAL PAIN: 0

## 2019-09-03 NOTE — ED NOTES

## 2019-09-03 NOTE — ED PROVIDER NOTES
[address]  eMERGENCY dEPARTMENT eNCOUnter    Pt Name: Joyce Orourke  MRN: [id number]  [place] 1975  Date of evaluation: 9/3/19  CHIEF COMPLAINT       Chief Complaint   Patient presents with    Finger Injury    Abscess     HISTORY OF PRESENT ILLNESS   HPI   Patient presents with hand injury and abscesses. She reports prior to arrival she tripped on the sidewalk and fell, catching herself on her right hand. She reports pain in her R fifth finger. She denies other injury. She also reports a history of multiple skin abscesses and reports abscesses to her R axilla, chest and groin. States these have been going on for months but wants to get them checked out. REVIEW OF SYSTEMS     Review of Systems   Constitutional: Negative for chills and fever. HENT: Negative for congestion, ear pain and sore throat. Eyes: Negative for pain, redness and visual disturbance. Respiratory: Negative for cough, chest tightness and shortness of breath. Cardiovascular: Negative for chest pain and palpitations. Gastrointestinal: Negative for abdominal pain, constipation, diarrhea, nausea and vomiting. Genitourinary: Negative for dysuria and vaginal discharge. Musculoskeletal: Negative for back pain and neck pain. Skin: Positive for wound. Negative for rash. Neurological: Negative for seizures, syncope and headaches. PASTMEDICAL HISTORY     Past Medical History:   Diagnosis Date    Anxiety     Arthritis     OA    Depression     post partum    Diabetes, gestational     history of    Ground glass opacity present on imaging of lung     History of mycoplasma pneumonia     Light cigarette smoker (1-9 cigarettes per day)     Menarche @ 16 y/o    Neuropathy     CTS    Obesity     Obesity (BMI 30.0-34. 9)     Parity       -    Pneumonia 2018    several times this year, had pneumonia last 10-    Restless leg syndrome     Sleep apnea     doesn't use CPAP    UTI (lower urinary tract infection)     with pregnancy    Wears glasses      SURGICAL HISTORY       Past Surgical History:   Procedure Laterality Date    ANKLE SURGERY Right 1997    BLADDER SUSPENSION       SECTION      CHOLECYSTECTOMY  06    COLONOSCOPY      with poly removal    DILATION AND CURETTAGE OF UTERUS  2014    ENDOMETRIAL ABLATION  14    with novasure    HYSTEROSCOPY  14    D&C    KNEE ARTHROSCOPY Left 2015    total x2 scopes.  KNEE SURGERY      right knee scoped on 17    MYRINGOTOMY Bilateral     with tubes    OTHER SURGICAL HISTORY  2016    removal multiple vulvar inclusion cysts    MD SLING OPER STRES INCONTINENCE N/A 2018    BLADDER SUSPENSION TRANSOBTURATOR TAPE AND CYSTO performed by Ramonita Callahan MD at [address] [facility] N/A 2019    VAGINAL SLING DISSECTION OF VAGINAL MUCOSA WITH REINSERTION OF  TRANSOBTURATOR TAPE  CYSTOSCOPY performed by Ramonita Callahan MD at [address] [place] [address]       Discharge Medication List as of 9/3/2019  1:55 AM      CONTINUE these medications which have NOT CHANGED    Details   !! rOPINIRole (REQUIP) 1 MG tablet Take 2 mg by mouth 2 times daily Morning and lunchHistorical Med      FLUoxetine (PROZAC) 40 MG capsule Take 80 mg by mouth daily Historical Med      busPIRone (BUSPAR) 10 MG tablet Take 15 mg by mouth 3 times daily Historical Med      !! rOPINIRole (REQUIP) 5 MG tablet Take 7.5 mg by mouth nightly Historical Med      naproxen (NAPROSYN) 500 MG tablet Take 500 mg by mouth dailyHistorical Med       !! - Potential duplicate medications found. Please discuss with provider. ALLERGIES     is allergic to bactrim [sulfamethoxazole-trimethoprim]; dicloxacillin; and pcn [penicillins]. FAMILY HISTORY     She indicated that the status of her mother is unknown. She indicated that the status of her father is unknown.  She indicated that the status of her paternal grandfather is unknown. SOCIALHISTORY      reports that she has been smoking cigarettes. She started smoking about 28 years ago. She has a 4.50 pack-year smoking history. She has never used smokeless tobacco. She reports that she has current or past drug history. Drug: Marijuana. She reports that she does not drink alcohol. PHYSICAL EXAM     INITIAL VITALS: /82   Pulse 89   Temp 97.7 °F (36.5 °C) (Oral)   Resp 16   Ht 5' 3\" (1.6 m)   Wt 194 lb (88 kg)   SpO2 99%   BMI 34.37 kg/m²    Physical Exam   Constitutional: She is oriented to person, place, and time. She appears well-developed and well-nourished. HENT:   Head: Normocephalic and atraumatic. Right Ear: External ear normal.   Left Ear: External ear normal.   Mouth/Throat: Oropharynx is clear and moist.   Eyes: Pupils are equal, round, and reactive to light. Conjunctivae and EOM are normal. Left eye exhibits no discharge. Neck: Normal range of motion. Neck supple. No JVD present. No tracheal deviation present. Cardiovascular: Normal rate, regular rhythm and normal heart sounds. Pulmonary/Chest: Effort normal and breath sounds normal. No stridor. No respiratory distress. Abdominal: Soft. Bowel sounds are normal. There is no tenderness. Musculoskeletal: Normal range of motion. She exhibits no tenderness or deformity. R hand with no bruising or deformity. There is tenderness over the 5th metacarpal. There is no snuff box tenderness or pain with axial loading of the thumb. Neurological: She is alert and oriented to person, place, and time. No cranial nerve deficit. Coordination normal.   Skin:   Multiple healing scabs diffusely over the skin. There is no abscess noted to axilla, breast or groin, probably some very mild, healing folliculitis noted. Nursing note and vitals reviewed. MEDICAL DECISION MAKING:   Assessment:  Chris Navarro is a 38 y.o. female who presents with hand injury.        ED

## 2019-09-27 ENCOUNTER — TELEPHONE (OUTPATIENT)
Dept: OBGYN CLINIC | Age: 44
End: 2019-09-27

## 2019-09-27 ENCOUNTER — OFFICE VISIT (OUTPATIENT)
Dept: PULMONOLOGY | Age: 44
End: 2019-09-27
Payer: COMMERCIAL

## 2019-09-27 VITALS
RESPIRATION RATE: 14 BRPM | HEIGHT: 63 IN | OXYGEN SATURATION: 99 % | BODY MASS INDEX: 32.6 KG/M2 | DIASTOLIC BLOOD PRESSURE: 72 MMHG | SYSTOLIC BLOOD PRESSURE: 110 MMHG | WEIGHT: 184 LBS | HEART RATE: 74 BPM

## 2019-09-27 DIAGNOSIS — E66.9 OBESITY (BMI 30.0-34.9): ICD-10-CM

## 2019-09-27 DIAGNOSIS — G47.33 OSA (OBSTRUCTIVE SLEEP APNEA): ICD-10-CM

## 2019-09-27 DIAGNOSIS — Z87.01 HISTORY OF MYCOPLASMA PNEUMONIA: ICD-10-CM

## 2019-09-27 DIAGNOSIS — J06.9 UPPER RESPIRATORY TRACT INFECTION, UNSPECIFIED TYPE: Primary | ICD-10-CM

## 2019-09-27 DIAGNOSIS — R91.8 GROUND GLASS OPACITY PRESENT ON IMAGING OF LUNG: ICD-10-CM

## 2019-09-27 PROCEDURE — G8427 DOCREV CUR MEDS BY ELIG CLIN: HCPCS | Performed by: INTERNAL MEDICINE

## 2019-09-27 PROCEDURE — G8417 CALC BMI ABV UP PARAM F/U: HCPCS | Performed by: INTERNAL MEDICINE

## 2019-09-27 PROCEDURE — 99214 OFFICE O/P EST MOD 30 MIN: CPT | Performed by: INTERNAL MEDICINE

## 2019-09-27 PROCEDURE — 4004F PT TOBACCO SCREEN RCVD TLK: CPT | Performed by: INTERNAL MEDICINE

## 2019-09-27 RX ORDER — AZITHROMYCIN 250 MG/1
250 TABLET, FILM COATED ORAL SEE ADMIN INSTRUCTIONS
Qty: 6 TABLET | Refills: 0 | Status: SHIPPED | OUTPATIENT
Start: 2019-09-27 | End: 2019-10-02

## 2019-09-27 NOTE — PROGRESS NOTES
@ 16 y/o    Neuropathy     CTS    Obesity     Obesity (BMI 30.0-34. 9)     Parity       -    Pneumonia 2018    several times this year, had pneumonia last 10-    Restless leg syndrome     Sleep apnea     doesn't use CPAP    UTI (lower urinary tract infection)     with pregnancy    Wears glasses      PAST SURGICAL HISTORY:    Past Surgical History:   Procedure Laterality Date    ANKLE SURGERY Right 1997    BLADDER SUSPENSION       SECTION      CHOLECYSTECTOMY  06    COLONOSCOPY      with poly removal    DILATION AND CURETTAGE OF UTERUS  2014    ENDOMETRIAL ABLATION  14    with novasure    HYSTEROSCOPY  14    D&C    KNEE ARTHROSCOPY Left 2015    total x2 scopes.  KNEE SURGERY      right knee scoped on 17    MYRINGOTOMY Bilateral     with tubes    OTHER SURGICAL HISTORY  2016    removal multiple vulvar inclusion cysts    [place] SLING OPER STRES INCONTINENCE N/A 2018    BLADDER SUSPENSION TRANSOBTURATOR TAPE AND CYSTO performed by Brien Campos MD at [address] [place] [address]      VAGINA SURGERY N/A 2019    VAGINAL SLING DISSECTION OF VAGINAL MUCOSA WITH REINSERTION OF  TRANSOBTURATOR TAPE  CYSTOSCOPY performed by Brien Campos MD at [address]:  TOBACCO: Patient  reports that she has been smoking cigarettes. She started smoking about 28 years ago. She has a 4.50 pack-year smoking history. She has never used smokeless tobacco.  ETOH:   reports that she does not drink alcohol. DRUGS: reports that she has current or past drug history. Drug: Marijuana.     IMMUNIZATION HISTORY:   Immunization History   Administered Date(s) Administered    Pneumococcal Polysaccharide ([id number]) 2018    Rabies 2018, 2018, 2018    Rabies Immune Globulin 2018    Td, unspecified formulation 2013     REVIEW OF SYSTEMS:   General: PROBNP 62 12/06/2018     D-Dimer/Fibrinogen:  Lab Results   Component Value Date    DDIMER 0.58 (H) 12/06/2018     Others labs:  Lab Results   Component Value Date    TSH 1.53 01/29/2015     Lab Results   Component Value Date    LEONEL NEGATIVE 06/23/2018    CRP 58.5 (H) 06/23/2018     Lab Results   Component Value Date    FERRITIN 67 01/29/2015     No results found for: SPEP, UPEP, PSA, CEA, , YG1523,   No results found for: RPR, HIV    Radiological reports:  CXR:    CT Scans: 12/6/18  Impression   No evidence of pulmonary embolism.       Patchy ground-glass opacities, similar to study of 6 months prior.  This   raises the possibility of unusual entities such as hypersensitivity   pneumonitis.         Pulmonary Function Test: 10/17/2018     Spirometry is within normal range. No change with bronchodilator  therapy. Lung volumes are normal.  Diffusion capacity is mildly  decreased at 79% predicted. Airway resistance and specific  conductance are normal.  Flow-volume loop is normal.     IMPRESSION:  Very mild isolated decrease in diffusion capacity, could  be related to anemia, venous thromboembolic disease of the lung, early  emphysema, and interstitial lung disease.     Echocardiogram:    Stress Test/Cardiac Angiography:    Polysomnography:  Baseline sleep study 7/10/14: AHI 5  Titration sleep study 8/13/4: Recommended CPAP at 8 cm of water      ASSESSMENT AND PLAN     Assessment:  1. Upper respiratory tract infection, unspecified type    2. Ground glass opacity present on imaging of lung    3. History of mycoplasma pneumonia    4. ИРИНА (obstructive sleep apnea)    5. Obesity (BMI 30.0-34. 9)      Plan/Recommendations:    Patient has been doing well from respiratory standpoint except for the last 2 days when she developed upper respiratory symptoms associated with cough and expectoration  We will recommend a course of Z-Adiel if her symptoms fail to improve  Pulmonary function tests were reviewed, only showed prior to surgery and to bring her CPAP machine to the hospital during perioperative. Kelley Harley MD    Pulmonary and Critical Care Medicine            Please note that this chart was generated using voice recognition Dragon dictation software. Although every effort was made to ensure the accuracy of this automated transcription, some errors in transcription may have occurred.

## 2019-10-01 ENCOUNTER — TELEPHONE (OUTPATIENT)
Dept: OBGYN CLINIC | Age: 44
End: 2019-10-01

## 2019-10-25 ENCOUNTER — APPOINTMENT (OUTPATIENT)
Dept: GENERAL RADIOLOGY | Age: 44
End: 2019-10-25
Payer: COMMERCIAL

## 2019-10-25 ENCOUNTER — HOSPITAL ENCOUNTER (EMERGENCY)
Age: 44
Discharge: HOME | End: 2019-10-25
Attending: EMERGENCY MEDICINE
Payer: COMMERCIAL

## 2019-10-25 VITALS
DIASTOLIC BLOOD PRESSURE: 72 MMHG | SYSTOLIC BLOOD PRESSURE: 111 MMHG | TEMPERATURE: 97.7 F | OXYGEN SATURATION: 99 % | RESPIRATION RATE: 16 BRPM | HEART RATE: 68 BPM

## 2019-10-25 DIAGNOSIS — S90.32XA CONTUSION OF LEFT FOOT, INITIAL ENCOUNTER: Primary | ICD-10-CM

## 2019-10-25 PROCEDURE — 73630 X-RAY EXAM OF FOOT: CPT

## 2019-10-25 PROCEDURE — 6370000000 HC RX 637 (ALT 250 FOR IP): Performed by: EMERGENCY MEDICINE

## 2019-10-25 PROCEDURE — 99283 EMERGENCY DEPT VISIT LOW MDM: CPT

## 2019-10-25 RX ORDER — HYDROCODONE BITARTRATE AND ACETAMINOPHEN 5; 325 MG/1; MG/1
1 TABLET ORAL EVERY 8 HOURS PRN
Qty: 6 TABLET | Refills: 0 | Status: SHIPPED | OUTPATIENT
Start: 2019-10-25 | End: 2019-10-27

## 2019-10-25 RX ORDER — HYDROCODONE BITARTRATE AND ACETAMINOPHEN 5; 325 MG/1; MG/1
1 TABLET ORAL ONCE
Status: COMPLETED | OUTPATIENT
Start: 2019-10-25 | End: 2019-10-25

## 2019-10-25 RX ADMIN — HYDROCODONE BITARTRATE AND ACETAMINOPHEN 1 TABLET: 5; 325 TABLET ORAL at 02:43

## 2019-10-25 ASSESSMENT — PAIN SCALES - GENERAL
PAINLEVEL_OUTOF10: 7
PAINLEVEL_OUTOF10: 7

## 2020-01-10 ENCOUNTER — HOSPITAL ENCOUNTER (EMERGENCY)
Age: 45
Discharge: HOME | End: 2020-01-10
Attending: EMERGENCY MEDICINE
Payer: COMMERCIAL

## 2020-01-10 VITALS
HEART RATE: 85 BPM | HEIGHT: 63 IN | OXYGEN SATURATION: 96 % | SYSTOLIC BLOOD PRESSURE: 131 MMHG | BODY MASS INDEX: 34.02 KG/M2 | DIASTOLIC BLOOD PRESSURE: 77 MMHG | TEMPERATURE: 98.3 F | WEIGHT: 192 LBS | RESPIRATION RATE: 19 BRPM

## 2020-01-10 PROCEDURE — 99282 EMERGENCY DEPT VISIT SF MDM: CPT

## 2020-01-10 RX ORDER — DOXYCYCLINE HYCLATE 100 MG
100 TABLET ORAL 2 TIMES DAILY
Qty: 20 TABLET | Refills: 0 | Status: SHIPPED | OUTPATIENT
Start: 2020-01-10 | End: 2020-05-24

## 2020-01-10 RX ORDER — CEPHALEXIN 500 MG/1
500 CAPSULE ORAL 2 TIMES DAILY
Qty: 14 CAPSULE | Refills: 0 | Status: SHIPPED | OUTPATIENT
Start: 2020-01-10 | End: 2020-05-24

## 2020-01-10 ASSESSMENT — PAIN DESCRIPTION - PAIN TYPE: TYPE: ACUTE PAIN

## 2020-01-10 ASSESSMENT — ENCOUNTER SYMPTOMS
ABDOMINAL PAIN: 0
DIARRHEA: 0
CONSTIPATION: 0
COLOR CHANGE: 0
SINUS PRESSURE: 0
VOMITING: 0
WHEEZING: 0
SORE THROAT: 0
COUGH: 0
SHORTNESS OF BREATH: 0
NAUSEA: 0
RHINORRHEA: 0

## 2020-01-10 ASSESSMENT — PAIN DESCRIPTION - DESCRIPTORS: DESCRIPTORS: TENDER

## 2020-01-10 ASSESSMENT — PAIN DESCRIPTION - LOCATION: LOCATION: BREAST

## 2020-01-10 ASSESSMENT — PAIN SCALES - GENERAL: PAINLEVEL_OUTOF10: 4

## 2020-01-10 ASSESSMENT — PAIN DESCRIPTION - ORIENTATION: ORIENTATION: LEFT

## 2020-01-10 NOTE — ED PROVIDER NOTES
cigarette smoker (1-9 cigarettes per day)     Menarche @ 16 y/o    Neuropathy     CTS    Obesity     Obesity (BMI 30.0-34. 9)     Parity       -    Pneumonia 2018    several times this year, had pneumonia last 10-    Restless leg syndrome     Sleep apnea     doesn't use CPAP    UTI (lower urinary tract infection)     with pregnancy    Wears glasses        SURGICAL HISTORY           Procedure Laterality Date    ANKLE SURGERY Right 1997    BLADDER SUSPENSION       SECTION      CHOLECYSTECTOMY  06    COLONOSCOPY      with poly removal    DILATION AND CURETTAGE OF UTERUS  2014    ENDOMETRIAL ABLATION  14    with novasure    HYSTEROSCOPY  14    D&C    KNEE ARTHROSCOPY Left 2015    total x2 scopes.  KNEE SURGERY      right knee scoped on 17    MYRINGOTOMY Bilateral     with tubes    OTHER SURGICAL HISTORY  2016    removal multiple vulvar inclusion cysts    [place] SLING OPER STRES INCONTINENCE N/A 2018    BLADDER SUSPENSION TRANSOBTURATOR TAPE AND CYSTO performed by Daniel Modi MD at [address] [place] [address]      VAGINA SURGERY N/A 2019    VAGINAL SLING DISSECTION OF VAGINAL MUCOSA WITH REINSERTION OF  TRANSOBTURATOR TAPE  CYSTOSCOPY performed by Daniel [facility] MD at [facility]           Problem Relation Age of Onset    Heart Disease Paternal Grandfather     Diabetes Father     Hypertension Father     Stroke Father     Hypertension Mother     Mental Illness Mother      Family Status   Relation Name Status    PGF  (Not Specified)    Father  (Not Specified)    Mother  (Not Specified)        SOCIAL HISTORY      reports that she has been smoking cigarettes. She started smoking about 29 years ago. She has a 4.50 pack-year smoking history. She has never used smokeless tobacco. She reports current drug use. Drug: Marijuana.  She reports that she does not drink

## 2020-03-20 ENCOUNTER — APPOINTMENT (OUTPATIENT)
Dept: CT IMAGING | Age: 45
End: 2020-03-20
Payer: COMMERCIAL

## 2020-03-20 ENCOUNTER — HOSPITAL ENCOUNTER (EMERGENCY)
Age: 45
Discharge: HOME | End: 2020-03-20
Attending: EMERGENCY MEDICINE
Payer: COMMERCIAL

## 2020-03-20 VITALS
TEMPERATURE: 99.1 F | HEIGHT: 63 IN | SYSTOLIC BLOOD PRESSURE: 145 MMHG | DIASTOLIC BLOOD PRESSURE: 87 MMHG | WEIGHT: 200.8 LBS | OXYGEN SATURATION: 98 % | HEART RATE: 80 BPM | BODY MASS INDEX: 35.58 KG/M2 | RESPIRATION RATE: 18 BRPM

## 2020-03-20 LAB
-: ABNORMAL
ABSOLUTE EOS #: 0.3 K/UL (ref 0–0.44)
ABSOLUTE IMMATURE GRANULOCYTE: 0.03 K/UL (ref 0–0.3)
ABSOLUTE LYMPH #: 2.61 K/UL (ref 1.1–3.7)
ABSOLUTE MONO #: 0.5 K/UL (ref 0.1–1.2)
ALBUMIN SERPL-MCNC: 4.2 G/DL (ref 3.5–5.2)
ALBUMIN/GLOBULIN RATIO: NORMAL (ref 1–2.5)
ALP BLD-CCNC: 81 U/L (ref 35–104)
ALT SERPL-CCNC: 17 U/L (ref 5–33)
AMORPHOUS: ABNORMAL
ANION GAP SERPL CALCULATED.3IONS-SCNC: 11 MMOL/L (ref 9–17)
AST SERPL-CCNC: 19 U/L
BACTERIA: ABNORMAL
BASOPHILS # BLD: 1 % (ref 0–2)
BASOPHILS ABSOLUTE: 0.06 K/UL (ref 0–0.2)
BILIRUB SERPL-MCNC: 0.34 MG/DL (ref 0.3–1.2)
BILIRUBIN DIRECT: 0.12 MG/DL
BILIRUBIN URINE: NEGATIVE
BILIRUBIN, INDIRECT: 0.22 MG/DL (ref 0–1)
BUN BLDV-MCNC: 10 MG/DL (ref 6–20)
BUN/CREAT BLD: 16 (ref 9–20)
CALCIUM SERPL-MCNC: 9.2 MG/DL (ref 8.6–10.4)
CASTS UA: ABNORMAL /LPF
CHLORIDE BLD-SCNC: 104 MMOL/L (ref 98–107)
CO2: 23 MMOL/L (ref 20–31)
COLOR: YELLOW
COMMENT UA: ABNORMAL
CREAT SERPL-MCNC: 0.61 MG/DL (ref 0.5–0.9)
CRYSTALS, UA: ABNORMAL /HPF
DIFFERENTIAL TYPE: NORMAL
EOSINOPHILS RELATIVE PERCENT: 3 % (ref 1–4)
EPITHELIAL CELLS UA: ABNORMAL /HPF (ref 0–5)
GFR AFRICAN AMERICAN: >60 ML/MIN
GFR NON-AFRICAN AMERICAN: >60 ML/MIN
GFR SERPL CREATININE-BSD FRML MDRD: NORMAL ML/MIN/{1.73_M2}
GFR SERPL CREATININE-BSD FRML MDRD: NORMAL ML/MIN/{1.73_M2}
GLOBULIN: NORMAL G/DL (ref 1.5–3.8)
GLUCOSE BLD-MCNC: 79 MG/DL (ref 70–99)
GLUCOSE URINE: NEGATIVE
HCT VFR BLD CALC: 43.9 % (ref 36.3–47.1)
HEMOGLOBIN: 14.6 G/DL (ref 11.9–15.1)
IMMATURE GRANULOCYTES: 0 %
KETONES, URINE: NEGATIVE
LEUKOCYTE ESTERASE, URINE: ABNORMAL
LIPASE: 30 U/L (ref 13–60)
LYMPHOCYTES # BLD: 29 % (ref 24–43)
MCH RBC QN AUTO: 32.2 PG (ref 25.2–33.5)
MCHC RBC AUTO-ENTMCNC: 33.3 G/DL (ref 28.4–34.8)
MCV RBC AUTO: 96.7 FL (ref 82.6–102.9)
MONOCYTES # BLD: 6 % (ref 3–12)
MUCUS: ABNORMAL
NITRITE, URINE: NEGATIVE
NRBC AUTOMATED: 0 PER 100 WBC
OTHER OBSERVATIONS UA: ABNORMAL
PDW BLD-RTO: 12.6 % (ref 11.8–14.4)
PH UA: 7 (ref 5–8)
PLATELET # BLD: 197 K/UL (ref 138–453)
PLATELET ESTIMATE: NORMAL
PMV BLD AUTO: 10.3 FL (ref 8.1–13.5)
POTASSIUM SERPL-SCNC: 4.4 MMOL/L (ref 3.7–5.3)
PROTEIN UA: NEGATIVE
RBC # BLD: 4.54 M/UL (ref 3.95–5.11)
RBC # BLD: NORMAL 10*6/UL
RBC UA: ABNORMAL /HPF (ref 0–2)
RENAL EPITHELIAL, UA: ABNORMAL /HPF
SEG NEUTROPHILS: 61 % (ref 36–65)
SEGMENTED NEUTROPHILS ABSOLUTE COUNT: 5.54 K/UL (ref 1.5–8.1)
SODIUM BLD-SCNC: 138 MMOL/L (ref 135–144)
SPECIFIC GRAVITY UA: 1.01 (ref 1–1.03)
TOTAL PROTEIN: 7.1 G/DL (ref 6.4–8.3)
TRICHOMONAS: ABNORMAL
TURBIDITY: ABNORMAL
URINE HGB: NEGATIVE
UROBILINOGEN, URINE: NORMAL
WBC # BLD: 9 K/UL (ref 3.5–11.3)
WBC # BLD: NORMAL 10*3/UL
WBC UA: ABNORMAL /HPF (ref 0–5)
YEAST: ABNORMAL

## 2020-03-20 PROCEDURE — 83690 ASSAY OF LIPASE: CPT

## 2020-03-20 PROCEDURE — 6360000002 HC RX W HCPCS: Performed by: NURSE PRACTITIONER

## 2020-03-20 PROCEDURE — 81001 URINALYSIS AUTO W/SCOPE: CPT

## 2020-03-20 PROCEDURE — 80076 HEPATIC FUNCTION PANEL: CPT

## 2020-03-20 PROCEDURE — 74177 CT ABD & PELVIS W/CONTRAST: CPT

## 2020-03-20 PROCEDURE — 2580000003 HC RX 258: Performed by: EMERGENCY MEDICINE

## 2020-03-20 PROCEDURE — 80048 BASIC METABOLIC PNL TOTAL CA: CPT

## 2020-03-20 PROCEDURE — 96374 THER/PROPH/DIAG INJ IV PUSH: CPT

## 2020-03-20 PROCEDURE — 85025 COMPLETE CBC W/AUTO DIFF WBC: CPT

## 2020-03-20 PROCEDURE — 99284 EMERGENCY DEPT VISIT MOD MDM: CPT

## 2020-03-20 PROCEDURE — 6360000004 HC RX CONTRAST MEDICATION: Performed by: EMERGENCY MEDICINE

## 2020-03-20 RX ORDER — ONDANSETRON 4 MG/1
4 TABLET, ORALLY DISINTEGRATING ORAL EVERY 8 HOURS PRN
Qty: 20 TABLET | Refills: 0 | Status: SHIPPED | OUTPATIENT
Start: 2020-03-20 | End: 2021-02-02

## 2020-03-20 RX ORDER — 0.9 % SODIUM CHLORIDE 0.9 %
80 INTRAVENOUS SOLUTION INTRAVENOUS ONCE
Status: COMPLETED | OUTPATIENT
Start: 2020-03-20 | End: 2020-03-20

## 2020-03-20 RX ORDER — DICYCLOMINE HYDROCHLORIDE 10 MG/1
10 CAPSULE ORAL EVERY 6 HOURS PRN
Qty: 20 CAPSULE | Refills: 0 | Status: SHIPPED | OUTPATIENT
Start: 2020-03-20 | End: 2020-05-24

## 2020-03-20 RX ORDER — ONDANSETRON 2 MG/ML
8 INJECTION INTRAMUSCULAR; INTRAVENOUS ONCE
Status: COMPLETED | OUTPATIENT
Start: 2020-03-20 | End: 2020-03-20

## 2020-03-20 RX ORDER — SODIUM CHLORIDE 0.9 % (FLUSH) 0.9 %
10 SYRINGE (ML) INJECTION PRN
Status: DISCONTINUED | OUTPATIENT
Start: 2020-03-20 | End: 2020-03-20 | Stop reason: HOSPADM

## 2020-03-20 RX ADMIN — SODIUM CHLORIDE 80 ML: 9 INJECTION, SOLUTION INTRAVENOUS at 14:04

## 2020-03-20 RX ADMIN — Medication 10 ML: at 14:04

## 2020-03-20 RX ADMIN — ONDANSETRON 8 MG: 2 INJECTION INTRAMUSCULAR; INTRAVENOUS at 14:29

## 2020-03-20 RX ADMIN — IOPAMIDOL 80 ML: 755 INJECTION, SOLUTION INTRAVENOUS at 14:04

## 2020-03-20 ASSESSMENT — PAIN DESCRIPTION - ORIENTATION: ORIENTATION: RIGHT;LOWER

## 2020-03-20 ASSESSMENT — ENCOUNTER SYMPTOMS
DIARRHEA: 0
SHORTNESS OF BREATH: 0
BACK PAIN: 0
COUGH: 0
VOMITING: 0
ABDOMINAL PAIN: 1
NAUSEA: 0
CONSTIPATION: 0

## 2020-03-20 ASSESSMENT — PAIN SCALES - GENERAL: PAINLEVEL_OUTOF10: 7

## 2020-03-20 ASSESSMENT — PAIN DESCRIPTION - FREQUENCY: FREQUENCY: CONTINUOUS

## 2020-03-20 ASSESSMENT — PAIN DESCRIPTION - LOCATION: LOCATION: ABDOMEN

## 2020-03-20 ASSESSMENT — PAIN DESCRIPTION - DESCRIPTORS: DESCRIPTORS: CRAMPING

## 2020-03-20 NOTE — ED PROVIDER NOTES
EMERGENCY DEPARTMENT ENCOUNTER   ATTENDING ATTESTATION     Pt Name: Anabella Desai  MRN: [id number]  [place] 1975  Date of evaluation: 3/20/20   Anabella Desai is a 38 y.o. female with CC: Abdominal Pain (onset 9am RLQ)    MDM:            CRITICAL CARE:       EKG: All EKG's are interpreted by the Emergency Department Physician who either signs or Co-signs this chart in the absence of a cardiologist.      RADIOLOGY:All plain film, CT, MRI, and formal ultrasound images (except ED bedside ultrasound) are read by the radiologist, see reports below, unless otherwise noted in MDM or here. CT ABDOMEN PELVIS W IV CONTRAST Additional Contrast? None    (Results Pending)     LABS: All lab results were reviewed by myself, and all abnormals are listed below. Labs Reviewed   URINALYSIS - Abnormal; Notable for the following components:       Result Value    Turbidity UA SLIGHTLY CLOUDY (*)     Leukocyte Esterase, Urine TRACE (*)     All other components within normal limits   MICROSCOPIC URINALYSIS - Abnormal; Notable for the following components:    Bacteria, UA FEW (*)     All other components within normal limits   CBC WITH AUTO DIFFERENTIAL   BASIC METABOLIC PANEL   HEPATIC FUNCTION PANEL   LIPASE     CONSULTS:  None  FINAL IMPRESSION    No diagnosis found. PASTMEDICAL HISTORY     Past Medical History:   Diagnosis Date    Anxiety     Arthritis     OA    Depression     post partum    Diabetes, gestational     history of    Ground glass opacity present on imaging of lung     History of mycoplasma pneumonia     Light cigarette smoker (1-9 cigarettes per day)     Menarche @ 16 y/o    Neuropathy     CTS    Obesity     Obesity (BMI 30.0-34. 9)     Parity       -    Pneumonia 2018    several times this year, had pneumonia last 10-    Restless leg syndrome     Sleep apnea     doesn't use CPAP    UTI (lower urinary tract infection)     with pregnancy    Wears glasses      SURGICAL HISTORY       Past

## 2020-05-14 ENCOUNTER — HOSPITAL ENCOUNTER (OUTPATIENT)
Dept: NON INVASIVE DIAGNOSTICS | Age: 45
Discharge: HOME | End: 2020-05-14
Payer: COMMERCIAL

## 2020-05-14 LAB
LV EF: 60 %
LVEF MODALITY: NORMAL

## 2020-05-14 PROCEDURE — 93306 TTE W/DOPPLER COMPLETE: CPT

## 2020-05-24 ENCOUNTER — HOSPITAL ENCOUNTER (EMERGENCY)
Age: 45
Discharge: HOME | End: 2020-05-25
Attending: EMERGENCY MEDICINE
Payer: COMMERCIAL

## 2020-05-24 LAB
ABSOLUTE EOS #: 0.27 K/UL (ref 0–0.44)
ABSOLUTE IMMATURE GRANULOCYTE: 0.11 K/UL (ref 0–0.3)
ABSOLUTE LYMPH #: 2.07 K/UL (ref 1.1–3.7)
ABSOLUTE MONO #: 1.05 K/UL (ref 0.1–1.2)
BASOPHILS # BLD: 0 % (ref 0–2)
BASOPHILS ABSOLUTE: 0.06 K/UL (ref 0–0.2)
DIFFERENTIAL TYPE: ABNORMAL
EOSINOPHILS RELATIVE PERCENT: 2 % (ref 1–4)
HCT VFR BLD CALC: 41.6 % (ref 36.3–47.1)
HEMOGLOBIN: 13.9 G/DL (ref 11.9–15.1)
IMMATURE GRANULOCYTES: 1 %
LYMPHOCYTES # BLD: 12 % (ref 24–43)
MCH RBC QN AUTO: 31.8 PG (ref 25.2–33.5)
MCHC RBC AUTO-ENTMCNC: 33.4 G/DL (ref 28.4–34.8)
MCV RBC AUTO: 95.2 FL (ref 82.6–102.9)
MONOCYTES # BLD: 6 % (ref 3–12)
NRBC AUTOMATED: 0 PER 100 WBC
PDW BLD-RTO: 12.7 % (ref 11.8–14.4)
PLATELET # BLD: 225 K/UL (ref 138–453)
PLATELET ESTIMATE: ABNORMAL
PMV BLD AUTO: 10.5 FL (ref 8.1–13.5)
RBC # BLD: 4.37 M/UL (ref 3.95–5.11)
RBC # BLD: ABNORMAL 10*6/UL
SEG NEUTROPHILS: 79 % (ref 36–65)
SEGMENTED NEUTROPHILS ABSOLUTE COUNT: 14.45 K/UL (ref 1.5–8.1)
WBC # BLD: 18 K/UL (ref 3.5–11.3)
WBC # BLD: ABNORMAL 10*3/UL

## 2020-05-24 PROCEDURE — 85025 COMPLETE CBC W/AUTO DIFF WBC: CPT

## 2020-05-24 PROCEDURE — 99285 EMERGENCY DEPT VISIT HI MDM: CPT

## 2020-05-24 PROCEDURE — 85379 FIBRIN DEGRADATION QUANT: CPT

## 2020-05-24 PROCEDURE — 93005 ELECTROCARDIOGRAM TRACING: CPT

## 2020-05-24 PROCEDURE — 83735 ASSAY OF MAGNESIUM: CPT

## 2020-05-24 PROCEDURE — 84484 ASSAY OF TROPONIN QUANT: CPT

## 2020-05-24 PROCEDURE — 80053 COMPREHEN METABOLIC PANEL: CPT

## 2020-05-25 ENCOUNTER — APPOINTMENT (OUTPATIENT)
Dept: GENERAL RADIOLOGY | Age: 45
End: 2020-05-25
Payer: COMMERCIAL

## 2020-05-25 VITALS
SYSTOLIC BLOOD PRESSURE: 122 MMHG | OXYGEN SATURATION: 98 % | HEIGHT: 63 IN | TEMPERATURE: 98.3 F | RESPIRATION RATE: 19 BRPM | BODY MASS INDEX: 35.44 KG/M2 | WEIGHT: 200 LBS | HEART RATE: 108 BPM | DIASTOLIC BLOOD PRESSURE: 82 MMHG

## 2020-05-25 LAB
ALBUMIN SERPL-MCNC: 4.2 G/DL (ref 3.5–5.2)
ALBUMIN/GLOBULIN RATIO: ABNORMAL (ref 1–2.5)
ALP BLD-CCNC: 105 U/L (ref 35–104)
ALT SERPL-CCNC: 21 U/L (ref 5–33)
ANION GAP SERPL CALCULATED.3IONS-SCNC: 16 MMOL/L (ref 9–17)
AST SERPL-CCNC: 23 U/L
BILIRUB SERPL-MCNC: 0.27 MG/DL (ref 0.3–1.2)
BUN BLDV-MCNC: 15 MG/DL (ref 6–20)
BUN/CREAT BLD: 20 (ref 9–20)
CALCIUM SERPL-MCNC: 9.2 MG/DL (ref 8.6–10.4)
CHLORIDE BLD-SCNC: 103 MMOL/L (ref 98–107)
CO2: 19 MMOL/L (ref 20–31)
CREAT SERPL-MCNC: 0.76 MG/DL (ref 0.5–0.9)
D-DIMER QUANTITATIVE: 0.39 MG/L FEU (ref 0–0.59)
GFR AFRICAN AMERICAN: >60 ML/MIN
GFR NON-AFRICAN AMERICAN: >60 ML/MIN
GFR SERPL CREATININE-BSD FRML MDRD: ABNORMAL ML/MIN/{1.73_M2}
GFR SERPL CREATININE-BSD FRML MDRD: ABNORMAL ML/MIN/{1.73_M2}
GLUCOSE BLD-MCNC: 131 MG/DL (ref 70–99)
MAGNESIUM: 2.1 MG/DL (ref 1.6–2.6)
POTASSIUM SERPL-SCNC: 3.4 MMOL/L (ref 3.7–5.3)
SODIUM BLD-SCNC: 138 MMOL/L (ref 135–144)
TOTAL PROTEIN: 7.2 G/DL (ref 6.4–8.3)
TROPONIN INTERP: NORMAL
TROPONIN T: NORMAL NG/ML
TROPONIN, HIGH SENSITIVITY: <6 NG/L (ref 0–14)

## 2020-05-25 PROCEDURE — 71045 X-RAY EXAM CHEST 1 VIEW: CPT

## 2020-05-25 RX ORDER — AZITHROMYCIN 500 MG/1
500 TABLET, FILM COATED ORAL DAILY
Qty: 5 TABLET | Refills: 0 | Status: SHIPPED | OUTPATIENT
Start: 2020-05-25 | End: 2020-05-30

## 2020-05-25 NOTE — ED PROVIDER NOTES
4/8/14    D&C    KNEE ARTHROSCOPY Left 06/29/2015    total x2 scopes.  KNEE SURGERY      right knee scoped on 1-2-17    MYRINGOTOMY Bilateral     with tubes    OTHER SURGICAL HISTORY  12/27/2016    removal multiple vulvar inclusion cysts    [place] SLING OPER STRES INCONTINENCE N/A 11/29/2018    BLADDER SUSPENSION TRANSOBTURATOR TAPE AND CYSTO performed by Arthur Ragland MD at [address] N/A 1/18/2019    VAGINAL SLING DISSECTION OF VAGINAL MUCOSA WITH REINSERTION OF  TRANSOBTURATOR TAPE  CYSTOSCOPY performed by Arthur Ragland MD at [address] [place] [address]       Previous Medications    BUSPIRONE (BUSPAR) 10 MG TABLET    Take 45 mg by mouth 3 times daily     FLUOXETINE (PROZAC) 40 MG CAPSULE    Take 80 mg by mouth daily     NAPROXEN (NAPROSYN) 500 MG TABLET    Take 500 mg by mouth daily    ONDANSETRON (ZOFRAN ODT) 4 MG DISINTEGRATING TABLET    Take 1 tablet by mouth every 8 hours as needed for Nausea    ROPINIROLE (REQUIP) 5 MG TABLET    Take 7.5 mg by mouth nightly      ALLERGIES     is allergic to bactrim [sulfamethoxazole-trimethoprim]; dicloxacillin; and pcn [penicillins]. FAMILY HISTORY     She indicated that the status of her mother is unknown. She indicated that the status of her father is unknown. She indicated that the status of her paternal grandfather is unknown.      SOCIAL HISTORY       Social History     Tobacco Use    Smoking status: Current Every Day Smoker     Packs/day: 0.50     Years: 18.00     Pack years: 9.00     Types: Cigarettes     Start date: 13    Smokeless tobacco: Never Used   Substance Use Topics    Alcohol use: No    Drug use: Yes     Types: Marijuana     Comment: occassionally      PHYSICAL EXAM     INITIAL VITALS: /82   Pulse 108   Temp 98.3 °F (36.8 °C) (Oral)   Resp 19   Ht 5' 3\" (1.6 m)   Wt 200 lb (90.7 kg)   SpO2 98%   BMI 35.43 kg/m²    Physical Exam  HENT:      Head: all abnormals are listed below. Labs Reviewed   CBC WITH AUTO DIFFERENTIAL - Abnormal; Notable for the following components:       Result Value    WBC 18.0 (*)     Seg Neutrophils 79 (*)     Lymphocytes 12 (*)     Immature Granulocytes 1 (*)     Segs Absolute 14.45 (*)     All other components within normal limits   COMPREHENSIVE METABOLIC PANEL W/ REFLEX TO MG FOR LOW K - Abnormal; Notable for the following components:    Glucose 131 (*)     Potassium 3.4 (*)     CO2 19 (*)     Alkaline Phosphatase 105 (*)     Total Bilirubin 0.27 (*)     All other components within normal limits   TROPONIN   D-DIMER, QUANTITATIVE   MAGNESIUM       EMERGENCY DEPARTMENTCOURSE:         Vitals:    Vitals:    05/24/20 2359 05/25/20 0014 05/25/20 0028 05/25/20 0044   BP: 125/74 127/71 133/85 122/82   Pulse: 115 110 111 108   Resp: 24 29 26 19   Temp:       TempSrc:       SpO2: 98% 97% 99% 98%   Weight:       Height:           The patient was given the following medications while in the emergency department:  Orders Placed This Encounter   Medications    azithromycin (ZITHROMAX) 500 MG tablet     Sig: Take 1 tablet by mouth daily for 5 days     Dispense:  5 tablet     Refill:  0     CONSULTS:  None    FINAL IMPRESSION      1.  Pneumonia due to organism          DISPOSITION/PLAN   DISPOSITION Decision To Discharge 05/25/2020 01:33:00 AM      PATIENT REFERRED TO:  BRICE Andrade  [address] [place] [id number]  [phone]    In 2 days      DISCHARGE MEDICATIONS:  New Prescriptions    AZITHROMYCIN (ZITHROMAX) 500 MG TABLET    Take 1 tablet by mouth daily for 5 days     Francis Carrington MD  Attending Emergency Physician                    Artem Kinsey MD  05/25/20 8402

## 2020-05-26 LAB
EKG ATRIAL RATE: 123 BPM
EKG P AXIS: 43 DEGREES
EKG P-R INTERVAL: 130 MS
EKG Q-T INTERVAL: 308 MS
EKG QRS DURATION: 88 MS
EKG QTC CALCULATION (BAZETT): 440 MS
EKG R AXIS: 42 DEGREES
EKG T AXIS: 23 DEGREES
EKG VENTRICULAR RATE: 123 BPM

## 2020-11-13 ENCOUNTER — APPOINTMENT (OUTPATIENT)
Dept: GENERAL RADIOLOGY | Age: 45
End: 2020-11-13
Payer: COMMERCIAL

## 2020-11-13 ENCOUNTER — HOSPITAL ENCOUNTER (EMERGENCY)
Age: 45
Discharge: HOME | End: 2020-11-13
Attending: EMERGENCY MEDICINE
Payer: COMMERCIAL

## 2020-11-13 VITALS
DIASTOLIC BLOOD PRESSURE: 99 MMHG | SYSTOLIC BLOOD PRESSURE: 133 MMHG | OXYGEN SATURATION: 99 % | HEART RATE: 70 BPM | BODY MASS INDEX: 31.89 KG/M2 | WEIGHT: 180 LBS | RESPIRATION RATE: 18 BRPM | HEIGHT: 63 IN | TEMPERATURE: 97.9 F

## 2020-11-13 PROCEDURE — 99284 EMERGENCY DEPT VISIT MOD MDM: CPT

## 2020-11-13 PROCEDURE — 73630 X-RAY EXAM OF FOOT: CPT

## 2020-11-13 ASSESSMENT — PAIN DESCRIPTION - ORIENTATION: ORIENTATION: RIGHT

## 2020-11-13 ASSESSMENT — PAIN SCALES - GENERAL: PAINLEVEL_OUTOF10: 4

## 2020-11-13 ASSESSMENT — PAIN DESCRIPTION - LOCATION: LOCATION: FOOT

## 2020-11-13 ASSESSMENT — PAIN DESCRIPTION - FREQUENCY: FREQUENCY: CONTINUOUS

## 2020-11-13 ASSESSMENT — PAIN DESCRIPTION - DESCRIPTORS: DESCRIPTORS: CONSTANT

## 2020-11-13 NOTE — ED PROVIDER NOTES
[address]  eMERGENCY dEPARTMENT eNCOUnter      Pt Name: Ori Espinoza  MRN: [id number]  [place] 1975  Date of evaluation: 2020  Provider: Karthikeyan Jeronimo PA-C    CHIEF COMPLAINT       Chief Complaint   Patient presents with    Foot Injury           HISTORY OF PRESENT ILLNESS  (Location/Symptom, Timing/Onset, Context/Setting, Quality, Duration, Modifying Factors, Severity.)   Ori Espinoza is a 41 y.o. female who presents to the emergency department with complaints of right foot pain. Patient states several months ago she dropped cans on the top of her foot. Patient states she never had it evaluated. Patient reports several days ago she tripped and fell and reinjured her right foot. States she has had pain, swelling over the top of her foot since. States she is walking with a limp. No ankle pain. No numbness. No other injuries. Patient does not want anything for pain. No other complaints. Nursing Notes were reviewed. REVIEW OF SYSTEMS    (2-9 systems for level 4, 10 or more for level 5)     Review of Systems   Foot pain  Swelling      Except as noted above the remainder of the review of systems was reviewed and negative. PAST MEDICAL HISTORY     Past Medical History:   Diagnosis Date    Anxiety     Arthritis     OA    Depression     post partum    Diabetes, gestational     history of    Ground glass opacity present on imaging of lung     History of mycoplasma pneumonia     Light cigarette smoker (1-9 cigarettes per day)     Menarche @ 16 y/o    Neuropathy     CTS    Obesity     Obesity (BMI 30.0-34. 9)     Parity       -    Pneumonia 2018    several times this year, had pneumonia last 10-    Restless leg syndrome     Sleep apnea     doesn't use CPAP    UTI (lower urinary tract infection)     with pregnancy    Wears glasses      None otherwise stated in nurses notes    SURGICAL HISTORY       Past Surgical History:   Procedure Laterality Date    ANKLE SURGERY Right 1997    BLADDER SUSPENSION       SECTION      CHOLECYSTECTOMY  06    COLONOSCOPY      with poly removal    DILATION AND CURETTAGE OF UTERUS  2014    ENDOMETRIAL ABLATION  14    with novasure    HYSTEROSCOPY  14    D&C    KNEE ARTHROSCOPY Left 2015    total x2 scopes.  KNEE SURGERY      right knee scoped on 17    MYRINGOTOMY Bilateral     with tubes    OTHER SURGICAL HISTORY  2016    removal multiple vulvar inclusion cysts    [place] SLING OPER STRES INCONTINENCE N/A 2018    BLADDER SUSPENSION TRANSOBTURATOR TAPE AND CYSTO performed by Melissa Mckee MD at [address] [facility] [address] N/A 2019    VAGINAL SLING DISSECTION OF VAGINAL MUCOSA WITH REINSERTION OF  TRANSOBTURATOR TAPE  CYSTOSCOPY performed by Melissa Mckee MD at [address]     None otherwise stated in nurses notes    [address]       Discharge Medication List as of 2020  6:22 PM      CONTINUE these medications which have NOT CHANGED    Details   ondansetron (ZOFRAN ODT) 4 MG disintegrating tablet Take 1 tablet by mouth every 8 hours as needed for Nausea, Disp-20 tablet, R-0Print      naproxen (NAPROSYN) 500 MG tablet Take 500 mg by mouth dailyHistorical Med      FLUoxetine (PROZAC) 40 MG capsule Take 80 mg by mouth daily Historical Med      busPIRone (BUSPAR) 10 MG tablet Take 45 mg by mouth 3 times daily Historical Med      rOPINIRole (REQUIP) 5 MG tablet Take 7.5 mg by mouth nightly Historical Med             ALLERGIES     Bactrim [sulfamethoxazole-trimethoprim];  Dicloxacillin; and Pcn [penicillins]    FAMILY HISTORY           Problem Relation Age of Onset    Heart Disease Paternal Grandfather     Diabetes Father     Hypertension Father     Stroke Father     Hypertension Mother     Mental Illness Mother      Family Status   Relation Name Status    PGF  (Not Specified)    Father  (Not Specified)    Mother  (Not Specified)      None otherwise stated in nurses notes    SOCIAL HISTORY      reports that she has been smoking cigarettes. She started smoking about 29 years ago. She has a 9.00 pack-year smoking history. She has never used smokeless tobacco. She reports current drug use. Drug: Marijuana. She reports that she does not drink alcohol. lives at home with others     PHYSICAL EXAM    (up to 7 for level 4, 8 or more for level 5)     ED Triage Vitals [11/13/20 1744]   BP Temp Temp Source Pulse Resp SpO2 Height Weight   (!) 133/99 97.9 °F (36.6 °C) Oral 70 18 99 % 5' 3\" (1.6 m) 180 lb (81.6 kg)       Physical Exam   Nursing note and vitals reviewed. Constitutional: well-developed, well-nourished, nontoxic, well appearing, not distressed  HEENT:  normocephalic atraumatic, external ears normal appearance, no nasal deformity, no neck masses or edema, patient protecting airway, no stridor, phonating well  Eyes: pupils equal, sclera non-icteric, no discharge  Cardiovascular: no JVD  Respiratory: non-labored breathing, effort normal, no accessory muscle use visulized, no audible wheezing  Gastrointestinal: Abdomen not distended  Musculoskeletal: Examination of right foot reveals tenderness over the dorsal aspect of metatarsals. No ankle tenderness. Mild swelling over dorsal aspect. Brisk cap refill. Distal sensation intact. 2/2 dp and pt pulses. Ambulatory with limp.    Skin: no pallor, no rashes visible, no bruising, no abrasions   Neuro: alert and oriented times 3, GCS 15, normal coordination, no dysarthria or aphasia  Psych: normal mood and affect, cooperative, normal thought content              DIAGNOSTIC RESULTS     EKG: All EKG's are interpreted by the Emergency Department Physician who either signs or Co-signs this chart in the absence of a cardiologist.        RADIOLOGY:   All plain film, CT, MRI, and formal ultrasound images (except ED bedside ultrasound) are read by the radiologist, see reports below, unless otherwise noted in MDM or here. XR FOOT RIGHT (MIN 3 VIEWS)   Final Result   No acute osseous abnormality. No results found. LABS:  Labs Reviewed - No data to display    All other labs were within normal range or not returned as of this dictation. EMERGENCY DEPARTMENT COURSE and DIFFERENTIAL DIAGNOSIS/MDM:   Vitals:    Vitals:    11/13/20 1744   BP: (!) 133/99   Pulse: 70   Resp: 18   Temp: 97.9 °F (36.6 °C)   TempSrc: Oral   SpO2: 99%   Weight: 180 lb (81.6 kg)   Height: 5' 3\" (1.6 m)       Patient instructed to return to the emergency room if symptoms worsen, return, or any other concern right away which is agreed by the patient    ED MEDS:  No orders of the defined types were placed in this encounter. CONSULTS:  None    PROCEDURES:  None      FINAL IMPRESSION      1. Right foot pain          DISPOSITION/PLAN   DISPOSITION Decision To Discharge    PATIENT REFERRED TO:  Estella Slater [address] [address]  [phone]          [facility]  [facility] [address]  [facility], [address] [facility]  [phone]            DISCHARGE MEDICATIONS:  Discharge Medication List as of 11/13/2020  6:22 PM            Summation      Patient Course:      Tenderness over the right foot. Injury several months ago. Re-injured foot several days ago. Pain over the metatarsals. She is neurovascularly intact. No deformities noted. Will get xray. Xray is unremarkable for acute abnormality. Will dc home. Recommend motrin, tylenol, rest/ ice/ elevate. Please follow up with podiatry. Discussed results and plan with the pt. They expressed appropriate understanding. Pt given close follow up, supportive care instructions and strict return instructions at the bedside.         ED Medications administered this visit:  Medications - No data to display    New Prescriptions from this visit:    Discharge Medication List as of 11/13/2020  6:22 PM          Follow-up:  Yao Quispe, APRN  [address] [place] [id number]  [phone]          [facility]  [address] [id number]  Kirill Johnson, [address] [place], [place] [id number]  [phone]              Final Impression:   1.  Right foot pain               (Please note that portions of this note were completed with a voice recognition program.  Efforts were made to edit the dictations but occasionally words are mis-transcribed.)      (Please note that portions of this note were completed with a voice recognition program.  Efforts were made to edit the dictations but occasionally words are mis-transcribed.)    Fozia Baxter, [address] [place], PA-  11/13/20 4480

## 2020-11-13 NOTE — ED PROVIDER NOTES
eMERGENCY dEPARTMENT eNCOUnter   Independent Attestation     Pt Name: Latasha Bocanegra  MRN: [id number]  [place] 1975  Date of evaluation: 11/13/20     Latasha Bocanegra is a 41 y.o. female with CC: Foot Injury      Based on the medical record the care appears appropriate. I was personally available for consultation in the Emergency Department.     Yahir Alexis MD  Attending Emergency Physician                   Yahir Alexis MD  11/13/20 9869

## 2021-01-13 ENCOUNTER — APPOINTMENT (OUTPATIENT)
Dept: GENERAL RADIOLOGY | Age: 46
End: 2021-01-13
Payer: COMMERCIAL

## 2021-01-13 ENCOUNTER — HOSPITAL ENCOUNTER (EMERGENCY)
Age: 46
Discharge: HOME | End: 2021-01-13
Attending: STUDENT IN AN ORGANIZED HEALTH CARE EDUCATION/TRAINING PROGRAM
Payer: COMMERCIAL

## 2021-01-13 VITALS
OXYGEN SATURATION: 99 % | HEART RATE: 81 BPM | HEIGHT: 63 IN | BODY MASS INDEX: 31.89 KG/M2 | SYSTOLIC BLOOD PRESSURE: 144 MMHG | TEMPERATURE: 98.2 F | RESPIRATION RATE: 18 BRPM | DIASTOLIC BLOOD PRESSURE: 90 MMHG

## 2021-01-13 DIAGNOSIS — J18.9 PNEUMONIA DUE TO ORGANISM: Primary | ICD-10-CM

## 2021-01-13 LAB
ABSOLUTE EOS #: 0.3 K/UL (ref 0–0.4)
ABSOLUTE IMMATURE GRANULOCYTE: NORMAL K/UL (ref 0–0.3)
ABSOLUTE LYMPH #: 2.5 K/UL (ref 1–4.8)
ABSOLUTE MONO #: 0.5 K/UL (ref 0.1–1.3)
ALBUMIN SERPL-MCNC: 4.3 G/DL (ref 3.5–5.2)
ALBUMIN/GLOBULIN RATIO: NORMAL (ref 1–2.5)
ALP BLD-CCNC: 82 U/L (ref 35–104)
ALT SERPL-CCNC: 15 U/L (ref 5–33)
ANION GAP SERPL CALCULATED.3IONS-SCNC: 11 MMOL/L (ref 9–17)
AST SERPL-CCNC: 22 U/L
BASOPHILS # BLD: 1 % (ref 0–2)
BASOPHILS ABSOLUTE: 0.1 K/UL (ref 0–0.2)
BILIRUB SERPL-MCNC: 0.62 MG/DL (ref 0.3–1.2)
BUN BLDV-MCNC: 8 MG/DL (ref 6–20)
BUN/CREAT BLD: NORMAL (ref 9–20)
CALCIUM SERPL-MCNC: 9.4 MG/DL (ref 8.6–10.4)
CHLORIDE BLD-SCNC: 106 MMOL/L (ref 98–107)
CO2: 20 MMOL/L (ref 20–31)
CREAT SERPL-MCNC: 0.58 MG/DL (ref 0.5–0.9)
DIFFERENTIAL TYPE: NORMAL
DIRECT EXAM: NORMAL
EOSINOPHILS RELATIVE PERCENT: 3 % (ref 0–4)
GFR AFRICAN AMERICAN: >60 ML/MIN
GFR NON-AFRICAN AMERICAN: >60 ML/MIN
GFR SERPL CREATININE-BSD FRML MDRD: NORMAL ML/MIN/{1.73_M2}
GFR SERPL CREATININE-BSD FRML MDRD: NORMAL ML/MIN/{1.73_M2}
GLUCOSE BLD-MCNC: 85 MG/DL (ref 70–99)
HCT VFR BLD CALC: 42.7 % (ref 36–46)
HEMOGLOBIN: 14.3 G/DL (ref 12–16)
IMMATURE GRANULOCYTES: NORMAL %
LYMPHOCYTES # BLD: 27 % (ref 24–44)
Lab: NORMAL
MCH RBC QN AUTO: 32 PG (ref 26–34)
MCHC RBC AUTO-ENTMCNC: 33.4 G/DL (ref 31–37)
MCV RBC AUTO: 95.8 FL (ref 80–100)
MONOCYTES # BLD: 5 % (ref 1–7)
NRBC AUTOMATED: NORMAL PER 100 WBC
PDW BLD-RTO: 13.7 % (ref 11.5–14.9)
PLATELET # BLD: 198 K/UL (ref 150–450)
PLATELET ESTIMATE: NORMAL
PMV BLD AUTO: 8.3 FL (ref 6–12)
POTASSIUM SERPL-SCNC: 3.9 MMOL/L (ref 3.7–5.3)
RBC # BLD: 4.46 M/UL (ref 4–5.2)
RBC # BLD: NORMAL 10*6/UL
SEG NEUTROPHILS: 64 % (ref 36–66)
SEGMENTED NEUTROPHILS ABSOLUTE COUNT: 6 K/UL (ref 1.3–9.1)
SODIUM BLD-SCNC: 137 MMOL/L (ref 135–144)
SPECIMEN DESCRIPTION: NORMAL
TOTAL PROTEIN: 7.2 G/DL (ref 6.4–8.3)
WBC # BLD: 9.2 K/UL (ref 3.5–11)
WBC # BLD: NORMAL 10*3/UL

## 2021-01-13 PROCEDURE — 80053 COMPREHEN METABOLIC PANEL: CPT

## 2021-01-13 PROCEDURE — 6370000000 HC RX 637 (ALT 250 FOR IP): Performed by: STUDENT IN AN ORGANIZED HEALTH CARE EDUCATION/TRAINING PROGRAM

## 2021-01-13 PROCEDURE — 36415 COLL VENOUS BLD VENIPUNCTURE: CPT

## 2021-01-13 PROCEDURE — 87804 INFLUENZA ASSAY W/OPTIC: CPT

## 2021-01-13 PROCEDURE — 71045 X-RAY EXAM CHEST 1 VIEW: CPT

## 2021-01-13 PROCEDURE — 85025 COMPLETE CBC W/AUTO DIFF WBC: CPT

## 2021-01-13 PROCEDURE — 99285 EMERGENCY DEPT VISIT HI MDM: CPT

## 2021-01-13 PROCEDURE — 2580000003 HC RX 258: Performed by: STUDENT IN AN ORGANIZED HEALTH CARE EDUCATION/TRAINING PROGRAM

## 2021-01-13 RX ORDER — 0.9 % SODIUM CHLORIDE 0.9 %
1000 INTRAVENOUS SOLUTION INTRAVENOUS ONCE
Status: COMPLETED | OUTPATIENT
Start: 2021-01-13 | End: 2021-01-13

## 2021-01-13 RX ORDER — AZITHROMYCIN 250 MG/1
250 TABLET, FILM COATED ORAL SEE ADMIN INSTRUCTIONS
Qty: 6 TABLET | Refills: 0 | Status: SHIPPED | OUTPATIENT
Start: 2021-01-13 | End: 2021-01-18

## 2021-01-13 RX ORDER — ACETAMINOPHEN 325 MG/1
650 TABLET ORAL ONCE
Status: COMPLETED | OUTPATIENT
Start: 2021-01-13 | End: 2021-01-13

## 2021-01-13 RX ORDER — BENZONATATE 100 MG/1
100 CAPSULE ORAL ONCE
Status: COMPLETED | OUTPATIENT
Start: 2021-01-13 | End: 2021-01-13

## 2021-01-13 RX ADMIN — SODIUM CHLORIDE 1000 ML: 9 INJECTION, SOLUTION INTRAVENOUS at 20:49

## 2021-01-13 RX ADMIN — BENZONATATE 100 MG: 100 CAPSULE ORAL at 20:36

## 2021-01-13 RX ADMIN — ACETAMINOPHEN 650 MG: 325 TABLET, FILM COATED ORAL at 20:36

## 2021-01-13 ASSESSMENT — ENCOUNTER SYMPTOMS
VOMITING: 0
DIARRHEA: 0
COUGH: 1
EYE ITCHING: 0
FACIAL SWELLING: 0
PHOTOPHOBIA: 0
COLOR CHANGE: 0
RHINORRHEA: 0
NAUSEA: 0
ABDOMINAL PAIN: 0
SHORTNESS OF BREATH: 0

## 2021-01-13 ASSESSMENT — PAIN SCALES - GENERAL: PAINLEVEL_OUTOF10: 2

## 2021-01-14 NOTE — ED PROVIDER NOTES
EMERGENCY DEPARTMENT ENCOUNTER    Pt Name: Josiah Rizvi  MRN: [id number]  [place] 1975  Date of evaluation: 21  CHIEF COMPLAINT       Chief Complaint   Patient presents with    Cough    Chills    Headache    Generalized Body Aches     HISTORY OF PRESENT ILLNESS   HPI  46-year-old female history of tobacco smoking, anxiety, arthritis, pneumonia presents for evaluation of cough, chills, and generalized body aches. Symptoms have been present for the last day. Gradually worsening over the day. Cough nonproductive. No abdominal pain or chest pain. No significant shortness of breath. No nausea vomiting diarrhea. No fevers at home. No home treatment prior to arrival.  No urinary symptoms. Symptoms are moderate and persistent. REVIEW OF SYSTEMS     Review of Systems   Constitutional: Positive for chills and fatigue. HENT: Negative for facial swelling, postnasal drip and rhinorrhea. Eyes: Negative for photophobia and itching. Respiratory: Positive for cough. Negative for shortness of breath. Cardiovascular: Negative for chest pain and leg swelling. Gastrointestinal: Negative for abdominal pain, diarrhea, nausea and vomiting. Genitourinary: Negative for dysuria, flank pain and hematuria. Musculoskeletal: Positive for myalgias. Negative for arthralgias and joint swelling. Skin: Negative for color change and rash. Neurological: Negative for dizziness, numbness and headaches. PASTMEDICAL HISTORY     Past Medical History:   Diagnosis Date    Anxiety     Arthritis     OA    Depression     post partum    Diabetes, gestational     history of    Ground glass opacity present on imaging of lung     History of mycoplasma pneumonia     Light cigarette smoker (1-9 cigarettes per day)     Menarche @ 16 y/o    Neuropathy     CTS    Obesity     Obesity (BMI 30.0-34. 9)     Parity       -    Pneumonia 2018    several times this year, had pneumonia last 10-    Restless leg baseline. MEDICAL DECISION MAKIN-year-old female presents for evaluation of cough chills generalized body aches x1 day. Will get an x-ray to evaluate for pneumonia, pleural effusion, pneumothorax. Will get a flu swab with 1 day duration of symptoms. We will get some basic labs to evaluate for anemia, electrolyte abnormality, renal dysfunction. Will treat symptomatically with Tylenol, Tessalon Pearls, IV fluid. Labs unremarkable. Chest x-ray with a left-sided opacity. Will treat for pneumonia with patient's symptoms. Discharged with a Z-Adiel as patient has stable vital signs and normal lab work. Covid quarantine precautions advised. CRITICAL CARE:       PROCEDURES:    Procedures    DIAGNOSTIC RESULTS   EKG:All EKG's are interpreted by the Emergency Department Physician who either signs or Co-signs this chart in the absence of a cardiologist.        RADIOLOGY:All plain film, CT, MRI, and formal ultrasound images (except ED bedside ultrasound) are read by the radiologist, see reports below, unless otherwisenoted in MDM or here. XR CHEST PORTABLE   Final Result   Left mid lung and bibasilar opacities. Atelectasis and early pneumonia are   in the differential.           LABS: All lab results were reviewed by myself, and all abnormals are listed below.   Labs Reviewed   RAPID INFLUENZA A/B ANTIGENS   CBC WITH AUTO DIFFERENTIAL   COMPREHENSIVE METABOLIC PANEL W/ REFLEX TO MG FOR LOW K       EMERGENCY DEPARTMENTCOURSE:         Vitals:    Vitals:    21 1853   BP: (!) 144/90   Pulse: 81   Resp: 18   Temp: 98.2 °F (36.8 °C)   SpO2: 99%   Height: 5' 3\" (1.6 m)       The patient was given the following medications while in the emergency department:  Orders Placed This Encounter   Medications    0.9 % sodium chloride IV bolus 1,000 mL    acetaminophen (TYLENOL) tablet 650 mg    benzonatate (TESSALON) capsule 100 mg    azithromycin (ZITHROMAX) 250 MG tablet     Sig: Take 1 tablet by mouth See Admin Instructions for 5 days 500mg on day 1 followed by 250mg on days 2 - 5     Dispense:  6 tablet     Refill:  0     CONSULTS:  None    FINAL IMPRESSION      1.  Pneumonia due to organism          DISPOSITION/PLAN   DISPOSITION Decision To Discharge 01/13/2021 09:30:33 PM      PATIENT REFERRED TO:  BRICE Oneill  [address] [place] [address] Ave  [phone]    Call   As needed    DISCHARGE MEDICATIONS:  Discharge Medication List as of 1/13/2021  9:37 PM      START taking these medications    Details   azithromycin (ZITHROMAX) 250 MG tablet Take 1 tablet by mouth See Admin Instructions for 5 days 500mg on day 1 followed by 250mg on days 2 - 5, Disp-6 tablet, R-0Print           Mayra Nevarez MD  Attending Emergency Physician                    Mayra Nevarez MD  01/14/21 8671

## 2021-01-14 NOTE — ED NOTES
Pt discharged in stable condition with prescriptions and dc instructions. Pt ambulates to door with steady gait and without assistance.         Dasia Melgar RN  01/13/21 8553

## 2021-01-14 NOTE — ED NOTES
Mode of arrival (squad #, walk in, police, etc) : walk in         Chief complaint(s): cough, sob, chest pain, body aches         Arrival Note (brief scenario, treatment PTA, etc). : Pt states she has been having flu like symptoms for a few days. Pt states today has been the worst with her body aches and chills. Pt voice is hoarse. Speaking in full sentences. GCS 15      C= \"Have you ever felt that you should Cut down on your drinking? \"  No  A= \"Have people Annoyed you by criticizing your drinking? \"  No  G= \"Have you ever felt bad or Guilty about your drinking? \"  No  E= \"Have you ever had a drink as an Eye-opener first thing in the morning to steady your nerves or to help a hangover? \"  No      Deferred []      Reason for deferring: N/A    *If yes to two or more: probable alcohol abuse. Ankush Gross RN  01/13/21 2055

## 2021-01-15 ENCOUNTER — TELEPHONE (OUTPATIENT)
Dept: OBGYN CLINIC | Age: 46
End: 2021-01-15

## 2021-01-15 NOTE — TELEPHONE ENCOUNTER
Patient called in stated that she has pneumonia and every time she coughs and goes to the bathroom she is having spotting and also her bladder is weak. She didn't know if she should try to come in or not. Pt also stated that she had a covid test yesterday. Told pt that she could not be scheduled until after her results come back. Told pt that call us back after her results come back if they are negative we can get her scheduled at that time. Pt gave verbal understanding.

## 2021-02-02 ENCOUNTER — OFFICE VISIT (OUTPATIENT)
Dept: OBGYN CLINIC | Age: 46
End: 2021-02-02
Payer: COMMERCIAL

## 2021-02-02 ENCOUNTER — HOSPITAL ENCOUNTER (OUTPATIENT)
Age: 46
Setting detail: SPECIMEN
Discharge: HOME | End: 2021-02-02
Payer: COMMERCIAL

## 2021-02-02 VITALS
SYSTOLIC BLOOD PRESSURE: 150 MMHG | WEIGHT: 195 LBS | HEART RATE: 84 BPM | BODY MASS INDEX: 34.55 KG/M2 | HEIGHT: 63 IN | DIASTOLIC BLOOD PRESSURE: 94 MMHG

## 2021-02-02 DIAGNOSIS — N89.8 VAGINAL DISCHARGE: ICD-10-CM

## 2021-02-02 DIAGNOSIS — N76.0 ACUTE VAGINITIS: Primary | ICD-10-CM

## 2021-02-02 LAB
DIRECT EXAM: ABNORMAL
Lab: ABNORMAL
SPECIMEN DESCRIPTION: ABNORMAL

## 2021-02-02 PROCEDURE — G8484 FLU IMMUNIZE NO ADMIN: HCPCS | Performed by: SPECIALIST

## 2021-02-02 PROCEDURE — 99213 OFFICE O/P EST LOW 20 MIN: CPT | Performed by: SPECIALIST

## 2021-02-02 PROCEDURE — G8417 CALC BMI ABV UP PARAM F/U: HCPCS | Performed by: SPECIALIST

## 2021-02-02 PROCEDURE — G8427 DOCREV CUR MEDS BY ELIG CLIN: HCPCS | Performed by: SPECIALIST

## 2021-02-02 PROCEDURE — 4004F PT TOBACCO SCREEN RCVD TLK: CPT | Performed by: SPECIALIST

## 2021-02-02 RX ORDER — FLUCONAZOLE 100 MG/1
100 TABLET ORAL DAILY
Qty: 7 TABLET | Refills: 0 | Status: SHIPPED | OUTPATIENT
Start: 2021-02-02 | End: 2021-02-09

## 2021-02-02 RX ORDER — HYDROCHLOROTHIAZIDE 25 MG/1
25 TABLET ORAL DAILY
COMMUNITY

## 2021-02-02 RX ORDER — METRONIDAZOLE 500 MG/1
500 TABLET ORAL 2 TIMES DAILY
Qty: 14 TABLET | Refills: 0 | Status: SHIPPED | OUTPATIENT
Start: 2021-02-02 | End: 2021-02-09

## 2021-02-02 ASSESSMENT — ENCOUNTER SYMPTOMS
ABDOMINAL DISTENTION: 0
COUGH: 0
CONSTIPATION: 0
APNEA: 0
ABDOMINAL PAIN: 0
VOMITING: 0
EYE PAIN: 0
DIARRHEA: 0
NAUSEA: 0

## 2021-02-02 NOTE — PROGRESS NOTES
Subjective:      Patient ID: Anita Kanner is a 41 y.o. female. Chief Complaint   Patient presents with    Vaginal Discharge     BP (!) 140/100 (Site: Left Upper Arm, Position: Sitting, Cuff Size: Medium Adult)   Pulse 84   Ht 5' 3\" (1.6 m)   Wt 195 lb (88.5 kg)   Breastfeeding No   BMI 34.54 kg/m²   No LMP recorded. Patient has had an ablation.     Past Medical History:   Diagnosis Date    Anxiety     Arthritis     OA    Depression     post partum    Diabetes, gestational     history of    Ground glass opacity present on imaging of lung     History of mycoplasma pneumonia     Light cigarette smoker (1-9 cigarettes per day)     Menarche @ 16 y/o    Neuropathy     CTS    Obesity     Obesity (BMI 30.0-34. 9)     Parity       -    Pneumonia 2018    several times this year, had pneumonia last 10-    Restless leg syndrome     Sleep apnea     doesn't use CPAP    UTI (lower urinary tract infection)     with pregnancy    Wears glasses      Current Outpatient Medications Ordered in Epic   Medication Sig Dispense Refill    hydroCHLOROthiazide (HYDRODIURIL) 25 MG tablet Take 25 mg by mouth daily      metroNIDAZOLE (FLAGYL) 500 MG tablet Take 1 tablet by mouth 2 times daily for 7 days 14 tablet 0    fluconazole (DIFLUCAN) 100 MG tablet Take 1 tablet by mouth daily for 7 days 7 tablet 0    naproxen (NAPROSYN) 500 MG tablet Take 500 mg by mouth daily       No current [facility]-ordered facility-administered medications on file.       Problem List Items Addressed This Visit     None      Visit Diagnoses     Acute vaginitis    -  Primary    Vaginal discharge        Relevant Orders    VAGINITIS DNA PROBE        Allergies   Allergen Reactions    Bactrim [Sulfamethoxazole-Trimethoprim] Hives and Itching    Dicloxacillin Hives    Pcn [Penicillins] Hives     Orders Placed This Encounter   Procedures    VAGINITIS DNA PROBE     Standing Status:   Future     Standing Expiration Date:   2022 Patient is here today because she has a vaginal discharge. She has been taking antibiotics. Patient states that she has to wear a pad and that it is irritating. She started antibiotics last Tuesday and the discharged started on Thursday. Review of Systems   Constitutional: Negative for activity change, appetite change and fever. HENT: Negative for ear discharge and ear pain. Eyes: Negative for pain and visual disturbance. Respiratory: Negative for apnea and cough. Cardiovascular: Negative for chest pain, palpitations and leg swelling. Gastrointestinal: Negative for abdominal distention, abdominal pain, constipation, diarrhea, nausea and vomiting. Endocrine: Negative. Genitourinary: Positive for vaginal discharge. Negative for difficulty urinating, dysuria, menstrual problem and pelvic pain. Musculoskeletal: Negative for neck pain and neck stiffness. Skin: Negative. Neurological: Negative for light-headedness and numbness. Hematological: Negative. Does not bruise/bleed easily. Objective:   Physical Exam  Vitals signs and nursing note reviewed. Exam conducted with a chaperone present. Constitutional:       Appearance: She is well-developed. HENT:      Head: Normocephalic and atraumatic. Neck:      Musculoskeletal: Normal range of motion and neck supple. Thyroid: No thyromegaly. Cardiovascular:      Rate and Rhythm: Normal rate and regular rhythm. Pulmonary:      Effort: Pulmonary effort is normal.      Breath sounds: Normal breath sounds. No wheezing. Abdominal:      General: Bowel sounds are normal. There is no distension. Palpations: Abdomen is soft. There is no mass. Tenderness: There is no abdominal tenderness. There is no guarding. Genitourinary:     Vagina: Vaginal discharge present. Musculoskeletal: Normal range of motion. Skin:     General: Skin is dry.    Neurological: Mental Status: She is alert and oriented to person, place, and time. Psychiatric:         Behavior: Behavior normal.         Thought Content: Thought content normal.         Assessment:      Patient with vaginitis. Cultures were done. Will treat with Flagyl and Diflucan. Patient was told to call for result of cultures on Friday. Additional treatment may be needed pending the results of cultures. Patient was advised to schedule an appointment for an annual exam.        Plan:       Orders Placed This Encounter   Medications    metroNIDAZOLE (FLAGYL) 500 MG tablet     Sig: Take 1 tablet by mouth 2 times daily for 7 days     Dispense:  14 tablet     Refill:  0    fluconazole (DIFLUCAN) 100 MG tablet     Sig: Take 1 tablet by mouth daily for 7 days     Dispense:  7 tablet     Refill:  0      Appointment for annual exam.    IMarcos, am scribing for, and in the presence of Dr. Deena Severs. Electronically signed by: Marcos Murillo 2/2/21 10:55 AM       I agree to the above documentation placed by my scribe Marcos Murillo. I reviewed the scribe's note and agree with the documented findings and plan of care. Any areas of disagreement are noted on the chart. I have personally evaluated this patient. Additional findings are as noted. I agree with the chief complaint, past medical history, past surgical history, allergies, medications, social and family history as documented unless otherwise noted below.      Electronically signed by Deena Severs, MD on 2/3/2021 at 1:23 AM

## 2021-02-03 LAB
C TRACH DNA GENITAL QL NAA+PROBE: NEGATIVE
N. GONORRHOEAE DNA: NEGATIVE
SPECIMEN DESCRIPTION: NORMAL

## 2021-02-04 ENCOUNTER — TELEPHONE (OUTPATIENT)
Dept: OBGYN CLINIC | Age: 46
End: 2021-02-04

## 2021-02-04 NOTE — TELEPHONE ENCOUNTER
----- Message from Tr Arciniega MD sent at 2/3/2021  2:15 PM EST -----  Notify pt partner needs treat. for Estiven Haney

## 2021-02-05 ENCOUNTER — TELEPHONE (OUTPATIENT)
Dept: OBGYN CLINIC | Age: 46
End: 2021-02-05

## 2021-02-05 LAB
CULTURE: NORMAL
CULTURE: NORMAL
Lab: NORMAL
SPECIMEN DESCRIPTION: NORMAL

## 2021-02-05 NOTE — TELEPHONE ENCOUNTER
Tried calling pt to let her know partner needed to be treated for trich but young child answered and stated that pt was sleep and would have to call back later.

## 2021-02-05 NOTE — TELEPHONE ENCOUNTER
----- Message from Nicola Tinoco MD sent at 2/3/2021  2:15 PM EST -----  Notify pt partner needs treat. for Estiven Haney

## 2021-04-29 ENCOUNTER — APPOINTMENT (OUTPATIENT)
Dept: CT IMAGING | Age: 46
End: 2021-04-29
Payer: COMMERCIAL

## 2021-04-29 ENCOUNTER — APPOINTMENT (OUTPATIENT)
Dept: GENERAL RADIOLOGY | Age: 46
End: 2021-04-29
Payer: COMMERCIAL

## 2021-04-29 ENCOUNTER — HOSPITAL ENCOUNTER (EMERGENCY)
Age: 46
Discharge: HOME | End: 2021-04-29
Attending: EMERGENCY MEDICINE
Payer: COMMERCIAL

## 2021-04-29 VITALS
HEIGHT: 63 IN | RESPIRATION RATE: 19 BRPM | TEMPERATURE: 98.2 F | SYSTOLIC BLOOD PRESSURE: 124 MMHG | WEIGHT: 195 LBS | OXYGEN SATURATION: 96 % | DIASTOLIC BLOOD PRESSURE: 87 MMHG | HEART RATE: 84 BPM | BODY MASS INDEX: 34.55 KG/M2

## 2021-04-29 DIAGNOSIS — R07.9 CHEST PAIN, UNSPECIFIED TYPE: Primary | ICD-10-CM

## 2021-04-29 LAB
ABSOLUTE EOS #: 0.2 K/UL (ref 0–0.4)
ABSOLUTE IMMATURE GRANULOCYTE: ABNORMAL K/UL (ref 0–0.3)
ABSOLUTE LYMPH #: 2.5 K/UL (ref 1–4.8)
ABSOLUTE MONO #: 0.9 K/UL (ref 0.1–1.3)
ALBUMIN SERPL-MCNC: 4.3 G/DL (ref 3.5–5.2)
ALBUMIN/GLOBULIN RATIO: NORMAL (ref 1–2.5)
ALP BLD-CCNC: 94 U/L (ref 35–104)
ALT SERPL-CCNC: 22 U/L (ref 5–33)
ANION GAP SERPL CALCULATED.3IONS-SCNC: 14 MMOL/L (ref 9–17)
AST SERPL-CCNC: 21 U/L
BASOPHILS # BLD: 0 % (ref 0–2)
BASOPHILS ABSOLUTE: 0.1 K/UL (ref 0–0.2)
BILIRUB SERPL-MCNC: 0.47 MG/DL (ref 0.3–1.2)
BILIRUBIN DIRECT: 0.16 MG/DL
BILIRUBIN, INDIRECT: 0.31 MG/DL (ref 0–1)
BUN BLDV-MCNC: 12 MG/DL (ref 6–20)
BUN/CREAT BLD: ABNORMAL (ref 9–20)
CALCIUM SERPL-MCNC: 9.4 MG/DL (ref 8.6–10.4)
CHLORIDE BLD-SCNC: 99 MMOL/L (ref 98–107)
CO2: 25 MMOL/L (ref 20–31)
CREAT SERPL-MCNC: 0.66 MG/DL (ref 0.5–0.9)
D-DIMER QUANTITATIVE: 1.09 MG/L FEU (ref 0–0.59)
DIFFERENTIAL TYPE: ABNORMAL
EKG ATRIAL RATE: 103 BPM
EKG P AXIS: 27 DEGREES
EKG P-R INTERVAL: 130 MS
EKG Q-T INTERVAL: 322 MS
EKG QRS DURATION: 94 MS
EKG QTC CALCULATION (BAZETT): 421 MS
EKG R AXIS: 39 DEGREES
EKG T AXIS: 37 DEGREES
EKG VENTRICULAR RATE: 103 BPM
EOSINOPHILS RELATIVE PERCENT: 1 % (ref 0–4)
ETHANOL PERCENT: <0.01 %
ETHANOL: <10 MG/DL
GFR AFRICAN AMERICAN: >60 ML/MIN
GFR NON-AFRICAN AMERICAN: >60 ML/MIN
GFR SERPL CREATININE-BSD FRML MDRD: ABNORMAL ML/MIN/{1.73_M2}
GFR SERPL CREATININE-BSD FRML MDRD: ABNORMAL ML/MIN/{1.73_M2}
GLOBULIN: NORMAL G/DL (ref 1.5–3.8)
GLUCOSE BLD-MCNC: 133 MG/DL (ref 70–99)
HCT VFR BLD CALC: 40.5 % (ref 36–46)
HEMOGLOBIN: 13.6 G/DL (ref 12–16)
IMMATURE GRANULOCYTES: ABNORMAL %
LIPASE: 25 U/L (ref 13–60)
LYMPHOCYTES # BLD: 18 % (ref 24–44)
MAGNESIUM: 2.1 MG/DL (ref 1.6–2.6)
MCH RBC QN AUTO: 31.7 PG (ref 26–34)
MCHC RBC AUTO-ENTMCNC: 33.6 G/DL (ref 31–37)
MCV RBC AUTO: 94.2 FL (ref 80–100)
MONOCYTES # BLD: 6 % (ref 1–7)
NRBC AUTOMATED: ABNORMAL PER 100 WBC
PDW BLD-RTO: 13.6 % (ref 11.5–14.9)
PLATELET # BLD: 188 K/UL (ref 150–450)
PLATELET ESTIMATE: ABNORMAL
PMV BLD AUTO: 9.5 FL (ref 6–12)
POTASSIUM SERPL-SCNC: 3.1 MMOL/L (ref 3.7–5.3)
RBC # BLD: 4.3 M/UL (ref 4–5.2)
RBC # BLD: ABNORMAL 10*6/UL
SEG NEUTROPHILS: 75 % (ref 36–66)
SEGMENTED NEUTROPHILS ABSOLUTE COUNT: 10.4 K/UL (ref 1.3–9.1)
SODIUM BLD-SCNC: 138 MMOL/L (ref 135–144)
TOTAL PROTEIN: 7.4 G/DL (ref 6.4–8.3)
TROPONIN INTERP: NORMAL
TROPONIN T: NORMAL NG/ML
TROPONIN, HIGH SENSITIVITY: <6 NG/L (ref 0–14)
WBC # BLD: 14.1 K/UL (ref 3.5–11)
WBC # BLD: ABNORMAL 10*3/UL

## 2021-04-29 PROCEDURE — 74177 CT ABD & PELVIS W/CONTRAST: CPT

## 2021-04-29 PROCEDURE — 2580000003 HC RX 258: Performed by: EMERGENCY MEDICINE

## 2021-04-29 PROCEDURE — 96374 THER/PROPH/DIAG INJ IV PUSH: CPT

## 2021-04-29 PROCEDURE — 80048 BASIC METABOLIC PNL TOTAL CA: CPT

## 2021-04-29 PROCEDURE — 93005 ELECTROCARDIOGRAM TRACING: CPT | Performed by: EMERGENCY MEDICINE

## 2021-04-29 PROCEDURE — 6370000000 HC RX 637 (ALT 250 FOR IP): Performed by: EMERGENCY MEDICINE

## 2021-04-29 PROCEDURE — 99282 EMERGENCY DEPT VISIT SF MDM: CPT

## 2021-04-29 PROCEDURE — 85025 COMPLETE CBC W/AUTO DIFF WBC: CPT

## 2021-04-29 PROCEDURE — 96375 TX/PRO/DX INJ NEW DRUG ADDON: CPT

## 2021-04-29 PROCEDURE — 83690 ASSAY OF LIPASE: CPT

## 2021-04-29 PROCEDURE — 71045 X-RAY EXAM CHEST 1 VIEW: CPT

## 2021-04-29 PROCEDURE — 6360000002 HC RX W HCPCS: Performed by: EMERGENCY MEDICINE

## 2021-04-29 PROCEDURE — 83735 ASSAY OF MAGNESIUM: CPT

## 2021-04-29 PROCEDURE — 71260 CT THORAX DX C+: CPT

## 2021-04-29 PROCEDURE — 36415 COLL VENOUS BLD VENIPUNCTURE: CPT

## 2021-04-29 PROCEDURE — 85379 FIBRIN DEGRADATION QUANT: CPT

## 2021-04-29 PROCEDURE — G0480 DRUG TEST DEF 1-7 CLASSES: HCPCS

## 2021-04-29 PROCEDURE — 6360000004 HC RX CONTRAST MEDICATION: Performed by: EMERGENCY MEDICINE

## 2021-04-29 PROCEDURE — 80076 HEPATIC FUNCTION PANEL: CPT

## 2021-04-29 PROCEDURE — 84484 ASSAY OF TROPONIN QUANT: CPT

## 2021-04-29 RX ORDER — MORPHINE SULFATE 4 MG/ML
4 INJECTION, SOLUTION INTRAMUSCULAR; INTRAVENOUS ONCE
Status: COMPLETED | OUTPATIENT
Start: 2021-04-29 | End: 2021-04-29

## 2021-04-29 RX ORDER — 0.9 % SODIUM CHLORIDE 0.9 %
80 INTRAVENOUS SOLUTION INTRAVENOUS ONCE
Status: COMPLETED | OUTPATIENT
Start: 2021-04-29 | End: 2021-04-29

## 2021-04-29 RX ORDER — POTASSIUM CHLORIDE 20 MEQ/1
40 TABLET, EXTENDED RELEASE ORAL ONCE
Status: COMPLETED | OUTPATIENT
Start: 2021-04-29 | End: 2021-04-29

## 2021-04-29 RX ORDER — ROPINIROLE 5 MG/1
7.5 TABLET, FILM COATED ORAL NIGHTLY
COMMUNITY

## 2021-04-29 RX ORDER — SODIUM CHLORIDE 0.9 % (FLUSH) 0.9 %
10 SYRINGE (ML) INJECTION PRN
Status: DISCONTINUED | OUTPATIENT
Start: 2021-04-29 | End: 2021-04-29 | Stop reason: HOSPADM

## 2021-04-29 RX ORDER — 0.9 % SODIUM CHLORIDE 0.9 %
1000 INTRAVENOUS SOLUTION INTRAVENOUS ONCE
Status: COMPLETED | OUTPATIENT
Start: 2021-04-29 | End: 2021-04-29

## 2021-04-29 RX ORDER — ONDANSETRON 2 MG/ML
4 INJECTION INTRAMUSCULAR; INTRAVENOUS ONCE
Status: COMPLETED | OUTPATIENT
Start: 2021-04-29 | End: 2021-04-29

## 2021-04-29 RX ADMIN — SODIUM CHLORIDE, PRESERVATIVE FREE 10 ML: 5 INJECTION INTRAVENOUS at 04:25

## 2021-04-29 RX ADMIN — MORPHINE SULFATE 4 MG: 4 INJECTION, SOLUTION INTRAMUSCULAR; INTRAVENOUS at 03:32

## 2021-04-29 RX ADMIN — SODIUM CHLORIDE 1000 ML: 9 INJECTION, SOLUTION INTRAVENOUS at 03:32

## 2021-04-29 RX ADMIN — IOPAMIDOL 100 ML: 755 INJECTION, SOLUTION INTRAVENOUS at 04:25

## 2021-04-29 RX ADMIN — POTASSIUM CHLORIDE 40 MEQ: 20 TABLET, EXTENDED RELEASE ORAL at 04:15

## 2021-04-29 RX ADMIN — ONDANSETRON 4 MG: 2 INJECTION INTRAMUSCULAR; INTRAVENOUS at 03:32

## 2021-04-29 RX ADMIN — SODIUM CHLORIDE 80 ML: 9 INJECTION, SOLUTION INTRAVENOUS at 04:25

## 2021-04-29 ASSESSMENT — ENCOUNTER SYMPTOMS
VOMITING: 1
ABDOMINAL PAIN: 1
NAUSEA: 1
DIARRHEA: 0
BACK PAIN: 0
COUGH: 0
SHORTNESS OF BREATH: 1

## 2021-04-29 NOTE — ED NOTES
Mode of arrival (squad #, walk in, police, etc) : Walk in         Chief complaint(s): Chest pain        Arrival Note (brief scenario, treatment PTA, etc).: Pt. Reports she began having chest pain approximately 5min PTA. Pt. States she got off work around midnight and has had a stressful week at work. C= \"Have you ever felt that you should Cut down on your drinking? \"  No  A= \"Have people Annoyed you by criticizing your drinking? \"  No  G= \"Have you ever felt bad or Guilty about your drinking? \"  No  E= \"Have you ever had a drink as an Eye-opener first thing in the morning to steady your nerves or to help a hangover? \"  No      Deferred []      Reason for deferring: N/A    *If yes to two or more: probable alcohol abuse. Virginia Amador RN  04/29/21 9908

## 2021-04-29 NOTE — ED PROVIDER NOTES
EMERGENCY DEPARTMENT ENCOUNTER    Pt Name: Savita Reveles  MRN: [id number]  [place] 1975  Date of evaluation: 21  CHIEF COMPLAINT       Chief Complaint   Patient presents with    Chest Pain     HISTORY OF PRESENT ILLNESS   HPI     This is a 52-year-old female who comes in today. The patient states that she got off work she was feeling fine she ate food and then she suddenly felt a midsternal chest pain that radiated underneath her left breast and associated with nausea and vomiting she continues to feel nauseous. Also has some shortness of breath but no cough no fevers she was feeling fine before this. No one else at home is sick. She denies family history of early heart attacks. Denies history of similar symptoms. No other complaints at this time. Not take anything for her pain. REVIEW OF SYSTEMS     Review of Systems   Constitutional: Negative for fever. HENT: Negative for congestion. Respiratory: Positive for shortness of breath. Negative for cough. Cardiovascular: Positive for chest pain. Gastrointestinal: Positive for abdominal pain, nausea and vomiting. Negative for diarrhea. Genitourinary: Negative for dysuria. Musculoskeletal: Negative for back pain. Skin: Negative for rash. Neurological: Negative for headaches. All other systems reviewed and are negative. PASTMEDICAL HISTORY     Past Medical History:   Diagnosis Date    Anxiety     Arthritis     OA    Depression     post partum    Diabetes, gestational     history of    Ground glass opacity present on imaging of lung     History of mycoplasma pneumonia     Light cigarette smoker (1-9 cigarettes per day)     Menarche @ 16 y/o    Neuropathy     CTS    Obesity     Obesity (BMI 30.0-34. 9)     Parity       -    Pneumonia 2018    several times this year, had pneumonia last 10-    Restless leg syndrome     Sleep apnea     doesn't use CPAP    UTI (lower urinary tract infection)     with pregnancy  Wears glasses      SURGICAL HISTORY       Past Surgical History:   Procedure Laterality Date    ANKLE SURGERY Right 1997    BLADDER SUSPENSION       SECTION      CHOLECYSTECTOMY  06    COLONOSCOPY      with poly removal    DILATION AND CURETTAGE OF UTERUS  2014    ENDOMETRIAL ABLATION  14    with novasure    HYSTEROSCOPY  14    D&C    KNEE ARTHROSCOPY Left 2015    total x2 scopes.  KNEE SURGERY      right knee scoped on 17    MYRINGOTOMY Bilateral     with tubes    OTHER SURGICAL HISTORY  2016    removal multiple vulvar inclusion cysts    IN SLING OPER STRES INCONTINENCE N/A 2018    BLADDER SUSPENSION TRANSOBTURATOR TAPE [facility]ST[facility]       VAGINA SURGERY N/A 2019    VAGINAL SLING DISSECTION OF VAGINAL MUCOSA WITH REINSERTION OF  TRANSOBTURATOR TAPE  CYSTOSCOPY performed by Francisca Livingston MD at [address] [facility] [place] [facility] [place]       Discharge Medication List as of 2021  5:07 AM      CONTINUE these medications which have NOT CHANGED    Details   rOPINIRole (REQUIP) 5 MG tablet Take 5 mg by mouth dailyHistorical Med      hydroCHLOROthiazide (HYDRODIURIL) 25 MG tablet Take 25 mg by mouth dailyHistorical Med      naproxen (NAPROSYN) 500 MG tablet Take 500 mg by mouth dailyHistorical Med           ALLERGIES     is allergic to bactrim [sulfamethoxazole-trimethoprim]; dicloxacillin; and pcn [penicillins]. FAMILY HISTORY     She indicated that the status of her mother is unknown. She indicated that the status of her father is unknown. She indicated that the status of her paternal grandfather is unknown.      SOCIAL HISTORY       Social History     Tobacco Use    Smoking status: Current Every Day Smoker     Packs/day: 0.50     Years: 18.00     Pack years: 9.00     Types: Cigarettes     Start date:     Smokeless tobacco: Never Used Substance Use Topics    Alcohol use: No    Drug use: Yes     Types: Marijuana     Comment: occassionally      PHYSICAL EXAM     INITIAL VITALS: /87   Pulse 84   Temp 98.2 °F (36.8 °C) (Oral)   Resp 19   Ht 5' 3\" (1.6 m)   Wt 195 lb (88.5 kg)   SpO2 96%   BMI 34.54 kg/m²    Physical Exam  Vitals signs and nursing note reviewed. Constitutional:       General: She is not in acute distress. Appearance: She is well-developed. HENT:      Head: Normocephalic and atraumatic. Eyes:      Conjunctiva/sclera: Conjunctivae normal.   Neck:      Musculoskeletal: Neck supple. Cardiovascular:      Rate and Rhythm: Normal rate and regular rhythm. Heart sounds: No murmur. No friction rub. Pulmonary:      Effort: Pulmonary effort is normal. No respiratory distress. Breath sounds: Normal breath sounds. No stridor. No wheezing or rhonchi. Chest:      Chest wall: No tenderness. Abdominal:      Palpations: Abdomen is soft. Comments: Epigastric abdominal tenderness to palpation without rebound or guarding   Musculoskeletal:      Comments: No lower extremity edema   Skin:     General: Skin is warm and dry. Capillary Refill: Capillary refill takes less than 2 seconds. Comments: No rash   Neurological:      Mental Status: She is alert. DIAGNOSTIC RESULTS   RADIOLOGY:All plain film, CT, MRI, and formal ultrasound images (except ED bedside ultrasound) are read by the radiologist, see reports below, unless otherwisenoted in MDM or here. CT ABDOMEN PELVIS W IV CONTRAST Additional Contrast? None   Final Result   1. Negative CT angiogram for acute pulmonary embolism. 2. No acute cardiopulmonary process identified. 3. No acute findings within the abdomen or pelvis. 4. Normal appendix. 5. Inflammatory stranding along the inferior left gluteal fold extending to   the anus suggestive of a cellulitis. There is no abscess identified.          CT CHEST PULMONARY EMBOLISM W CONTRAST Final Result   1. Negative CT angiogram for acute pulmonary embolism. 2. No acute cardiopulmonary process identified. 3. No acute findings within the abdomen or pelvis. 4. Normal appendix. 5. Inflammatory stranding along the inferior left gluteal fold extending to   the anus suggestive of a cellulitis. There is no abscess identified. XR CHEST PORTABLE   Final Result   No acute cardiopulmonary process identified. LABS: All lab results were reviewed by myself, and all abnormals are listed below. Labs Reviewed   CBC WITH AUTO DIFFERENTIAL - Abnormal; Notable for the following components:       Result Value    WBC 14.1 (*)     Seg Neutrophils 75 (*)     Lymphocytes 18 (*)     Segs Absolute 10.40 (*)     All other components within normal limits   BASIC METABOLIC PANEL - Abnormal; Notable for the following components:    Glucose 133 (*)     Potassium 3.1 (*)     All other components within normal limits   D-DIMER, QUANTITATIVE - Abnormal; Notable for the following components:    D-Dimer, Quant 1.09 (*)     All other components within normal limits   MAGNESIUM   LIPASE   HEPATIC FUNCTION PANEL   ETHANOL   TROPONIN   URINALYSIS       EMERGENCY DEPARTMENTCOURSE:   Differential diagnosis includes ACS pneumonia pneumothorax pulmonary embolism pancreatitis gastroenteritis    5:08 AM EDT  Patient has a low potassium 3.1 this was replaced. There is no elevation in creatinine no other electrolyte abnormality no anion gap elevation no LFT or lipase elevation she does have a leukocytosis of 14.1 this could be reactive there is no anemia.   Troponin is negative CT chest abdomen pelvis is negative for pulmonary embolism or acute process in the abdomen and pelvis on the etiology for the patient's symptoms could be viral she has a low heart score do not believe that she requires further work-up in the emergency department she will be discharged         EKG:All EKG's are interpreted by the Emergency Department Physician who either signs or Co-signs this chart in the absence of a cardiologist.  Sinus tachycardia with a heart rate of 103 bpm normal axis T wave inversion V1 V2 V3 r incomplete right bundle branch block  no acute ST or T wave changes similar to previous EKG  Vitals:    Vitals:    04/29/21 0202 04/29/21 0300 04/29/21 0315 04/29/21 0330   BP: 134/72 134/85  124/87   Pulse:  92 87 84   Resp:  20 22 19   Temp:       TempSrc:       SpO2:  97% 96% 96%   Weight:       Height:           The patient was given the following medications while in the emergency department:  Orders Placed This Encounter   Medications    0.9 % sodium chloride bolus    ondansetron (ZOFRAN) injection 4 mg    morphine sulfate (PF) injection 4 mg    potassium chloride (KLOR-CON M) extended release tablet 40 mEq    0.9 % sodium chloride bolus    sodium chloride flush 0.9 % injection 10 mL    iopamidol (ISOVUE-370) 76 % injection 100 mL         FINAL IMPRESSION      1.  Chest pain, unspecified type         DISPOSITION/PLAN   DISPOSITION Decision To Discharge 04/29/2021 05:07:44 AM      PATIENT REFERRED TO:  BRICE Gray  [address] [place] [address] [id number] [phone]    In 1 week    Toshia Dixon MD  Attending Emergency Physician    This charting supersedes any ED resident or staff charting and was written using speech recognition software       Toshia Dixon MD  04/29/21 [id number]

## 2021-05-28 ENCOUNTER — HOSPITAL ENCOUNTER (OUTPATIENT)
Age: 46
Discharge: HOME | End: 2021-05-28
Payer: COMMERCIAL

## 2021-05-28 LAB
ALBUMIN SERPL-MCNC: 3.9 G/DL (ref 3.5–5.2)
ALBUMIN/GLOBULIN RATIO: ABNORMAL (ref 1–2.5)
ALP BLD-CCNC: 92 U/L (ref 35–104)
ALT SERPL-CCNC: 19 U/L (ref 5–33)
ANION GAP SERPL CALCULATED.3IONS-SCNC: 7 MMOL/L (ref 9–17)
AST SERPL-CCNC: 18 U/L
BILIRUB SERPL-MCNC: 0.39 MG/DL (ref 0.3–1.2)
BUN BLDV-MCNC: 12 MG/DL (ref 6–20)
BUN/CREAT BLD: ABNORMAL (ref 9–20)
CALCIUM SERPL-MCNC: 9.3 MG/DL (ref 8.6–10.4)
CHLORIDE BLD-SCNC: 110 MMOL/L (ref 98–107)
CHOLESTEROL, FASTING: 116 MG/DL
CHOLESTEROL/HDL RATIO: 2.4
CO2: 25 MMOL/L (ref 20–31)
CREAT SERPL-MCNC: 0.64 MG/DL (ref 0.5–0.9)
ESTIMATED AVERAGE GLUCOSE: 111 MG/DL
GFR AFRICAN AMERICAN: >60 ML/MIN
GFR NON-AFRICAN AMERICAN: >60 ML/MIN
GFR SERPL CREATININE-BSD FRML MDRD: ABNORMAL ML/MIN/{1.73_M2}
GFR SERPL CREATININE-BSD FRML MDRD: ABNORMAL ML/MIN/{1.73_M2}
GLUCOSE BLD-MCNC: 110 MG/DL (ref 70–99)
HBA1C MFR BLD: 5.5 % (ref 4–6)
HDLC SERPL-MCNC: 48 MG/DL
LDL CHOLESTEROL: 56 MG/DL (ref 0–130)
POTASSIUM SERPL-SCNC: 4.2 MMOL/L (ref 3.7–5.3)
SODIUM BLD-SCNC: 142 MMOL/L (ref 135–144)
THYROXINE, FREE: 1.16 NG/DL (ref 0.93–1.7)
TOTAL PROTEIN: 6.3 G/DL (ref 6.4–8.3)
TRIGLYCERIDE, FASTING: 58 MG/DL
TSH SERPL DL<=0.05 MIU/L-ACNC: 1.09 MIU/L (ref 0.3–5)
VLDLC SERPL CALC-MCNC: NORMAL MG/DL (ref 1–30)

## 2021-05-28 PROCEDURE — 84439 ASSAY OF FREE THYROXINE: CPT

## 2021-05-28 PROCEDURE — 36415 COLL VENOUS BLD VENIPUNCTURE: CPT

## 2021-05-28 PROCEDURE — 80053 COMPREHEN METABOLIC PANEL: CPT

## 2021-05-28 PROCEDURE — 84443 ASSAY THYROID STIM HORMONE: CPT

## 2021-05-28 PROCEDURE — 80061 LIPID PANEL: CPT

## 2021-05-28 PROCEDURE — 83036 HEMOGLOBIN GLYCOSYLATED A1C: CPT

## 2021-06-17 ENCOUNTER — HOSPITAL ENCOUNTER (EMERGENCY)
Age: 46
Discharge: HOME | End: 2021-06-17
Attending: EMERGENCY MEDICINE
Payer: COMMERCIAL

## 2021-06-17 ENCOUNTER — APPOINTMENT (OUTPATIENT)
Dept: GENERAL RADIOLOGY | Age: 46
End: 2021-06-17
Payer: COMMERCIAL

## 2021-06-17 VITALS
DIASTOLIC BLOOD PRESSURE: 76 MMHG | OXYGEN SATURATION: 99 % | HEART RATE: 60 BPM | RESPIRATION RATE: 19 BRPM | TEMPERATURE: 98.6 F | SYSTOLIC BLOOD PRESSURE: 129 MMHG

## 2021-06-17 DIAGNOSIS — R53.83 OTHER FATIGUE: Primary | ICD-10-CM

## 2021-06-17 LAB
-: ABNORMAL
ABSOLUTE EOS #: 0.2 K/UL (ref 0–0.4)
ABSOLUTE IMMATURE GRANULOCYTE: NORMAL K/UL (ref 0–0.3)
ABSOLUTE LYMPH #: 2.1 K/UL (ref 1–4.8)
ABSOLUTE MONO #: 0.4 K/UL (ref 0.1–1.3)
ACETAMINOPHEN LEVEL: <5 UG/ML (ref 10–30)
ALBUMIN SERPL-MCNC: 4.6 G/DL (ref 3.5–5.2)
ALBUMIN/GLOBULIN RATIO: NORMAL (ref 1–2.5)
ALLEN TEST: ABNORMAL
ALP BLD-CCNC: 101 U/L (ref 35–104)
ALT SERPL-CCNC: 21 U/L (ref 5–33)
AMORPHOUS: ABNORMAL
ANION GAP SERPL CALCULATED.3IONS-SCNC: 10 MMOL/L (ref 9–17)
AST SERPL-CCNC: 22 U/L
BACTERIA: ABNORMAL
BASOPHILS # BLD: 1 % (ref 0–2)
BASOPHILS ABSOLUTE: 0 K/UL (ref 0–0.2)
BILIRUB SERPL-MCNC: 0.5 MG/DL (ref 0.3–1.2)
BILIRUBIN URINE: NEGATIVE
BUN BLDV-MCNC: 10 MG/DL (ref 6–20)
BUN/CREAT BLD: NORMAL (ref 9–20)
CALCIUM SERPL-MCNC: 8.9 MG/DL (ref 8.6–10.4)
CARBOXYHEMOGLOBIN: 5.8 % (ref 0–5)
CARBOXYHEMOGLOBIN: 5.8 % (ref 0–5)
CASTS UA: ABNORMAL /LPF
CHLORIDE BLD-SCNC: 104 MMOL/L (ref 98–107)
CO2: 25 MMOL/L (ref 20–31)
COLOR: YELLOW
COMMENT UA: ABNORMAL
CREAT SERPL-MCNC: 0.63 MG/DL (ref 0.5–0.9)
CRYSTALS, UA: ABNORMAL /HPF
DIFFERENTIAL TYPE: NORMAL
EOSINOPHILS RELATIVE PERCENT: 3 % (ref 0–4)
EPITHELIAL CELLS UA: ABNORMAL /HPF
ETHANOL PERCENT: <0.01 %
ETHANOL: <10 MG/DL
FIO2: ABNORMAL
GFR AFRICAN AMERICAN: >60 ML/MIN
GFR NON-AFRICAN AMERICAN: >60 ML/MIN
GFR SERPL CREATININE-BSD FRML MDRD: NORMAL ML/MIN/{1.73_M2}
GFR SERPL CREATININE-BSD FRML MDRD: NORMAL ML/MIN/{1.73_M2}
GLUCOSE BLD-MCNC: 98 MG/DL (ref 70–99)
GLUCOSE URINE: NEGATIVE
HCG QUALITATIVE: NEGATIVE
HCO3 VENOUS: 27 MMOL/L (ref 24–30)
HCT VFR BLD CALC: 43.4 % (ref 36–46)
HEMOGLOBIN: 14.6 G/DL (ref 12–16)
IMMATURE GRANULOCYTES: NORMAL %
KETONES, URINE: NEGATIVE
LACTIC ACID: 0.6 MMOL/L (ref 0.5–2.2)
LEUKOCYTE ESTERASE, URINE: ABNORMAL
LYMPHOCYTES # BLD: 29 % (ref 24–44)
MCH RBC QN AUTO: 32.1 PG (ref 26–34)
MCHC RBC AUTO-ENTMCNC: 33.6 G/DL (ref 31–37)
MCV RBC AUTO: 95.6 FL (ref 80–100)
METHEMOGLOBIN: 0.9 % (ref 0–1.9)
MODE: ABNORMAL
MONOCYTES # BLD: 5 % (ref 1–7)
MUCUS: ABNORMAL
NEGATIVE BASE EXCESS, VEN: ABNORMAL MMOL/L (ref 0–2)
NITRITE, URINE: NEGATIVE
NOTIFICATION TIME: ABNORMAL
NOTIFICATION: ABNORMAL
NRBC AUTOMATED: NORMAL PER 100 WBC
O2 DEVICE/FLOW/%: ABNORMAL
O2 SAT, VEN: 83.6 % (ref 60–85)
OTHER OBSERVATIONS UA: ABNORMAL
OXYHEMOGLOBIN: ABNORMAL % (ref 95–98)
PATIENT TEMP: 37
PCO2, VEN, TEMP ADJ: ABNORMAL MMHG (ref 39–55)
PCO2, VEN: 43.7 (ref 39–55)
PDW BLD-RTO: 13.4 % (ref 11.5–14.9)
PEEP/CPAP: ABNORMAL
PH UA: 6.5 (ref 5–8)
PH VENOUS: 7.4 (ref 7.32–7.42)
PH, VEN, TEMP ADJ: ABNORMAL (ref 7.32–7.42)
PLATELET # BLD: 267 K/UL (ref 150–450)
PLATELET ESTIMATE: NORMAL
PMV BLD AUTO: 8.3 FL (ref 6–12)
PO2, VEN, TEMP ADJ: ABNORMAL MMHG (ref 30–50)
PO2, VEN: 53.9 (ref 30–50)
POSITIVE BASE EXCESS, VEN: 2.2 MMOL/L (ref 0–2)
POTASSIUM SERPL-SCNC: 4.2 MMOL/L (ref 3.7–5.3)
PROTEIN UA: NEGATIVE
PSV: ABNORMAL
PT. POSITION: ABNORMAL
RBC # BLD: 4.54 M/UL (ref 4–5.2)
RBC # BLD: NORMAL 10*6/UL
RBC UA: ABNORMAL /HPF
RENAL EPITHELIAL, UA: ABNORMAL /HPF
RESPIRATORY RATE: ABNORMAL
SALICYLATE LEVEL: <1 MG/DL (ref 3–10)
SAMPLE SITE: ABNORMAL
SARS-COV-2, RAPID: NOT DETECTED
SEG NEUTROPHILS: 62 % (ref 36–66)
SEGMENTED NEUTROPHILS ABSOLUTE COUNT: 4.6 K/UL (ref 1.3–9.1)
SET RATE: ABNORMAL
SODIUM BLD-SCNC: 139 MMOL/L (ref 135–144)
SPECIFIC GRAVITY UA: 1.01 (ref 1–1.03)
SPECIMEN DESCRIPTION: NORMAL
TEXT FOR RESPIRATORY: ABNORMAL
TOTAL HB: ABNORMAL G/DL (ref 12–16)
TOTAL PROTEIN: 7.4 G/DL (ref 6.4–8.3)
TOTAL RATE: ABNORMAL
TOXIC TRICYCLIC SC,BLOOD: ABNORMAL
TRICHOMONAS: ABNORMAL
TRICYCLIC ANTIDEP,URINE: NEGATIVE
TROPONIN INTERP: NORMAL
TROPONIN T: NORMAL NG/ML
TROPONIN, HIGH SENSITIVITY: <6 NG/L (ref 0–14)
TSH SERPL DL<=0.05 MIU/L-ACNC: 1.2 MIU/L (ref 0.3–5)
TURBIDITY: ABNORMAL
URINE HGB: ABNORMAL
UROBILINOGEN, URINE: NORMAL
VT: ABNORMAL
WBC # BLD: 7.3 K/UL (ref 3.5–11)
WBC # BLD: NORMAL 10*3/UL
WBC UA: ABNORMAL /HPF
YEAST: ABNORMAL

## 2021-06-17 PROCEDURE — 99285 EMERGENCY DEPT VISIT HI MDM: CPT

## 2021-06-17 PROCEDURE — 83605 ASSAY OF LACTIC ACID: CPT

## 2021-06-17 PROCEDURE — 81001 URINALYSIS AUTO W/SCOPE: CPT

## 2021-06-17 PROCEDURE — 93005 ELECTROCARDIOGRAM TRACING: CPT | Performed by: STUDENT IN AN ORGANIZED HEALTH CARE EDUCATION/TRAINING PROGRAM

## 2021-06-17 PROCEDURE — 84703 CHORIONIC GONADOTROPIN ASSAY: CPT

## 2021-06-17 PROCEDURE — 84484 ASSAY OF TROPONIN QUANT: CPT

## 2021-06-17 PROCEDURE — 80143 DRUG ASSAY ACETAMINOPHEN: CPT

## 2021-06-17 PROCEDURE — 85025 COMPLETE CBC W/AUTO DIFF WBC: CPT

## 2021-06-17 PROCEDURE — 82375 ASSAY CARBOXYHB QUANT: CPT

## 2021-06-17 PROCEDURE — 84443 ASSAY THYROID STIM HORMONE: CPT

## 2021-06-17 PROCEDURE — 36415 COLL VENOUS BLD VENIPUNCTURE: CPT

## 2021-06-17 PROCEDURE — 80179 DRUG ASSAY SALICYLATE: CPT

## 2021-06-17 PROCEDURE — 80053 COMPREHEN METABOLIC PANEL: CPT

## 2021-06-17 PROCEDURE — 82805 BLOOD GASES W/O2 SATURATION: CPT

## 2021-06-17 PROCEDURE — G0480 DRUG TEST DEF 1-7 CLASSES: HCPCS

## 2021-06-17 PROCEDURE — 87635 SARS-COV-2 COVID-19 AMP PRB: CPT

## 2021-06-17 PROCEDURE — 51798 US URINE CAPACITY MEASURE: CPT

## 2021-06-17 PROCEDURE — 71046 X-RAY EXAM CHEST 2 VIEWS: CPT

## 2021-06-17 PROCEDURE — 82800 BLOOD PH: CPT

## 2021-06-17 PROCEDURE — 87086 URINE CULTURE/COLONY COUNT: CPT

## 2021-06-17 PROCEDURE — 80307 DRUG TEST PRSMV CHEM ANLYZR: CPT

## 2021-06-17 ASSESSMENT — ENCOUNTER SYMPTOMS
CONSTIPATION: 0
SHORTNESS OF BREATH: 0
TROUBLE SWALLOWING: 0
ABDOMINAL PAIN: 0
DIARRHEA: 0
VOMITING: 0
SORE THROAT: 0
NAUSEA: 0

## 2021-06-17 NOTE — ED PROVIDER NOTES
EMERGENCY DEPARTMENT ENCOUNTER   ATTENDING ATTESTATION     Pt Name: Felix Grey  MRN: [id number]  [place] 1975  Date of evaluation: 6/17/21       Felix Grey is a 41 y.o. female who presents with Hypertension (Pt reports compliance with her BP medications; reports that her at home readings have been more elevated than normal; pt stated she is unable to explain all of her symptoms; pt stated \"I just don't fucking feel right ok? \")      MDM:   Feeling very tired today  BP was high, came in to get evaluated  GCS 15  Strength in arms 5/5  Strength in legs 5/5  Sensation intact bl arms and legs  No facial droop  Speech is clear, no dysarthria or aphasia  No ataxia in arms or legs  No gaze preference or nystagums  Visual fields intact      Vitals:   Vitals:    06/17/21 1520   BP: (!) 160/92   Pulse: 64   Resp: 16   Temp: 98.6 °F (37 °C)   SpO2: 98%         I personally evaluated and examined the patient in conjunction with the resident and agree with the assessment, treatment plan, and disposition of the patient as recorded by the resident. I performed a history and physical examination of the patient and discussed management with the resident. I reviewed the residents note and agree with the documented findings and plan of care. Any areas of disagreement are noted on the chart. I was personally present for the key portions of any procedures. I have documented in the chart those procedures where I was not present during the key portions. I have personally reviewed all images and agree with the resident's interpretation. I have reviewed the emergency nurses triage note. I agree with the chief complaint, past medical history, past surgical history, allergies, medications, social and family history as documented unless otherwise noted.     Eron Del Castillo MD  Attending Emergency Physician           Eron Del Castillo MD  06/17/21 0060

## 2021-06-17 NOTE — ED PROVIDER NOTES
[address]  Emergency Department Encounter  Emergency Medicine Resident     Pt Indy Tobin  MRN: [id number]  [place] 1975  Date of evaluation: 6/17/21  PCP:  BRICE Valero    CHIEF COMPLAINT       Chief Complaint   Patient presents with    Hypertension     Pt reports compliance with her BP medications; reports that her at home readings have been more elevated than normal; pt stated she is unable to explain all of her symptoms; pt stated \"I just don't fucking feel right ok? \"       HISTORY OF PRESENT ILLNESS  (Location/Symptom, Timing/Onset, Context/Setting, Quality, Duration, Modifying Factors, Severity.)      Ajay White is a 41 y.o. female who presents with several hours acute onset \"fatigue\" which patient is unwilling and unable to further articulate on. Patient woke up this morning feeling normal, went about her day doing daily activities, went grocery shopping and after coming home felt overly tired. Patient works overnights at a gas station however this level of fatigue is atypical for her. Noted her blood pressure was elevated at home but cannot tell any exact numbers, took her home blood pressure medication and the numbers went down. Is not associated with head trauma, takes no anticoagulation or antiplatelet medications. Patient denies any provoking or alleviating factors. Is not accompanied by any focal or generalized weakness. No history of similar symptoms in the past.  Denies any alcohol or drug use, daily smoker. No recent surgeries, no prolonged travel, no hormonal birth control. Has a history of ablation and bladder surgery. Did not receive Covid vaccination. Patient is adamant there are no other accompanying symptoms or factors other than the fatigue.   No headaches, vision changes, numbness tingling or weakness, difficulty swallowing or sore throat, chest pain, shortness of breath, abdominal pain, changes in  or GI habits, no vaginal bleeding. PAST MEDICAL / SURGICAL / SOCIAL / FAMILY HISTORY      has a past medical history of Anxiety, Arthritis, Depression, Diabetes, gestational, Ground glass opacity present on imaging of lung, History of mycoplasma pneumonia, Light cigarette smoker (1-9 cigarettes per day), Menarche, Neuropathy, Obesity, Obesity (BMI 30.0-34.9), Parity, Pneumonia, Restless leg syndrome, Sleep apnea, UTI (lower urinary tract infection), and Wears glasses. has a past surgical history that includes Cholecystectomy (06); Tubal ligation;  section; Colonoscopy; hysteroscopy (14); Dilation and curettage of uterus (2014); myringotomy (Bilateral); Tonsillectomy and adenoidectomy; Ankle surgery (Right, ); Endometrial ablation (14); Knee arthroscopy (Left, 2015); other surgical history (2016); knee surgery; pr sling oper stres incontinence (N/A, 2018); bladder suspension; and Vagina surgery (N/A, 2019).     Social History     Socioeconomic History    Marital status: Legally      Spouse name: Not on file    Number of children: Not on file    Years of education: Not on file    Highest education level: Not on file   Occupational History    Not on file   Tobacco Use    Smoking status: Current Every Day Smoker     Packs/day: 0.50     Years: 18.00     Pack years: 9.00     Types: Cigarettes     Start date:  [address]    Smokeless tobacco: Never Used   Vaping Use    Vaping Use: Never used   Substance and Sexual Activity    Alcohol use: No    Drug use: Yes     Types: Marijuana     Comment: occassionally     Sexual activity: Yes     Partners: Male   Other Topics Concern    Not on file   Social History Narrative    Not on file     Social Determinants of Health     Financial Resource Strain:     Difficulty of Paying Living Expenses:    Food Insecurity:     Worried About Running Out of Food in the Last Year:     [address] in the Last Year:    Transportation Needs:     Lack of Transportation (Medical):  Lack of Transportation (Non-Medical):    Physical Activity:     Days of Exercise per Week:     Minutes of Exercise per Session:    Stress:     Feeling of Stress :    Social Connections:     Frequency of Communication with Friends and Family:     Frequency of Social Gatherings with Friends and Family:     Attends Congregation Services:     Active Member of Clubs or Organizations:     Attends Club or Organization Meetings:     Marital Status:    Intimate Partner Violence:     Fear of Current or Ex-Partner:     Emotionally Abused:     Physically Abused:     Sexually Abused:        Family History   Problem Relation Age of Onset    Heart Disease Paternal Grandfather     Diabetes Father     Hypertension Father     Stroke Father     Hypertension Mother     Mental Illness Mother        Allergies:  Bactrim [sulfamethoxazole-trimethoprim], Dicloxacillin, and Pcn [penicillins]    Home Medications:  Prior to Admission medications    Medication Sig Start Date End Date Taking? Authorizing Provider   rOPINIRole (REQUIP) 5 MG tablet Take 5 mg by mouth daily    Historical Provider, MD   hydroCHLOROthiazide (HYDRODIURIL) 25 MG tablet Take 25 mg by mouth daily    Historical Provider, MD   naproxen (NAPROSYN) 500 MG tablet Take 500 mg by mouth daily    Historical Provider, MD       REVIEW OF SYSTEMS    (2-9 systems for level 4, 10 or more for level 5)      Review of Systems   Constitutional: Positive for fatigue. Negative for chills and fever. HENT: Negative for sore throat and trouble swallowing. Eyes: Negative for visual disturbance. Respiratory: Negative for shortness of breath. Cardiovascular: Negative for chest pain. Gastrointestinal: Negative for abdominal pain, constipation, diarrhea, nausea and vomiting. Genitourinary: Negative for dysuria and vaginal discharge. Musculoskeletal: Negative for arthralgias and myalgias. Skin: Negative for wound. Neurological: Negative for dizziness, seizures, syncope, facial asymmetry, speech difficulty, weakness, light-headedness, numbness and headaches. Psychiatric/Behavioral: Negative for behavioral problems. PHYSICAL EXAM   (up to 7 for level 4, 8 or more for level 5)      INITIAL VITALS:   BP (!) 160/92   Pulse 64   Temp 98.6 °F (37 °C)   Resp 16   SpO2 98%     Physical Exam  Vitals and nursing note reviewed. Constitutional:       General: She is not in acute distress. Appearance: Normal appearance. She is well-developed. She is obese. She is not ill-appearing, toxic-appearing or diaphoretic. HENT:      Head: Normocephalic and atraumatic. Right Ear: External ear normal.      Left Ear: External ear normal.      Nose: Nose normal.      Mouth/Throat:      Mouth: Mucous membranes are moist.      Pharynx: Uvula midline. No oropharyngeal exudate. Eyes:      General:         Right eye: No discharge. Left eye: No discharge. Extraocular Movements: Extraocular movements intact. Conjunctiva/sclera: Conjunctivae normal.      Pupils: Pupils are equal, round, and reactive to light. Cardiovascular:      Rate and Rhythm: Normal rate and regular rhythm. Heart sounds: Normal heart sounds. No murmur heard. Pulmonary:      Effort: Pulmonary effort is normal. No respiratory distress. Breath sounds: Normal breath sounds. Abdominal:      General: Abdomen is flat. Palpations: Abdomen is soft. Tenderness: There is no abdominal tenderness. Musculoskeletal:         General: No deformity. Normal range of motion. Cervical back: Normal range of motion. Skin:     General: Skin is warm and dry. Capillary Refill: Capillary refill takes less than 2 seconds. Neurological:      General: No focal deficit present. Mental Status: She is alert and oriented to person, place, and time. GCS: GCS eye subscore is 4. GCS verbal subscore is 5.  GCS motor subscore is 6.      Cranial Nerves: Cranial nerves are intact. No cranial nerve deficit. Sensory: Sensation is intact. No sensory deficit. Motor: Motor function is intact. No weakness. Coordination: Coordination is intact. Coordination normal.   Psychiatric:         Attention and Perception: Attention normal.         Mood and Affect: Affect is blunt. Speech: Speech normal.         Behavior: Behavior is agitated. Behavior is not combative. Behavior is cooperative. Thought Content: Thought content does not include homicidal or suicidal ideation. Thought content does not include homicidal or suicidal plan. DIFFERENTIAL  DIAGNOSIS     PLAN (LABS / IMAGING / EKG):  Orders Placed This Encounter   Procedures    COVID-19, Rapid    Culture, Urine    XR CHEST (2 VW)    CBC Auto Differential    Troponin    TSH w/reflex to FT4    TOX SCR, BLD, ED    HCG Qualitative, Serum    Urinalysis Reflex to Culture    Lactic Acid    Comprehensive Metabolic Panel w/ Reflex to MG    CARBOXYHEMOGLOBIN    Blood Gas, Venous    Drug screen, tricyclic    Microscopic Urinalysis    EKG 12 Lead    Place CT Compatible peripheral IV       MEDICATIONS ORDERED:  No orders of the defined types were placed in this encounter. DDX: ACS versus PE versus pneumonia versus viral URI versus hypothyroidism versus electrolyte abnormality versus deconditioning versus exposure versus other    DIAGNOSTIC RESULTS / EMERGENCY DEPARTMENT COURSE / MDM     LABS:  Results for orders placed or performed during the hospital encounter of 06/17/21   COVID-19, Rapid    Specimen: Nasopharyngeal Swab   Result Value Ref Range    Specimen Description . NASOPHARYNGEAL SWAB     SARS-CoV-2, Rapid Not Detected Not Detected   CBC Auto Differential   Result Value Ref Range    WBC 7.3 3.5 - 11.0 k/uL    RBC 4.54 4.0 - 5.2 m/uL    Hemoglobin 14.6 12.0 - 16.0 g/dL    Hematocrit 43.4 36 - 46 %    MCV 95.6 80 - 100 fL    MCH 32.1 26 - 34 pg MCHC 33.6 31 - 37 g/dL    RDW 13.4 11.5 - 14.9 %    Platelets 356 959 - 291 k/uL    MPV 8.3 6.0 - 12.0 fL    NRBC Automated NOT REPORTED per 100 WBC    Differential Type NOT REPORTED     Seg Neutrophils 62 36 - 66 %    Lymphocytes 29 24 - 44 %    Monocytes 5 1 - 7 %    Eosinophils % 3 0 - 4 %    Basophils 1 0 - 2 %    Immature Granulocytes NOT REPORTED 0 %    Segs Absolute 4.60 1.3 - 9.1 k/uL    Absolute Lymph # 2.10 1.0 - 4.8 k/uL    Absolute Mono # 0.40 0.1 - 1.3 k/uL    Absolute Eos # 0.20 0.0 - 0.4 k/uL    Basophils Absolute 0.00 0.0 - 0.2 k/uL    Absolute Immature Granulocyte NOT REPORTED 0.00 - 0.30 k/uL    WBC Morphology NOT REPORTED     RBC Morphology NOT REPORTED     Platelet Estimate NOT REPORTED    Troponin   Result Value Ref Range    Troponin, High Sensitivity <6 0 - 14 ng/L    Troponin T NOT REPORTED <0.03 ng/mL    Troponin Interp NOT REPORTED    TSH w/reflex to FT4   Result Value Ref Range    TSH 1.20 0.30 - 5.00 mIU/L   TOX SCR, BLD, ED   Result Value Ref Range    Acetaminophen Level <5 (L) 10 - 30 ug/mL    Ethanol <10 <10 mg/dL    Ethanol percent <5.861 %    Salicylate Lvl <1 (L) 3 - 10 mg/dL    Toxic Tricyclic Sc,Blood WRONG TEST ORDERED NEGATIVE   HCG Qualitative, Serum   Result Value Ref Range    hCG Qual NEGATIVE NEGATIVE   Urinalysis Reflex to Culture    Specimen: Urine, clean catch   Result Value Ref Range    Color, UA YELLOW YELLOW    Turbidity UA CLOUDY (A) CLEAR    Glucose, Ur NEGATIVE NEGATIVE    Bilirubin Urine NEGATIVE NEGATIVE    Ketones, Urine NEGATIVE NEGATIVE    Specific Gravity, UA 1.006 1.000 - 1.030    Urine Hgb TRACE (A) NEGATIVE    pH, UA 6.5 5.0 - 8.0    Protein, UA NEGATIVE NEGATIVE    Urobilinogen, Urine Normal Normal    Nitrite, Urine NEGATIVE NEGATIVE    Leukocyte Esterase, Urine MOD (A) NEGATIVE    Urinalysis Comments NOT REPORTED    Lactic Acid   Result Value Ref Range    Lactic Acid 0.6 0.5 - 2.2 mmol/L   Comprehensive Metabolic Panel w/ Reflex to MG   Result Value Ref Range    Glucose 98 70 - 99 mg/dL    BUN 10 6 - 20 mg/dL    CREATININE 0.63 0.50 - 0.90 mg/dL    Bun/Cre Ratio NOT REPORTED 9 - 20    Calcium 8.9 8.6 - 10.4 mg/dL    Sodium 139 135 - 144 mmol/L    Potassium 4.2 3.7 - 5.3 mmol/L    Chloride 104 98 - 107 mmol/L    CO2 25 20 - 31 mmol/L    Anion Gap 10 9 - 17 mmol/L    Alkaline Phosphatase 101 35 - 104 U/L    ALT 21 5 - 33 U/L    AST 22 <32 U/L    Total Bilirubin 0.50 0.3 - 1.2 mg/dL    Total Protein 7.4 6.4 - 8.3 g/dL    Albumin 4.6 3.5 - 5.2 g/dL    Albumin/Globulin Ratio NOT REPORTED 1.0 - 2.5    GFR Non-African American >60 >60 mL/min    GFR African American >60 >60 mL/min    GFR Comment          GFR Staging NOT REPORTED    CARBOXYHEMOGLOBIN   Result Value Ref Range    Carboxyhemoglobin 5.8 (H) 0 - 5 %   Blood Gas, Venous   Result Value Ref Range    pH, Ebenezer 7.400 7.320 - 7.420    pCO2, Ebenezer 43.7 39.0 - 55.0    pO2, Ebenezer 53.9 (H) 30 - 50    HCO3, Venous 27.0 24.0 - 30.0 mmol/L    Positive Base Excess, Ebenezer 2.2 (H) 0.0 - 2.0 mmol/L    Negative Base Excess, Ebenezer NOT REPORTED 0.0 - 2.0 mmol/L    O2 Sat, Ebenezer 83.6 60.0 - 85.0 %    Total Hb NOT REPORTED 12.0 - 16.0 g/dl    Oxyhemoglobin NOT REPORTED 95.0 - 98.0 %    Carboxyhemoglobin 5.8 (H) 0 - 5 %    Methemoglobin 0.9 0.0 - 1.9 %    Pt Temp 37.0     pH, Ebenezer, Temp Adj NOT REPORTED 7.320 - 7.420    pCO2, Ebenezer, Temp Adj NOT REPORTED 39.0 - 55.0 mmHg    pO2, Ebenezer, Temp Adj NOT REPORTED 30.0 - 50.0 mmHg    O2 Device/Flow/% NOT REPORTED     Respiratory Rate NOT REPORTED     David Test NA     Sample Site NA     Pt.  Position NOT REPORTED     Mode NOT REPORTED     Set Rate NOT REPORTED     Total Rate NOT REPORTED     VT NOT REPORTED     FIO2 NOT REPORTED     Peep/Cpap NOT REPORTED     PSV NOT REPORTED     Text for Respiratory NOT REPORTED     NOTIFICATION NOT REPORTED     NOTIFICATION TIME NOT REPORTED    Drug screen, tricyclic   Result Value Ref Range    Tricyclic Antidep,Urine NEGATIVE NEGATIVE   Microscopic Urinalysis   Result Value Ref Range    -          WBC, UA 10 TO 20 /HPF    RBC, UA 2 TO 5 /HPF    Casts UA NOT REPORTED /LPF    Crystals, UA NOT REPORTED None /HPF    Epithelial Cells UA 10 TO 20 /HPF    Renal Epithelial, UA NOT REPORTED 0 /HPF    Bacteria, UA FEW (A) None    Mucus, UA NOT REPORTED None    Trichomonas, UA NOT REPORTED None    Amorphous, UA NOT REPORTED None    Other Observations UA NOT REPORTED NOT REQ. Yeast, UA NOT REPORTED None   EKG 12 Lead   Result Value Ref Range    Ventricular Rate 57 BPM    Atrial Rate 57 BPM    P-R Interval 132 ms    QRS Duration 82 ms    Q-T Interval 436 ms    QTc Calculation (Bazett) 424 ms    P Axis 24 degrees    R Axis 26 degrees    T Axis 33 degrees         RADIOLOGY:  XR CHEST (2 VW)    Result Date: 6/17/2021  EXAMINATION: TWO XRAY VIEWS OF THE CHEST 6/17/2021 3:11 pm COMPARISON: 04/29/2021 HISTORY: ORDERING SYSTEM PROVIDED HISTORY: fatigue TECHNOLOGIST PROVIDED HISTORY: fatigue Reason for Exam: high bp Acuity: Unknown Type of Exam: Unknown FINDINGS: No lung infiltrate or consolidation. No pneumothorax or pleural effusion. Heart size is normal.     No acute abnormality. EKG  EKG Interpretation    Interpreted by me    Rhythm: sinus bradycardia  Rate: Bradycardic at 57 bpm  Axis: normal  Ectopy: none  Conduction: Right bundle branch block pattern  ST Segments: Normal  T Waves: Nonspecific  Q Waves: none    Clinical Impression: Sinus bradycardia, when compared with EKG dated 4/29/2021 sinus bradycardia has replaced sinus tachycardia, no other acute changes    All EKG's are interpreted by the Emergency Department Physician who either signs or Co-signs this chart in the absence of a cardiologist.    EMERGENCY DEPARTMENT COURSE:  Patient found lying in bed, no acute distress, not ill or toxic appearing. Limited willingness to participate in history and exam however can be convinced to minimally do so.   Vital signs noted for hypertensive consistent with patient's history however not and alarmingly high level. O2 sats 98 to 99% on room air, no respiratory distress, heart rate in the mid to high 60s. Moist mucous membranes, NIH of 0, no focal neurologic deficit. Mental faculties intact. Patient very unwilling to elaborate on her symptoms other than generalized fatigue as such a more broad work-up performed. PERC negative. No head trauma headache or focal neurologic deficit, will hold off on head CT for now pending more information. Remarkably negative work-up despite broad differential.  Elevated carboxyhemoglobin however is normal in the setting of Daily smoker. Discussed these findings with patient who is reassured by the negative findings. Will write work note for today to allow patient to rest, will follow up with PCP for recheck of symptoms. Discharge plan discussed with patient who is in agreement. Educated on likely pathology, medications, return precautions, and follow-up. Patient understood all educated materials with all questions answered to their satisfaction. PROCEDURES:  None    CONSULTS:  None    CRITICAL CARE:  None    FINAL IMPRESSION      1.  Other fatigue          DISPOSITION / PLAN     DISPOSITION        PATIENT REFERRED TO:  Amol Frazier[address]  [phone]    Schedule an appointment as soon as possible for a visit   Regarding this visit    [facility] ED  [place] [id number] [phone]  Go to   If symptoms worsen      DISCHARGE MEDICATIONS:  New Prescriptions    No medications on file       Latrcie Goetz MD  Emergency Medicine Resident    (Please note that portions of this note were completed with a voice recognition program.  Efforts were made to edit the dictations but occasionally words are mis-transcribed.)        Latrice Goetz MD  Resident  06/17/21 [id number]

## 2021-06-17 NOTE — ED NOTES
Pt given instructions for follow-up and discharge. Pt verbalizes understanding. Pt is A&O x4, PWD, eupneic, and ambulatory with steady, even gait upon discharge.       Heri Aleman RN  06/17/21 [id number]

## 2021-06-18 LAB
CULTURE: NORMAL
Lab: NORMAL
SPECIMEN DESCRIPTION: NORMAL

## 2021-06-18 PROCEDURE — 93010 ELECTROCARDIOGRAM REPORT: CPT | Performed by: INTERNAL MEDICINE

## 2021-06-24 LAB
EKG ATRIAL RATE: 57 BPM
EKG P AXIS: 24 DEGREES
EKG P-R INTERVAL: 132 MS
EKG Q-T INTERVAL: 436 MS
EKG QRS DURATION: 82 MS
EKG QTC CALCULATION (BAZETT): 424 MS
EKG R AXIS: 26 DEGREES
EKG T AXIS: 33 DEGREES
EKG VENTRICULAR RATE: 57 BPM

## 2021-08-29 ENCOUNTER — APPOINTMENT (OUTPATIENT)
Dept: CT IMAGING | Age: 46
DRG: 223 | End: 2021-08-29
Payer: COMMERCIAL

## 2021-08-29 ENCOUNTER — HOSPITAL ENCOUNTER (INPATIENT)
Age: 46
LOS: 2 days | Discharge: HOME | DRG: 223 | End: 2021-08-31
Attending: EMERGENCY MEDICINE | Admitting: INTERNAL MEDICINE
Payer: COMMERCIAL

## 2021-08-29 DIAGNOSIS — K61.0 PERIANAL ABSCESS: Primary | ICD-10-CM

## 2021-08-29 LAB
-: ABNORMAL
-: NORMAL
ABSOLUTE EOS #: 0.2 K/UL (ref 0–0.4)
ABSOLUTE IMMATURE GRANULOCYTE: ABNORMAL K/UL (ref 0–0.3)
ABSOLUTE LYMPH #: 2 K/UL (ref 1–4.8)
ABSOLUTE MONO #: 0.6 K/UL (ref 0.1–1.3)
AMORPHOUS: ABNORMAL
ANION GAP SERPL CALCULATED.3IONS-SCNC: 11 MMOL/L (ref 9–17)
BACTERIA: ABNORMAL
BASOPHILS # BLD: 1 % (ref 0–2)
BASOPHILS ABSOLUTE: 0 K/UL (ref 0–0.2)
BILIRUBIN URINE: NEGATIVE
BUN BLDV-MCNC: 9 MG/DL (ref 6–20)
BUN/CREAT BLD: NORMAL (ref 9–20)
CALCIUM SERPL-MCNC: 9.3 MG/DL (ref 8.6–10.4)
CASTS UA: ABNORMAL /LPF
CHLORIDE BLD-SCNC: 103 MMOL/L (ref 98–107)
CO2: 23 MMOL/L (ref 20–31)
COLOR: YELLOW
COMMENT UA: ABNORMAL
CREAT SERPL-MCNC: 0.57 MG/DL (ref 0.5–0.9)
CRYSTALS, UA: ABNORMAL /HPF
DIFFERENTIAL TYPE: ABNORMAL
EOSINOPHILS RELATIVE PERCENT: 3 % (ref 0–4)
EPITHELIAL CELLS UA: ABNORMAL /HPF
GFR AFRICAN AMERICAN: >60 ML/MIN
GFR NON-AFRICAN AMERICAN: >60 ML/MIN
GFR SERPL CREATININE-BSD FRML MDRD: NORMAL ML/MIN/{1.73_M2}
GFR SERPL CREATININE-BSD FRML MDRD: NORMAL ML/MIN/{1.73_M2}
GLUCOSE BLD-MCNC: 87 MG/DL (ref 70–99)
GLUCOSE URINE: NEGATIVE
HCT VFR BLD CALC: 40.5 % (ref 36–46)
HEMOGLOBIN: 13.8 G/DL (ref 12–16)
IMMATURE GRANULOCYTES: ABNORMAL %
INR BLD: 1
KETONES, URINE: NEGATIVE
LEUKOCYTE ESTERASE, URINE: ABNORMAL
LYMPHOCYTES # BLD: 23 % (ref 24–44)
MCH RBC QN AUTO: 32.4 PG (ref 26–34)
MCHC RBC AUTO-ENTMCNC: 34.2 G/DL (ref 31–37)
MCV RBC AUTO: 94.9 FL (ref 80–100)
MONOCYTES # BLD: 7 % (ref 1–7)
MUCUS: ABNORMAL
NITRITE, URINE: NEGATIVE
NRBC AUTOMATED: ABNORMAL PER 100 WBC
OTHER OBSERVATIONS UA: ABNORMAL
PARTIAL THROMBOPLASTIN TIME: 33.4 SEC (ref 24–36)
PDW BLD-RTO: 13.2 % (ref 11.5–14.9)
PH UA: 7 (ref 5–8)
PLATELET # BLD: 176 K/UL (ref 150–450)
PLATELET ESTIMATE: ABNORMAL
PMV BLD AUTO: 8.3 FL (ref 6–12)
POTASSIUM SERPL-SCNC: 3.8 MMOL/L (ref 3.7–5.3)
PROTEIN UA: NEGATIVE
PROTHROMBIN TIME: 13.4 SEC (ref 11.8–14.6)
RBC # BLD: 4.27 M/UL (ref 4–5.2)
RBC # BLD: ABNORMAL 10*6/UL
RBC UA: ABNORMAL /HPF
REASON FOR REJECTION: NORMAL
RENAL EPITHELIAL, UA: ABNORMAL /HPF
SARS-COV-2, RAPID: NOT DETECTED
SEG NEUTROPHILS: 66 % (ref 36–66)
SEGMENTED NEUTROPHILS ABSOLUTE COUNT: 5.8 K/UL (ref 1.3–9.1)
SODIUM BLD-SCNC: 137 MMOL/L (ref 135–144)
SPECIFIC GRAVITY UA: 1.04 (ref 1–1.03)
SPECIMEN DESCRIPTION: NORMAL
TRICHOMONAS: ABNORMAL
TURBIDITY: CLEAR
URINE HGB: ABNORMAL
UROBILINOGEN, URINE: ABNORMAL
WBC # BLD: 8.6 K/UL (ref 3.5–11)
WBC # BLD: ABNORMAL 10*3/UL
WBC UA: ABNORMAL /HPF
YEAST: ABNORMAL
ZZ NTE CLEAN UP: ORDERED TEST: NORMAL
ZZ NTE WITH NAME CLEAN UP: SPECIMEN SOURCE: NORMAL

## 2021-08-29 PROCEDURE — 99285 EMERGENCY DEPT VISIT HI MDM: CPT

## 2021-08-29 PROCEDURE — 99223 1ST HOSP IP/OBS HIGH 75: CPT | Performed by: INTERNAL MEDICINE

## 2021-08-29 PROCEDURE — 87635 SARS-COV-2 COVID-19 AMP PRB: CPT

## 2021-08-29 PROCEDURE — 2580000003 HC RX 258: Performed by: STUDENT IN AN ORGANIZED HEALTH CARE EDUCATION/TRAINING PROGRAM

## 2021-08-29 PROCEDURE — 36415 COLL VENOUS BLD VENIPUNCTURE: CPT

## 2021-08-29 PROCEDURE — 87086 URINE CULTURE/COLONY COUNT: CPT

## 2021-08-29 PROCEDURE — 85730 THROMBOPLASTIN TIME PARTIAL: CPT

## 2021-08-29 PROCEDURE — 81001 URINALYSIS AUTO W/SCOPE: CPT

## 2021-08-29 PROCEDURE — 72193 CT PELVIS W/DYE: CPT

## 2021-08-29 PROCEDURE — 96365 THER/PROPH/DIAG IV INF INIT: CPT

## 2021-08-29 PROCEDURE — 85610 PROTHROMBIN TIME: CPT

## 2021-08-29 PROCEDURE — 6360000004 HC RX CONTRAST MEDICATION: Performed by: STUDENT IN AN ORGANIZED HEALTH CARE EDUCATION/TRAINING PROGRAM

## 2021-08-29 PROCEDURE — 1200000000 HC SEMI PRIVATE

## 2021-08-29 PROCEDURE — 80048 BASIC METABOLIC PNL TOTAL CA: CPT

## 2021-08-29 PROCEDURE — 85025 COMPLETE CBC W/AUTO DIFF WBC: CPT

## 2021-08-29 RX ORDER — SODIUM CHLORIDE 0.9 % (FLUSH) 0.9 %
10 SYRINGE (ML) INJECTION PRN
Status: DISCONTINUED | OUTPATIENT
Start: 2021-08-29 | End: 2021-08-31 | Stop reason: HOSPADM

## 2021-08-29 RX ORDER — 0.9 % SODIUM CHLORIDE 0.9 %
80 INTRAVENOUS SOLUTION INTRAVENOUS ONCE
Status: COMPLETED | OUTPATIENT
Start: 2021-08-29 | End: 2021-08-29

## 2021-08-29 RX ADMIN — SODIUM CHLORIDE 80 ML: 9 INJECTION, SOLUTION INTRAVENOUS at 20:33

## 2021-08-29 RX ADMIN — SODIUM CHLORIDE, PRESERVATIVE FREE 10 ML: 5 INJECTION INTRAVENOUS at 20:33

## 2021-08-29 RX ADMIN — IOPAMIDOL 75 ML: 755 INJECTION, SOLUTION INTRAVENOUS at 20:33

## 2021-08-29 ASSESSMENT — ENCOUNTER SYMPTOMS
BLOOD IN STOOL: 0
COLOR CHANGE: 0
VOMITING: 0
ABDOMINAL DISTENTION: 0
ABDOMINAL PAIN: 0
COUGH: 0
EYES NEGATIVE: 1
SORE THROAT: 0
NAUSEA: 0
WHEEZING: 0
SHORTNESS OF BREATH: 0
CONSTIPATION: 0
DIARRHEA: 0
RESPIRATORY NEGATIVE: 1
BACK PAIN: 0

## 2021-08-29 NOTE — ED PROVIDER NOTES
[address] ED  eMERGENCY dEPARTMENT eNCOUnter   Attending Attestation     Pt Name: Rafael Ramon  MRN: [id number]  [place] 1975  Date of evaluation: 8/29/21       Rafael Ramon is a 40 y.o. female who presents with Skin Problem      History:   Patient has a mass around the rectum on the right side that started off is very small about a month and a half ago but is getting bigger and is more painful. Exam: Vitals:   Vitals:    08/29/21 1917   BP: (!) 169/104   Pulse: 84   Resp: 16   Temp: 97.9 °F (36.6 °C)   TempSrc: Oral   SpO2: 98%   Weight: 195 lb (88.5 kg)   Height: 5' 3\" (1.6 m)     Patient has probably about a 2 to 3 cm area of induration that is deep with no surrounding erythema. It is not fluctuant. It is tender. I performed a history and physical examination of the patient and discussed management with the resident. I reviewed the residents note and agree with the documented findings and plan of care. Any areas of disagreement are noted on the chart. I was personally present for the key portions of any procedures. I have documented in the chart those procedures where I was not present during the key portions. I have personally reviewed all images and agree with the resident's interpretation. I have reviewed the emergency nurses triage note. I agree with the chief complaint, past medical history, past surgical history, allergies, medications, social and family history as documented unless otherwise noted below. Documentation of the HPI, Physical Exam and Medical Decision Making performed by medical students or scribes is based on my personal performance of the HPI, PE and MDM. I personally evaluated and examined the patient in conjunction with the APC and agree with the assessment, treatment plan, and disposition of the patient as recorded by the APC. Additional findings are as noted.     Hellen Harper MD  Attending Emergency  Physician             Leola Corona MD  08/29/21 [id number]

## 2021-08-29 NOTE — ED PROVIDER NOTES
[address]  Emergency Department Encounter  Emergency Medicine Resident     Pt Name: Junior Hill  [facility]:[id number]  [place] 1975  Date of evaluation: 21  PCP:  BRICE Baker    CHIEF COMPLAINT       Chief Complaint   Patient presents with    Skin Problem       HISTORY OF PRESENT ILLNESS  (Location/Symptom, Timing/Onset, Context/Setting, Quality, Duration, ModifyingFactors, Severity.)      Junior Hill is a 40 y.o. female presents emergency room for evaluation of a mass on the internal side of her right cheek. Patient states he had a CT scan proximally month Ago which showed small collection in the area however after discussion with the attending on call as well as radiologist thought perhaps it was just artifact that it was some smaller noted mood. Patient states that the nodules were become larger, is now painful to press on. Patient states that there is no external signs that it is there, no redness erythema or draining sinus. Patient states never had a fever, chills or change in her bowel or bladder habits. PAST MEDICAL / SURGICAL / SOCIAL /FAMILY HISTORY      has a past medical history of Anxiety, Arthritis, Depression, Diabetes, gestational, Ground glass opacity present on imaging of lung, History of mycoplasma pneumonia, Light cigarette smoker (1-9 cigarettes per day), Menarche, Neuropathy, Obesity, Obesity (BMI 30.0-34.9), Parity, Pneumonia, Restless leg syndrome, Sleep apnea, UTI (lower urinary tract infection), and Wears glasses. No other pertinent PMH on review with patient/guardian. has a past surgical history that includes Cholecystectomy (06); Tubal ligation;  section; Colonoscopy; hysteroscopy (14); Dilation and curettage of uterus (2014); myringotomy (Bilateral); Tonsillectomy and adenoidectomy; Ankle surgery (Right, ); Endometrial ablation (14);  Knee arthroscopy (Left, 2015); other surgical history (2016); knee surgery; pr sling oper stres incontinence (N/A, 11/29/2018); bladder suspension; and Vagina surgery (N/A, 1/18/2019). No other pertinent PSH on review with patient/guardian. Social History     Socioeconomic History    Marital status: Legally      Spouse name: Not on file    Number of children: Not on file    Years of education: Not on file    Highest education level: Not on file   Occupational History    Not on file   Tobacco Use    Smoking status: Current Every Day Smoker     Packs/day: 0.50     Years: 18.00     Pack years: 9.00     Types: Cigarettes     Start date: 13    Smokeless tobacco: Never Used   Vaping Use    Vaping Use: Never used   Substance and Sexual Activity    Alcohol use: No    Drug use: Yes     Types: Marijuana     Comment: occassionally     Sexual activity: Yes     Partners: Male   Other Topics Concern    Not on file   Social History Narrative    Not on file     Social Determinants of Health     Financial Resource Strain:     Difficulty of Paying Living Expenses:    Food Insecurity:     Worried About Running Out of Food in the Last Year:     Ran Out of Food in the Last Year:    Transportation Needs:     Lack of Transportation (Medical):  Lack of Transportation (Non-Medical):    Physical Activity:     Days of Exercise per Week:     Minutes of Exercise per Session:    Stress:     Feeling of Stress :    Social Connections:     Frequency of Communication with Friends and Family:     Frequency of Social Gatherings with Friends and Family:     Attends Alevism Services:     Active Member of Clubs or Organizations:     Attends Club or Organization Meetings:     Marital Status:    Intimate Partner Violence:     Fear of Current or Ex-Partner:     Emotionally Abused:     Physically Abused:     Sexually Abused:        I counseled the patient against using tobacco products.     Family History   Problem Relation Age of Onset    Heart Disease Paternal Grandfather     Diabetes Father     Hypertension Father     Stroke Father     Hypertension Mother     Mental Illness Mother      No other pertinent FamHx on review with patient/guardian. Allergies:  Bactrim [sulfamethoxazole-trimethoprim], Dicloxacillin, and Pcn [penicillins]    Home Medications:  Prior to Admission medications    Medication Sig Start Date End Date Taking? Authorizing Provider   rOPINIRole (REQUIP) 5 MG tablet Take 7.5 mg by mouth nightly    Yes Historical Provider, MD   naproxen (NAPROSYN) 500 MG tablet Take 500 mg by mouth nightly    Yes Historical Provider, MD   hydroCHLOROthiazide (HYDRODIURIL) 25 MG tablet Take 25 mg by mouth daily    Historical Provider, MD       REVIEW OF SYSTEMS    (2-9 systems for level 4, 10 ormore for level 5)      Review of Systems   Constitutional: Negative for activity change, appetite change, diaphoresis, fatigue and fever. HENT: Negative. Eyes: Negative. Respiratory: Negative. Cardiovascular: Negative. Gastrointestinal: Negative for abdominal distention, blood in stool, constipation, diarrhea, nausea and vomiting. Genitourinary: Negative. Musculoskeletal: Negative. Skin: Negative. Neurological: Negative. Psychiatric/Behavioral: Negative. PHYSICAL EXAM   (up to 7 for level 4, 8 or more for level 5)      INITIAL VITALS:   BP (!) 169/104   Pulse 84   Temp 97.9 °F (36.6 °C) (Oral)   Resp 16   Ht 5' 3\" (1.6 m)   Wt 195 lb (88.5 kg)   SpO2 98%   BMI 34.54 kg/m²     Physical Exam  Vitals reviewed. Exam conducted with a chaperone present. Constitutional:       Appearance: Normal appearance. She is obese. HENT:      Head: Normocephalic and atraumatic. Nose: Nose normal.      Mouth/Throat:      Mouth: Mucous membranes are moist.      Pharynx: Oropharynx is clear. Eyes:      Extraocular Movements: Extraocular movements intact.       Conjunctiva/sclera: Conjunctivae normal.      Pupils: Pupils are equal, round, and reactive to light. Cardiovascular:      Rate and Rhythm: Normal rate and regular rhythm. Pulses: Normal pulses. Heart sounds: Normal heart sounds. Pulmonary:      Effort: Pulmonary effort is normal.      Breath sounds: Normal breath sounds. Abdominal:      General: Abdomen is flat. Palpations: Abdomen is soft. Genitourinary:      Musculoskeletal:         General: Normal range of motion. Cervical back: Normal range of motion and neck supple. Skin:     General: Skin is warm and dry. Capillary Refill: Capillary refill takes less than 2 seconds. Neurological:      General: No focal deficit present. Mental Status: She is alert. Mental status is at baseline.    Psychiatric:         Mood and Affect: Mood normal.         DIFFERENTIAL  DIAGNOSIS     DDX: Perirectal abscess, collection, lipoma    PLAN (LABS / IMAGING / EKG):  Orders Placed This Encounter   Procedures    COVID-19, Rapid    CT PELVIS W CONTRAST Additional Contrast? None    Basic Metabolic Panel w/ Reflex to MG    Urinalysis Reflex to Culture    Microscopic Urinalysis    Protime-INR    APTT    SPECIMEN REJECTION    CBC Auto Differential    Basic Metabolic Panel w/ Reflex to MG    CBC    Protime-INR    Diet NPO    Inpatient consult to General Surgery    Inpatient consult to Internal Medicine    POC CHEMISTRY (NA,K,ICA,GLU,CALC HCT/HGB,LACTATE,CREA,CL)    PATIENT STATUS (FROM ED OR OR/PROCEDURAL) Inpatient       MEDICATIONS ORDERED:  Orders Placed This Encounter   Medications    0.9 % sodium chloride bolus    sodium chloride flush 0.9 % injection 10 mL    iopamidol (ISOVUE-370) 76 % injection 75 mL    ceFAZolin (ANCEF) 2000 mg in dextrose 5 % 50 mL IVPB     Order Specific Question:   Antimicrobial Indications     Answer:   Skin and Soft Tissue Infection    rOPINIRole (REQUIP) tablet 7.5 mg    OR Linked Order Group     acetaminophen (TYLENOL) tablet 650 mg     acetaminophen (TYLENOL) suppository 650 mg    0.9 % sodium chloride infusion    nicotine polacrilex (COMMIT) lozenge 2 mg           DIAGNOSTIC RESULTS / EMERGENCY DEPARTMENT COURSE / MDM     LABS:  Results for orders placed or performed during the hospital encounter of 08/29/21   COVID-19, Rapid    Specimen: Nasopharyngeal Swab   Result Value Ref Range    Specimen Description . NASOPHARYNGEAL SWAB     SARS-CoV-2, Rapid Not Detected Not Detected   Basic Metabolic Panel w/ Reflex to MG   Result Value Ref Range    Glucose 87 70 - 99 mg/dL    BUN 9 6 - 20 mg/dL    CREATININE 0.57 0.50 - 0.90 mg/dL    Bun/Cre Ratio NOT REPORTED 9 - 20    Calcium 9.3 8.6 - 10.4 mg/dL    Sodium 137 135 - 144 mmol/L    Potassium 3.8 3.7 - 5.3 mmol/L    Chloride 103 98 - 107 mmol/L    CO2 23 20 - 31 mmol/L    Anion Gap 11 9 - 17 mmol/L    GFR Non-African American >60 >60 mL/min    GFR African American >60 >60 mL/min    GFR Comment          GFR Staging NOT REPORTED    Urinalysis Reflex to Culture    Specimen: Urine, clean catch   Result Value Ref Range    Color, UA YELLOW YELLOW    Turbidity UA CLEAR CLEAR    Glucose, Ur NEGATIVE NEGATIVE    Bilirubin Urine NEGATIVE NEGATIVE    Ketones, Urine NEGATIVE NEGATIVE    Specific Gravity, UA 1.044 (H) 1.000 - 1.030    Urine Hgb MOD (A) NEGATIVE    pH, UA 7.0 5.0 - 8.0    Protein, UA NEGATIVE NEGATIVE    Urobilinogen, Urine ELEVATED (A) Normal    Nitrite, Urine NEGATIVE NEGATIVE    Leukocyte Esterase, Urine LARGE (A) NEGATIVE    Urinalysis Comments NOT REPORTED    Microscopic Urinalysis   Result Value Ref Range    -          WBC, UA 2 TO 5 /HPF    RBC, UA 0 TO 2 /HPF    Casts UA NOT REPORTED /LPF    Crystals, UA NOT REPORTED None /HPF    Epithelial Cells UA 0 TO 2 /HPF    Renal Epithelial, UA NOT REPORTED 0 /HPF    Bacteria, UA MODERATE (A) None    Mucus, UA NOT REPORTED None    Trichomonas, UA NOT REPORTED None    Amorphous, UA NOT REPORTED None    Other Observations UA NOT REPORTED NOT REQ.     Yeast, UA NOT REPORTED None   Protime-INR Result Value Ref Range    Protime 13.4 11.8 - 14.6 sec    INR 1.0    APTT   Result Value Ref Range    PTT 33.4 24.0 - 36.0 sec   SPECIMEN REJECTION   Result Value Ref Range    Specimen Source . BLOOD     Ordered Test CDP     Reason for Rejection Unable to perform testing: Specimen clotted. - NOT REPORTED    CBC Auto Differential   Result Value Ref Range    WBC 8.6 3.5 - 11.0 k/uL    RBC 4.27 4.0 - 5.2 m/uL    Hemoglobin 13.8 12.0 - 16.0 g/dL    Hematocrit 40.5 36 - 46 %    MCV 94.9 80 - 100 fL    MCH 32.4 26 - 34 pg    MCHC 34.2 31 - 37 g/dL    RDW 13.2 11.5 - 14.9 %    Platelets 761 519 - 312 k/uL    MPV 8.3 6.0 - 12.0 fL    NRBC Automated NOT REPORTED per 100 WBC    Differential Type NOT REPORTED     Seg Neutrophils 66 36 - 66 %    Lymphocytes 23 (L) 24 - 44 %    Monocytes 7 1 - 7 %    Eosinophils % 3 0 - 4 %    Basophils 1 0 - 2 %    Immature Granulocytes NOT REPORTED 0 %    Segs Absolute 5.80 1.3 - 9.1 k/uL    Absolute Lymph # 2.00 1.0 - 4.8 k/uL    Absolute Mono # 0.60 0.1 - 1.3 k/uL    Absolute Eos # 0.20 0.0 - 0.4 k/uL    Basophils Absolute 0.00 0.0 - 0.2 k/uL    Absolute Immature Granulocyte NOT REPORTED 0.00 - 0.30 k/uL    WBC Morphology NOT REPORTED     RBC Morphology NOT REPORTED     Platelet Estimate NOT REPORTED          IMPRESSION/MDM/ED COURSE:  40 y.o. female presented with subcutaneous collection/mass on her right buttock in the perianal space. Will reCT scan the pelvis with contrast to evaluate for any rectal involvement. If there is any form of abscess that is safe to drain will attempt drainage if not will have patient be admitted or have patient follow-up with general surgery/colorectal surgery    ED Course as of Aug 30 0043   Sun Aug 29, 2021   2222 Spoke with on-call surgeon. Recommended admission to medicine team, make the patient n.p.o. and give IV antibiotics in preparation for surgery.     [ES]      ED Course User Index  [ES] Josfeina Smith MD           RADIOLOGY:  CT PELVIS W

## 2021-08-30 ENCOUNTER — ANESTHESIA EVENT (OUTPATIENT)
Dept: OPERATING ROOM | Age: 46
DRG: 223 | End: 2021-08-30
Payer: COMMERCIAL

## 2021-08-30 ENCOUNTER — ANESTHESIA (OUTPATIENT)
Dept: OPERATING ROOM | Age: 46
DRG: 223 | End: 2021-08-30
Payer: COMMERCIAL

## 2021-08-30 VITALS — SYSTOLIC BLOOD PRESSURE: 88 MMHG | DIASTOLIC BLOOD PRESSURE: 48 MMHG | TEMPERATURE: 97.2 F | OXYGEN SATURATION: 96 %

## 2021-08-30 PROBLEM — Z72.0 TOBACCO ABUSE: Status: ACTIVE | Noted: 2021-08-30

## 2021-08-30 LAB
ANION GAP SERPL CALCULATED.3IONS-SCNC: 9 MMOL/L (ref 9–17)
BUN BLDV-MCNC: 11 MG/DL (ref 6–20)
BUN/CREAT BLD: ABNORMAL (ref 9–20)
CALCIUM SERPL-MCNC: 8.6 MG/DL (ref 8.6–10.4)
CHLORIDE BLD-SCNC: 108 MMOL/L (ref 98–107)
CO2: 23 MMOL/L (ref 20–31)
CREAT SERPL-MCNC: 0.63 MG/DL (ref 0.5–0.9)
GFR AFRICAN AMERICAN: >60 ML/MIN
GFR NON-AFRICAN AMERICAN: >60 ML/MIN
GFR SERPL CREATININE-BSD FRML MDRD: ABNORMAL ML/MIN/{1.73_M2}
GFR SERPL CREATININE-BSD FRML MDRD: ABNORMAL ML/MIN/{1.73_M2}
GLUCOSE BLD-MCNC: 104 MG/DL (ref 70–99)
HCT VFR BLD CALC: 39.2 % (ref 36–46)
HEMOGLOBIN: 13.1 G/DL (ref 12–16)
INR BLD: 1
MCH RBC QN AUTO: 31.7 PG (ref 26–34)
MCHC RBC AUTO-ENTMCNC: 33.5 G/DL (ref 31–37)
MCV RBC AUTO: 94.6 FL (ref 80–100)
NRBC AUTOMATED: NORMAL PER 100 WBC
PDW BLD-RTO: 13.4 % (ref 11.5–14.9)
PLATELET # BLD: 173 K/UL (ref 150–450)
PMV BLD AUTO: 8.1 FL (ref 6–12)
POTASSIUM SERPL-SCNC: 3.9 MMOL/L (ref 3.7–5.3)
PROTHROMBIN TIME: 12.9 SEC (ref 11.8–14.6)
RBC # BLD: 4.14 M/UL (ref 4–5.2)
SODIUM BLD-SCNC: 140 MMOL/L (ref 135–144)
WBC # BLD: 8 K/UL (ref 3.5–11)

## 2021-08-30 PROCEDURE — 87205 SMEAR GRAM STAIN: CPT

## 2021-08-30 PROCEDURE — 1200000000 HC SEMI PRIVATE

## 2021-08-30 PROCEDURE — 6360000002 HC RX W HCPCS: Performed by: STUDENT IN AN ORGANIZED HEALTH CARE EDUCATION/TRAINING PROGRAM

## 2021-08-30 PROCEDURE — 2580000003 HC RX 258: Performed by: SURGERY

## 2021-08-30 PROCEDURE — 2500000003 HC RX 250 WO HCPCS: Performed by: NURSE ANESTHETIST, CERTIFIED REGISTERED

## 2021-08-30 PROCEDURE — 6360000002 HC RX W HCPCS: Performed by: NURSE ANESTHETIST, CERTIFIED REGISTERED

## 2021-08-30 PROCEDURE — 87075 CULTR BACTERIA EXCEPT BLOOD: CPT

## 2021-08-30 PROCEDURE — 3600000012 HC SURGERY LEVEL 2 ADDTL 15MIN: Performed by: SURGERY

## 2021-08-30 PROCEDURE — 0D9P0ZZ DRAINAGE OF RECTUM, OPEN APPROACH: ICD-10-PCS | Performed by: INTERNAL MEDICINE

## 2021-08-30 PROCEDURE — 2500000003 HC RX 250 WO HCPCS: Performed by: PHYSICIAN ASSISTANT

## 2021-08-30 PROCEDURE — 3600000002 HC SURGERY LEVEL 2 BASE: Performed by: SURGERY

## 2021-08-30 PROCEDURE — 85027 COMPLETE CBC AUTOMATED: CPT

## 2021-08-30 PROCEDURE — 6370000000 HC RX 637 (ALT 250 FOR IP): Performed by: SURGERY

## 2021-08-30 PROCEDURE — 85610 PROTHROMBIN TIME: CPT

## 2021-08-30 PROCEDURE — 7100000000 HC PACU RECOVERY - FIRST 15 MIN: Performed by: SURGERY

## 2021-08-30 PROCEDURE — 2580000003 HC RX 258: Performed by: NURSE PRACTITIONER

## 2021-08-30 PROCEDURE — 2580000003 HC RX 258: Performed by: PHYSICIAN ASSISTANT

## 2021-08-30 PROCEDURE — 2500000003 HC RX 250 WO HCPCS: Performed by: SURGERY

## 2021-08-30 PROCEDURE — 6360000002 HC RX W HCPCS: Performed by: NURSE PRACTITIONER

## 2021-08-30 PROCEDURE — 6370000000 HC RX 637 (ALT 250 FOR IP): Performed by: NURSE PRACTITIONER

## 2021-08-30 PROCEDURE — 7100000001 HC PACU RECOVERY - ADDTL 15 MIN: Performed by: SURGERY

## 2021-08-30 PROCEDURE — 87070 CULTURE OTHR SPECIMN AEROBIC: CPT

## 2021-08-30 PROCEDURE — 36415 COLL VENOUS BLD VENIPUNCTURE: CPT

## 2021-08-30 PROCEDURE — 3700000001 HC ADD 15 MINUTES (ANESTHESIA): Performed by: SURGERY

## 2021-08-30 PROCEDURE — 2580000003 HC RX 258: Performed by: NURSE ANESTHETIST, CERTIFIED REGISTERED

## 2021-08-30 PROCEDURE — 6360000002 HC RX W HCPCS: Performed by: SURGERY

## 2021-08-30 PROCEDURE — 3700000000 HC ANESTHESIA ATTENDED CARE: Performed by: SURGERY

## 2021-08-30 PROCEDURE — 87076 CULTURE ANAEROBE IDENT EACH: CPT

## 2021-08-30 PROCEDURE — 2709999900 HC NON-CHARGEABLE SUPPLY: Performed by: SURGERY

## 2021-08-30 PROCEDURE — 87185 SC STD ENZYME DETCJ PER NZM: CPT

## 2021-08-30 PROCEDURE — 99999 PR OFFICE/OUTPT VISIT,PROCEDURE ONLY: CPT | Performed by: PHYSICIAN ASSISTANT

## 2021-08-30 PROCEDURE — 80048 BASIC METABOLIC PNL TOTAL CA: CPT

## 2021-08-30 RX ORDER — POLYETHYLENE GLYCOL 3350 17 G/17G
17 POWDER, FOR SOLUTION ORAL DAILY PRN
Status: DISCONTINUED | OUTPATIENT
Start: 2021-08-30 | End: 2021-08-31 | Stop reason: HOSPADM

## 2021-08-30 RX ORDER — OXYCODONE HYDROCHLORIDE AND ACETAMINOPHEN 5; 325 MG/1; MG/1
2 TABLET ORAL PRN
Status: DISCONTINUED | OUTPATIENT
Start: 2021-08-30 | End: 2021-08-30 | Stop reason: HOSPADM

## 2021-08-30 RX ORDER — SODIUM CHLORIDE 0.9 % (FLUSH) 0.9 %
10 SYRINGE (ML) INJECTION PRN
Status: DISCONTINUED | OUTPATIENT
Start: 2021-08-30 | End: 2021-08-31 | Stop reason: HOSPADM

## 2021-08-30 RX ORDER — LIDOCAINE HYDROCHLORIDE 10 MG/ML
INJECTION, SOLUTION EPIDURAL; INFILTRATION; INTRACAUDAL; PERINEURAL PRN
Status: DISCONTINUED | OUTPATIENT
Start: 2021-08-30 | End: 2021-08-30 | Stop reason: SDUPTHER

## 2021-08-30 RX ORDER — DEXAMETHASONE SODIUM PHOSPHATE 4 MG/ML
INJECTION, SOLUTION INTRA-ARTICULAR; INTRALESIONAL; INTRAMUSCULAR; INTRAVENOUS; SOFT TISSUE PRN
Status: DISCONTINUED | OUTPATIENT
Start: 2021-08-30 | End: 2021-08-30 | Stop reason: SDUPTHER

## 2021-08-30 RX ORDER — SODIUM CHLORIDE 9 MG/ML
INJECTION, SOLUTION INTRAVENOUS CONTINUOUS
Status: DISCONTINUED | OUTPATIENT
Start: 2021-08-30 | End: 2021-08-31 | Stop reason: HOSPADM

## 2021-08-30 RX ORDER — POTASSIUM CHLORIDE 20 MEQ/1
40 TABLET, EXTENDED RELEASE ORAL PRN
Status: DISCONTINUED | OUTPATIENT
Start: 2021-08-30 | End: 2021-08-31 | Stop reason: HOSPADM

## 2021-08-30 RX ORDER — OXYCODONE HYDROCHLORIDE AND ACETAMINOPHEN 5; 325 MG/1; MG/1
1 TABLET ORAL PRN
Status: DISCONTINUED | OUTPATIENT
Start: 2021-08-30 | End: 2021-08-30 | Stop reason: HOSPADM

## 2021-08-30 RX ORDER — LABETALOL HYDROCHLORIDE 5 MG/ML
5 INJECTION, SOLUTION INTRAVENOUS EVERY 10 MIN PRN
Status: DISCONTINUED | OUTPATIENT
Start: 2021-08-30 | End: 2021-08-30 | Stop reason: HOSPADM

## 2021-08-30 RX ORDER — ACETAMINOPHEN 650 MG/1
650 SUPPOSITORY RECTAL EVERY 6 HOURS PRN
Status: DISCONTINUED | OUTPATIENT
Start: 2021-08-30 | End: 2021-08-30 | Stop reason: SDUPTHER

## 2021-08-30 RX ORDER — FENTANYL CITRATE 50 UG/ML
INJECTION, SOLUTION INTRAMUSCULAR; INTRAVENOUS PRN
Status: DISCONTINUED | OUTPATIENT
Start: 2021-08-30 | End: 2021-08-30 | Stop reason: SDUPTHER

## 2021-08-30 RX ORDER — SODIUM CHLORIDE 0.9 % (FLUSH) 0.9 %
5-40 SYRINGE (ML) INJECTION EVERY 12 HOURS SCHEDULED
Status: DISCONTINUED | OUTPATIENT
Start: 2021-08-30 | End: 2021-08-31 | Stop reason: HOSPADM

## 2021-08-30 RX ORDER — DIPHENHYDRAMINE HYDROCHLORIDE 50 MG/ML
12.5 INJECTION INTRAMUSCULAR; INTRAVENOUS
Status: DISCONTINUED | OUTPATIENT
Start: 2021-08-30 | End: 2021-08-30 | Stop reason: HOSPADM

## 2021-08-30 RX ORDER — SODIUM CHLORIDE 9 MG/ML
INJECTION, SOLUTION INTRAVENOUS CONTINUOUS PRN
Status: DISCONTINUED | OUTPATIENT
Start: 2021-08-30 | End: 2021-08-30 | Stop reason: SDUPTHER

## 2021-08-30 RX ORDER — ONDANSETRON 2 MG/ML
INJECTION INTRAMUSCULAR; INTRAVENOUS PRN
Status: DISCONTINUED | OUTPATIENT
Start: 2021-08-30 | End: 2021-08-30 | Stop reason: SDUPTHER

## 2021-08-30 RX ORDER — ONDANSETRON 2 MG/ML
4 INJECTION INTRAMUSCULAR; INTRAVENOUS EVERY 6 HOURS PRN
Status: DISCONTINUED | OUTPATIENT
Start: 2021-08-30 | End: 2021-08-31 | Stop reason: HOSPADM

## 2021-08-30 RX ORDER — SODIUM CHLORIDE 9 MG/ML
25 INJECTION, SOLUTION INTRAVENOUS PRN
Status: DISCONTINUED | OUTPATIENT
Start: 2021-08-30 | End: 2021-08-31 | Stop reason: HOSPADM

## 2021-08-30 RX ORDER — ACETAMINOPHEN 325 MG/1
650 TABLET ORAL EVERY 6 HOURS PRN
Status: DISCONTINUED | OUTPATIENT
Start: 2021-08-30 | End: 2021-08-30 | Stop reason: SDUPTHER

## 2021-08-30 RX ORDER — PROPOFOL 10 MG/ML
INJECTION, EMULSION INTRAVENOUS PRN
Status: DISCONTINUED | OUTPATIENT
Start: 2021-08-30 | End: 2021-08-30 | Stop reason: SDUPTHER

## 2021-08-30 RX ORDER — FENTANYL CITRATE 50 UG/ML
100 INJECTION, SOLUTION INTRAMUSCULAR; INTRAVENOUS
Status: DISCONTINUED | OUTPATIENT
Start: 2021-08-30 | End: 2021-08-31 | Stop reason: HOSPADM

## 2021-08-30 RX ORDER — MIDAZOLAM HYDROCHLORIDE 1 MG/ML
INJECTION INTRAMUSCULAR; INTRAVENOUS PRN
Status: DISCONTINUED | OUTPATIENT
Start: 2021-08-30 | End: 2021-08-30 | Stop reason: SDUPTHER

## 2021-08-30 RX ORDER — POLYETHYLENE GLYCOL 3350 17 G
2 POWDER IN PACKET (EA) ORAL
Status: DISCONTINUED | OUTPATIENT
Start: 2021-08-30 | End: 2021-08-31 | Stop reason: HOSPADM

## 2021-08-30 RX ORDER — POLYETHYLENE GLYCOL 3350 17 G
2 POWDER IN PACKET (EA) ORAL PRN
Status: DISCONTINUED | OUTPATIENT
Start: 2021-08-30 | End: 2021-08-31 | Stop reason: HOSPADM

## 2021-08-30 RX ORDER — OXYCODONE HYDROCHLORIDE AND ACETAMINOPHEN 5; 325 MG/1; MG/1
1 TABLET ORAL EVERY 4 HOURS PRN
Status: DISCONTINUED | OUTPATIENT
Start: 2021-08-30 | End: 2021-08-31 | Stop reason: HOSPADM

## 2021-08-30 RX ORDER — FENTANYL CITRATE 50 UG/ML
50 INJECTION, SOLUTION INTRAMUSCULAR; INTRAVENOUS
Status: DISCONTINUED | OUTPATIENT
Start: 2021-08-30 | End: 2021-08-31 | Stop reason: HOSPADM

## 2021-08-30 RX ORDER — ACETAMINOPHEN 325 MG/1
650 TABLET ORAL EVERY 4 HOURS PRN
Status: DISCONTINUED | OUTPATIENT
Start: 2021-08-30 | End: 2021-08-31 | Stop reason: HOSPADM

## 2021-08-30 RX ORDER — ONDANSETRON 4 MG/1
4 TABLET, ORALLY DISINTEGRATING ORAL EVERY 8 HOURS PRN
Status: DISCONTINUED | OUTPATIENT
Start: 2021-08-30 | End: 2021-08-31 | Stop reason: HOSPADM

## 2021-08-30 RX ORDER — POTASSIUM CHLORIDE 7.45 MG/ML
10 INJECTION INTRAVENOUS PRN
Status: DISCONTINUED | OUTPATIENT
Start: 2021-08-30 | End: 2021-08-31 | Stop reason: HOSPADM

## 2021-08-30 RX ORDER — BUPIVACAINE HYDROCHLORIDE AND EPINEPHRINE 5; 5 MG/ML; UG/ML
INJECTION, SOLUTION EPIDURAL; INTRACAUDAL; PERINEURAL PRN
Status: DISCONTINUED | OUTPATIENT
Start: 2021-08-30 | End: 2021-08-30 | Stop reason: ALTCHOICE

## 2021-08-30 RX ORDER — ONDANSETRON 2 MG/ML
4 INJECTION INTRAMUSCULAR; INTRAVENOUS
Status: DISCONTINUED | OUTPATIENT
Start: 2021-08-30 | End: 2021-08-30 | Stop reason: HOSPADM

## 2021-08-30 RX ORDER — MAGNESIUM SULFATE 1 G/100ML
1000 INJECTION INTRAVENOUS PRN
Status: DISCONTINUED | OUTPATIENT
Start: 2021-08-30 | End: 2021-08-31 | Stop reason: HOSPADM

## 2021-08-30 RX ADMIN — SODIUM CHLORIDE, PRESERVATIVE FREE 10 ML: 5 INJECTION INTRAVENOUS at 16:33

## 2021-08-30 RX ADMIN — SODIUM CHLORIDE: 9 INJECTION, SOLUTION INTRAVENOUS at 00:12

## 2021-08-30 RX ADMIN — FENTANYL CITRATE 100 MCG: 50 INJECTION, SOLUTION INTRAMUSCULAR; INTRAVENOUS at 13:16

## 2021-08-30 RX ADMIN — DEXAMETHASONE SODIUM PHOSPHATE 4 MG: 4 INJECTION, SOLUTION INTRAMUSCULAR; INTRAVENOUS at 13:42

## 2021-08-30 RX ADMIN — MIDAZOLAM 2 MG: 1 INJECTION INTRAMUSCULAR; INTRAVENOUS at 13:04

## 2021-08-30 RX ADMIN — ONDANSETRON 4 MG: 2 INJECTION, SOLUTION INTRAMUSCULAR; INTRAVENOUS at 13:42

## 2021-08-30 RX ADMIN — OXYCODONE HYDROCHLORIDE AND ACETAMINOPHEN 1 TABLET: 5; 325 TABLET ORAL at 21:38

## 2021-08-30 RX ADMIN — PROPOFOL 200 MG: 10 INJECTION, EMULSION INTRAVENOUS at 13:12

## 2021-08-30 RX ADMIN — CEFAZOLIN 1000 MG: 1 INJECTION, POWDER, FOR SOLUTION INTRAMUSCULAR; INTRAVENOUS at 23:27

## 2021-08-30 RX ADMIN — CEFAZOLIN 1000 MG: 1 INJECTION, POWDER, FOR SOLUTION INTRAMUSCULAR; INTRAVENOUS at 10:53

## 2021-08-30 RX ADMIN — SODIUM CHLORIDE: 9 INJECTION, SOLUTION INTRAVENOUS at 13:04

## 2021-08-30 RX ADMIN — ROPINIROLE HYDROCHLORIDE 7.5 MG: 3 TABLET, FILM COATED ORAL at 22:02

## 2021-08-30 RX ADMIN — ROPINIROLE HYDROCHLORIDE 7.5 MG: 3 TABLET, FILM COATED ORAL at 01:39

## 2021-08-30 RX ADMIN — CEFAZOLIN 2000 MG: 10 INJECTION, POWDER, FOR SOLUTION INTRAVENOUS at 00:13

## 2021-08-30 RX ADMIN — LIDOCAINE HYDROCHLORIDE 50 MG: 10 INJECTION, SOLUTION EPIDURAL; INFILTRATION; INTRACAUDAL; PERINEURAL at 13:12

## 2021-08-30 RX ADMIN — SODIUM CHLORIDE: 9 INJECTION, SOLUTION INTRAVENOUS at 12:06

## 2021-08-30 RX ADMIN — CEFAZOLIN 1000 MG: 1 INJECTION, POWDER, FOR SOLUTION INTRAMUSCULAR; INTRAVENOUS at 16:33

## 2021-08-30 RX ADMIN — FAMOTIDINE 20 MG: 10 INJECTION, SOLUTION INTRAVENOUS at 10:53

## 2021-08-30 ASSESSMENT — PAIN DESCRIPTION - FREQUENCY
FREQUENCY: INTERMITTENT
FREQUENCY: INTERMITTENT

## 2021-08-30 ASSESSMENT — PULMONARY FUNCTION TESTS
PIF_VALUE: 17
PIF_VALUE: 15
PIF_VALUE: 15
PIF_VALUE: 1
PIF_VALUE: 0
PIF_VALUE: 16
PIF_VALUE: 1
PIF_VALUE: 10
PIF_VALUE: 15
PIF_VALUE: 11
PIF_VALUE: 15
PIF_VALUE: 11
PIF_VALUE: 15
PIF_VALUE: 15
PIF_VALUE: 14
PIF_VALUE: 15
PIF_VALUE: 11
PIF_VALUE: 11
PIF_VALUE: 15
PIF_VALUE: 15
PIF_VALUE: 10
PIF_VALUE: 12
PIF_VALUE: 15
PIF_VALUE: 14
PIF_VALUE: 1
PIF_VALUE: 15
PIF_VALUE: 4
PIF_VALUE: 1
PIF_VALUE: 15
PIF_VALUE: 2
PIF_VALUE: 14
PIF_VALUE: 11
PIF_VALUE: 15
PIF_VALUE: 2
PIF_VALUE: 1
PIF_VALUE: 15
PIF_VALUE: 3
PIF_VALUE: 14
PIF_VALUE: 14
PIF_VALUE: 11
PIF_VALUE: 15
PIF_VALUE: 14
PIF_VALUE: 2
PIF_VALUE: 14
PIF_VALUE: 0
PIF_VALUE: 15
PIF_VALUE: 1
PIF_VALUE: 11

## 2021-08-30 ASSESSMENT — PAIN DESCRIPTION - ORIENTATION: ORIENTATION: RIGHT;MID

## 2021-08-30 ASSESSMENT — PAIN SCALES - GENERAL
PAINLEVEL_OUTOF10: 0
PAINLEVEL_OUTOF10: 0
PAINLEVEL_OUTOF10: 1
PAINLEVEL_OUTOF10: 6

## 2021-08-30 ASSESSMENT — PAIN DESCRIPTION - PAIN TYPE: TYPE: SURGICAL PAIN

## 2021-08-30 ASSESSMENT — PAIN DESCRIPTION - DESCRIPTORS
DESCRIPTORS: SORE
DESCRIPTORS: SORE

## 2021-08-30 ASSESSMENT — PAIN - FUNCTIONAL ASSESSMENT: PAIN_FUNCTIONAL_ASSESSMENT: 0-10

## 2021-08-30 ASSESSMENT — PAIN DESCRIPTION - LOCATION
LOCATION: BUTTOCKS
LOCATION: BUTTOCKS

## 2021-08-30 NOTE — ED NOTES
When pt. Was getting off the CT bed the CT technician noted there was blood on the bed. Pt. Denies still having periods. Dr. Ximena Womack at bedside with Bernadette Taylor RN to chaperone an exam of the rectal abscess to see if it opened up. Abscess still intact.       Chava Ramos RN  08/29/21 [id number]

## 2021-08-30 NOTE — CARE COORDINATION
CASE MANAGEMENT NOTE:    Admission Date:  8/29/2021 Drew Coleman is a 40 y.o.  female    Admitted for : Perianal abscess [K61.0]    Met with:  Patient    PCP:  Shara Fuentes NP                                Insurance:  [facility]      Is patient alert and oriented at time of discussion:  Yes    Current Residence/ Living Arrangements:  independently at home with her aunt and her daughter, Cely Aaron PTA:  No    Does patient go to outpatient dialysis: No  If yes, location and chair time: n/a    Is patient agreeable to [facility]: Yes (for wound care)    [place] of choice provided:  Yes    List of [address] provided: Yes    V[facility] chosen:  [address] [place] [address]. Referral faxed and notified Nick Cavanaugh from [facility] of this. DME:  none    Home Oxygen: No    Nebulizer: No    CPAP/BIPAP: No    Supplier: N/A    Potential Assistance Needed: wound care. Pt states she thinks her daughter will be a teachable caregiver for wound care. SNF needed: No    Freedom of choice and list provided: NA    Pharmacy:  [facility]       Does Patient want to use MEDS to BEDS? No    Is patient currently receiving oral anticoagulation therapy? No    Is the Patient an [facility] with Readmission Risk Score greater than 14%? No  If yes, pt needs a follow up appointment made within 7 days. Family Members/Caregivers that pt would like involved in their care:    Yes    If yes, list name here:  Sandra Bethrie    Transportation Provider:  Family and patient walks             Discharge Plan:  Home with [address]. Electronically signed by: Taco Josue RN on 8/30/2021 at 11:36 AM     Received message from Nick Cavanaugh at [address] [place] [address] stating they are able to accept pt for VNS.     Electronically signed by Taco Josue RN on 8/30/2021 at 1:32 PM

## 2021-08-30 NOTE — H&P
[address] [facility]– [place][note 2]     HISTORY AND PHYSICAL EXAMINATION            Date:   8/30/2021  Patientname:  Sheeba Limon  Date of admission:  8/29/2021  7:16 PM  MRN:   [id number]  Account:  [de-identified]  YOB: 1975  PCP:    BRICE Gayle  Room:   03/03  Code Status:    Prior    CHIEF COMPLAINT     Chief Complaint   Patient presents with    Skin Problem       HISTORY OF PRESENT ILLNESS  (Character, Onset, Location, Duration,  Exacerbating/RelievingFactors, Radiation,   Associated Symptoms, Severity )      The patient is a 40 y.o.  female, with a history of anxiety, left genital labial abscess, neuropathy, and restless leg syndrome, who presents with skin problem. According to patient, she noted a pain in the right side of her rectum a few days ago, which felt as if she were developing a boil. She has been soaking in hot water and applying pressure to the area in an attempt to drain pus; however, home treatments have been ineffective in pain is now interfering with her ability to walk. Symptoms are associated with a tender, palpable mass on the right side of her rectum. Denies fever, chills, chest pain, cough, abdominal pain, nausea, vomiting, diarrhea, and urinary symptoms. Symptoms are aggravated by ambulation and pressure. There are no alleviating factors. Symptoms are reported as constant and moderate; progressively worsening. Patient reports that she had a CT scan last month that showed she had an abscess starting in this area. Review of EHR shows that a CT abdomen and Pelvis completed 4/29/21 noted inflammatory stranding along the inferior left gluteal fold extending to the anus with no perianal abscess identified.      PAST MEDICAL HISTORY   Patient  has a past medical history of Anxiety, Arthritis, Depression, Diabetes, gestational, Ground glass opacity present on imaging of lung, History of mycoplasma pneumonia, Light cigarette smoker (1-9 cigarettes per day), Menarche, Neuropathy, Obesity, Obesity (BMI 30.0-34.9), Parity, Pneumonia, Restless leg syndrome, Sleep apnea, UTI (lower urinary tract infection), and Wears glasses. PAST SURGICAL HISTORY    Patient  has a past surgical history that includes Cholecystectomy (06); Tubal ligation;  section; Colonoscopy; hysteroscopy (14); Dilation and curettage of uterus (2014); myringotomy (Bilateral); Tonsillectomy and adenoidectomy; Ankle surgery (Right, ); Endometrial ablation (14); Knee arthroscopy (Left, 2015); other surgical history (2016); knee surgery; pr sling oper stres incontinence (N/A, 2018); bladder suspension; and Vagina surgery (N/A, 2019). FAMILY HISTORY    Patient family history includes Diabetes in her father; Heart Disease in her paternal grandfather; Hypertension in her father and mother; Mental Illness in her mother; Stroke in her father. SOCIAL HISTORY    Patient  reports that she has been smoking cigarettes. She started smoking about 30 years ago. She has a 9.00 pack-year smoking history. She has never used smokeless tobacco. She reports current drug use. Drug: Marijuana. She reports that she does not drink alcohol. HOME MEDICATIONS        Prior to Admission medications    Medication Sig Start Date End Date Taking? Authorizing Provider   rOPINIRole (REQUIP) 5 MG tablet Take 7.5 mg by mouth nightly    Yes Historical Provider, MD   naproxen (NAPROSYN) 500 MG tablet Take 500 mg by mouth nightly    Yes Historical Provider, MD   hydroCHLOROthiazide (HYDRODIURIL) 25 MG tablet Take 25 mg by mouth daily    Historical Provider, MD       ALLERGIES      Bactrim [sulfamethoxazole-trimethoprim], Dicloxacillin, and Pcn [penicillins]    REVIEW OF SYSTEMS     Review of Systems   Constitutional: Negative for chills, diaphoresis and fever. HENT: Negative for congestion and sore throat.     Respiratory: Negative for cough, shortness of breath and wheezing. Cardiovascular: Negative for chest pain, palpitations and leg swelling. Gastrointestinal: Negative for abdominal pain, constipation, diarrhea, nausea and vomiting. Genitourinary: Negative for dysuria, frequency and urgency. Musculoskeletal: Negative for back pain and myalgias. Skin: Negative for color change and rash. Painful mass along right side of rectum   Neurological: Negative for dizziness, weakness and headaches. Psychiatric/Behavioral: The patient is not nervous/anxious. PHYSICAL EXAM      BP (!) 125/97   Pulse 87   Temp 97.9 °F (36.6 °C) (Oral)   Resp 14   Ht 5' 3\" (1.6 m)   Wt 195 lb (88.5 kg)   SpO2 98%   BMI 34.54 kg/m²  Body mass index is 34.54 kg/m². Physical Exam  Constitutional:       General: She is not in acute distress. Appearance: She is well-developed. She is not diaphoretic. HENT:      Head: Normocephalic and atraumatic. Eyes:      Conjunctiva/sclera: Conjunctivae normal.      Pupils: Pupils are equal, round, and reactive to light. Neck:      Trachea: No tracheal deviation. Cardiovascular:      Rate and Rhythm: Normal rate and regular rhythm. Heart sounds: Normal heart sounds. No murmur heard. No friction rub. No gallop. Pulmonary:      Effort: Pulmonary effort is normal. No respiratory distress. Breath sounds: Normal breath sounds. No wheezing or rales. Chest:      Chest wall: No tenderness. Abdominal:      General: Bowel sounds are normal. There is no distension. Palpations: Abdomen is soft. Tenderness: There is no abdominal tenderness. There is no guarding. Genitourinary:     Comments: plalpable mass along right side of rectum. No erythema or fluctuance noted; no drainage seen from wound. Musculoskeletal:         General: No tenderness. Normal range of motion. Cervical back: Normal range of motion and neck supple. Lymphadenopathy:      Cervical: No cervical adenopathy.    Skin: General: Skin is warm and dry. Coloration: Skin is not pale. Findings: No erythema or rash. Neurological:      Mental Status: She is alert and oriented to person, place, and time. Motor: No seizure activity. Coordination: Coordination normal.   Psychiatric:         Behavior: Behavior normal.         Thought Content: Thought content normal.       DIAGNOSTICS      EKG: None    Labs:  CBC:   Recent Labs     08/29/21  2303   WBC 8.6   HGB 13.8        BMP:    Recent Labs     08/29/21 1950      K 3.8      CO2 23   BUN 9   CREATININE 0.57   GLUCOSE 87     S. Calcium:  Recent Labs     08/29/21 1950   CALCIUM 9.3     S. Ionized Calcium:No results for input(s): IONCA in the last 72 hours. S. Magnesium:No results for input(s): MG in the last 72 hours. S. Phosphorus:No results for input(s): PHOS in the last 72 hours. S. Glucose:No results for input(s): POCGLU in the last 72 hours. Glycosylated hemoglobin A1C:   Lab Results   Component Value Date    LABA1C 5.5 05/28/2021     Hepatic: No results for input(s): AST, ALT, ALB, ALKPHOS in the last 72 hours. Invalid input(s):  PROT,  BILITOT,  BILIDIR  CARDIAC ENZY: No results for input(s): CKTOTAL, CKMB, CKMBINDEX, TROPHS, MYOGLOBIN in the last 72 hours. INR:   Recent Labs     08/29/21 2221   INR 1.0     BNP: No results for input(s): PROBNP in the last 72 hours. ABGs: No results for input(s): PH, PCO2, PO2, HCO3, O2SAT in the last 72 hours. Lipids: No results for input(s): CHOL, TRIG, HDL, LDLCALC in the last 72 hours. Invalid input(s): LDL  Pancreatic functions:No results for input(s): LIPASE, AMYLASE in the last 72 hours. Meghana : No results for input(s): LACTA in the last 72 hours.   Thyroid functions:   Lab Results   Component Value Date    TSH 1.20 06/17/2021      U/A:  Recent Labs     08/29/21  2045   COLORU YELLOW   WBCUA 2 TO 5   RBCUA 0 TO 2   MUCUS NOT REPORTED   BACTERIA MODERATE*   SPECGRAV 1.044* LEUKOCYTESUR LARGE*   GLUCOSEU NEGATIVE   AMORPHOUS NOT REPORTED       Imaging/Diagonstics:     CT PELVIS W CONTRAST Additional Contrast? None    Result Date: 8/29/2021  EXAMINATION: CT OF THE PELVIS WITH CONTRAST 8/29/2021 2:12 pm TECHNIQUE: CT of the pelvis was performed with the administration of intravenous contrast. Multiplanar reformatted images are provided for review. Dose modulation, iterative reconstruction, and/or weight based adjustment of the mA/kV was utilized to reduce the radiation dose to as low as reasonably achievable. COMPARISON: CT scan dated April 29 2021 HISTORY: ORDERING SYSTEM PROVIDED HISTORY: Large fluid collection appreciated on ultrasound in the perianal space on the right gluteal cleft TECHNOLOGIST PROVIDED HISTORY: Large fluid collection appreciated on ultrasound in the perianal space on the right gluteal cleft Decision Support Exception - unselect if not a suspected or confirmed emergency medical condition->Emergency Medical Condition (MA) Reason for Exam: Large fluid collection appreciated on ultrasound in the perianal space on the right gluteal cleft Type of Exam: Subsequent/Follow-up Additional signs and symptoms: patient states she has had the \"bump\" for one month which has gotten bigger and more painful FINDINGS: An elongated fluid collection with surrounding inflammation is present within the right perianal region, extending posteriorly and inferiorly within the medial right buttock region. Fluid collection measures approximately 8.7 cm in length, and 1.1 cm in transverse dimension, and 2.5 cm in greatest cephalocaudal dimension. Findings are consistent with a large perirectal abscess. No soft tissue emphysema is present. Skin thickening and soft tissue stranding is present within the inferior medial right buttock region. Visualized portions of the small and large bowel appear grossly normal. Large amount of stool is present within the rectum and sigmoid colon.   The appendix is normal.  Urinary bladder appears normal.  Bilateral Essure devices are noted within the uterus. Trace amount of free fluid is present within the pelvis. A 2.1 cm dominant follicles present on the left ovary. No acute osseous abnormality is present involving the pelvis. 1. CT findings consistent with a large right perianal abscess, measuring approximately 8.7 cm in length, extending from the right perianal region posteriorly and inferiorly within the inferior medial right buttock region. 2. Large amount of stool present within the rectosigmoid colon RECOMMENDATIONS: 2.1 cm benign appearing ovarian cyst.  No follow-up imaging is recommended. Reference: J Am Eli Radiol 2013;10:[id number]     ASSESSMENT  and  PLAN     Principal Problem:    Perianal abscess  Active Problems:    UTI (urinary tract infection)    Hypertension    Tobacco abuse  Resolved Problems:    * No resolved hospital problems. *    Plan:    Perianal abscess  -CT Abdomen/Pelvis shows large right perianal abscess  --Area measuring approximately 8.7 cm in length  --- Large amount of stool and 2.1 cm benign ovarian cyst  -IV Ancef initiated in ED  --Continue on admission to unit  -IV Normal Saline  -General surgery consulted in ED  --Keep n.p.o. after midnight for potential I&D in a.m.     Urinary tract infection  -UA obtained in ED shows large leukocyte Estrace  --Denies urinary symptoms  ---Currently receiving Ancef for perianal abscess   -Urine culture pending    Hypertension  -Reports history hypertension  -Home med rec shows HCTZ 25 mg daily  --Reports has not taken for approximately 1 month  --- States that med is a water pill and not a \"high blood pressure medicine\"  -Patient education provided  --Agreeable to resume HCTZ   --- Not in initiated at this time due to n.p.o. and on IV fluids    Tobacco use   -smoking cessation education  -Patient denies need for nicotine replacement  --Will initiate nicotine lozenge as needed      Consultations:     IP CONSULT TO GENERAL SURGERY  IP CONSULT TO INTERNAL MEDICINE      BRICE Stanton CNP   8/30/2021  3:50 AM    Pilar Mary [address] [place] [address]. Phone [phone]     Attending Physician Statement  I have discussed the care of Lord Hatch and I have examined the patient myselft and taken ros and hpi , including pertinent history and exam findings,  with the resident. I have reviewed the key elements of all parts of the encounter with the resident. I agree with the assessment, plan and orders as documented by the resident.   Iv abx  surg input noted  Pending cx     Electronically signed by Wagner Somers MD

## 2021-08-30 NOTE — ED NOTES
Mode of arrival (squad #, walk in, police, etc) : walk in        Chief complaint(s): Skin problem        Arrival Note (brief scenario, treatment PTA, etc). : pt. Reports she was here a few months ago for an abscess on her right buttock. Pt. Reports it did not cause her any pain at the time but she wanted the dr. To see it. Pt. Reports an increase in pain and and a hardened spot near her rectum. C= \"Have you ever felt that you should Cut down on your drinking? \"  No  A= \"Have people Annoyed you by criticizing your drinking? \"  No  G= \"Have you ever felt bad or Guilty about your drinking? \"  No  E= \"Have you ever had a drink as an Eye-opener first thing in the morning to steady your nerves or to help a hangover? \"  No      Deferred []      Reason for deferring: N/A    *If yes to two or more: probable alcohol abuse. Jennie Arboleda RN  08/29/21 2010

## 2021-08-30 NOTE — CONSULTS
General Surgery Consult      Pt Name: Junior Hill  MRN: [id number]  YOB: 1975  Date of evaluation: 2021  Primary Care Physician: BRICE Baker   Patient evaluated at the request of  Dr. Ruy Hoyt  Reason for evaluation: Perirectal pain    SUBJECTIVE:   History of Chief Complaint:    Junior Hill is a 40 y.o. female who presents with perirectal pain for the last few days. No history of diabetes. Significant discomfort no drainage. Some redness. Emergency room work-up reviewed. CT scan revealed a large perirectal abscess on the right side measuring 8.7 cm. It is extending from the right perianal region posteriorly and inferiorly within the inferior medial right buttock region. Large amount of stool present in the rectosigmoid colon. 2.1 cm benign-appearing ovarian cyst.  Blood work was reviewed. WBC count is normal.  Hemoglobin is adequate. BMP is unremarkable. Symptom onset has been acute for a time period of few day(s). Severity is described as moderate. Course of her symptoms over time is acute. Past Medical History   has a past medical history of Anxiety, Arthritis, Depression, Diabetes, gestational, Ground glass opacity present on imaging of lung, History of mycoplasma pneumonia, Light cigarette smoker (1-9 cigarettes per day), Menarche, Neuropathy, Obesity, Obesity (BMI 30.0-34.9), Parity, Pneumonia, Restless leg syndrome, Sleep apnea, UTI (lower urinary tract infection), and Wears glasses. Past Surgical History   has a past surgical history that includes Cholecystectomy (06); Tubal ligation;  section; Colonoscopy; hysteroscopy (14); Dilation and curettage of uterus (2014); myringotomy (Bilateral); Tonsillectomy and adenoidectomy; Ankle surgery (Right, ); Endometrial ablation (14);  Knee arthroscopy (Left, 2015); other surgical history (2016); knee surgery; pr sling oper stres incontinence (N/A, 2018); bladder suspension; and Vagina surgery (N/A, 1/18/2019). Medications  Prior to Admission medications    Medication Sig Start Date End Date Taking? Authorizing Provider   rOPINIRole (REQUIP) 5 MG tablet Take 7.5 mg by mouth nightly    Yes Historical Provider, MD   naproxen (NAPROSYN) 500 MG tablet Take 500 mg by mouth nightly    Yes Historical Provider, MD   hydroCHLOROthiazide (HYDRODIURIL) 25 MG tablet Take 25 mg by mouth daily    Historical Provider, MD     Allergies  is allergic to bactrim [sulfamethoxazole-trimethoprim], dicloxacillin, and pcn [penicillins]. Family History  family history includes Diabetes in her father; Heart Disease in her paternal grandfather; Hypertension in her father and mother; Mental Illness in her mother; Stroke in her father. Social History   reports that she has been smoking cigarettes. She started smoking about 30 years ago. She has a 9.00 pack-year smoking history. She has never used smokeless tobacco. She reports current drug use. Drug: Marijuana. She reports that she does not drink alcohol. Review of Systems:  All 10 system review was conducted. Please refer to chart. OBJECTIVE:   VITALS:  height is 5' 3\" (1.6 m) and weight is 196 lb (88.9 kg). Her oral temperature is 97.7 °F (36.5 °C). Her blood pressure is 129/79 and her pulse is 72. Her respiration is 18 and oxygen saturation is 97%. CONSTITUTIONAL: Alert and oriented times 3, no acute distress and cooperative to examination with proper mood and affect. SKIN: Skin color, texture, turgor normal. No rashes or lesions. LYMPH: no cervical nodes, no inguinal nodes  HEENT: Head is normocephalic, atraumatic. EOMI, PERRLA  NECK: Supple, symmetrical, trachea midline, no adenopathy, thyroid symmetric, not enlarged and no tenderness, skin normal  CHEST/LUNGS: chest symmetric with normal A/P diameter, normal respiratory rate and rhythm, lungs clear to auscultation without wheezes, rales or rhonchi. No accessory muscle use.  Scars None CARDIOVASCULAR: Heart regular rate and rhythm Normal S1 and S2. . Carotid and femoral pulses 2+/4 and equal bilaterally  ABDOMEN: Soft nondistended nontender   RECTAL: Digital examination was deferred. Patient has a large perirectal abscess with induration and significant tenderness and mild erythema. No crepitus. No drainage. NEUROLOGIC: There are no focalizing motor or sensory deficits. CN II-XII are grossly intact.   EXTREMITIES: no cyanosis, no clubbing and no edema    LABS:   CBC with Differential:    Lab Results   Component Value Date    WBC 8.0 08/30/2021    RBC 4.14 08/30/2021    HGB 13.1 08/30/2021    HCT 39.2 08/30/2021     08/30/2021    MCV 94.6 08/30/2021    MCH 31.7 08/30/2021    MCHC 33.5 08/30/2021    RDW 13.4 08/30/2021    LYMPHOPCT 23 08/29/2021    MONOPCT 7 08/29/2021    BASOPCT 1 08/29/2021    MONOSABS 0.60 08/29/2021    LYMPHSABS 2.00 08/29/2021    EOSABS 0.20 08/29/2021    BASOSABS 0.00 08/29/2021    DIFFTYPE NOT REPORTED 08/29/2021     BMP:    Lab Results   Component Value Date     08/30/2021    K 3.9 08/30/2021     08/30/2021    CO2 23 08/30/2021    BUN 11 08/30/2021    LABALBU 4.6 06/17/2021    CREATININE 0.63 08/30/2021    CALCIUM 8.6 08/30/2021    GFRAA >60 08/30/2021    LABGLOM >60 08/30/2021    GLUCOSE 104 08/30/2021     Hepatic Function Panel:    Lab Results   Component Value Date    ALKPHOS 101 06/17/2021    ALT 21 06/17/2021    AST 22 06/17/2021    PROT 7.4 06/17/2021    BILITOT 0.50 06/17/2021    BILIDIR 0.16 04/29/2021    IBILI 0.31 04/29/2021    LABALBU 4.6 06/17/2021     Calcium:    Lab Results   Component Value Date    CALCIUM 8.6 08/30/2021     Magnesium:    Lab Results   Component Value Date    MG 2.1 04/29/2021     Phosphorus:  No results found for: PHOS  PT/INR:    Lab Results   Component Value Date    PROTIME 12.9 08/30/2021    INR 1.0 08/30/2021     ABG:  No results found for: PHART, PH, LKA9ZLX, PCO2, PO2ART, PO2, GKR8LQI, HCO3, BEART, BE, THGBART, THB, OXL3COJ, J3DMPKDG, O2SAT  Urine Culture:  No components found for: CURINE  Blood Culture:  No components found for: CBLOOD, CFUNGUSBL  Stool Culture:  No components found for: CSTOOL    RADIOLOGY:   I have personally reviewed the following films:  CT PELVIS W CONTRAST Additional Contrast? None    Result Date: 8/29/2021  EXAMINATION: CT OF THE PELVIS WITH CONTRAST 8/29/2021 2:12 pm TECHNIQUE: CT of the pelvis was performed with the administration of intravenous contrast. Multiplanar reformatted images are provided for review. Dose modulation, iterative reconstruction, and/or weight based adjustment of the mA/kV was utilized to reduce the radiation dose to as low as reasonably achievable. COMPARISON: CT scan dated April 29 2021 HISTORY: ORDERING SYSTEM PROVIDED HISTORY: Large fluid collection appreciated on ultrasound in the perianal space on the right gluteal cleft TECHNOLOGIST PROVIDED HISTORY: Large fluid collection appreciated on ultrasound in the perianal space on the right gluteal cleft Decision Support Exception - unselect if not a suspected or confirmed emergency medical condition->Emergency Medical Condition (MA) Reason for Exam: Large fluid collection appreciated on ultrasound in the perianal space on the right gluteal cleft Type of Exam: Subsequent/Follow-up Additional signs and symptoms: patient states she has had the \"bump\" for one month which has gotten bigger and more painful FINDINGS: An elongated fluid collection with surrounding inflammation is present within the right perianal region, extending posteriorly and inferiorly within the medial right buttock region. Fluid collection measures approximately 8.7 cm in length, and 1.1 cm in transverse dimension, and 2.5 cm in greatest cephalocaudal dimension. Findings are consistent with a large perirectal abscess. No soft tissue emphysema is present.   Skin thickening and soft tissue stranding is present within the inferior medial right buttock region. Visualized portions of the small and large bowel appear grossly normal. Large amount of stool is present within the rectum and sigmoid colon. The appendix is normal.  Urinary bladder appears normal.  Bilateral Essure devices are noted within the uterus. Trace amount of free fluid is present within the pelvis. A 2.1 cm dominant follicles present on the left ovary. No acute osseous abnormality is present involving the pelvis. 1. CT findings consistent with a large right perianal abscess, measuring approximately 8.7 cm in length, extending from the right perianal region posteriorly and inferiorly within the inferior medial right buttock region. 2. Large amount of stool present within the rectosigmoid colon RECOMMENDATIONS: 2.1 cm benign appearing ovarian cyst.  No follow-up imaging is recommended. Reference: J Am Eli Radiol 7303;28:[id number]         IMPRESSION:   Large right perirectal/perianal abscess. does not have any pertinent problems on file. PLAN:   Continue n.p.o.  IV antibiotics IV fluids. For incision and drainage under general anesthesia today. Discussed with patient. She understands and wants to proceed. Scheduling was notified. Thank you for this interesting consult and for allowing us to participate in the care of this patient. If you have any questions please don't hesitate to call.           Electronically signed by Bry Anthony MD  on 8/30/2021 at 9:15 AM

## 2021-08-30 NOTE — OP NOTE
Operative Note      Patient: Mandeep Newell  YOB: 1975  MRN: [id number]    Date of Procedure: 8/30/2021                Preoperative diagnosis: Right-sided perirectal/perianal abscess    Postoperative diagnosis: Same    Procedure: Incision and drainage of right-sided perirectal/perianal abscess down to subcutaneous tissue    Surgeon: Dr. Nayana Spicer    Asst.: DANYEL Santos    Anesthesia: General    Preparation: Hibiclens    EBL: Less than 10 mL    Specimen: Cultures obtained    Procedure: Informed consent was obtained. Preoperative antibiotics were given. Patient was taken to the operating room. General anesthesia was given. Operative site was prepped and draped in usual sterile fashion after she was placed in lithotomy position. Timeout was done. I did review CT images with the radiologist.  The report of a large abscess measuring over 8 cm was not correct. Radiologist informed me the abscess was much smaller around 2 cm in size. 18-gauge needle was introduced in the right side perirectal perineal region and pus was obtained. Incision was made at the site of needle puncture. Abscess cavity was drained. Fluid was sent for cultures. Wound was explored. I did not see any other pus pockets or felt any other pockets around. Digital rectal examination reveals intact mucosa and rectal wall. I changed my gloves. At this point the cavity was irrigated into the returning fluid was clear. Hemostasis was confirmed. Sponge needle instrument count was found to be correct. Local anesthetic was infiltrated. Cavity was packed using half-inch iodoform gauze. Clean dressing was applied. Patient tolerated procedure well and was transferred to the recovery room in a stable condition.     -  Recommendations: Monitor patient's progress. Repeat blood work in the morning. Resume diet. Local wound care. Pain control. IV antibiotics.

## 2021-08-30 NOTE — ED NOTES
Dr. Renee Kate assessed rectal abscess chaperoned by Jareth Medrano, RN.      Jt Madrid RN  08/29/21 2014

## 2021-08-30 NOTE — ANESTHESIA PRE PROCEDURE
Department of Anesthesiology  Preprocedure Note       Name:  Thomas Niño   Age:  40 y.o.  :  1975                                          MRN:  [id number]         Date:  2021      Surgeon: Brittnee Farley):  Audi Weems MD    Procedure: Procedure(s):  PERIRECTAL ABSCESS INCISION AND DRAINAGE    Medications prior to admission:   Prior to Admission medications    Medication Sig Start Date End Date Taking?  Authorizing Provider   rOPINIRole (REQUIP) 5 MG tablet Take 7.5 mg by mouth nightly    Yes Historical Provider, MD   naproxen (NAPROSYN) 500 MG tablet Take 500 mg by mouth nightly    Yes Historical Provider, MD   hydroCHLOROthiazide (HYDRODIURIL) 25 MG tablet Take 25 mg by mouth daily    Historical Provider, MD       Current medications:    Current Facility-Administered Medications   Medication Dose Route Frequency Provider Last Rate Last Admin    [MAR Hold] rOPINIRole (REQUIP) tablet 7.5 mg  7.5 mg Oral Nightly Rochester Chroman, APRN - CNP   7.5 mg at 21 0139    [MAR Hold] sodium chloride flush 0.9 % injection 5-40 mL  5-40 mL IntraVENous 2 times per day Pedro Luis Chroman, APRN - CNP        [facility] Hold] sodium chloride flush 0.9 % injection 10 mL  10 mL IntraVENous PRN Rochester Chroman, APRN - CNP        [facility] Hold] 0.9 % sodium chloride infusion  25 mL IntraVENous PRN Pedro Luis Chroman, APRN - CNP        [facility] Hold] potassium chloride (KLOR-CON M) extended release tablet 40 mEq  40 mEq Oral PRN Rochester Chroman, APRN - CNP        Or    [MAR Hold] potassium bicarb-citric acid (EFFER-K) effervescent tablet 40 mEq  40 mEq Oral PRN Rochester Chroman, APRN - CNP        Or   Leny Draper [facility] Hold] potassium chloride 10 mEq/100 mL IVPB (Peripheral Line)  10 mEq IntraVENous PRN Pedro Luis Chroman, APRN - CNP        [facility] Hold] magnesium sulfate 1000 mg in dextrose 5% 100 mL IVPB  1,000 mg IntraVENous PRN Pedro Luis Chroman, APRN - CNP        [MAR Hold] ondansetron (ZOFRAN-ODT) disintegrating tablet 4 mg  4 mg Oral Q8H PRN Rochester Tiffany, APRN - CNP Suspension Transobturator Tape 18 R32    S/p Bladder Suspension Transobturator Tape Revision 19 Z98.890    Urinary, incontinence, stress female N39.3    Postoperative pain G89.18    Perianal abscess K61.0    UTI (urinary tract infection) N39.0    Hypertension I10    Tobacco abuse Z72.0       Past Medical History:        Diagnosis Date    Anxiety     Arthritis     OA    Depression     post partum    Diabetes, gestational     history of    Ground glass opacity present on imaging of lung     History of mycoplasma pneumonia     Light cigarette smoker (1-9 cigarettes per day)     Menarche @ 16 y/o    Neuropathy     CTS    Obesity     Obesity (BMI 30.0-34. 9)     Parity       -    Pneumonia 2018    several times this year, had pneumonia last 10-    Restless leg syndrome     Sleep apnea     doesn't use CPAP    UTI (lower urinary tract infection)     with pregnancy    Wears glasses        Past Surgical History:        Procedure Laterality Date    ANKLE SURGERY Right     BLADDER SUSPENSION       SECTION      CHOLECYSTECTOMY  06    COLONOSCOPY      with poly removal    DILATION AND CURETTAGE OF UTERUS  2014    ENDOMETRIAL ABLATION  14    with novasure    HYSTEROSCOPY  14    D&C    KNEE ARTHROSCOPY Left 2015    total x2 scopes.     KNEE SURGERY      right knee scoped on 17    MYRINGOTOMY Bilateral     with tubes    OTHER SURGICAL HISTORY  2016    removal multiple vulvar inclusion cysts    [place] SLING OPER STRES INCONTINENCE N/A 2018    BLADDER SUSPENSION TRANSOBTURATOR TAPE AND CYSTO performed by Kiesha Hernández MD at [address] [place] [address]      VAGINA SURGERY N/A 2019    VAGINAL SLING DISSECTION OF VAGINAL MUCOSA WITH REINSERTION OF  TRANSOBTURATOR TAPE  CYSTOSCOPY performed by Kiesha Hernández MD at [facility] History:    Social History     Tobacco Use    Smoking status: Current Every Day Smoker     Packs/day: 0.50     Years: 18.00     Pack years: 9.00     Types: Cigarettes     Start date: 12    Smokeless tobacco: Never Used   Substance Use Topics    Alcohol use: No                                Ready to quit: Not Answered  Counseling given: Not Answered      Vital Signs (Current):   Vitals:    08/30/21 0140 08/30/21 0627 08/30/21 0738 08/30/21 1159   BP: (!) 125/97 115/67 129/79 121/81   Pulse: 87 78 72 74   Resp: 14 16 18 18   Temp:  97.5 °F (36.4 °C) 97.7 °F (36.5 °C) 97.8 °F (36.6 °C)   TempSrc:  Oral Oral Temporal   SpO2: 98% 97% 97% 98%   Weight:   196 lb (88.9 kg) 196 lb (88.9 kg)   Height:    5' 3\" (1.6 m)                                              BP Readings from Last 3 Encounters:   08/30/21 121/81   06/17/21 129/76   04/29/21 124/87       NPO Status: Time of last liquid consumption: 2300                        Time of last solid consumption: 2300                        Date of last liquid consumption: 08/29/21                        Date of last solid food consumption: 08/29/21    BMI:   Wt Readings from Last 3 Encounters:   08/30/21 196 lb (88.9 kg)   04/29/21 195 lb (88.5 kg)   02/02/21 195 lb (88.5 kg)     Body mass index is 34.72 kg/m².     CBC:   Lab Results   Component Value Date    WBC 8.0 08/30/2021    RBC 4.14 08/30/2021    HGB 13.1 08/30/2021    HCT 39.2 08/30/2021    MCV 94.6 08/30/2021    RDW 13.4 08/30/2021     08/30/2021       CMP:   Lab Results   Component Value Date     08/30/2021    K 3.9 08/30/2021     08/30/2021    CO2 23 08/30/2021    BUN 11 08/30/2021    CREATININE 0.63 08/30/2021    GFRAA >60 08/30/2021    LABGLOM >60 08/30/2021    GLUCOSE 104 08/30/2021    PROT 7.4 06/17/2021    CALCIUM 8.6 08/30/2021    BILITOT 0.50 06/17/2021    ALKPHOS 101 06/17/2021    AST 22 06/17/2021    ALT 21 06/17/2021       POC Tests: No results for input(s): POCGLU, POCNA, POCK, POCCL, POCBUN, POCHEMO, POCHCT in the last 72

## 2021-08-31 VITALS
BODY MASS INDEX: 34.73 KG/M2 | HEIGHT: 63 IN | TEMPERATURE: 98.8 F | SYSTOLIC BLOOD PRESSURE: 128 MMHG | RESPIRATION RATE: 18 BRPM | OXYGEN SATURATION: 97 % | HEART RATE: 76 BPM | WEIGHT: 196 LBS | DIASTOLIC BLOOD PRESSURE: 84 MMHG

## 2021-08-31 LAB
ANION GAP SERPL CALCULATED.3IONS-SCNC: 10 MMOL/L (ref 9–17)
BUN BLDV-MCNC: 14 MG/DL (ref 6–20)
BUN/CREAT BLD: ABNORMAL (ref 9–20)
CALCIUM SERPL-MCNC: 8.4 MG/DL (ref 8.6–10.4)
CHLORIDE BLD-SCNC: 106 MMOL/L (ref 98–107)
CO2: 22 MMOL/L (ref 20–31)
CREAT SERPL-MCNC: 0.64 MG/DL (ref 0.5–0.9)
CULTURE: NORMAL
GFR AFRICAN AMERICAN: >60 ML/MIN
GFR NON-AFRICAN AMERICAN: >60 ML/MIN
GFR SERPL CREATININE-BSD FRML MDRD: ABNORMAL ML/MIN/{1.73_M2}
GFR SERPL CREATININE-BSD FRML MDRD: ABNORMAL ML/MIN/{1.73_M2}
GLUCOSE BLD-MCNC: 123 MG/DL (ref 70–99)
HCT VFR BLD CALC: 37.6 % (ref 36–46)
HEMOGLOBIN: 12.7 G/DL (ref 12–16)
Lab: NORMAL
MCH RBC QN AUTO: 32.2 PG (ref 26–34)
MCHC RBC AUTO-ENTMCNC: 33.6 G/DL (ref 31–37)
MCV RBC AUTO: 95.7 FL (ref 80–100)
NRBC AUTOMATED: ABNORMAL PER 100 WBC
PDW BLD-RTO: 13.3 % (ref 11.5–14.9)
PLATELET # BLD: 181 K/UL (ref 150–450)
PMV BLD AUTO: 8.3 FL (ref 6–12)
POTASSIUM SERPL-SCNC: 3.7 MMOL/L (ref 3.7–5.3)
RBC # BLD: 3.93 M/UL (ref 4–5.2)
SODIUM BLD-SCNC: 138 MMOL/L (ref 135–144)
SPECIMEN DESCRIPTION: NORMAL
WBC # BLD: 9.9 K/UL (ref 3.5–11)

## 2021-08-31 PROCEDURE — 80048 BASIC METABOLIC PNL TOTAL CA: CPT

## 2021-08-31 PROCEDURE — 2580000003 HC RX 258: Performed by: SURGERY

## 2021-08-31 PROCEDURE — 36415 COLL VENOUS BLD VENIPUNCTURE: CPT

## 2021-08-31 PROCEDURE — 99239 HOSP IP/OBS DSCHRG MGMT >30: CPT | Performed by: INTERNAL MEDICINE

## 2021-08-31 PROCEDURE — 85027 COMPLETE CBC AUTOMATED: CPT

## 2021-08-31 PROCEDURE — 6360000002 HC RX W HCPCS: Performed by: SURGERY

## 2021-08-31 PROCEDURE — 2500000003 HC RX 250 WO HCPCS: Performed by: SURGERY

## 2021-08-31 RX ORDER — OXYCODONE HYDROCHLORIDE AND ACETAMINOPHEN 5; 325 MG/1; MG/1
1 TABLET ORAL EVERY 6 HOURS PRN
Qty: 20 TABLET | Refills: 0 | Status: SHIPPED | OUTPATIENT
Start: 2021-08-31 | End: 2021-09-05

## 2021-08-31 RX ORDER — CEPHALEXIN 500 MG/1
500 CAPSULE ORAL 3 TIMES DAILY
Qty: 30 CAPSULE | Refills: 0 | Status: SHIPPED | OUTPATIENT
Start: 2021-08-31 | End: 2021-09-10

## 2021-08-31 RX ORDER — DOXYCYCLINE HYCLATE 100 MG/1
100 CAPSULE ORAL 2 TIMES DAILY
Qty: 20 CAPSULE | Refills: 0 | Status: SHIPPED | OUTPATIENT
Start: 2021-08-31 | End: 2021-09-10

## 2021-08-31 RX ADMIN — FAMOTIDINE 20 MG: 10 INJECTION, SOLUTION INTRAVENOUS at 08:35

## 2021-08-31 RX ADMIN — CEFAZOLIN 1000 MG: 1 INJECTION, POWDER, FOR SOLUTION INTRAMUSCULAR; INTRAVENOUS at 08:35

## 2021-08-31 RX ADMIN — SODIUM CHLORIDE, PRESERVATIVE FREE 10 ML: 5 INJECTION INTRAVENOUS at 08:35

## 2021-08-31 ASSESSMENT — PAIN SCALES - GENERAL: PAINLEVEL_OUTOF10: 0

## 2021-08-31 NOTE — PROGRESS NOTES
Patient admitted into room 2071. Patient is A&Ox4. Vitals stable. Call light within reach. Allergies and home medications reviewed.
Patient discharged with personal belongings, dressing supplies, and discharge instructions. IV removed.
tolerated  2. Continue local wound care; packing changes with 1/2 inch iodoform daily  3. Surgically stable for discharge   4. Prescriptions called in to pharmacy   5.  Continue medical management       Electronically signed by Mell Lawton PA-C  [address] [address]

## 2021-08-31 NOTE — DISCHARGE INSTR - COC
Continuity of Care Form    Patient Name: Elba Keyes   :  1975  MRN:  [id number]    Admit date:  2021  Discharge date:  21    Code Status Order: Full Code   Advance Directives:   Advance Care Flowsheet Documentation       Date/Time Healthcare Directive Type of Healthcare Directive Copy in [address] Agent's Name Healthcare Agent's Phone Number    21 1200  No, patient does not have an advance directive for healthcare treatment  --  --  --  --  --            Admitting Physician:  Marixa Cisneros MD  PCP: BRICE Robertson    Discharging Nurse:   [address] Unit/Room#: 2071/-01  Discharging Unit Phone Number:     Emergency Contact:   Extended Emergency Contact Information  Primary Emergency Contact: Krunal Cormier  Address: [address] , [address] [place] [address] [place]  Phone: [phone]  Relation: Parent  Hearing or visual needs: None  Other needs: None  Preferred language: English   needed? No    Past Surgical History:  Past Surgical History:   Procedure Laterality Date    ANKLE SURGERY Right     BLADDER SUSPENSION       SECTION      CHOLECYSTECTOMY  06    COLONOSCOPY      with poly removal    DILATION AND CURETTAGE OF UTERUS  2014    ENDOMETRIAL ABLATION  14    with novasure    HYSTEROSCOPY  14    D&C    KNEE ARTHROSCOPY Left 2015    total x2 scopes.     KNEE SURGERY      right knee scoped on 17    MYRINGOTOMY Bilateral     with tubes    OTHER SURGICAL HISTORY  2016    removal multiple vulvar inclusion cysts    [place] SLING OPER STRES INCONTINENCE N/A 2018    BLADDER SUSPENSION TRANSOBTURATOR TAPE AND CYSTO performed by [place] Belle MD at [address] [place] [address] Right 2021    PERIRECTAL ABSCESS INCISION AND DRAINAGE performed by Gibran Livingston MD at [facility] Independent  Feeding  Independent  Med Admin  Independent  Med Delivery   whole    Wound Care Documentation and Therapy:    Daily dressing changes with 1/2\" iodioorm packing covered by dry sterile dressing     Elimination:  Continence:   · Bowel: Yes  · Bladder: Yes  Urinary Catheter: None   Colostomy/Ileostomy/Ileal Conduit: No       Date of Last BM:     Intake/Output Summary (Last 24 hours) at 8/31/2021 0938  Last data filed at 8/31/2021 1433  Gross per 24 hour   Intake 1536 ml   Output 900 ml   Net 636 ml     I/O last 3 completed shifts: In: 1536 [I.V.:1536]  Out: 900 [Urine:900]    Safety Concerns:     None    Impairments/Disabilities:      None    Nutrition Therapy:  Current Nutrition Therapy:   - Oral Diet:  General    Routes of Feeding: Oral  Liquids: No Restrictions  Daily Fluid Restriction: no  Last Modified Barium Swallow with Video (Video Swallowing Test): not done    Treatments at the Time of Hospital Discharge:   Respiratory Treatments:   Oxygen Therapy:  is not on home oxygen therapy. Ventilator:    - No ventilator support    Rehab Therapies:   Weight Bearing Status/Restrictions: No weight bearing restirctions  Other Medical Equipment (for information only, NOT a DME order): Other Treatments: Skilled Nursing Assessment Per Protocol. Medication Education & Monitoring. S/P I & D of Perianal Abscess. Dressing Changes: Daily dressing changes w/ 1/2 Iodoform Packing, cover w/ Dry Dressing.      Patient's personal belongings (please select all that are sent with patient):  cell phone    RN SIGNATURE:  Electronically signed by Colette Willson RN on 8/31/21 at 9:42 AM EDT    CASE MANAGEMENT/SOCIAL WORK SECTION    Inpatient Status Date:     Readmission Risk Assessment Score:  Readmission Risk              Risk of Unplanned Readmission:  8           Discharging to Facility/ [address] [place] [address]. [place] [address] [place] [address], [address] [place] [address] Old: [phone]  · F: [phone]    Dialysis Facility (if applicable)   · Name:  · Address:  · Dialysis Schedule:  · Phone:  · Fax:    / signature: Electronically signed by Claire Benedict RN on 8/31/21 at 9:39 AM EDT    PHYSICIAN SECTION    Prognosis: Good    Condition at Discharge: Stable    Rehab Potential (if transferring to Rehab): Good    Recommended Labs or Other Treatments After Discharge:     Physician Certification: I certify the above information and transfer of Markus Bellamy  is necessary for the continuing treatment of the diagnosis listed and that she requires Home Care for less 30 days.      Update Admission H&P: No change in H&P    PHYSICIAN SIGNATURE:  Electronically signed by Partha Bryant MD on 8/31/21 at 11:10 AM EDT

## 2021-08-31 NOTE — CARE COORDINATION
Continuity of Care Form    Patient Name: Elba Keyes   :  1975  MRN:  [id number]    Admit date:  2021  Discharge date:  21    Code Status Order: Full Code   Advance Directives:   Advance Care Flowsheet Documentation       Date/Time Healthcare Directive Type of Healthcare Directive Copy in [address] Agent's Name Healthcare Agent's Phone Number    21 1200  No, patient does not have an advance directive for healthcare treatment  --  --  --  --  --            Admitting Physician:  Marixa Cisneros MD  PCP: BRICE Robertson    Discharging Nurse:   [address] Unit/Room#: 2071/207-01  Discharging Unit Phone Number:     Emergency Contact:   Extended Emergency Contact Information  Primary Emergency Contact: Krunal Cormier  Address: [address] , [address] [place] [address] [place]  Phone: [phone]  Relation: Parent  Hearing or visual needs: None  Other needs: None  Preferred language: English   needed? No    Past Surgical History:  Past Surgical History:   Procedure Laterality Date    ANKLE SURGERY Right     BLADDER SUSPENSION       SECTION      CHOLECYSTECTOMY  06    COLONOSCOPY      with poly removal    DILATION AND CURETTAGE OF UTERUS  2014    ENDOMETRIAL ABLATION  14    with novasure    HYSTEROSCOPY  14    D&C    KNEE ARTHROSCOPY Left 2015    total x2 scopes.     KNEE SURGERY      right knee scoped on 17    MYRINGOTOMY Bilateral     with tubes    OTHER SURGICAL HISTORY  2016    removal multiple vulvar inclusion cysts    [place] SLING OPER STRES INCONTINENCE N/A 2018    BLADDER SUSPENSION TRANSOBTURATOR TAPE AND CYSTO performed by [place] Belle MD at [address] [place] [address] Right 2021    PERIRECTAL ABSCESS INCISION AND DRAINAGE performed by Gibran Livingston MD at [facility] SURGERY N/A 1/18/2019    VAGINAL SLING DISSECTION OF VAGINAL MUCOSA WITH REINSERTION OF  TRANSOBTURATOR TAPE  CYSTOSCOPY performed by Gely [place] MD at [address]       Immunization History:   Immunization History   Administered Date(s) Administered    Pneumococcal Polysaccharide ([id number]) 12/07/2018    Rabies 03/24/2018, 03/27/2018, 04/01/2018    Rabies Immune Globulin 03/24/2018    Td, unspecified formulation 07/20/2013       Active Problems:  Patient Active Problem List   Diagnosis Code    Neuropathy G62.9    Anxiety F41.9    Restless leg syndrome G25.81    Abnormal uterine bleeding N93.9    Menorrhagia N92.0    Left genital labial abscess N76.4    Multifocal pneumonia J18.9    Pneumonia of both lower lobes due to Mycoplasma pneumoniae J15.7    Bladder Suspension Transobturator Tape 11/29/18 R32    S/p Bladder Suspension Transobturator Tape Revision 1/18/19 Z98.890    Urinary, incontinence, stress female N39.3    Postoperative pain G89.18    Perianal abscess K61.0    UTI (urinary tract infection) N39.0    Hypertension I10    Tobacco abuse Z72.0       Isolation/Infection:   Isolation            No Isolation          Patient Infection Status       Infection Onset Added Last Indicated Last Indicated By Review Planned Expiration Resolved Resolved By    None active    Resolved    COVID-19 Rule Out 06/17/21 06/17/21 06/17/21 COVID-19, Rapid (Ordered)   06/17/21 Rule-Out Test Resulted            Nurse Assessment:  Last Vital Signs: /84   Pulse 76   Temp 98.8 °F (37.1 °C) (Oral)   Resp 18   Ht 5' 3\" (1.6 m)   Wt 196 lb (88.9 kg)   SpO2 97%   BMI 34.72 kg/m²     Last documented pain score (0-10 scale): Pain Level: 0  Last Weight:   Wt Readings from Last 1 Encounters:   08/30/21 196 lb (88.9 kg)     Mental Status:  oriented and alert    IV Access:  - None    Nursing Mobility/ADLs:  Walking   Independent  Transfer  Independent  Bathing  Independent  Dressing  Independent  Toileting Independent  Feeding  Independent  Med Admin  Independent  Med Delivery   whole    Wound Care Documentation and Therapy:    Daily dressing changes with 1/2\" iodioorm packing covered by dry sterile dressing     Elimination:  Continence:   · Bowel: Yes  · Bladder: Yes  Urinary Catheter: None   Colostomy/Ileostomy/Ileal Conduit: No       Date of Last BM:     Intake/Output Summary (Last 24 hours) at 8/31/2021 0938  Last data filed at 8/31/2021 1045  Gross per 24 hour   Intake 1536 ml   Output 900 ml   Net 636 ml     I/O last 3 completed shifts: In: 1536 [I.V.:1536]  Out: 900 [Urine:900]    Safety Concerns:     None    Impairments/Disabilities:      None    Nutrition Therapy:  Current Nutrition Therapy:   - Oral Diet:  General    Routes of Feeding: Oral  Liquids: No Restrictions  Daily Fluid Restriction: no  Last Modified Barium Swallow with Video (Video Swallowing Test): not done    Treatments at the Time of Hospital Discharge:   Respiratory Treatments:   Oxygen Therapy:  is not on home oxygen therapy. Ventilator:    - No ventilator support    Rehab Therapies:   Weight Bearing Status/Restrictions: No weight bearing restirctions  Other Medical Equipment (for information only, NOT a DME order): Other Treatments: Skilled Nursing Assessment Per Protocol. Medication Education & Monitoring. S/P I & D of Perianal Abscess. Dressing Changes: Daily dressing changes w/ 1/2 Iodoform Packing, cover w/ Dry Dressing.      Patient's personal belongings (please select all that are sent with patient):  cell phone    RN SIGNATURE:  Electronically signed by Gisele Bowling RN on 8/31/21 at 9:42 AM EDT    CASE MANAGEMENT/SOCIAL WORK SECTION    Inpatient Status Date:     Readmission Risk Assessment Score:  Readmission Risk              Risk of Unplanned Readmission:  8           Discharging to Facility/ [address] [place] [address] [place] [address] [place] [address], [address] [place] [address] [place]: [phone]  · F: [phone]    Dialysis Facility (if applicable)   · Name:  · Address:  · Dialysis Schedule:  · Phone:  · Fax:    / signature: Electronically signed by Kalyani Alcantar RN on 8/31/21 at 9:39 AM EDT    PHYSICIAN SECTION    Prognosis: Good    Condition at Discharge: Stable    Rehab Potential (if transferring to Rehab): Good    Recommended Labs or Other Treatments After Discharge:     Physician Certification: I certify the above information and transfer of Mayra Zamarripa  is necessary for the continuing treatment of the diagnosis listed and that she requires Home Care for less 30 days.      Update Admission H&P: No change in H&P    PHYSICIAN SIGNATURE:  Electronically signed by Lisa Vick MD on 8/31/21 at 11:10 AM EDT      Current Discharge Medication List    START taking these medications    Medication Dose   cephALEXin (KEFLEX) 500 MG capsule 500 mg   Take 1 capsule by mouth 3 times daily for 10 days   Quantity: 30 capsule Refills: 0       doxycycline hyclate (VIBRAMYCIN) 100 MG capsule 100 mg   Take 1 capsule by mouth 2 times daily for 10 days Take with food, but avoid dairy, calcium and MTV's 2 hours before and after the dose   Quantity: 20 capsule Refills: 0           CONTINUE these medications which have NOT CHANGED    Medication Dose   rOPINIRole (REQUIP) 5 MG tablet 7.5 mg   Take 7.5 mg by mouth nightly        naproxen (NAPROSYN) 500 MG tablet 500 mg   Take 500 mg by mouth nightly        hydroCHLOROthiazide (HYDRODIURIL) 25 MG tablet 25 mg   Take 25 mg by mouth daily           Printing Report    Report ID Report Name Print   [id number] Discharge Meds

## 2021-08-31 NOTE — DISCHARGE SUMMARY
[facility] Internal Medicine    Discharge Summary     Patient ID: Drew Coleman  :  1975   MRN: [id number]     ACCOUNT:  [de-identified]   Patient's PCP: BRICE Laughlin  Admit Date: 2021   Discharge Date: 2021    Length of Stay: 2  Code Status:  Full Code  Admitting Physician: Khalida Taylor MD  Discharge Physician: Domenica Toribio MD     Active Discharge Diagnoses:     Primary Problem  Perianal abscess      Hospital Problems  Active Hospital Problems    Diagnosis Date Noted    UTI (urinary tract infection) [N39.0] 2021    Hypertension [I10] 2021    Tobacco abuse [Z72.0] 2021    Perianal abscess [K61.0] 2021       Admission Condition:  fair     Discharged Condition: fair    Hospital Stay:     Hospital Course:  Drew oCleman is a 40 y.o. female who was admitted for the management of Perianal abscess , presented to ER with Skin Problem  78-year-old obese  lady admitted with a perianal pain rectal abscess and incision and drainage done mixed growth noted treated with IV antibiotics discharged on oral Keflex and doxycycline outpatient follow-up with surgery dr Aleksandra Darden  History of hypertension but no diabetes        Significant therapeutic interventions:     Significant Diagnostic Studies:   Labs / Micro:        ,     Radiology:    CT PELVIS W CONTRAST Additional Contrast? None    Result Date: 2021  EXAMINATION: CT OF THE PELVIS WITH CONTRAST 2021 2:12 pm TECHNIQUE: CT of the pelvis was performed with the administration of intravenous contrast. Multiplanar reformatted images are provided for review. Dose modulation, iterative reconstruction, and/or weight based adjustment of the mA/kV was utilized to reduce the radiation dose to as low as reasonably achievable.  COMPARISON: CT scan dated 2021 HISTORY: ORDERING SYSTEM PROVIDED HISTORY: Large fluid collection appreciated on ultrasound in the perianal space on the right gluteal cleft TECHNOLOGIST PROVIDED HISTORY: Large fluid collection appreciated on ultrasound in the perianal space on the right gluteal cleft Decision Support Exception - unselect if not a suspected or confirmed emergency medical condition->Emergency Medical Condition (MA) Reason for Exam: Large fluid collection appreciated on ultrasound in the perianal space on the right gluteal cleft Type of Exam: Subsequent/Follow-up Additional signs and symptoms: patient states she has had the \"bump\" for one month which has gotten bigger and more painful FINDINGS: An elongated fluid collection with surrounding inflammation is present within the right perianal region, extending posteriorly and inferiorly within the medial right buttock region. Fluid collection measures approximately 8.7 cm in length, and 1.1 cm in transverse dimension, and 2.5 cm in greatest cephalocaudal dimension. Findings are consistent with a large perirectal abscess. No soft tissue emphysema is present. Skin thickening and soft tissue stranding is present within the inferior medial right buttock region. Visualized portions of the small and large bowel appear grossly normal. Large amount of stool is present within the rectum and sigmoid colon. The appendix is normal.  Urinary bladder appears normal.  Bilateral Essure devices are noted within the uterus. Trace amount of free fluid is present within the pelvis. A 2.1 cm dominant follicles present on the left ovary. No acute osseous abnormality is present involving the pelvis. 1. CT findings consistent with a large right perianal abscess, measuring approximately 8.7 cm in length, extending from the right perianal region posteriorly and inferiorly within the inferior medial right buttock region. 2. Large amount of stool present within the rectosigmoid colon RECOMMENDATIONS: 2.1 cm benign appearing ovarian cyst.  No follow-up imaging is recommended.  Reference: Salvatore Morrell Eli Radiol [id number] Consultations:    Consults:     Final Specialist Recommendations/Findings:   IP CONSULT TO GENERAL SURGERY  IP CONSULT TO INTERNAL MEDICINE      The patient was seen and examined on day of discharge and this discharge summary is in conjunction with any daily progress note from day of discharge. Discharge plan:     Disposition: Home    Physician Follow Up:     [facility]  [address]  [phone]    They will call you at home to set up a time to come to your house.     Erik Ruffin MD  [facility]  [address] [address] [phone]    In 1 week  For wound re-check    Joseline Salgado, APRN  [address]  [phone]      If symptoms worsen       Requiring Further Evaluation/Follow Up POST HOSPITALIZATION/Incidental Findings:    Diet: regular diet low fat      Activity: As tolerated    Instructions to Patient:     Discharge Medications:      Medication List      START taking these medications    cephALEXin 500 MG capsule  Commonly known as: KEFLEX  Take 1 capsule by mouth 3 times daily for 10 days     doxycycline hyclate 100 MG capsule  Commonly known as: VIBRAMYCIN  Take 1 capsule by mouth 2 times daily for 10 days Take with food, but avoid dairy, calcium and MTV's 2 hours before and after the dose        CONTINUE taking these medications    hydroCHLOROthiazide 25 MG tablet  Commonly known as: HYDRODIURIL     naproxen 500 MG tablet  Commonly known as: NAPROSYN     rOPINIRole 5 MG tablet  Commonly known as: REQUIP           Where to Get Your Medications      These medications were sent to [address] [facility].    Phone: [phone]   · cephALEXin 500 MG capsule  · doxycycline hyclate 100 MG capsule         Time Spent on discharge is  35 mins in patient examination, evaluation, counseling as well as medication reconciliation, prescriptions for required medications, discharge plan and follow up. Electronically signed by   Mihir Prieto MD  8/31/2021  11:10 AM      Thank you BRICE Landrum for the opportunity to be involved in this patient's care.

## 2021-08-31 NOTE — CARE COORDINATION
Writer spoke to Bishnu Wheeler, from [facility], [address] [place] [address], & informed her that pt. Will DC to home today, w/ daily dressing changes. Writer faxed Neville/DC med List & instructed to call writer w/ any questions.

## 2021-08-31 NOTE — CARE COORDINATION
ONGOING DISCHARGE PLAN:    Patient is alert and oriented x4. Spoke with patient regarding discharge plan and patient confirms that plan is still to return to home w/ Aunt & Daughter. Pt. Is POD #1 I & D perianal Abscess. Remains on IV Ancef. Pt. Will have [facility], [facility] for dressing changes. Anticipate DC today, w/ no other needs. Will continue to follow for additional discharge needs.     Electronically signed by Kalyani Alcantar RN on 8/31/2021 at 9:37 AM

## 2021-08-31 NOTE — FLOWSHEET NOTE
08/31/21 1220   Encounter Summary   Services provided to: Patient   Referral/Consult From: Rounding   Complexity of Encounter Low   Length of Encounter 15 minutes   Spiritual/Anglican   Type Spiritual support   Assessment Sleeping   Intervention Prayer

## 2021-09-04 LAB
CULTURE: ABNORMAL
CULTURE: ABNORMAL
DIRECT EXAM: ABNORMAL
DIRECT EXAM: ABNORMAL
Lab: ABNORMAL
SPECIMEN DESCRIPTION: ABNORMAL

## 2021-10-02 ENCOUNTER — HOSPITAL ENCOUNTER (EMERGENCY)
Age: 46
Discharge: HOME | End: 2021-10-02
Attending: EMERGENCY MEDICINE
Payer: COMMERCIAL

## 2021-10-02 VITALS
DIASTOLIC BLOOD PRESSURE: 81 MMHG | RESPIRATION RATE: 18 BRPM | OXYGEN SATURATION: 98 % | HEART RATE: 89 BPM | TEMPERATURE: 98.1 F | SYSTOLIC BLOOD PRESSURE: 136 MMHG

## 2021-10-02 DIAGNOSIS — U07.1 COVID-19: Primary | ICD-10-CM

## 2021-10-02 LAB
SARS-COV-2, RAPID: DETECTED
SPECIMEN DESCRIPTION: ABNORMAL

## 2021-10-02 PROCEDURE — 87635 SARS-COV-2 COVID-19 AMP PRB: CPT

## 2021-10-02 PROCEDURE — 99283 EMERGENCY DEPT VISIT LOW MDM: CPT

## 2021-10-02 ASSESSMENT — ENCOUNTER SYMPTOMS
SHORTNESS OF BREATH: 0
BACK PAIN: 0
DIARRHEA: 0
COUGH: 1
VOMITING: 0
ABDOMINAL PAIN: 0

## 2021-10-02 NOTE — ED TRIAGE NOTES
Mode of arrival (squad #, walk in, police, etc) : walk in        Chief complaint(s): Concern for COVID        Arrival Note (brief scenario, treatment PTA, etc). : Pt states she was informed today that her son is COVID +. Pt states she has not been feeling well since Monday. C= \"Have you ever felt that you should Cut down on your drinking? \"  No  A= \"Have people Annoyed you by criticizing your drinking? \"  No  G= \"Have you ever felt bad or Guilty about your drinking? \"  No  E= \"Have you ever had a drink as an Eye-opener first thing in the morning to steady your nerves or to help a hangover? \"  No      Deferred []      Reason for deferring: N/A    *If yes to two or more: probable alcohol abuse. *

## 2021-10-03 NOTE — ED NOTES
Lab called with critical result that patient is Covid positive. Dr. Bella Lips informed.      Tri Orta RN  10/02/21 2035

## 2021-10-03 NOTE — ED PROVIDER NOTES
EMERGENCY DEPARTMENT ENCOUNTER    Pt Name: Edmund Hernández  MRN: [id number]  [place] 1975  Date of evaluation: 10/2/21  CHIEF COMPLAINT       Chief Complaint   Patient presents with    Concern For COVID-19    Shortness of Breath     HISTORY OF PRESENT ILLNESS   HPI     This is a 60-year-old female with a history of diabetes who comes in today for a Covid test.  Patient states that for the past 5 days she has had nasal congestion headache and cough she took some over-the-counter sinus medication for it without any improvement of her symptoms her son came over 3 days ago and then 2 days ago he got sent home and required a Covid test to return to school and it was positive she would like a Covid test today she denies any chest pain or shortness of breath. REVIEW OF SYSTEMS     Review of Systems   Constitutional: Positive for chills and fever. HENT: Positive for congestion. Respiratory: Positive for cough. Negative for shortness of breath. Cardiovascular: Negative for chest pain. Gastrointestinal: Negative for abdominal pain, diarrhea and vomiting. Genitourinary: Negative for dysuria. Musculoskeletal: Negative for back pain. Skin: Negative for rash. Neurological: Positive for headaches. All other systems reviewed and are negative. PASTMEDICAL HISTORY     Past Medical History:   Diagnosis Date    Anxiety     Arthritis     OA    Depression     post partum    Diabetes, gestational     history of    Ground glass opacity present on imaging of lung     History of mycoplasma pneumonia     Light cigarette smoker (1-9 cigarettes per day)     Menarche @ 16 y/o    Neuropathy     CTS    Obesity     Obesity (BMI 30.0-34. 9)     Parity       -    Pneumonia 2018    several times this year, had pneumonia last 10-    Restless leg syndrome     Sleep apnea     doesn't use CPAP    UTI (lower urinary tract infection)     with pregnancy    Wears glasses      SURGICAL HISTORY       Past Surgical History:   Procedure Laterality Date    ANKLE SURGERY Right 1997    BLADDER SUSPENSION       SECTION      CHOLECYSTECTOMY  06    COLONOSCOPY      with poly removal    DILATION AND CURETTAGE OF UTERUS  2014    ENDOMETRIAL ABLATION  14    with novasure    HYSTEROSCOPY  14    D&C    KNEE ARTHROSCOPY Left 2015    total x2 scopes.  KNEE SURGERY      right knee scoped on 17    MYRINGOTOMY Bilateral     with tubes    OTHER SURGICAL HISTORY  2016    removal multiple vulvar inclusion cysts    UT SLING OPER STRES INCONTINENCE N/A 2018    BLADDER SUSPENSION TRANSOBTURATOR TAPE AND CYSTO performed by Kunal Dixon MD at [facility] Right 2021    PERIRECTAL ABSCESS INCISION AND DRAINAGE performed by Wilhemina Olszewski, MD at [address] [facility] N/A 2019    VAGINAL SLING DISSECTION OF VAGINAL MUCOSA WITH REINSERTION OF  TRANSOBTURATOR TAPE  CYSTOSCOPY performed by Kunal Dixon MD at [address]       Previous Medications    HYDROCHLOROTHIAZIDE (HYDRODIURIL) 25 MG TABLET    Take 25 mg by mouth daily    NAPROXEN (NAPROSYN) 500 MG TABLET    Take 500 mg by mouth nightly     ROPINIROLE (REQUIP) 5 MG TABLET    Take 7.5 mg by mouth nightly      ALLERGIES     is allergic to bactrim [sulfamethoxazole-trimethoprim], dicloxacillin, and pcn [penicillins]. FAMILY HISTORY     She indicated that the status of her mother is unknown. She indicated that the status of her father is unknown. She indicated that the status of her paternal grandfather is unknown.      SOCIAL HISTORY       Social History     Tobacco Use    Smoking status: Current Every Day Smoker     Packs/day: 0.50     Years: 18.00     Pack years: 9.00     Types: Cigarettes     Start date:     Smokeless tobacco: Never Used   Vaping Use    Vaping Use: Never used   Substance Use Topics  Alcohol use: No    Drug use: Yes     Types: Marijuana     Comment: occassionally      PHYSICAL EXAM     INITIAL VITALS: /81   Pulse 89   Temp 98.1 °F (36.7 °C) (Oral)   Resp 18   SpO2 98%    Physical Exam  Vitals and nursing note reviewed. Constitutional:       General: She is not in acute distress. Appearance: She is well-developed. Comments: Nontoxic-appearing female no respiratory distress   HENT:      Head: Normocephalic and atraumatic. Eyes:      Conjunctiva/sclera: Conjunctivae normal.   Cardiovascular:      Rate and Rhythm: Normal rate and regular rhythm. Pulmonary:      Effort: No respiratory distress. Musculoskeletal:      Cervical back: Neck supple. Skin:     General: Skin is warm and dry. Capillary Refill: Capillary refill takes less than 2 seconds. Neurological:      Mental Status: She is alert. DIAGNOSTIC RESULTS   RADIOLOGY:All plain film, CT, MRI, and formal ultrasound images (except ED bedside ultrasound) are read by the radiologist, see reports below, unless otherwisenoted in MDM or here. No orders to display     LABS: All lab results were reviewed by myself, and all abnormals are listed below. Labs Reviewed   COVID-19, RAPID - Abnormal; Notable for the following components:       Result Value    SARS-CoV-2, Rapid DETECTED (*)     All other components within normal limits       EMERGENCY DEPARTMENTCOURSE:   Differential diagnosis includes viral syndrome coronavirus pneumonia patient is satting 98% she is not tachypneic she is not tachycardic she clinically appears well her Covid test was positive patient will be discharged with a work note.          Vitals:    Vitals:    10/02/21 1940   BP: 136/81   Pulse: 89   Resp: 18   Temp: 98.1 °F (36.7 °C)   TempSrc: Oral   SpO2: 98%       FINAL IMPRESSION      1. COVID-19         DISPOSITION/PLAN   DISPOSITION Decision To Discharge 10/02/2021 08:57:34 PM      PATIENT REFERRED TO:  [facility] ED  [phone] [facility] [address]  [phone]    If symptoms worsen  Edith Lozada MD  Attending Emergency Physician    This charting supersedes any ED resident or staff charting and was written using speech recognition software       Edith Lozada MD  10/02/21 3593

## 2021-10-04 ENCOUNTER — CARE COORDINATION (OUTPATIENT)
Dept: CARE COORDINATION | Age: 46
End: 2021-10-04

## 2021-10-04 NOTE — CARE COORDINATION
ACM outreach call for follow up ED visit/COVID monitoring. Pt to ED with c/o concerns for COVID, shortness of breath. COVID test Positive. Patient contacted regarding COVID-19 diagnosis. Discussed COVID-19 related testing which was available at this time. Test results were positive. Patient informed of results, if available? Yes. Ambulatory Care Manager contacted the patient by telephone to perform post discharge assessment. Call within 2 business days of discharge: Yes. Verified name and  with patient as identifiers. Provided introduction to self, and explanation of the CTN/ACM role, and reason for call due to risk factors for infection and/or exposure to COVID-19. Symptoms reviewed with patient who verbalized the following symptoms: no new symptoms and no worsening symptoms. Due to no new or worsening symptoms encounter was not routed to provider for escalation. Discussed follow-up appointments. If no appointment was previously scheduled, appointment scheduling offered: Yes. [address] follow up appointment(s): No future appointments. Non-[facility] follow up appointment(s):     Non-face-to-face services provided:  Scheduled appointment with PCP-states she will call for appointment. Obtained and reviewed discharge summary and/or continuity of care documents  Education of patient/family/caregiver/guardian to support self-management-done  Assessment and support for treatment adherence and medication management-done     Advance Care Planning:   Does patient have an Advance Directive:  decision maker updated. Educated patient about risk for severe COVID-19 due to risk factors according to CDC guidelines. ACM reviewed discharge instructions, medical action plan and red flag symptoms with the patient who verbalized understanding. Discussed COVID vaccination status: Yes. Education provided on COVID-19 vaccination as appropriate. Discussed exposure protocols and quarantine with CDC Guidelines.  Patient was given an opportunity to verbalize any questions and concerns and agrees to contact ACM or health care provider for questions related to their healthcare. Reviewed and educated patient on any new and changed medications related to discharge diagnosis     Was patient discharged with a pulse oximeter? No Discussed and confirmed pulse oximeter discharge instructions and when to notify provider or seek emergency care. ACM provided contact information. No further follow-up call identified based on severity of symptoms and risk factors.

## 2021-10-04 NOTE — ACP (ADVANCE CARE PLANNING)
Advance Care Planning   Healthcare Decision Maker:    Primary Decision Maker: Ramin Encarnacion - Brother/Sister - [phone]    Click here to complete Healthcare Decision Makers including selection of the Healthcare Decision Maker Relationship (ie \"Primary\"). Today we documented Decision Maker(s) consistent with Legal Next of Kin hierarchy.

## 2022-06-10 ENCOUNTER — APPOINTMENT (OUTPATIENT)
Dept: CT IMAGING | Age: 47
End: 2022-06-10
Payer: COMMERCIAL

## 2022-06-10 ENCOUNTER — HOSPITAL ENCOUNTER (EMERGENCY)
Age: 47
Discharge: HOME | End: 2022-06-10
Attending: STUDENT IN AN ORGANIZED HEALTH CARE EDUCATION/TRAINING PROGRAM
Payer: COMMERCIAL

## 2022-06-10 VITALS
RESPIRATION RATE: 16 BRPM | BODY MASS INDEX: 35.97 KG/M2 | WEIGHT: 203 LBS | DIASTOLIC BLOOD PRESSURE: 67 MMHG | SYSTOLIC BLOOD PRESSURE: 113 MMHG | OXYGEN SATURATION: 99 % | HEART RATE: 67 BPM | HEIGHT: 63 IN | TEMPERATURE: 97.9 F

## 2022-06-10 DIAGNOSIS — R10.84 GENERALIZED ABDOMINAL PAIN: Primary | ICD-10-CM

## 2022-06-10 LAB
ABSOLUTE EOS #: 0.3 K/UL (ref 0–0.4)
ABSOLUTE LYMPH #: 3.1 K/UL (ref 1–4.8)
ABSOLUTE MONO #: 0.6 K/UL (ref 0.1–1.3)
ALBUMIN SERPL-MCNC: 4.1 G/DL (ref 3.5–5.2)
ALP BLD-CCNC: 92 U/L (ref 35–104)
ALT SERPL-CCNC: 20 U/L (ref 5–33)
ANION GAP SERPL CALCULATED.3IONS-SCNC: 10 MMOL/L (ref 9–17)
AST SERPL-CCNC: 20 U/L
BACTERIA: ABNORMAL
BASOPHILS # BLD: 1 % (ref 0–2)
BASOPHILS ABSOLUTE: 0.1 K/UL (ref 0–0.2)
BILIRUB SERPL-MCNC: 0.22 MG/DL (ref 0.3–1.2)
BILIRUBIN DIRECT: 0.09 MG/DL
BILIRUBIN URINE: NEGATIVE
BILIRUBIN, INDIRECT: 0.13 MG/DL (ref 0–1)
BUN BLDV-MCNC: 12 MG/DL (ref 6–20)
CALCIUM SERPL-MCNC: 9.3 MG/DL (ref 8.6–10.4)
CASTS UA: ABNORMAL /LPF
CHLORIDE BLD-SCNC: 102 MMOL/L (ref 98–107)
CO2: 25 MMOL/L (ref 20–31)
COLOR: YELLOW
CREAT SERPL-MCNC: 0.7 MG/DL (ref 0.5–0.9)
EOSINOPHILS RELATIVE PERCENT: 3 % (ref 0–4)
EPITHELIAL CELLS UA: ABNORMAL /HPF
GFR AFRICAN AMERICAN: >60 ML/MIN
GFR NON-AFRICAN AMERICAN: >60 ML/MIN
GFR SERPL CREATININE-BSD FRML MDRD: ABNORMAL ML/MIN/{1.73_M2}
GLUCOSE BLD-MCNC: 92 MG/DL (ref 70–99)
GLUCOSE URINE: NEGATIVE
HCG QUALITATIVE: NEGATIVE
HCT VFR BLD CALC: 43.2 % (ref 36–46)
HEMOGLOBIN: 14.3 G/DL (ref 12–16)
KETONES, URINE: NEGATIVE
LEUKOCYTE ESTERASE, URINE: ABNORMAL
LIPASE: 38 U/L (ref 13–60)
LYMPHOCYTES # BLD: 29 % (ref 24–44)
MCH RBC QN AUTO: 31.4 PG (ref 26–34)
MCHC RBC AUTO-ENTMCNC: 33.1 G/DL (ref 31–37)
MCV RBC AUTO: 94.8 FL (ref 80–100)
MONOCYTES # BLD: 6 % (ref 1–7)
NITRITE, URINE: NEGATIVE
PDW BLD-RTO: 13.1 % (ref 11.5–14.9)
PH UA: 5.5 (ref 5–8)
PLATELET # BLD: 215 K/UL (ref 150–450)
PMV BLD AUTO: 8.4 FL (ref 6–12)
POTASSIUM SERPL-SCNC: 3.3 MMOL/L (ref 3.7–5.3)
PROTEIN UA: NEGATIVE
RBC # BLD: 4.56 M/UL (ref 4–5.2)
RBC UA: ABNORMAL /HPF
SEG NEUTROPHILS: 61 % (ref 36–66)
SEGMENTED NEUTROPHILS ABSOLUTE COUNT: 6.6 K/UL (ref 1.3–9.1)
SODIUM BLD-SCNC: 137 MMOL/L (ref 135–144)
SPECIFIC GRAVITY UA: 1.01 (ref 1–1.03)
TOTAL PROTEIN: 6.7 G/DL (ref 6.4–8.3)
TURBIDITY: CLEAR
URINE HGB: NEGATIVE
UROBILINOGEN, URINE: NORMAL
WBC # BLD: 10.8 K/UL (ref 3.5–11)
WBC UA: ABNORMAL /HPF

## 2022-06-10 PROCEDURE — 6360000002 HC RX W HCPCS: Performed by: STUDENT IN AN ORGANIZED HEALTH CARE EDUCATION/TRAINING PROGRAM

## 2022-06-10 PROCEDURE — 99284 EMERGENCY DEPT VISIT MOD MDM: CPT

## 2022-06-10 PROCEDURE — 80076 HEPATIC FUNCTION PANEL: CPT

## 2022-06-10 PROCEDURE — 36415 COLL VENOUS BLD VENIPUNCTURE: CPT

## 2022-06-10 PROCEDURE — 74176 CT ABD & PELVIS W/O CONTRAST: CPT

## 2022-06-10 PROCEDURE — 6370000000 HC RX 637 (ALT 250 FOR IP): Performed by: STUDENT IN AN ORGANIZED HEALTH CARE EDUCATION/TRAINING PROGRAM

## 2022-06-10 PROCEDURE — 96374 THER/PROPH/DIAG INJ IV PUSH: CPT

## 2022-06-10 PROCEDURE — 84703 CHORIONIC GONADOTROPIN ASSAY: CPT

## 2022-06-10 PROCEDURE — 96375 TX/PRO/DX INJ NEW DRUG ADDON: CPT

## 2022-06-10 PROCEDURE — 2580000003 HC RX 258: Performed by: STUDENT IN AN ORGANIZED HEALTH CARE EDUCATION/TRAINING PROGRAM

## 2022-06-10 PROCEDURE — 85025 COMPLETE CBC W/AUTO DIFF WBC: CPT

## 2022-06-10 PROCEDURE — 80048 BASIC METABOLIC PNL TOTAL CA: CPT

## 2022-06-10 PROCEDURE — 81001 URINALYSIS AUTO W/SCOPE: CPT

## 2022-06-10 PROCEDURE — 83690 ASSAY OF LIPASE: CPT

## 2022-06-10 RX ORDER — ONDANSETRON 4 MG/1
4 TABLET, FILM COATED ORAL EVERY 8 HOURS PRN
Qty: 20 TABLET | Refills: 0 | Status: SHIPPED | OUTPATIENT
Start: 2022-06-10 | End: 2022-10-27

## 2022-06-10 RX ORDER — FAMOTIDINE 20 MG/1
20 TABLET, FILM COATED ORAL ONCE
Status: COMPLETED | OUTPATIENT
Start: 2022-06-10 | End: 2022-06-10

## 2022-06-10 RX ORDER — 0.9 % SODIUM CHLORIDE 0.9 %
1000 INTRAVENOUS SOLUTION INTRAVENOUS ONCE
Status: COMPLETED | OUTPATIENT
Start: 2022-06-10 | End: 2022-06-10

## 2022-06-10 RX ORDER — DICYCLOMINE HYDROCHLORIDE 10 MG/1
10 CAPSULE ORAL ONCE
Status: COMPLETED | OUTPATIENT
Start: 2022-06-10 | End: 2022-06-10

## 2022-06-10 RX ORDER — OXYCODONE HYDROCHLORIDE 5 MG/1
5 TABLET ORAL EVERY 8 HOURS PRN
Qty: 9 TABLET | Refills: 0 | Status: SHIPPED | OUTPATIENT
Start: 2022-06-10 | End: 2022-06-13

## 2022-06-10 RX ORDER — DICYCLOMINE HYDROCHLORIDE 10 MG/1
20 CAPSULE ORAL 3 TIMES DAILY PRN
Qty: 42 CAPSULE | Refills: 0 | Status: ON HOLD | OUTPATIENT
Start: 2022-06-10 | End: 2022-08-08 | Stop reason: ALTCHOICE

## 2022-06-10 RX ORDER — MORPHINE SULFATE 4 MG/ML
4 INJECTION, SOLUTION INTRAMUSCULAR; INTRAVENOUS ONCE
Status: COMPLETED | OUTPATIENT
Start: 2022-06-10 | End: 2022-06-10

## 2022-06-10 RX ORDER — PANTOPRAZOLE SODIUM 20 MG/1
20 TABLET, DELAYED RELEASE ORAL DAILY
Qty: 30 TABLET | Refills: 0 | Status: SHIPPED | OUTPATIENT
Start: 2022-06-10 | End: 2022-07-28

## 2022-06-10 RX ORDER — FENTANYL CITRATE 50 UG/ML
50 INJECTION, SOLUTION INTRAMUSCULAR; INTRAVENOUS ONCE
Status: COMPLETED | OUTPATIENT
Start: 2022-06-10 | End: 2022-06-10

## 2022-06-10 RX ADMIN — DICYCLOMINE HYDROCHLORIDE 10 MG: 10 CAPSULE ORAL at 04:22

## 2022-06-10 RX ADMIN — MORPHINE SULFATE 4 MG: 4 INJECTION, SOLUTION INTRAMUSCULAR; INTRAVENOUS at 01:19

## 2022-06-10 RX ADMIN — SODIUM CHLORIDE 1000 ML: 9 INJECTION, SOLUTION INTRAVENOUS at 01:17

## 2022-06-10 RX ADMIN — FENTANYL CITRATE 50 MCG: 50 INJECTION, SOLUTION INTRAMUSCULAR; INTRAVENOUS at 02:33

## 2022-06-10 RX ADMIN — FAMOTIDINE 20 MG: 20 TABLET ORAL at 04:22

## 2022-06-10 ASSESSMENT — PAIN DESCRIPTION - LOCATION
LOCATION: ABDOMEN

## 2022-06-10 ASSESSMENT — PAIN SCALES - GENERAL
PAINLEVEL_OUTOF10: 6
PAINLEVEL_OUTOF10: 5
PAINLEVEL_OUTOF10: 2
PAINLEVEL_OUTOF10: 5

## 2022-06-10 ASSESSMENT — PAIN DESCRIPTION - DESCRIPTORS
DESCRIPTORS: ACHING

## 2022-06-10 ASSESSMENT — ENCOUNTER SYMPTOMS
RHINORRHEA: 0
COUGH: 0
VOMITING: 0
SHORTNESS OF BREATH: 0
BACK PAIN: 0
ABDOMINAL PAIN: 1
NAUSEA: 0

## 2022-06-10 ASSESSMENT — PAIN DESCRIPTION - FREQUENCY: FREQUENCY: INTERMITTENT

## 2022-06-10 ASSESSMENT — PAIN DESCRIPTION - PAIN TYPE: TYPE: ACUTE PAIN

## 2022-06-10 ASSESSMENT — PAIN DESCRIPTION - ORIENTATION
ORIENTATION: RIGHT
ORIENTATION: RIGHT;UPPER

## 2022-06-10 ASSESSMENT — LIFESTYLE VARIABLES: HOW OFTEN DO YOU HAVE A DRINK CONTAINING ALCOHOL: NEVER

## 2022-06-10 ASSESSMENT — PAIN - FUNCTIONAL ASSESSMENT: PAIN_FUNCTIONAL_ASSESSMENT: 0-10

## 2022-06-10 NOTE — ED TRIAGE NOTES
Rt sided abdominal pain since 2000 today. Pt states she is also bloated. Pt denies any other symptoms.

## 2022-06-10 NOTE — ED PROVIDER NOTES
[facility]  Emergency Department Encounter  Emergency Medicine Physician     Pt Name: Guanako Beaver  MRN: [id number]  [place] 1975  Date of evaluation: 6/10/22  PCP:  BRICE Gandhi    CHIEF COMPLAINT       Chief Complaint   Patient presents with    Abdominal Pain       HISTORY OF PRESENT ILLNESS  (Location/Symptom, Timing/Onset, Context/Setting, Quality, Duration, Modifying Factors, Severity.)    Guanako Beaver is a 40 y.o. female who presents with right-sided abdominal pain worsening over the past several hours. Patient states that she has had bouts of this pain off and on recently but since 8:00 tonight the pain has steadily worsened. She states that most of the pain is in the right upper quadrant she states this feels similar to when she had a \"gallbladder attack\". She states that she had her gallbladder removed in 2008 and has not had any similar pain since that time. Denies any nausea or vomiting. She endorses simply pain. She states that she did have an episode of dysuria with some right-sided flank pain earlier today but none since that time. She denies any abdominal trauma. She denies any alcohol use use. She states that she intermittently smokes marijuana. States that she takes naproxen once a day. States that she has had an issue with peptic ulcer disease in the past secondary to NSAID overuse. Denies any hematochezia, lightheadedness, dizziness, fever, chills. PAST MEDICAL / SURGICAL / SOCIAL / FAMILY HISTORY    has a past medical history of Anxiety, Arthritis, Depression, Diabetes, gestational, Ground glass opacity present on imaging of lung, History of mycoplasma pneumonia, Light cigarette smoker (1-9 cigarettes per day), Menarche, Neuropathy, Obesity, Obesity (BMI 30.0-34.9), Parity, Pneumonia, Restless leg syndrome, Sleep apnea, UTI (lower urinary tract infection), and Wears glasses.      has a past surgical history that includes Cholecystectomy (06); Tubal ligation;  section; Colonoscopy; hysteroscopy (14); Dilation and curettage of uterus (2014); myringotomy (Bilateral); Tonsillectomy and adenoidectomy; Ankle surgery (Right, ); Endometrial ablation (14); Knee arthroscopy (Left, 2015); other surgical history (2016); knee surgery; pr sling oper stres incontinence (N/A, 2018); bladder suspension; Vagina surgery (N/A, 2019); and Rectal surgery (Right, 2021). Social History     Socioeconomic History    Marital status: Legally      Spouse name: Not on file    Number of children: Not on file    Years of education: Not on file    Highest education level: Not on file   Occupational History    Not on file   Tobacco Use    Smoking status: Current Every Day Smoker     Packs/day: 0.50     Years: 18.00     Pack years: 9.00     Types: Cigarettes     Start date:  [address]    Smokeless tobacco: Never Used   Vaping Use    Vaping Use: Never used   Substance and Sexual Activity    Alcohol use: No    Drug use: Yes     Types: Marijuana Garon Kettle)     Comment: occassionally     Sexual activity: Yes     Partners: Male   Other Topics Concern    Not on file   Social History Narrative    Not on file     Social Determinants of Health     Financial Resource Strain:     Difficulty of Paying Living Expenses: Not on file   Food Insecurity:     Worried About Running Out of Food in the Last Year: Not on file    Marie of Food in the Last Year: Not on file   Transportation Needs:     Lack of Transportation (Medical): Not on file    Lack of Transportation (Non-Medical):  Not on file   Physical Activity:     Days of Exercise per Week: Not on file    Minutes of Exercise per Session: Not on file   Stress:     Feeling of Stress : Not on file   Social Connections:     Frequency of Communication with Friends and Family: Not on file    Frequency of Social Gatherings with Friends and Family: Not on file   Baldo Ortiz more for level 5)    Review of Systems   Constitutional: Negative for chills, fatigue and fever. HENT: Negative for congestion and rhinorrhea. Respiratory: Negative for cough and shortness of breath. Cardiovascular: Negative for chest pain. Gastrointestinal: Positive for abdominal pain. Negative for nausea and vomiting. Genitourinary: Positive for dysuria and flank pain. Musculoskeletal: Negative for back pain and myalgias. Neurological: Negative for headaches. All other systems reviewed and are negative. PHYSICAL EXAM   (up to 7 for level 4, 8 or more for level 5)    INITIAL VITALS:   ED Triage Vitals [06/10/22 0011]   BP Temp Temp Source Heart Rate Resp SpO2 Height Weight   (!) 131/100 97.9 °F (36.6 °C) Tympanic 98 17 98 % 5' 3\" (1.6 m) 203 lb (92.1 kg)       Physical Exam  Vitals and nursing note reviewed. Constitutional:       General: She is not in acute distress. Appearance: She is well-developed. Cardiovascular:      Rate and Rhythm: Normal rate and regular rhythm. Pulses:           Radial pulses are 2+ on the right side and 2+ on the left side. Heart sounds: Normal heart sounds. No murmur heard. Pulmonary:      Effort: Pulmonary effort is normal. No respiratory distress. Breath sounds: Normal breath sounds. No decreased breath sounds. Abdominal:      General: Bowel sounds are normal. There is no distension. Palpations: Abdomen is soft. Tenderness: There is abdominal tenderness in the right upper quadrant, right lower quadrant and left lower quadrant. There is no right CVA tenderness, left CVA tenderness, guarding or rebound. Positive signs include Bliss's sign and McBurney's sign. Negative signs include Rovsing's sign, psoas sign and obturator sign. Skin:     General: Skin is warm and dry. Capillary Refill: Capillary refill takes less than 2 seconds. Neurological:      Mental Status: She is alert and oriented to person, place, and time. DIFFERENTIAL  DIAGNOSIS   PLAN (LABS / IMAGING / EKG):  Orders Placed This Encounter   Procedures    CT ABDOMEN PELVIS WO CONTRAST Additional Contrast? None    CBC with Auto Differential    Basic Metabolic Panel    Hepatic Function Panel    Lipase    Urinalysis with Reflex to Culture    HCG Qualitative, Serum    Microscopic Urinalysis       MEDICATIONS ORDERED:  Orders Placed This Encounter   Medications    0.9 % sodium chloride bolus    morphine sulfate (PF) injection 4 mg    fentaNYL (SUBLIMAZE) injection 50 mcg    famotidine (PEPCID) tablet 20 mg    dicyclomine (BENTYL) capsule 10 mg    dicyclomine (BENTYL) 10 MG capsule     Sig: Take 2 capsules by mouth 3 times daily as needed (abdominal cramping)     Dispense:  42 capsule     Refill:  0    ondansetron (ZOFRAN) 4 MG tablet     Sig: Take 1 tablet by mouth every 8 hours as needed for Nausea     Dispense:  20 tablet     Refill:  0    pantoprazole (PROTONIX) 20 MG tablet     Sig: Take 1 tablet by mouth daily     Dispense:  30 tablet     Refill:  0    oxyCODONE (ROXICODONE) 5 MG immediate release tablet     Sig: Take 1 tablet by mouth every 8 hours as needed for Pain for up to 3 days. Intended supply: 3 days. Take lowest dose possible to manage pain     Dispense:  9 tablet     Refill:  0         DIAGNOSTIC RESULTS / EMERGENCYDEPARTMENT COURSE / MDM   LABS:  Labs Reviewed   BASIC METABOLIC PANEL - Abnormal; Notable for the following components:       Result Value    Potassium 3.3 (*)     All other components within normal limits   HEPATIC FUNCTION PANEL - Abnormal; Notable for the following components:     Total Bilirubin 0.22 (*)     All other components within normal limits   URINALYSIS WITH REFLEX TO CULTURE - Abnormal; Notable for the following components:    Leukocyte Esterase, Urine TRACE (*)     All other components within normal limits   MICROSCOPIC URINALYSIS - Abnormal; Notable for the following components:    Bacteria, UA FEW (*) All other components within normal limits   CBC WITH AUTO DIFFERENTIAL   LIPASE   HCG, SERUM, QUALITATIVE       RADIOLOGY:  CT ABDOMEN PELVIS WO CONTRAST Additional Contrast? None    Result Date: 6/10/2022  EXAMINATION: CT OF THE ABDOMEN AND PELVIS WITHOUT CONTRAST 6/10/2022 1:25 am TECHNIQUE: CT of the abdomen and pelvis was performed without the administration of intravenous contrast. Multiplanar reformatted images are provided for review. Automated exposure control, iterative reconstruction, and/or weight based adjustment of the mA/kV was utilized to reduce the radiation dose to as low as reasonably achievable. COMPARISON: 04/29/2021 HISTORY: ORDERING SYSTEM PROVIDED HISTORY: diffuse abdominal pain, worse in RUQ. ttp to RLQ and LLQ, previous vandana, concern for appy vs nephrolithiasis TECHNOLOGIST PROVIDED HISTORY: diffuse abdominal pain, worse in RUQ. ttp to RLQ and LLQ, previous vandana, concern for appy vs nephrolithiasis Decision Support Exception - unselect if not a suspected or confirmed emergency medical condition->Emergency Medical Condition (MA) Is the patient pregnant?->No Reason for Exam: pt. c/o all over abd pain that is the worst in the RUQ since 8pm 6-9-22. Relevant Medical/Surgical History: hx. of vandana and ablasion of the uterus per pt FINDINGS: Lower Chest: The lung bases are clear and the heart size is normal. Organs: The gallbladder is surgically absent. The liver, spleen, pancreas and kidneys are grossly normal with the limitations of a noncontrast study. No urolithiasis or acute obstructive uropathy. There are left larger than right bilateral low-attenuation adrenal gland nodules that are unchanged and most consistent with adenomas. GI/Bowel: The appendix is normal.  Mild formed stool load throughout the colon. Small bowel loops are normal in caliber. No bowel obstruction. Pelvis: There are bilateral Essure tubal occlusion devices. Uterus and ovaries are otherwise unremarkable.   Urinary bladder is normal.  There is a scar from the prior right perirectal abscess noted on the study of 04/29/2021. Peritoneum/Retroperitoneum: There is no adenopathy, free air or free fluid. Bones/Soft Tissues: No acute bone finding. No acute finding in the abdomen or pelvis. Specifically, the appendix is normal.  There is no urolithiasis or acute obstructive uropathy. The gallbladder is surgically absent. Bilateral Essure tubal occlusion devices, unchanged. Impression:  Patient presents originally with right upper quadrant tenderness. On exam however patient is actually quite tender in the left lower quadrant and of the right lower quadrant as well. She states that she is having ongoing pain in the right upper quadrant and the only tenderness on palpation of the other areas. No rebound tenderness, no guarding. No flank pain noted bilaterally. Will obtain abdominal labs, lipase, urinalysis. Bedside ultrasound did not demonstrate any fluid in Morison's pouch or obvious pathology in the liver. Gallbladder surgically removed. We will proceed with blood work, urinalysis. If necessary will obtain CT scan. EMERGENCY DEPARTMENT COURSE:  ED Course as of 06/10/22 0754   Fri Deng 10, 2022   0227 Patient called out stating that she was in worsening pain. I had just left the room and patient states that the pain was getting better at the time. However now she is writhing around the bed pointing to her midsection stating that the pain is severe. Heart rate not elevated, consistent in the 80s. Belly soft, not distended. [AP]      ED Course User Index  [AP] Raul Kgs, DO     CT scan negative for any acute pathology. Discussed this findings and lab work with patient. Since there is no acute findings, patient does not require intervention and her pain is currently well controlled. Patient concerned that she has been under considerable amount of stress which may be triggering her abdominal pain. Patient states that she has a history of ulcers, has not been taking any medication for the ulcers, continues to smoke, and also consumes large amount of carbonated beverages throughout the day. While peptic ulcer disease, versus gastritis may be the etiology for patient's pain we will still have patient follow-up with her primary care provider. PROCEDURES:  none  CONSULTS:  None    CRITICAL CARE:  There was a high probability of clinically significant/life threatening deterioration in this patient's condition which required my urgent intervention. Total critical care time was  15 minutes. This excludes any time for separately reportable procedures. FINAL IMPRESSION     1. Generalized abdominal pain          DISPOSITION / PLAN   DISPOSITION Decision To Discharge 06/10/2022 04:05:52 AM      Evaluation and treatment course in the ED, and plan of care upon discharge was discussed in length with the patient/patient representative. Patient/patient representative had no further questions prior to being discharged and was instructed to return to the ED for new or worsening symptoms. Any changes to existing medications or new prescriptions were reviewed with patient/patient representative and they expressed understanding of how to correctly take their medications and the possible side effects.     PATIENT REFERRED TO:  Too Romero[address] [address]  [phone]    Schedule an appointment as soon as possible for a visit in 2 days        DISCHARGE MEDICATIONS:  Discharge Medication List as of 6/10/2022  4:08 AM      START taking these medications    Details   dicyclomine (BENTYL) 10 MG capsule Take 2 capsules by mouth 3 times daily as needed (abdominal cramping), Disp-42 capsule, R-0Print      ondansetron (ZOFRAN) 4 MG tablet Take 1 tablet by mouth every 8 hours as needed for Nausea, Disp-20 tablet, R-0Print      pantoprazole (PROTONIX) 20 MG tablet Take 1 tablet by mouth daily, Disp-30 tablet, R-0Print      oxyCODONE (ROXICODONE) 5 MG immediate release tablet Take 1 tablet by mouth every 8 hours as needed for Pain for up to 3 days. Intended supply: 3 days.  Take lowest dose possible to manage pain, Disp-9 tablet, R-0Print             Raul Zhong DO  Emergency Medicine Attending    (Please note that portions of this note were completed with a voice recognition program.  Efforts were made to edit the dictations but occasionally words are mis-transcribed.)         Raul Zhong DO  06/10/22 9772

## 2022-07-07 ENCOUNTER — HOSPITAL ENCOUNTER (OUTPATIENT)
Age: 47
Setting detail: SPECIMEN
Discharge: HOME | End: 2022-07-07

## 2022-07-07 ENCOUNTER — OFFICE VISIT (OUTPATIENT)
Dept: OBGYN CLINIC | Age: 47
End: 2022-07-07
Payer: COMMERCIAL

## 2022-07-07 VITALS
WEIGHT: 187 LBS | HEIGHT: 63 IN | BODY MASS INDEX: 33.13 KG/M2 | DIASTOLIC BLOOD PRESSURE: 86 MMHG | HEART RATE: 74 BPM | SYSTOLIC BLOOD PRESSURE: 118 MMHG

## 2022-07-07 DIAGNOSIS — K61.0 ANAL ABSCESS: ICD-10-CM

## 2022-07-07 DIAGNOSIS — Z12.31 BREAST CANCER SCREENING BY MAMMOGRAM: ICD-10-CM

## 2022-07-07 DIAGNOSIS — Z01.419 WELL WOMAN EXAM WITH ROUTINE GYNECOLOGICAL EXAM: Primary | ICD-10-CM

## 2022-07-07 DIAGNOSIS — N76.0 ACUTE VAGINITIS: ICD-10-CM

## 2022-07-07 PROCEDURE — 99396 PREV VISIT EST AGE 40-64: CPT | Performed by: SPECIALIST

## 2022-07-07 RX ORDER — METRONIDAZOLE 500 MG/1
500 TABLET ORAL 2 TIMES DAILY
Qty: 14 TABLET | Refills: 0 | Status: SHIPPED | OUTPATIENT
Start: 2022-07-07 | End: 2022-07-14

## 2022-07-07 RX ORDER — FLUCONAZOLE 100 MG/1
100 TABLET ORAL DAILY
Qty: 7 TABLET | Refills: 0 | Status: SHIPPED | OUTPATIENT
Start: 2022-07-07 | End: 2022-07-14

## 2022-07-07 SDOH — ECONOMIC STABILITY: FOOD INSECURITY: WITHIN THE PAST 12 MONTHS, YOU WORRIED THAT YOUR FOOD WOULD RUN OUT BEFORE YOU GOT MONEY TO BUY MORE.: NEVER TRUE

## 2022-07-07 SDOH — ECONOMIC STABILITY: FOOD INSECURITY: WITHIN THE PAST 12 MONTHS, THE FOOD YOU BOUGHT JUST DIDN'T LAST AND YOU DIDN'T HAVE MONEY TO GET MORE.: NEVER TRUE

## 2022-07-07 ASSESSMENT — ENCOUNTER SYMPTOMS
CONSTIPATION: 0
DIARRHEA: 0
NAUSEA: 0
EYE PAIN: 0
ABDOMINAL PAIN: 0
ABDOMINAL DISTENTION: 0
VOMITING: 0
COUGH: 0
APNEA: 0

## 2022-07-07 ASSESSMENT — PATIENT HEALTH QUESTIONNAIRE - PHQ9
8. MOVING OR SPEAKING SO SLOWLY THAT OTHER PEOPLE COULD HAVE NOTICED. OR THE OPPOSITE, BEING SO FIGETY OR RESTLESS THAT YOU HAVE BEEN MOVING AROUND A LOT MORE THAN USUAL: 1
1. LITTLE INTEREST OR PLEASURE IN DOING THINGS: 2
10. IF YOU CHECKED OFF ANY PROBLEMS, HOW DIFFICULT HAVE THESE PROBLEMS MADE IT FOR YOU TO DO YOUR WORK, TAKE CARE OF THINGS AT HOME, OR GET ALONG WITH OTHER PEOPLE: 0
5. POOR APPETITE OR OVEREATING: 0
SUM OF ALL RESPONSES TO PHQ QUESTIONS 1-9: 6
SUM OF ALL RESPONSES TO PHQ QUESTIONS 1-9: 6
9. THOUGHTS THAT YOU WOULD BE BETTER OFF DEAD, OR OF HURTING YOURSELF: 0
3. TROUBLE FALLING OR STAYING ASLEEP: 0
6. FEELING BAD ABOUT YOURSELF - OR THAT YOU ARE A FAILURE OR HAVE LET YOURSELF OR YOUR FAMILY DOWN: 0
SUM OF ALL RESPONSES TO PHQ9 QUESTIONS 1 & 2: 3
SUM OF ALL RESPONSES TO PHQ QUESTIONS 1-9: 6
2. FEELING DOWN, DEPRESSED OR HOPELESS: 1
7. TROUBLE CONCENTRATING ON THINGS, SUCH AS READING THE NEWSPAPER OR WATCHING TELEVISION: 1
SUM OF ALL RESPONSES TO PHQ QUESTIONS 1-9: 6
4. FEELING TIRED OR HAVING LITTLE ENERGY: 1

## 2022-07-07 ASSESSMENT — SOCIAL DETERMINANTS OF HEALTH (SDOH): HOW HARD IS IT FOR YOU TO PAY FOR THE VERY BASICS LIKE FOOD, HOUSING, MEDICAL CARE, AND HEATING?: NOT HARD AT ALL

## 2022-07-07 NOTE — PROGRESS NOTES
Subjective:      Patient ID: Jg Ren is a 56 y.o. female. Chief Complaint   Patient presents with    Gynecologic Exam     /86 (Site: Right Upper Arm, Position: Sitting)   Pulse 74   Ht 5' 3\" (1.6 m)   Wt 187 lb (84.8 kg)   BMI 33.13 kg/m²   No LMP recorded. Patient has had an ablation.     Past Medical History:   Diagnosis Date    Anxiety     Arthritis     OA    Depression     post partum    Diabetes, gestational     history of    Gestational diabetes mellitus     Ground glass opacity present on imaging of lung     History of mycoplasma pneumonia     Light cigarette smoker (1-9 cigarettes per day)     Menarche @ 16 y/o    Neuropathy     CTS    Obesity     Obesity (BMI 30.0-34. 9)     Parity       -    Pneumonia 2018    several times this year, had pneumonia last 10-    Restless leg syndrome     Restless leg syndrome     Sleep apnea     doesn't use CPAP    UTI (lower urinary tract infection)     with pregnancy    Wears glasses      Current Outpatient Medications Ordered in Epic   Medication Sig Dispense Refill    hydrocortisone 2.5 % cream Place 1 applicator rectally 2 times daily      metroNIDAZOLE (FLAGYL) 500 MG tablet Take 1 tablet by mouth 2 times daily for 7 days 14 tablet 0    fluconazole (DIFLUCAN) 100 MG tablet Take 1 tablet by mouth daily for 7 days 7 tablet 0    ondansetron (ZOFRAN) 4 MG tablet Take 1 tablet by mouth every 8 hours as needed for Nausea 20 tablet 0    rOPINIRole (REQUIP) 5 MG tablet Take 7.5 mg by mouth nightly       hydroCHLOROthiazide (HYDRODIURIL) 25 MG tablet Take 25 mg by mouth daily      naproxen (NAPROSYN) 500 MG tablet Take 500 mg by mouth nightly       dicyclomine (BENTYL) 10 MG capsule Take 2 capsules by mouth 3 times daily as needed (abdominal cramping) 42 capsule 0    pantoprazole (PROTONIX) 20 MG tablet Take 1 tablet by mouth daily (Patient not taking: Reported on 2022) 30 tablet 0     No current Epic-ordered facility-administered medications on file. Problem List Items Addressed This Visit     Breast cancer screening by mammogram - Primary    Relevant Orders    CHARO DIGITAL SCREEN W OR WO CAD BILATERAL    Acute vaginitis    Anal abscess        Allergies   Allergen Reactions    Bactrim [Sulfamethoxazole-Trimethoprim] Hives and Itching    Dicloxacillin Hives    Pcn [Penicillins] Hives     Orders Placed This Encounter   Procedures    CHARO DIGITAL SCREEN W OR WO CAD BILATERAL     Standing Status:   Future     Standing Expiration Date:   2023     Order Specific Question:   Reason for exam:     Answer:   yearly breast cancer screening    PAP SMEAR     Patient History:    No LMP recorded. Patient has had an ablation. OBGYN Status: Ablation  Past Surgical History:  : ANKLE SURGERY; Right  No date: BLADDER SUSPENSION  No date:  SECTION  06: CHOLECYSTECTOMY  No date: COLONOSCOPY      Comment:  with poly removal  2014: [address] UTERUS  14: ENDOMETRIAL ABLATION      Comment:  with novasure  14: HYSTEROSCOPY      Comment:  D&C  2015: KNEE ARTHROSCOPY; Left      Comment:  total x2 scopes. No date: KNEE SURGERY      Comment:  right knee scoped on 17  No date: MYRINGOTOMY; Bilateral      Comment:  with tubes  2016: OTHER SURGICAL HISTORY      Comment:  removal multiple vulvar inclusion cysts  2018: MO SLING OPER STRES INCONTINENCE; N/A      Comment:  BLADDER SUSPENSION TRANSOBTURATOR TAPE AND CYSTO                performed by Tasha Sierra MD at [address]  2021: RECTAL SURGERY;  Right      Comment:  PERIRECTAL ABSCESS INCISION AND DRAINAGE performed by                Nghia Arnold MD at [address]  No date: TONSILLECTOMY AND ADENOIDECTOMY  No date: TUBAL LIGATION  2019: VAGINA SURGERY; N/A      Comment:  VAGINAL SLING DISSECTION OF VAGINAL MUCOSA WITH                REINSERTION OF  TRANSOBTURATOR TAPE  CYSTOSCOPY performed by Kunal Dixon MD at [facility] History    Tobacco Use      Smoking status: Current Every Day Smoker        Packs/day: 0.50        Years: 18.00        Pack years: 9        Types: Cigarettes        Start date: 1991      Smokeless tobacco: Never Used       Standing Status:   Future     Standing Expiration Date:   9/7/2022     Order Specific Question:   Collection Type     Answer: Thin Prep     Order Specific Question:   Prior Abnormal Pap Test     Answer:   No     Order Specific Question:   Screening or Diagnostic     Answer:   Screening     Order Specific Question:   HPV Requested? Answer:   Yes     Order Specific Question:   High Risk Patient     Answer:   N/A          HPI  Patient is here for an annual exam today. Her last pap smear was done 3/13/2018 and was negative for intraepithelial lesion or malignancy. She has not had a mammogram since 2017. She does not do self breast exams. She complains of a vulvar cyst today. Patient also had bladder surgery in the past and was supposed to have another surgery but did not have it done. She is using Anusol because she had an abscess near her anus, but she does not think it is helping. Review of Systems   Constitutional: Negative for activity change, appetite change and fever. HENT: Negative for ear discharge and ear pain. Eyes: Negative for pain and visual disturbance. Respiratory: Negative for apnea and cough. Cardiovascular: Negative for chest pain, palpitations and leg swelling. Gastrointestinal: Negative for abdominal distention, abdominal pain, constipation, diarrhea, nausea and vomiting. Endocrine: Negative. Genitourinary: Negative for difficulty urinating, dysuria, menstrual problem and pelvic pain. Vulvar cyst   Musculoskeletal: Negative for neck pain and neck stiffness. Skin: Negative. Neurological: Negative for light-headedness and numbness. Hematological: Negative. Does not bruise/bleed easily. Objective:   Physical Exam  Vitals and nursing note reviewed. Exam conducted with a chaperone present. Constitutional:       Appearance: She is well-developed. HENT:      Head: Normocephalic and atraumatic. Neck:      Thyroid: No thyroid mass or thyromegaly. Cardiovascular:      Rate and Rhythm: Normal rate and regular rhythm. Pulmonary:      Effort: Pulmonary effort is normal.      Breath sounds: Normal breath sounds. Chest:   Breasts:      Right: Normal. No inverted nipple, mass, nipple discharge, skin change or tenderness. Left: Normal. No inverted nipple, mass, nipple discharge, skin change or tenderness. Abdominal:      General: Bowel sounds are normal. There is no distension. Palpations: Abdomen is soft. There is no mass. Tenderness: There is no abdominal tenderness. There is no guarding or rebound. Hernia: There is no hernia in the left inguinal area. Genitourinary:     General: Normal vulva. Exam position: Supine. Labia:         Right: No rash or lesion. Left: No rash or lesion. Vagina: No signs of injury. Vaginal discharge present. No tenderness. Cervix: No cervical motion tenderness or discharge. Uterus: Normal. Not enlarged, not fixed and not tender. Adnexa: Right adnexa normal and left adnexa normal.        Right: No mass or tenderness. Left: No mass or tenderness. Rectum: Normal. No mass or anal fissure. Normal anal tone. Comments: Abscess with drainage at 8:00 position around anus  Musculoskeletal:         General: No tenderness. Normal range of motion. Skin:     General: Skin is warm and dry. Neurological:      Mental Status: She is alert and oriented to person, place, and time. Psychiatric:         Judgment: Judgment normal.         Assessment:      Patient with persistent abscess of anus and vaginitis, otherwise normal annual exam.  Pap smear was done.   Patient was advised to have mammogram done. Patient was advised to follow up with Dr. Melvin Bartlett for anal abscess and possible colonoscopy to rule out an intestinal issue that may be causing the abscess. Plan:      Orders Placed This Encounter   Procedures    CHARO DIGITAL SCREEN W OR WO CAD BILATERAL    PAP SMEAR     Orders Placed This Encounter   Medications    metroNIDAZOLE (FLAGYL) 500 MG tablet     Sig: Take 1 tablet by mouth 2 times daily for 7 days     Dispense:  14 tablet     Refill:  0    fluconazole (DIFLUCAN) 100 MG tablet     Sig: Take 1 tablet by mouth daily for 7 days     Dispense:  7 tablet     Refill:  0      Appointment in 1 year    IBriana am scribing for, and in the presence of Dr. Nas Fairbanks. Electronically signed by: Briana Boyce 7/7/22 2:02 PM       I agree to the above documentation placed by my scribe Briana Boyce. I reviewed the scribe's note and agree with the documented findings and plan of care. Any areas of disagreement are noted on the chart. I have personally evaluated this patient. Additional findings are as noted. I agree with the chief complaint, past medical history, past surgical history, allergies, medications, social and family history as documented unless otherwise noted below.      Electronically signed by Nas Fairbanks MD on 7/8/2022 at 5:36 AM

## 2022-07-09 LAB
HPV SAMPLE: ABNORMAL
HPV, GENOTYPE 16: NOT DETECTED
HPV, GENOTYPE 18: NOT DETECTED
HPV, HIGH RISK OTHER: DETECTED
HPV, INTERPRETATION: ABNORMAL
SPECIMEN DESCRIPTION: ABNORMAL

## 2022-07-11 ENCOUNTER — HOSPITAL ENCOUNTER (OUTPATIENT)
Dept: WOMENS IMAGING | Age: 47
Discharge: HOME | End: 2022-07-13
Payer: COMMERCIAL

## 2022-07-11 DIAGNOSIS — Z12.31 BREAST CANCER SCREENING BY MAMMOGRAM: ICD-10-CM

## 2022-07-11 PROCEDURE — 77063 BREAST TOMOSYNTHESIS BI: CPT

## 2022-07-15 LAB — CYTOLOGY REPORT: NORMAL

## 2022-07-19 ENCOUNTER — TELEPHONE (OUTPATIENT)
Dept: OBGYN CLINIC | Age: 47
End: 2022-07-19

## 2022-07-20 ENCOUNTER — OFFICE VISIT (OUTPATIENT)
Dept: ORTHOPEDIC SURGERY | Age: 47
End: 2022-07-20
Payer: COMMERCIAL

## 2022-07-20 DIAGNOSIS — M25.531 BILATERAL WRIST PAIN: Primary | ICD-10-CM

## 2022-07-20 DIAGNOSIS — M79.601 BILATERAL ARM PAIN: ICD-10-CM

## 2022-07-20 DIAGNOSIS — M25.532 PAIN IN BOTH WRISTS: ICD-10-CM

## 2022-07-20 DIAGNOSIS — M25.532 BILATERAL WRIST PAIN: Primary | ICD-10-CM

## 2022-07-20 DIAGNOSIS — M79.602 BILATERAL ARM PAIN: ICD-10-CM

## 2022-07-20 DIAGNOSIS — M25.531 PAIN IN BOTH WRISTS: ICD-10-CM

## 2022-07-20 PROCEDURE — 4004F PT TOBACCO SCREEN RCVD TLK: CPT | Performed by: ORTHOPAEDIC SURGERY

## 2022-07-20 PROCEDURE — 99203 OFFICE O/P NEW LOW 30 MIN: CPT | Performed by: ORTHOPAEDIC SURGERY

## 2022-07-20 PROCEDURE — G8417 CALC BMI ABV UP PARAM F/U: HCPCS | Performed by: ORTHOPAEDIC SURGERY

## 2022-07-20 PROCEDURE — G8428 CUR MEDS NOT DOCUMENT: HCPCS | Performed by: ORTHOPAEDIC SURGERY

## 2022-07-20 NOTE — PROGRESS NOTES
Luis Gonzalez M.D.            [address] [place] [address], [address] [facility],[address], [facility].           Dept Phone: [phone]           Dept Fax:  [phone] [address]           Atul Nava          Dept Phone: [phone]           Dept Fax:  [phone]      Chief Compliant:  Chief Complaint   Patient presents with    Pain     Both wrist        History of Present Illness: This is a 56 y.o. female who presents to the clinic today for evaluation / follow up of bilateral hand pain and numbness. Patient is a 50-year-old right-hand-dominant patient who works as a cash register attendant. She states that she had EMGs and NCV's done many years ago/greater than 5 years ago she has tried nocturnal splints this is actually makes her symptoms worse. Patient states that her hand falls asleep at nighttime when on a cell phone and/or driving. .       Review of Systems   Constitutional: Negative for fever, chills, sweats. Eyes: Negative for changes in vision, or pain. HENT: Negative for ear ache, epistaxis, or sore throat. Respiratory/Cardio: Negative for Chest pain, palpitations, SOB, or cough. Gastrointestinal: Negative for abdominal pain, N/V/D. Genitourinary: Negative for dysuria, frequency, urgency, or hematuria. Neurological: Negative for headache, numbness, or weakness. Integumentary: Negative for rash, itching, laceration, or abrasion. Musculoskeletal: Positive for Pain (Both wrist)       Physical Exam:  Constitutional: Patient is oriented to person, place, and time. Patient appears well-developed and well nourished. HENT: Negative otherwise noted  Head: Normocephalic and Atraumatic  Nose: Normal  Eyes: Conjunctivae and EOM are normal  Neck: Normal range of motion Neck supple. Respiratory/Cardio: Effort normal. No respiratory distress.   Musculoskeletal: Examination of both hands identical.  Patient has no evidence of any thenar atrophy. She has ongoing numbness. She has a positive Tinel's. She has no negative Tinel's at the elbow however. She has slightly decreased  strength. She has normal interosseous strength. Normal Froment's negative Finkelstein's negative grind. Examination is identical on both sides    Neurological: Patient is alert and oriented to person, place, and time. Normal strenght. No sensory deficit. Skin: Skin is warm and dry  Psychiatric: Behavior is normal. Thought content normal.  Nursing note and vitals reviewed. Labs and Imaging:     XR taken today: No x-rays taken today  No results found. EMG results EMGs done greater than 5 years ago       Orders Placed This Encounter   Procedures    Brian Prado MD, Physical Medicine and Rehabilitation, [address] [place] [address]     Referral Priority:   Routine     Referral Type:   Eval and Treat     Referral Reason:   Specialty Services Required     Referred to Provider:   Vishnu Espino MD     Requested Specialty:   Physical Medicine and Rehab     Number of Visits Requested:   1    EMG     Standing Status:   Future     Standing Expiration Date:   7/20/2023     Order Specific Question:   Which body part? Answer:   bilateral upper extremity       Assessment and Plan:  Bilateral carpal tunnel syndrome        This is a 56 y.o. female who presents to the clinic today for evaluation / follow up of bilateral carpal tunnel syndrome.      Past History:    Current Outpatient Medications:     hydrocortisone 2.5 % cream, Place 1 applicator rectally 2 times daily, Disp: , Rfl:     dicyclomine (BENTYL) 10 MG capsule, Take 2 capsules by mouth 3 times daily as needed (abdominal cramping), Disp: 42 capsule, Rfl: 0    ondansetron (ZOFRAN) 4 MG tablet, Take 1 tablet by mouth every 8 hours as needed for Nausea, Disp: 20 tablet, Rfl: 0    pantoprazole (PROTONIX) 20 MG tablet, Take 1 tablet by mouth daily (Patient not taking: Reported on 7/7/2022), Disp: 30 tablet, Rfl: 0    rOPINIRole (REQUIP) 5 MG tablet, Take 7.5 mg by mouth nightly , Disp: , Rfl:     hydroCHLOROthiazide (HYDRODIURIL) 25 MG tablet, Take 25 mg by mouth daily, Disp: , Rfl:     naproxen (NAPROSYN) 500 MG tablet, Take 500 mg by mouth nightly , Disp: , Rfl:   Allergies   Allergen Reactions    Bactrim [Sulfamethoxazole-Trimethoprim] Hives and Itching    Dicloxacillin Hives    Pcn [Penicillins] Hives     Social History     Socioeconomic History    Marital status: Legally      Spouse name: Not on file    Number of children: Not on file    Years of education: Not on file    Highest education level: Not on file   Occupational History    Not on file   Tobacco Use    Smoking status: Every Day     Packs/day: 0.50     Years: 18.00     Pack years: 9.00     Types: Cigarettes     Start date: 13    Smokeless tobacco: Never   Vaping Use    Vaping Use: Never used   Substance and Sexual Activity    Alcohol use: No    Drug use: Yes     Types: Marijuana Akhil Luna     Comment: occassionally     Sexual activity: Yes     Partners: Male   Other Topics Concern    Not on file   Social History Narrative    Not on file     Social Determinants of Health     Financial Resource Strain: Low Risk     Difficulty of Paying Living Expenses: Not hard at all   Food Insecurity: No Food Insecurity    Worried About Running Out of Food in the Last Year: Never true    Ran Out of Food in the Last Year: Never true   Transportation Needs: Not on file   Physical Activity: Not on file   Stress: Not on file   Social Connections: Not on file   Intimate Partner Violence: Not on file   Housing Stability: Not on file     Past Medical History:   Diagnosis Date    Anxiety     Arthritis     OA    Depression     post partum    Diabetes, gestational     history of    Gestational diabetes mellitus     Ground glass opacity present on imaging of lung     History of mycoplasma pneumonia     Light cigarette

## 2022-07-28 ENCOUNTER — HOSPITAL ENCOUNTER (OUTPATIENT)
Dept: PREADMISSION TESTING | Age: 47
Discharge: HOME | End: 2022-08-01

## 2022-07-28 VITALS — BODY MASS INDEX: 32.07 KG/M2 | HEIGHT: 63 IN | WEIGHT: 181 LBS

## 2022-07-28 RX ORDER — DOXYCYCLINE HYCLATE 100 MG/1
100 CAPSULE ORAL 2 TIMES DAILY
Status: ON HOLD | COMMUNITY
End: 2022-08-08 | Stop reason: ALTCHOICE

## 2022-07-28 RX ORDER — CEPHALEXIN 500 MG/1
500 CAPSULE ORAL 3 TIMES DAILY
Status: ON HOLD | COMMUNITY
End: 2022-08-08 | Stop reason: ALTCHOICE

## 2022-07-28 RX ORDER — BUPROPION HYDROCHLORIDE 150 MG/1
300 TABLET ORAL EVERY MORNING
COMMUNITY

## 2022-07-28 NOTE — PROGRESS NOTES

## 2022-08-02 ENCOUNTER — PROCEDURE VISIT (OUTPATIENT)
Dept: OBGYN CLINIC | Age: 47
End: 2022-08-02
Payer: COMMERCIAL

## 2022-08-02 ENCOUNTER — HOSPITAL ENCOUNTER (OUTPATIENT)
Age: 47
Setting detail: SPECIMEN
Discharge: HOME | End: 2022-08-02

## 2022-08-02 VITALS — HEIGHT: 63 IN | WEIGHT: 184 LBS | BODY MASS INDEX: 32.6 KG/M2

## 2022-08-02 DIAGNOSIS — R87.811 ASCUS WITH POSITIVE HIGH RISK HUMAN PAPILLOMAVIRUS OF VAGINA: Primary | ICD-10-CM

## 2022-08-02 DIAGNOSIS — R87.620 ASCUS WITH POSITIVE HIGH RISK HUMAN PAPILLOMAVIRUS OF VAGINA: Primary | ICD-10-CM

## 2022-08-02 DIAGNOSIS — Z32.02 NEGATIVE PREGNANCY TEST: ICD-10-CM

## 2022-08-02 LAB
CONTROL: NORMAL
PREGNANCY TEST URINE, POC: NEGATIVE

## 2022-08-02 PROCEDURE — 81025 URINE PREGNANCY TEST: CPT | Performed by: SPECIALIST

## 2022-08-02 PROCEDURE — 57456 ENDOCERV CURETTAGE W/SCOPE: CPT | Performed by: SPECIALIST

## 2022-08-04 LAB — SURGICAL PATHOLOGY REPORT: NORMAL

## 2022-08-05 ENCOUNTER — ANESTHESIA EVENT (OUTPATIENT)
Dept: ENDOSCOPY | Age: 47
End: 2022-08-05
Payer: COMMERCIAL

## 2022-08-08 ENCOUNTER — HOSPITAL ENCOUNTER (OUTPATIENT)
Age: 47
Setting detail: OUTPATIENT SURGERY
Discharge: HOME | End: 2022-08-08
Attending: SURGERY | Admitting: SURGERY
Payer: COMMERCIAL

## 2022-08-08 ENCOUNTER — TELEPHONE (OUTPATIENT)
Dept: ORTHOPEDIC SURGERY | Age: 47
End: 2022-08-08

## 2022-08-08 ENCOUNTER — ANESTHESIA (OUTPATIENT)
Dept: ENDOSCOPY | Age: 47
End: 2022-08-08
Payer: COMMERCIAL

## 2022-08-08 VITALS
RESPIRATION RATE: 20 BRPM | WEIGHT: 184 LBS | DIASTOLIC BLOOD PRESSURE: 71 MMHG | OXYGEN SATURATION: 97 % | BODY MASS INDEX: 32.6 KG/M2 | TEMPERATURE: 97.9 F | SYSTOLIC BLOOD PRESSURE: 110 MMHG | HEIGHT: 63 IN | HEART RATE: 69 BPM

## 2022-08-08 DIAGNOSIS — K61.1 PERI-RECTAL ABSCESS: ICD-10-CM

## 2022-08-08 LAB — HCG, PREGNANCY URINE (POC): NEGATIVE

## 2022-08-08 PROCEDURE — 7100000001 HC PACU RECOVERY - ADDTL 15 MIN: Performed by: SURGERY

## 2022-08-08 PROCEDURE — 7100000031 HC ASPR PHASE II RECOVERY - ADDTL 15 MIN: Performed by: SURGERY

## 2022-08-08 PROCEDURE — 7100000000 HC PACU RECOVERY - FIRST 15 MIN: Performed by: SURGERY

## 2022-08-08 PROCEDURE — 3700000000 HC ANESTHESIA ATTENDED CARE: Performed by: SURGERY

## 2022-08-08 PROCEDURE — 2500000003 HC RX 250 WO HCPCS: Performed by: NURSE ANESTHETIST, CERTIFIED REGISTERED

## 2022-08-08 PROCEDURE — 2580000003 HC RX 258: Performed by: ANESTHESIOLOGY

## 2022-08-08 PROCEDURE — 7100000030 HC ASPR PHASE II RECOVERY - FIRST 15 MIN: Performed by: SURGERY

## 2022-08-08 PROCEDURE — 2709999900 HC NON-CHARGEABLE SUPPLY: Performed by: SURGERY

## 2022-08-08 PROCEDURE — 6360000002 HC RX W HCPCS: Performed by: NURSE ANESTHETIST, CERTIFIED REGISTERED

## 2022-08-08 PROCEDURE — 81025 URINE PREGNANCY TEST: CPT

## 2022-08-08 PROCEDURE — 7100000011 HC PHASE II RECOVERY - ADDTL 15 MIN: Performed by: SURGERY

## 2022-08-08 PROCEDURE — 7100000010 HC PHASE II RECOVERY - FIRST 15 MIN: Performed by: SURGERY

## 2022-08-08 PROCEDURE — 88305 TISSUE EXAM BY PATHOLOGIST: CPT

## 2022-08-08 PROCEDURE — 3609010300 HC COLONOSCOPY W/BIOPSY SINGLE/MULTIPLE: Performed by: SURGERY

## 2022-08-08 PROCEDURE — 3700000001 HC ADD 15 MINUTES (ANESTHESIA): Performed by: SURGERY

## 2022-08-08 RX ORDER — SODIUM CHLORIDE, SODIUM LACTATE, POTASSIUM CHLORIDE, CALCIUM CHLORIDE 600; 310; 30; 20 MG/100ML; MG/100ML; MG/100ML; MG/100ML
INJECTION, SOLUTION INTRAVENOUS CONTINUOUS
Status: DISCONTINUED | OUTPATIENT
Start: 2022-08-08 | End: 2022-08-08 | Stop reason: HOSPADM

## 2022-08-08 RX ORDER — ONDANSETRON 2 MG/ML
4 INJECTION INTRAMUSCULAR; INTRAVENOUS
Status: DISCONTINUED | OUTPATIENT
Start: 2022-08-08 | End: 2022-08-08 | Stop reason: HOSPADM

## 2022-08-08 RX ORDER — DEXAMETHASONE SODIUM PHOSPHATE 4 MG/ML
INJECTION, SOLUTION INTRA-ARTICULAR; INTRALESIONAL; INTRAMUSCULAR; INTRAVENOUS; SOFT TISSUE PRN
Status: DISCONTINUED | OUTPATIENT
Start: 2022-08-08 | End: 2022-08-08 | Stop reason: SDUPTHER

## 2022-08-08 RX ORDER — DIPHENHYDRAMINE HYDROCHLORIDE 50 MG/ML
12.5 INJECTION INTRAMUSCULAR; INTRAVENOUS
Status: DISCONTINUED | OUTPATIENT
Start: 2022-08-08 | End: 2022-08-08 | Stop reason: HOSPADM

## 2022-08-08 RX ORDER — HYDRALAZINE HYDROCHLORIDE 20 MG/ML
10 INJECTION INTRAMUSCULAR; INTRAVENOUS
Status: DISCONTINUED | OUTPATIENT
Start: 2022-08-08 | End: 2022-08-08 | Stop reason: HOSPADM

## 2022-08-08 RX ORDER — ONDANSETRON 2 MG/ML
INJECTION INTRAMUSCULAR; INTRAVENOUS PRN
Status: DISCONTINUED | OUTPATIENT
Start: 2022-08-08 | End: 2022-08-08 | Stop reason: SDUPTHER

## 2022-08-08 RX ORDER — LIDOCAINE HYDROCHLORIDE 10 MG/ML
INJECTION, SOLUTION EPIDURAL; INFILTRATION; INTRACAUDAL; PERINEURAL PRN
Status: DISCONTINUED | OUTPATIENT
Start: 2022-08-08 | End: 2022-08-08 | Stop reason: SDUPTHER

## 2022-08-08 RX ORDER — FENTANYL CITRATE 50 UG/ML
INJECTION, SOLUTION INTRAMUSCULAR; INTRAVENOUS PRN
Status: DISCONTINUED | OUTPATIENT
Start: 2022-08-08 | End: 2022-08-08 | Stop reason: SDUPTHER

## 2022-08-08 RX ORDER — SODIUM CHLORIDE 0.9 % (FLUSH) 0.9 %
5-40 SYRINGE (ML) INJECTION PRN
Status: DISCONTINUED | OUTPATIENT
Start: 2022-08-08 | End: 2022-08-08 | Stop reason: HOSPADM

## 2022-08-08 RX ORDER — SODIUM CHLORIDE 0.9 % (FLUSH) 0.9 %
5-40 SYRINGE (ML) INJECTION EVERY 12 HOURS SCHEDULED
Status: DISCONTINUED | OUTPATIENT
Start: 2022-08-08 | End: 2022-08-08 | Stop reason: HOSPADM

## 2022-08-08 RX ORDER — PROPOFOL 10 MG/ML
INJECTION, EMULSION INTRAVENOUS PRN
Status: DISCONTINUED | OUTPATIENT
Start: 2022-08-08 | End: 2022-08-08 | Stop reason: SDUPTHER

## 2022-08-08 RX ORDER — FENTANYL CITRATE 50 UG/ML
25 INJECTION, SOLUTION INTRAMUSCULAR; INTRAVENOUS EVERY 5 MIN PRN
Status: DISCONTINUED | OUTPATIENT
Start: 2022-08-08 | End: 2022-08-08 | Stop reason: HOSPADM

## 2022-08-08 RX ORDER — SODIUM CHLORIDE 9 MG/ML
INJECTION, SOLUTION INTRAVENOUS PRN
Status: DISCONTINUED | OUTPATIENT
Start: 2022-08-08 | End: 2022-08-08 | Stop reason: HOSPADM

## 2022-08-08 RX ORDER — LIDOCAINE HYDROCHLORIDE 10 MG/ML
1 INJECTION, SOLUTION EPIDURAL; INFILTRATION; INTRACAUDAL; PERINEURAL
Status: DISCONTINUED | OUTPATIENT
Start: 2022-08-08 | End: 2022-08-08 | Stop reason: HOSPADM

## 2022-08-08 RX ORDER — METOCLOPRAMIDE HYDROCHLORIDE 5 MG/ML
10 INJECTION INTRAMUSCULAR; INTRAVENOUS
Status: DISCONTINUED | OUTPATIENT
Start: 2022-08-08 | End: 2022-08-08 | Stop reason: HOSPADM

## 2022-08-08 RX ORDER — LABETALOL HYDROCHLORIDE 5 MG/ML
10 INJECTION, SOLUTION INTRAVENOUS
Status: DISCONTINUED | OUTPATIENT
Start: 2022-08-08 | End: 2022-08-08 | Stop reason: HOSPADM

## 2022-08-08 RX ORDER — MEPERIDINE HYDROCHLORIDE 25 MG/ML
12.5 INJECTION INTRAMUSCULAR; INTRAVENOUS; SUBCUTANEOUS EVERY 5 MIN PRN
Status: DISCONTINUED | OUTPATIENT
Start: 2022-08-08 | End: 2022-08-08 | Stop reason: HOSPADM

## 2022-08-08 RX ADMIN — PROPOFOL 50 MG: 10 INJECTION, EMULSION INTRAVENOUS at 08:09

## 2022-08-08 RX ADMIN — FENTANYL CITRATE 50 MCG: 50 INJECTION, SOLUTION INTRAMUSCULAR; INTRAVENOUS at 08:13

## 2022-08-08 RX ADMIN — ONDANSETRON 4 MG: 2 INJECTION INTRAMUSCULAR; INTRAVENOUS at 08:29

## 2022-08-08 RX ADMIN — LIDOCAINE HYDROCHLORIDE 40 MG: 10 INJECTION, SOLUTION EPIDURAL; INFILTRATION; INTRACAUDAL; PERINEURAL at 08:07

## 2022-08-08 RX ADMIN — PROPOFOL 50 MG: 10 INJECTION, EMULSION INTRAVENOUS at 08:08

## 2022-08-08 RX ADMIN — PROPOFOL 150 MG: 10 INJECTION, EMULSION INTRAVENOUS at 08:07

## 2022-08-08 RX ADMIN — FENTANYL CITRATE 50 MCG: 50 INJECTION, SOLUTION INTRAMUSCULAR; INTRAVENOUS at 08:20

## 2022-08-08 RX ADMIN — DEXAMETHASONE SODIUM PHOSPHATE 4 MG: 4 INJECTION, SOLUTION INTRAMUSCULAR; INTRAVENOUS at 08:29

## 2022-08-08 RX ADMIN — SODIUM CHLORIDE, POTASSIUM CHLORIDE, SODIUM LACTATE AND CALCIUM CHLORIDE: 600; 310; 30; 20 INJECTION, SOLUTION INTRAVENOUS at 07:24

## 2022-08-08 ASSESSMENT — ENCOUNTER SYMPTOMS
STRIDOR: 0
SHORTNESS OF BREATH: 0
ABDOMINAL PAIN: 1
NAUSEA: 0
RECTAL PAIN: 0
VOMITING: 0
DIARRHEA: 0
APNEA: 1
CONSTIPATION: 0

## 2022-08-08 ASSESSMENT — PAIN SCALES - GENERAL
PAINLEVEL_OUTOF10: 0
PAINLEVEL_OUTOF10: 0

## 2022-08-08 ASSESSMENT — PAIN - FUNCTIONAL ASSESSMENT: PAIN_FUNCTIONAL_ASSESSMENT: 0-10

## 2022-08-08 ASSESSMENT — LIFESTYLE VARIABLES: SMOKING_STATUS: 1

## 2022-08-08 NOTE — DISCHARGE INSTRUCTIONS
DISCHARGE INSTRUCTIONS FOR COLONOSCOPY    In order to continue your care at home, please follow the instructions below. For General Anesthesia:  Do not drink any alcoholic beverages or make any legal or important decisions for 24 hours. Do not drive or operate machinery for 24 hours. You may return to work after 24 hours. Diet    Drink plenty of fluids after surgery, unless you are on a fluid restriction. After general anesthesia, start out eating lightly (broth, soup, bread, etc.) advancing as tolerated to your usual diet. Try to avoid spicy or greasy/fatty foods for 24 hours. Avoid milk/milk product for several hours. Medications  Take medications as ordered by your surgeon. Activities  Limit your activities for 24 hours. Walk around to help pass gas. You may shower. Call your surgeon for the following: If you have abdominal pain that is not relieved by passing gas. For an oral temperature (by mouth) is 101 degrees or higher, chills or excessive sweating. You have increasing and progressive bleeding or drainage from surgery area. Persistent nausea or vomiting  Rectal bleeding (may be red, maroon, or black) or change in your bowel habits. Redness or swelling at the IV site. If you are unable to urinate within 8 hours of surgery. For any questions or concerns you may have. COLON POLYP INFORMATION       Colon polyps are growths in the colon or the rectum. The cause of most colon polyps is not known, and most people who get them do not have any problems. But a certain kind can turn into cancer. For this reason, regular testing for colon polyps is important for people age 48 and older and anyone who has an increased risk for colon cancer. Although most colon polyps are not cancerous, they are usually removed and then tested for cancer. Screening for colon cancer saves lives because the cancer can usually be cured if it is caught early. To prevent polyps:    There is no home treatment for colon polyps. But you can take steps to prevent them from forming. Get regular exercise and stay at a healthy body weight. Exercise can lower your chance of getting colon cancer. Get at least 30 minutes of exercise on most days of the week. Walking is a good choice. You also may want to do other activities, such as running, swimming, cycling, or playing tennis or team sports. Limit alcohol to 2 drinks a day for men and 1 drink a day for women. Too much alcohol can cause health problems. Do not smoke. Smoking can raise your risk of getting colon polyps. If you need help quitting, talk to your doctor about stop-smoking programs and medicines. These can increase your chances of quitting for good. Eat lots of fruits and vegetables, and limit animal fat in your diet.

## 2022-08-08 NOTE — OP NOTE
Operative Note      Patient: Ruddy Carrasquillo  YOB: 1975  MRN: [id number]    Date of Procedure: 8/8/2022  PROCEDURE NOTE    DATE OF PROCEDURE: 8/8/2022    SURGEON: Jenny Hardy MD    ASSISTANT: None    PREOPERATIVE DIAGNOSIS: Screening colonoscopy    POSTOPERATIVE DIAGNOSIS: Fair preparation. Rectal polyp. Sigmoid diverticulosis. OPERATION: Total colonoscopy to cecum with rectal polypectomy with hot biopsy forceps    ANESTHESIA: General    ESTIMATED BLOOD LOSS: None    COMPLICATIONS: None     SPECIMENS:  Was Obtained: Rectal polyp    HISTORY: The patient is a 56y.o. year old female with history of above preop diagnosis. I recommended colonoscopy with possible biopsy or polypectomy and I explained the risk, benefits, expected outcome, and alternatives to the procedure. Risks included but are not limited to bleeding, infection, respiratory distress, hypotension, and perforation of the colon and possibility of missing a lesion. The patient understands and is in agreement. PROCEDURE: The patient was given IV conscious sedation. The patient's SPO2 remained above 90% throughout the procedure. Digital rectal exam was normal.  The colonoscope was inserted through the anus into the rectum and advanced under direct vision to the cecum without difficulty. Terminal ileum was examined for approximately 2 inches. The prep was fair. Findings:    Cecum/Ascending colon: Fair prep. Grossly unremarkable. Transverse colon: Fair prep grossly unremarkable. Descending/Sigmoid colon: Fair prep mild sigmoid diverticulosis grossly otherwise unremarkable. Rectum/Anus: examined in normal and retroflexed positions and was abnormal: Low rectal polyp removed with hot biopsy forceps    Withdrawal Time was (minutes): 20      Next screening colonoscopy: 3 years. If screening is less than 10 years the recommended reason is due: Colon polyp and fair preparation    The colon was decompressed.   While withdrawing the scope the above findings were verified and the scope was removed. The patient tolerated the procedure and conscious sedation without unusual events. In the recovery room patient was examined and remains hemodynamically stable. Discharge home when criteria met.     Recommendations/Plan:   F/U Biopsies  F/U In Office as instructed  Discussed with the family  High fiber diet   Precautions to avoid constipation    Electronically signed by Criss Lynn MD  on 8/8/2022 at 8:51 AM

## 2022-08-08 NOTE — TELEPHONE ENCOUNTER
Real Hassan's office called, PCP would like office notes from 7-20 appt w/Dr Candi Faith faxed to them. Fax # [phone]    Office # [phone]    Please call with any questions. Visit Information Date & Time Provider Department Dept. Phone Encounter #  
 10/24/2017  8:45 AM Fermín Goodwin MD [facility] Internal Medicine [phone] [id number] Upcoming Health Maintenance Date Due INFLUENZA AGE 9 TO ADULT 8/1/2017 BREAST CANCER SCRN MAMMOGRAM 1/6/2018 PAP AKA CERVICAL CYTOLOGY 2/1/2018 COLONOSCOPY 4/1/2025 DTaP/Tdap/Td series (2 - Td) 7/31/2025 Allergies as of 10/24/2017  Review Complete On: 10/24/2017 By: Stephanie Gary LPN Severity Noted Reaction Type Reactions Demerol [Meperidine]  05/28/2010    Itching Penicillin G  05/28/2010    Anaphylaxis Current Immunizations  Reviewed on 6/10/2016 Name Date Influenza High Dose Vaccine PF  Incomplete Influenza Vaccine 10/19/2015 Tdap 7/31/2015 Not reviewed this visit You Were Diagnosed With   
  
 Codes Comments Lumbar strain, subsequent encounter    -  Primary ICD-10-CM: S39.012D ICD-9-CM: V58.89, 847.2 Encounter for immunization     ICD-10-CM: X97 ICD-9-CM: V03.89 Vitals BP Pulse Temp Resp Height(growth percentile) Weight(growth percentile) 126/84 68 97.8 °F (36.6 °C) (Oral) 14 5' 4\" (1.626 m) 163 lb (73.9 kg) SpO2 BMI OB Status Smoking Status 99% 27.98 kg/m2 Hysterectomy Never Smoker Vitals History BMI and BSA Data Body Mass Index Body Surface Area  
 27.98 kg/m 2 1.83 m 2 Preferred Pharmacy Pharmacy Name Phone [facility] [address] [phone] Your Updated Medication List  
  
   
This list is accurate as of: 10/24/17  9:26 AM.  Always use your most recent med list.  
  
  
  
  
 ibuprofen 800 mg tablet Commonly known as:  MOTRIN Take 1 Tab by mouth every eight (8) hours as needed. QSYMIA 11.25-69 mg Cm24 Generic drug:  phentermine-topiramate TAKE ONE CAPSULE BY MOUTH EVERY MORNING  
  
 RX AMB NIFEDIPINE 0.2 % RECTAL OINTMENT COMPOUND Nifedipine 0.2% Rectal Ointment (zinc 25mg/L-Carnitine 10mg)  Apply to anus three times daily x 7 days tiZANidine 4 mg tablet Commonly known as:  Jose Pique Take 1 Tab by mouth three (3) times daily as needed. Prescriptions Sent to Pharmacy Refills  
 ibuprofen (MOTRIN) 800 mg tablet 2 Sig: Take 1 Tab by mouth every eight (8) hours as needed. Class: Normal  
 Pharmacy: [facility] Ph #: [phone] Route: Oral  
 tiZANidine (ZANAFLEX) 4 mg tablet 1 Sig: Take 1 Tab by mouth three (3) times daily as needed. Class: Normal  
 Pharmacy: [address] Ph #: [phone] Route: Oral  
  
We Performed the Following ADMIN INFLUENZA VIRUS VAC [ [facility]] INFLUENZA VIRUS VACCINE, HIGH DOSE SEASONAL, PRESERVATIVE FREE [83867 CPT(R)] REFERRAL TO PHYSICAL THERAPY [IKC49 Custom] Referral Information Referral ID Referred By Referred To  
  
 [id number] [address], [address] [place] [address] Way Phone: [phone] Fax: [phone] Visits Status Start Date End Date 1 New Request 10/24/17 10/24/18 If your referral has a status of pending review or denied, additional information will be sent to support the outcome of this decision. Patient Instructions Low Back Pain: Exercises Your Care Instructions Here are some examples of typical rehabilitation exercises for your condition. Start each exercise slowly. Ease off the exercise if you start to have pain. Your doctor or physical therapist will tell you when you can start these exercises and which ones will work best for you. How to do the exercises Press-up 1. Lie on your stomach, supporting your body with your forearms. 2. Press your elbows down into the floor to raise your upper back. As you do this, relax your stomach muscles and allow your back to arch without using your back muscles. As your press up, do not let your hips or pelvis come off the floor. 3. Hold for 15 to 30 seconds, then relax. 4. Repeat 2 to 4 times. Alternate arm and leg (bird dog) exercise Note: Do this exercise slowly. Try to keep your body straight at all times, and do not let one hip drop lower than the other. 1. Start on the floor, on your hands and knees. 2. Tighten your belly muscles. 3. Raise one leg off the floor, and hold it straight out behind you. Be careful not to let your hip drop down, because that will twist your trunk. 4. Hold for about 6 seconds, then lower your leg and switch to the other leg. 5. Repeat 8 to 12 times on each leg. 6. Over time, work up to holding for 10 to 30 seconds each time. 7. If you feel stable and secure with your leg raised, try raising the opposite arm straight out in front of you at the same time. Knee-to-chest exercise 1. Lie on your back with your knees bent and your feet flat on the floor. 2. Bring one knee to your chest, keeping the other foot flat on the floor (or keeping the other leg straight, whichever feels better on your lower back). 3. Keep your lower back pressed to the floor. Hold for at least 15 to 30 seconds. 4. Relax, and lower the knee to the starting position. 5. Repeat with the other leg. Repeat 2 to 4 times with each leg. 6. To get more stretch, put your other leg flat on the floor while pulling your knee to your chest. 
Curl-ups 1. Lie on the floor on your back with your knees bent at a 90-degree angle. Your feet should be flat on the floor, about 12 inches from your buttocks.  
2. Cross your arms over your chest. If this bothers your neck, try putting your hands behind your neck (not your head), with your elbows spread apart. 3. Slowly tighten your belly muscles and raise your shoulder blades off the floor. 4. Keep your head in line with your body, and do not press your chin to your chest. 
5. Hold this position for 1 or 2 seconds, then slowly lower yourself back down to the floor. 6. Repeat 8 to 12 times. Pelvic tilt exercise 1. Lie on your back with your knees bent. 2. \"Brace\" your stomach. This means to tighten your muscles by pulling in and imagining your belly button moving toward your spine. You should feel like your back is pressing to the floor and your hips and pelvis are rocking back. 3. Hold for about 6 seconds while you breathe smoothly. 4. Repeat 8 to 12 times. Heel dig bridging 1. Lie on your back with both knees bent and your ankles bent so that only your heels are digging into the floor. Your knees should be bent about 90 degrees. 2. Then push your heels into the floor, squeeze your buttocks, and lift your hips off the floor until your shoulders, hips, and knees are all in a straight line. 3. Hold for about 6 seconds as you continue to breathe normally, and then slowly lower your hips back down to the floor and rest for up to 10 seconds. 4. Do 8 to 12 repetitions. Hamstring stretch in doorway 1. Lie on your back in a doorway, with one leg through the open door. 2. Slide your leg up the wall to straighten your knee. You should feel a gentle stretch down the back of your leg. 3. Hold the stretch for at least 15 to 30 seconds. Do not arch your back, point your toes, or bend either knee. Keep one heel touching the floor and the other heel touching the wall. 4. Repeat with your other leg. 5. Do 2 to 4 times for each leg. Hip flexor stretch 1. Kneel on the floor with one knee bent and one leg behind you. Place your forward knee over your foot. Keep your other knee touching the floor. 2. Slowly push your hips forward until you feel a stretch in the upper thigh of your rear leg. 3. Hold the stretch for at least 15 to 30 seconds. Repeat with your other leg. 4. Do 2 to 4 times on each side. Wall sit 1. Stand with your back 10 to 12 inches away from a wall. 2. Lean into the wall until your back is flat against it. 3. Slowly slide down until your knees are slightly bent, pressing your lower back into the wall. 4. Hold for about 6 seconds, then slide back up the wall. 5. Repeat 8 to 12 times. Follow-up care is a key part of your treatment and safety. Be sure to make and go to all appointments, and call your doctor if you are having problems. It's also a good idea to know your test results and keep a list of the medicines you take. Where can you learn more? Go to http://ginger-leon.info/. Enter B324 in the search box to learn more about \"Low Back Pain: Exercises. \" Current as of: March 21, 2017 Content Version: 11.3 © 7482-2739 [employer]. Care instructions adapted under license by [employer] (which disclaims liability or warranty for this information). If you have questions about a medical condition or this instruction, always ask your healthcare professional. [address] any warranty or liability for your use of this information. Introducing [facility]! Gardenia Lyn introduces [employer] patient portal. Now you can access parts of your medical record, email your doctor's office, and request medication refills online. 1. In your internet browser, go to [email or web address] 2. Click on the First Time User? Click Here link in the Sign In box. You will see the New Member Sign Up page. 3. Enter your [employer] Access Code exactly as it appears below. You will not need to use this code after youve completed the sign-up process.  If you do not sign up before the expiration date, you must request a new code. · [employer] Access Code: [email or web address] Expires: 1/22/2018  8:52 AM 
 
4. Enter the last four digits of your Social Security Number (xxxx) and Date of Birth (mm/dd/yyyy) as indicated and click Submit. You will be taken to the next sign-up page. 5. Create a [employer] ID. This will be your [employer] login ID and cannot be changed, so think of one that is secure and easy to remember. 6. Create a [employer] password. You can change your password at any time. 7. Enter your Password Reset Question and Answer. This can be used at a later time if you forget your password. 8. Enter your e-mail address. You will receive e-mail notification when new information is available in [address] 9. Click Sign Up. You can now view and download portions of your medical record. 10. Click the Download Summary menu link to download a portable copy of your medical information. If you have questions, please visit the Frequently Asked Questions section of the [employer] website. Remember, [employer] is NOT to be used for urgent needs. For medical emergencies, dial 911. Now available from your iPhone and Android! Please provide this summary of care documentation to your next provider. Your primary care clinician is listed as Yun [phone] If you have any questions after today's visit, please call [phone].

## 2022-08-08 NOTE — H&P
HISTORY and Juliann Rocha [id number]       NAME:  Gwendolyn Burroughs  MRN: [id number]   YOB: 1975   Date: 8/8/2022   Age: 56 y.o. Gender: female       COMPLAINT AND PRESENT HISTORY:     Gwendolyn Burroughs is 56 y.o.,  female, presents for [address], NOT HIGH RISK AND EXAM UNDER ANESTHETIC   Primary dx: ASHLI-RECTAL ABCESS.  HPI:  Gwendolyn Burroughs is 56 y.o.,   female, having a Screening Colonoscopy. This is patient's first Colonoscopy. Patient does not know if she has family hx  of Colon cancer  or other type of cancer. Patient reports no changes in her BM. She has no constipation or diarrhea ,No GI /Rectal bleeding, experiencing red/ black/ BRBPR stools. Patient has a history of intermittent abdominal pain in the lower abdominal, no nausea or vomiting, no abdominal bloating, no weight loss. Pain as cramping, pain rated 2-3 /10. Pt states she has hx of endometriosis. Patient denies any Dysphagia. Pt has no hx of GERD . Pt denies fever/chills, chest pain or SOB. Past medical history :  HTN   pt denies chest pain , palpitation, dizziness, or headache   Current medication r/t condition : Hydrodiuril     BP Readings from Last 3 Encounters:   07/07/22 118/86   06/10/22 113/67   10/02/21 136/81     ECG 6/17/21  Sinus bradycardia  Otherwise normal ECG  When compared with ECG of 29-APR-2021 02:00,  Vent. rate has decreased BY 46 BPM    Sleep apnea  she does not use CPAP    NPO status:  pt NPO since the past midnight  Medications taken TODAY (with sip of water): no am medication today   Anticoagulation status: none  Prep fully completed: YES. Pt reports her last Bm is clear liquid   Denies personal hx of blood clots. Denies personal hx of MRSA infection. Denies any personal or family hx of previous complications w/anesthesia.   Nicotine status: she smoke cigarette  half a pack per day   And she smoke marijuana occasionally   PAST MEDICAL HISTORY     Past HISTORY       Social History     Socioeconomic History    Marital status: Legally    Tobacco Use    Smoking status: Every Day     Packs/day: 0.50     Years: 18.00     Pack years: 9.00     Types: Cigarettes     Start date: 12    Smokeless tobacco: Never   Vaping Use    Vaping Use: Never used   Substance and Sexual Activity    Alcohol use: No    Drug use: Yes     Types: Marijuana Aidan Vergara     Comment: occassionally     Sexual activity: Yes     Partners: Male     Social Determinants of Health     Financial Resource Strain: Low Risk     Difficulty of Paying Living Expenses: Not hard at all   Food Insecurity: No Food Insecurity    Worried About Running Out of Food in the Last Year: Never true    Ran Out of Food in the Last Year: Never true           REVIEW OF SYSTEMS      Allergies   Allergen Reactions    Bactrim [Sulfamethoxazole-Trimethoprim] Hives and Itching    Dicloxacillin Hives    Pcn [Penicillins] Hives       No current facility-administered medications on file prior to encounter. Current Outpatient Medications on File Prior to Encounter   Medication Sig Dispense Refill    hydrocortisone 2.5 % cream Place 1 applicator rectally 2 times daily      dicyclomine (BENTYL) 10 MG capsule Take 2 capsules by mouth 3 times daily as needed (abdominal cramping) 42 capsule 0    ondansetron (ZOFRAN) 4 MG tablet Take 1 tablet by mouth every 8 hours as needed for Nausea 20 tablet 0    rOPINIRole (REQUIP) 5 MG tablet Take 7.5 mg by mouth nightly       hydroCHLOROthiazide (HYDRODIURIL) 25 MG tablet Take 25 mg by mouth daily      naproxen (NAPROSYN) 500 MG tablet Take 500 mg by mouth nightly          Review of Systems   Constitutional: Negative. HENT: Negative. Pt has full denture    Respiratory:  Positive for apnea. Cardiovascular: Negative. Gastrointestinal:  Positive for abdominal pain. Negative for constipation, diarrhea, nausea, rectal pain and vomiting. Genitourinary: Negative. Musculoskeletal: Negative. Bilateral knees pain , pt has carpal tunnel both hands    Skin: Negative. Neurological: Negative. Hematological: Negative. Psychiatric/Behavioral:  The patient is nervous/anxious. GENERAL PHYSICAL EXAM     Vitals: see nursing flow sheet for vital signs     GENERAL APPEARANCE:   Lord Hatch is 56 y.o.,  female, mildly obese, nourished, conscious, alert. Does not appear to be distress or pain at this time. Physical Exam  Constitutional:       Appearance: Normal appearance. She is obese. HENT:      Head: Normocephalic. Right Ear: External ear normal.      Left Ear: External ear normal.      Nose: Nose normal.      Mouth/Throat:      Mouth: Mucous membranes are moist.      Comments: Pt has no teeth upper or lower jaw . Eyes:      General:         Right eye: No discharge. Left eye: No discharge. Cardiovascular:      Rate and Rhythm: Normal rate and regular rhythm. Pulses: Normal pulses. Radial pulses are 2+ on the right side and 2+ on the left side. Popliteal pulses are 2+ on the right side and 2+ on the left side. Dorsalis pedis pulses are 2+ on the right side and 2+ on the left side. Heart sounds: Normal heart sounds. No murmur heard. No gallop. Pulmonary:      Effort: Pulmonary effort is normal.      Breath sounds: Normal breath sounds. No wheezing, rhonchi or rales. Abdominal:      General: Bowel sounds are normal. There is no distension. Palpations: Abdomen is soft. There is no mass. Tenderness: There is no abdominal tenderness. Musculoskeletal:         General: No swelling or tenderness. Normal range of motion. Cervical back: Normal range of motion and neck supple. Right lower leg: No edema. Left lower leg: No edema. Skin:     General: Skin is warm and dry. Findings: No bruising, erythema or lesion.    Neurological:      General: No focal deficit present. Mental Status: She is alert and oriented to person, place, and time. Motor: No weakness.       Gait: Gait normal.   Psychiatric:         Mood and Affect: Mood normal.         Behavior: Behavior normal.                 PROVISIONAL DIAGNOSES / SURGERY:      ASHLI-RECTAL ABCESS    COLORECTAL CANCER SCREENING, NOT HIGH RISK AND EXAM UNDER ANESTHETIC     Patient Active Problem List    Diagnosis Date Noted    Breast cancer screening by mammogram 07/07/2022    Acute vaginitis 07/07/2022    Hypertension 08/30/2021    Tobacco abuse 08/30/2021    Anal abscess 08/29/2021    Urinary, incontinence, stress female     Postoperative pain     S/p Bladder Suspension Transobturator Tape Revision 1/18/19 01/18/2019    Bladder Suspension Transobturator Tape 11/29/18 11/29/2018    Pneumonia of both lower lobes due to Mycoplasma pneumoniae 06/25/2018    Multifocal pneumonia 06/23/2018    Menorrhagia 09/11/2014    Left genital labial abscess 09/11/2014    Abnormal uterine bleeding 04/08/2014    Restless leg syndrome     Neuropathy     Anxiety            BRICE Reynaga CNP on 8/8/2022 at 6:34 AM

## 2022-08-08 NOTE — ANESTHESIA PRE PROCEDURE
Department of Anesthesiology  Preprocedure Note       Name:  Anastasiya Michelle   Age:  56 y.o.  :  1975                                          MRN:  [id number]         Date:  2022      Surgeon: Stan Baca):  Avelino Ocasio MD    Procedure: Procedure(s):  COLORECTAL CANCER SCREENING, NOT HIGH RISK AND EXAM UNDER ANESTHETIC    Medications prior to admission:   Prior to Admission medications    Medication Sig Start Date End Date Taking? Authorizing Provider   buPROPion (WELLBUTRIN XL) 150 MG extended release tablet Take 150 mg by mouth every morning    Historical Provider, MD   ondansetron (ZOFRAN) 4 MG tablet Take 1 tablet by mouth every 8 hours as needed for Nausea 6/10/22   Vimal Garcia DO   rOPINIRole (REQUIP) 5 MG tablet Take 7.5 mg by mouth nightly     Historical Provider, MD   hydroCHLOROthiazide (HYDRODIURIL) 25 MG tablet Take 25 mg by mouth daily    Historical Provider, MD   naproxen (NAPROSYN) 500 MG tablet Take 500 mg by mouth nightly     Historical Provider, MD       Current medications:    Current Facility-Administered Medications   Medication Dose Route Frequency Provider Last Rate Last Admin    lidocaine PF 1 % injection 1 mL  1 mL IntraDERmal Once PRN Wendie Horan MD        lactated ringers infusion   IntraVENous Continuous Wendie Horan  mL/hr at 22 0724 New Bag at 22 0724    sodium chloride flush 0.9 % injection 5-40 mL  5-40 mL IntraVENous 2 times per day Wendie Horan MD        sodium chloride flush 0.9 % injection 5-40 mL  5-40 mL IntraVENous PRN Wendie Horan MD        0.9 % sodium chloride infusion   IntraVENous PRN Wendie Horan MD           Allergies:     Allergies   Allergen Reactions    Bactrim [Sulfamethoxazole-Trimethoprim] Hives and Itching    Dicloxacillin Hives    Pcn [Penicillins] Hives       Problem List:    Patient Active Problem List   Diagnosis Code    Neuropathy G62.9    Anxiety F41.9    Restless leg syndrome G25.81    Abnormal uterine bleeding N93.9    Menorrhagia N92.0    Left genital labial abscess N76.4    Multifocal pneumonia J18.9    Pneumonia of both lower lobes due to Mycoplasma pneumoniae J15.7    Bladder Suspension Transobturator Tape 18 R32    S/p Bladder Suspension Transobturator Tape Revision 19 Z98.890    Urinary, incontinence, stress female N39.3    Postoperative pain G89.18    Anal abscess K61.0    Hypertension I10    Tobacco abuse Z72.0    Breast cancer screening by mammogram Z12.31    Acute vaginitis N76.0       Past Medical History:        Diagnosis Date    Anxiety     Arthritis     OA    Depression     post partum    Diabetes, gestational     history of    Ground glass opacity present on imaging of lung     History of mycoplasma pneumonia     Light cigarette smoker (1-9 cigarettes per day)     Menarche @ 16 y/o    Neuropathy     CTS    Obesity     Obesity (BMI 30.0-34. 9)     Parity       -    Pneumonia 2018    several times this year, had pneumonia last 10-    Restless leg syndrome     Sleep apnea     doesn't use CPAP    UTI (lower urinary tract infection)     with pregnancy    Wears glasses        Past Surgical History:        Procedure Laterality Date    ANKLE SURGERY Right     BLADDER SUSPENSION       SECTION      CHOLECYSTECTOMY  06    COLONOSCOPY      with poly removal    DILATION AND CURETTAGE OF UTERUS  2014    ENDOMETRIAL ABLATION  14    with novasure    HYSTEROSCOPY  14    D&C    KNEE ARTHROSCOPY Left 2015    total x2 scopes.     KNEE SURGERY      right knee scoped on 17    MYRINGOTOMY Bilateral     with tubes    OTHER SURGICAL HISTORY  2016    removal multiple vulvar inclusion cysts    OK SLING OPER STRES INCONTINENCE N/A 2018    BLADDER SUSPENSION TRANSOBTURATOR TAPE AND CYSTO performed by Derek Avitia MD at [address] [place] [address] Right 2021    PERIRECTAL ABSCESS INCISION AND DRAINAGE performed by Nghia Arnold MD at [address] N/A 1/18/2019    VAGINAL SLING DISSECTION OF VAGINAL MUCOSA WITH REINSERTION OF  TRANSOBTURATOR TAPE  CYSTOSCOPY performed by Tasha Sierra MD at [address] [facility] History:    Social History     Tobacco Use    Smoking status: Every Day     Packs/day: 0.50     Years: 18.00     Pack years: 9.00     Types: Cigarettes     Start date: 12    Smokeless tobacco: Never   Substance Use Topics    Alcohol use: No                                Ready to quit: Not Answered  Counseling given: Not Answered      Vital Signs (Current):   Vitals:    08/08/22 0704   BP: 112/71   Pulse: 79   Resp: 18   Temp: 97.8 °F (36.6 °C)   TempSrc: Infrared   SpO2: 97%   Weight: 184 lb (83.5 kg)   Height: 5' 3\" (1.6 m)                                              BP Readings from Last 3 Encounters:   08/08/22 112/71   07/07/22 118/86   06/10/22 113/67       NPO Status: Time of last liquid consumption: 2330                        Time of last solid consumption: 1900                        Date of last liquid consumption: 08/07/22                        Date of last solid food consumption: 08/06/22    BMI:   Wt Readings from Last 3 Encounters:   08/08/22 184 lb (83.5 kg)   08/02/22 184 lb (83.5 kg)   07/28/22 181 lb (82.1 kg)     Body mass index is 32.59 kg/m².     CBC:   Lab Results   Component Value Date/Time    WBC 10.8 06/10/2022 12:48 AM    RBC 4.56 06/10/2022 12:48 AM    HGB 14.3 06/10/2022 12:48 AM    HCT 43.2 06/10/2022 12:48 AM    MCV 94.8 06/10/2022 12:48 AM    RDW 13.1 06/10/2022 12:48 AM     06/10/2022 12:48 AM     K 3.3    CMP:   Lab Results   Component Value Date/Time     06/10/2022 12:48 AM    K 3.3 06/10/2022 12:48 AM     06/10/2022 12:48 AM    CO2 25 06/10/2022 12:48 AM    BUN 12 06/10/2022 12:48 AM    CREATININE 0.70 06/10/2022 12:48 AM    GFRAA >60 06/10/2022 12:48 AM    LABGLOM reviewed  Rhythm: regular  Rate: normal  Echocardiogram reviewed         Beta Blocker:  Not on Beta Blocker         Neuro/Psych:   (+) psychiatric history: stable with treatmentdepression/anxiety    (-) seizures, neuromuscular disease, TIA, CVA and headaches           GI/Hepatic/Renal: Neg GI/Hepatic/Renal ROS       (-) hiatal hernia, GERD, PUD, hepatitis, liver disease, no renal disease, bowel prep and no morbid obesity       Endo/Other:    (+) DiabetesType II DM, no interval change, , no malignancy/cancer. (-) hypothyroidism, hyperthyroidism, blood dyscrasia, arthritis, no electrolyte abnormalities, no malignancy/cancer               Abdominal:             Vascular: negative vascular ROS. - PVD, DVT and PE. Other Findings:           Anesthesia Plan      general     ASA 3       Induction: intravenous. MIPS: Postoperative opioids intended and Prophylactic antiemetics administered. Anesthetic plan and risks discussed with patient. Plan discussed with CRNA.                     Anna Vee MD   8/8/2022

## 2022-08-09 LAB — SURGICAL PATHOLOGY REPORT: NORMAL

## 2022-08-11 ASSESSMENT — ENCOUNTER SYMPTOMS
ABDOMINAL PAIN: 0
APNEA: 0
CONSTIPATION: 0
NAUSEA: 0
VOMITING: 0
ABDOMINAL DISTENTION: 0
DIARRHEA: 0
EYE PAIN: 0
COUGH: 0

## 2022-08-11 NOTE — PROGRESS NOTES
Subjective:      Patient ID: Ruddy Carrasquillo is a 56 y.o. female. Chief Complaint   Patient presents with    Colposcopy     Ht 5' 3\" (1.6 m)   Wt 184 lb (83.5 kg)   Breastfeeding No   BMI 32.59 kg/m²   No LMP recorded. Patient has had an ablation. [id number]    Past Medical History:   Diagnosis Date    Anxiety     Arthritis     OA    Depression     post partum    Diabetes, gestational     history of    Ground glass opacity present on imaging of lung     History of mycoplasma pneumonia     Light cigarette smoker (1-9 cigarettes per day)     Menarche @ 16 y/o    Neuropathy     CTS    Obesity     Obesity (BMI 30.0-34. 9)     Parity       -    Pneumonia 2018    several times this year, had pneumonia last 10-    Restless leg syndrome     Sleep apnea     doesn't use CPAP    UTI (lower urinary tract infection)     with pregnancy    Wears glasses      Current Outpatient Medications Ordered in Epic   Medication Sig Dispense Refill    buPROPion (WELLBUTRIN XL) 150 MG extended release tablet Take 150 mg by mouth every morning      ondansetron (ZOFRAN) 4 MG tablet Take 1 tablet by mouth every 8 hours as needed for Nausea 20 tablet 0    rOPINIRole (REQUIP) 5 MG tablet Take 7.5 mg by mouth nightly       hydroCHLOROthiazide (HYDRODIURIL) 25 MG tablet Take 25 mg by mouth daily      naproxen (NAPROSYN) 500 MG tablet Take 500 mg by mouth nightly        No current Epic-ordered facility-administered medications on file.      Problem List Items Addressed This Visit    None  Visit Diagnoses       ASCUS with positive high risk human papillomavirus of vagina    -  Primary    Relevant Orders    Specimen to Pathology Outpatient    48616 - [place] COLPOSC,CERVIX W/ADJ VAG,W/BX & CURRETAG (Completed)    Specimen to Pathology Outpatient    Negative pregnancy test        Relevant Orders    POCT urine pregnancy (Completed)    19044 - [place] COLPOSC,CERVIX W/ADJ VAG,W/BX & CURRETAG (Completed)    Specimen to Pathology Outpatient          Allergies Allergen Reactions    Bactrim [Sulfamethoxazole-Trimethoprim] Hives and Itching    Dicloxacillin Hives    Pcn [Penicillins] Hives     Orders Placed This Encounter   Procedures    Specimen to Pathology Outpatient     12 oclock cervix bx     Standing Status:   Future     Standing Expiration Date:   8/2/2023     Order Specific Question:   PREVIOUS BIOPSY     Answer:   No     Order Specific Question:   PREOP DIAGNOSIS     Answer:   ascus     Order Specific Question:   FROZEN SECTION - NO OR YES/SPECIMEN     Answer:   No    Specimen to Pathology Outpatient     Standing Status:   Future     Standing Expiration Date:   8/2/2023     Order Specific Question:   PREVIOUS BIOPSY     Answer:   No     Order Specific Question:   PREOP DIAGNOSIS     Answer:   ascus     Order Specific Question:   FROZEN SECTION - NO OR YES/SPECIMEN     Answer:   No    POCT urine pregnancy    48887 - CT COLPOSC,CERVIX W/ADJ VAG,W/BX & CURRETAG          HPI  Patient is here today for a colposcopy. Her pap smear on 7/7/22 was abnormal with ASCUS and positive for other high risk HPV. On 3/13/2018 her pap smear was negative for intraepithelial lesion or malignancy. She had ASCUS with negative HPV on 10/17/2016. Her pap smear was negative for intraepithelial lesion or malignancy on 3/21/2013. Review of Systems   Constitutional:  Negative for activity change, appetite change and fever. HENT:  Negative for ear discharge and ear pain. Eyes:  Negative for pain and visual disturbance. Respiratory:  Negative for apnea and cough. Cardiovascular:  Negative for chest pain, palpitations and leg swelling. Gastrointestinal:  Negative for abdominal distention, abdominal pain, constipation, diarrhea, nausea and vomiting. Endocrine: Negative. Genitourinary:  Negative for difficulty urinating, dysuria, menstrual problem and pelvic pain. Musculoskeletal:  Negative for neck pain and neck stiffness. Skin: Negative.     Neurological:  Negative for light-headedness and numbness. Hematological: Negative. Does not bruise/bleed easily. Objective:   Physical Exam  Vitals and nursing note reviewed. Exam conducted with a chaperone present. Constitutional:       Appearance: She is well-developed. Skin:     General: Skin is dry. Neurological:      Mental Status: She is alert and oriented to person, place, and time. Psychiatric:         Behavior: Behavior normal.         Thought Content: Thought content normal.       Assessment:      Patient with ASCUS and positive other high risk HPV here for colposcopy. Discussed surgical procedure, risks and benefits, and all questions were answered. A pregnancy test today is negative. Plan:      Orders Placed This Encounter   Procedures    Specimen to Pathology Outpatient    Specimen to Pathology Outpatient    POCT urine pregnancy    53055 - [place] COLPOSC,CERVIX W/ADJ VAG,W/BX & CURRETAG     Encounter: Colposcopy    Time Out: Administered pre-procedure checklist: Verified documentation of informed consent. Patient identification verified verbally by the patient. Administered correct patient, correct site and correct procedure. Counseling/Education: Colposcopy was discussed in detail including risk of post procedure bleeding. Patient expressed understanding of the above and consented to the procedure. Therapy:  Colposcopy of the cervix - The patient was placed in the dorsal lithotomy position and a vaginal speculum was inserted. Acetic acid (5%) was applied to the cervix. Acetowhite epithelium was not visualized close the squamous columnar junction. No punctuation visualized. No mosaic visualized. No vessels visualized. Biopsies were not performed. An ECC was done. Monsol's Solution was not needed. EBL was minimal.      Complications: None. Specimen(s) were sent to pathology.       Assessment:  Abnormal pap smear    Plan:    Patient is not to have intercourse for 5 days following the procedure. Return to office in 2 weeks and pathology results will be reviewed at that time. Fred Evans am scribing for, and in the presence of Dr. Virk. Electronically signed by: Christine Neri 8/11/22 5:13 PM         Christine Neri  I agree to the above documentation placed by my scribe Christine Neri. I reviewed the scribe's note and agree with the documented findings and plan of care. Any areas of disagreement are noted on the chart. I have personally evaluated this patient. Additional findings are as noted. I agree with the chief complaint, past medical history, past surgical history, allergies, medications, social and family history as documented unless otherwise noted below.      Electronically signed by Mayo Bradley MD on 8/13/2022 at 9:32 AM

## 2022-08-16 ENCOUNTER — OFFICE VISIT (OUTPATIENT)
Dept: OBGYN CLINIC | Age: 47
End: 2022-08-16
Payer: COMMERCIAL

## 2022-08-16 VITALS
HEART RATE: 84 BPM | HEIGHT: 63 IN | DIASTOLIC BLOOD PRESSURE: 80 MMHG | BODY MASS INDEX: 32.59 KG/M2 | SYSTOLIC BLOOD PRESSURE: 122 MMHG

## 2022-08-16 DIAGNOSIS — N87.1 DYSPLASIA OF CERVIX, HIGH GRADE CIN 2: Primary | ICD-10-CM

## 2022-08-16 PROCEDURE — 99213 OFFICE O/P EST LOW 20 MIN: CPT | Performed by: SPECIALIST

## 2022-08-16 PROCEDURE — G8427 DOCREV CUR MEDS BY ELIG CLIN: HCPCS | Performed by: SPECIALIST

## 2022-08-16 PROCEDURE — G8417 CALC BMI ABV UP PARAM F/U: HCPCS | Performed by: SPECIALIST

## 2022-08-16 PROCEDURE — 4004F PT TOBACCO SCREEN RCVD TLK: CPT | Performed by: SPECIALIST

## 2022-08-16 ASSESSMENT — ENCOUNTER SYMPTOMS
VOMITING: 0
ABDOMINAL PAIN: 0
CONSTIPATION: 0
NAUSEA: 0
COUGH: 0
ABDOMINAL DISTENTION: 0
DIARRHEA: 0
APNEA: 0
EYE PAIN: 0

## 2022-08-16 NOTE — PROGRESS NOTES
Subjective:      Patient ID: Carine Bassett is a 56 y.o. female. Chief Complaint   Patient presents with    Follow-up     /80 (Site: Left Upper Arm, Position: Sitting, Cuff Size: Medium Adult)   Pulse 84   Ht 5' 3\" (1.6 m)   Breastfeeding No   BMI 32.59 kg/m²   No LMP recorded. Patient has had an ablation. [id number]    Past Medical History:   Diagnosis Date    Anxiety     Arthritis     OA    Depression     post partum    Diabetes, gestational     history of    Ground glass opacity present on imaging of lung     History of mycoplasma pneumonia     Light cigarette smoker (1-9 cigarettes per day)     Menarche @ 16 y/o    Neuropathy     CTS    Obesity     Obesity (BMI 30.0-34. 9)     Parity       -    Pneumonia 2018    several times this year, had pneumonia last 10-    Restless leg syndrome     Sleep apnea     doesn't use CPAP    UTI (lower urinary tract infection)     with pregnancy    Wears glasses      Current Outpatient Medications Ordered in Epic   Medication Sig Dispense Refill    buPROPion (WELLBUTRIN XL) 150 MG extended release tablet Take 150 mg by mouth every morning      ondansetron (ZOFRAN) 4 MG tablet Take 1 tablet by mouth every 8 hours as needed for Nausea 20 tablet 0    rOPINIRole (REQUIP) 5 MG tablet Take 7.5 mg by mouth nightly       hydroCHLOROthiazide (HYDRODIURIL) 25 MG tablet Take 25 mg by mouth daily      naproxen (NAPROSYN) 500 MG tablet Take 500 mg by mouth nightly        No current Epic-ordered facility-administered medications on file. Problem List Items Addressed This Visit    None  Visit Diagnoses       Dysplasia of cervix, high grade ANNE 2    -  Primary          Allergies   Allergen Reactions    Bactrim [Sulfamethoxazole-Trimethoprim] Hives and Itching    Dicloxacillin Hives    Pcn [Penicillins] Hives     No orders of the defined types were placed in this encounter. HPI  Patient is here today to discuss the result of her colposcopy.   Her pap smear on 22 was abnormal with ASCUS and positive for other high risk HPV. On 3/13/2018 her pap smear was negative for intraepithelial lesion or malignancy. She had ASCUS with negative HPV on 10/17/2016. Her pap smear was negative for intraepithelial lesion or malignancy on 3/21/2013. Review of Systems   Constitutional:  Negative for activity change, appetite change and fever. HENT:  Negative for ear discharge and ear pain. Eyes:  Negative for pain and visual disturbance. Respiratory:  Negative for apnea and cough. Cardiovascular:  Negative for chest pain, palpitations and leg swelling. Gastrointestinal:  Negative for abdominal distention, abdominal pain, constipation, diarrhea, nausea and vomiting. Endocrine: Negative. Genitourinary:  Negative for difficulty urinating, dysuria, menstrual problem and pelvic pain. Musculoskeletal:  Negative for neck pain and neck stiffness. Skin: Negative. Neurological:  Negative for light-headedness and numbness. Hematological: Negative. Does not bruise/bleed easily. Objective:   Physical Exam  Vitals and nursing note reviewed. Constitutional:       Appearance: She is well-developed. Skin:     General: Skin is dry. Neurological:      Mental Status: She is alert and oriented to person, place, and time. Psychiatric:         Behavior: Behavior normal.         Thought Content: Thought content normal.       Assessment:      Patient here following colposcopy done 2 weeks ago. Pathology showing HSIL (ANNE-2) was reviewed and explained to patient. Explained to patient the need for treatment with LEEP and she will be scheduled for LEEP. Discussed surgical procedure, risks and benefits, and all questions were answered. Patient was told that she will be off work from 1 day to 1 week. She was told she will need to avoid sex for at least 5 weeks following procedure.       Recent Results (from the past 336 hour(s))   SURGICAL PATHOLOGY REPORT    Collection Time: 08/02/22 11:43 AM   Result Value Ref Range    Surgical Pathology Report       -- Diagnosis --   A. Endocervical curetting:    -  High-grade squamous intraepithelial lesion (ANNE-2). B.  Cervix, 12:00 biopsy:    -  Low-grade squamous intraepithelial lesion. Opal Hicks M.D.  **Electronically Signed Out**         rdd/8/4/2022       Clinical Information  Pre-op Diagnosis:  ASCUS WITH POSITIVE HIGH RISK HPV CERVICAL,  NEGATIVE FOR PREGNANCY TEST   Operative Findings:  ENDOCERV. CURETT.; 12:00 CERVIX BX    Source of Specimen  A: ENDOCERVIAL CURETTINGS  B: 12O'CLOCK CERVIX BX    Gross Description  A. \"MIKE MERARY, ECC\" Minute tan-white tissue fragments and mucoid  material, 1.0 x 0.8 x 0.1 cm in aggregate. Entirely 1cs. B. \"MIKE MERARY, 12:00 CERV. BX\" 0.6 x 0.3 x 0.2 cm tan-white  fragment. Entirely 1cs. yr tm      Microscopic Description  A. Curettings show fragments of mature squamous epithelium and  endocervical glandular mucosae. A rare fragment of dysplastic  squamous epithelium is present. This sample is evaluated through  additio nal levels and by control appropriate immunostain for p16  antigen. The small fragment of dysplastic mucosa appears at least  ANNE-2. P16 antigen immunostain shows confluent moderate staining of  the small fragment of dysplastic mucosa. B.  Squamous mucosa shows mild koilocytic atypia consistent with HPV. SURGICAL PATHOLOGY CONSULTATION       Patient Name: Lowell Ward Med Rec: [id number]  Path Number: [id number]    [place] [address].   [facility], [address]  [phone]  Fax: [phone]     POCT urine pregnancy    Collection Time: 08/02/22  4:07 PM   Result Value Ref Range    Preg Test, Ur negative     Control done    POCT HCG, Prenancy, Ur    Collection Time: 08/08/22  6:58 AM   Result Value Ref Range    HCG, Pregnancy Urine (POC) NEGATIVE NEGATIVE   SURGICAL PATHOLOGY REPORT    Collection Time: 08/08/22  8:41 AM   Result Value Ref Range    Surgical Pathology Report       -- Diagnosis --  RECTUM, BIOPSY:  -HYPERPLASTIC POLYP. Chandler Link M.D.  **Electronically Signed Out**         des/8/9/2022       Clinical Information  Pre-op Diagnosis:  PERIRECTAL ABSCESS   Operative Findings:  RECTAL POLYP   Operation Performed:  COLONOSCOPY, POLYPECTOMY HOT BIOPSY     Source of Specimen  A: RECTAL POLYP    Gross Description  \"MIKE MERARY, RECTAL POLYP\" One tan-white tissue fragment, 0.4 x 0.2  x 0.2 cm. Entirely 1cs. mpb tm       Microscopic Description  Microscopic examination performed. SURGICAL PATHOLOGY CONSULTATION       Patient Name: Pam Diaz Med Rec: [id number]  Path Number: [id number]    [address] [place] [address]. [place], [address] [place] [address] [place]  [phone]  Fax: [phone]             Plan:      Schedule for LEEP    I, Dimitri Vasquez, am scribing for, and in the presence of Dr. Eloy Tellez. Electronically signed by: Dimitri Vasquez 8/16/22 11:11 AM       I agree to the above documentation placed by my scribe Dimitri Vasquez. I reviewed the scribe's note and agree with the documented findings and plan of care. Any areas of disagreement are noted on the chart. I have personally evaluated this patient. Additional findings are as noted. I agree with the chief complaint, past medical history, past surgical history, allergies, medications, social and family history as documented unless otherwise noted below.      Electronically signed by Eloy Tellez MD on 8/17/2022 at 8:36 PM

## 2022-08-18 ENCOUNTER — TELEPHONE (OUTPATIENT)
Dept: OBGYN CLINIC | Age: 47
End: 2022-08-18

## 2022-09-18 NOTE — H&P (VIEW-ONLY)
HISTORY and Juliann Rocha 5747       NAME:  Kenya Pickens  MRN: [id number]   YOB: 1975   Date: 9/19/2022   Age: 56 y.o. Gender: female     COMPLAINT AND PRESENT HISTORY:   Kenya Pickens is 56 y.o.,  female, presents for pre-anesthesia/admission testing for LEEP per Dr. Allen Alexander  Primary dx: High grade squamous intraepithelial lesion (HGSIL), grade 2 ANNE, on biopsy of cervix [N87.1]. HPI:  SEE PORTION OF THE NOTE BELOW FROM OFFICE VISIT PER DR Rach Giang ON 8/16/2022  Patient is here today to discuss the result of her colposcopy. Her pap smear on 7/7/22 was abnormal with ASCUS and positive for other high risk HPV. On 3/13/2018 her pap smear was negative for intraepithelial lesion or malignancy. She had ASCUS with negative HPV on 10/17/2016. Her pap smear was negative for intraepithelial lesion or malignancy on 3/21/2013. Pt present today denies any pelvic pain. Pt has no menstrual period since 2014, she had dilation and curettage on [facility] and endometrial ablation, 2014  per Dr Allen Alexander. She denies any vaginal bleeding , no vaginal discharge. She has history of tobacco use. She denies any history of sexually transmitted infections. Patient feeling well today, denies recent fever/chills, chest pain, shortness of breath. RECENT IMAGING R/T HPI     Review of additional significant medical hx:    SLEEP APNEA does not use CPAP     HTN  Current medication r/t condition : Hydrochlorothiazide     BP Readings from Last 3 Encounters:   09/19/22 124/81   08/16/22 122/80   08/08/22 110/71     ECG 6/7/2021  Narrative & Impression  Sinus bradycardia  Otherwise normal ECG  When compared with ECG of 29-APR-2021 02:00,  Vent. rate has decreased BY  46 BPM    Activity level:  Functional Capacity per patient:  1. Patient is able to walk 2 city blocks on level ground without SOB. 2. Patient is able to climb 2 flights of stairs without SOB.     Denies hx of MRSA infection. Denies hx of blood clots. Denies hx of any personal or family hx of complications w/anesthesia. PAST MEDICAL HISTORY     Past Medical History:   Diagnosis Date    Anxiety     Arthritis     OA    Depression     post partum    Diabetes, gestational     history of    Edema     Ground glass opacity present on imaging of lung     History of mycoplasma pneumonia     Hypertension     Light cigarette smoker (1-9 cigarettes per day)     Menarche @ 16 y/o    Neuropathy     CTS    Obesity     Obesity (BMI 30.0-34. 9)     Parity       -    Pneumonia 2018    several times this year, had pneumonia last 10-    Restless leg syndrome     Sleep apnea     doesn't use CPAP    UTI (lower urinary tract infection)     with pregnancy    Wears glasses        SURGICAL HISTORY       Past Surgical History:   Procedure Laterality Date    ANKLE SURGERY Right     BLADDER SUSPENSION       SECTION      CHOLECYSTECTOMY  06    COLONOSCOPY      with poly removal    COLONOSCOPY N/A 2022    COLONOSCOPY WITH BIOPSY performed by Milagro Anderson MD at [facility]  2014    ENDOMETRIAL ABLATION  14    with novasure    HYSTEROSCOPY  14    D&C    KNEE ARTHROSCOPY Left 2015    total x2 scopes.     KNEE SURGERY      right knee scoped on 17    MYRINGOTOMY Bilateral     with tubes    OTHER SURGICAL HISTORY  2016    removal multiple vulvar inclusion cysts    [place] SLING OPER STRES INCONTINENCE N/A 2018    BLADDER SUSPENSION TRANSOBTURATOR TAPE AND CYSTO performed by Irina Sanford MD at [address] [facility] Right 2021    PERIRECTAL ABSCESS INCISION AND DRAINAGE performed by Milagro Anderson MD at [address] [facility] N/A 2019    VAGINAL SLING DISSECTION OF VAGINAL MUCOSA WITH REINSERTION OF  TRANSOBTURATOR TAPE  CYSTOSCOPY performed by Irina Sanford MD at [address] SOCIAL HISTORY       Social History     Socioeconomic History    Marital status: Legally      Spouse name: None    Number of children: None    Years of education: None    Highest education level: None   Tobacco Use    Smoking status: Every Day     Packs/day: 0.25     Years: 18.00     Pack years: 4.50     Types: Cigarettes     Start date: 13    Smokeless tobacco: Never   Vaping Use    Vaping Use: Never used   Substance and Sexual Activity    Alcohol use: Yes     Comment: very rare    Drug use: Yes     Types: Marijuana (Weed)     Comment: occassionally     Sexual activity: Yes     Partners: Male     Social Determinants of Health     Financial Resource Strain: Low Risk     Difficulty of Paying Living Expenses: Not hard at all   Food Insecurity: No Food Insecurity    Worried About Running Out of Food in the Last Year: Never true    Ran Out of Food in the Last Year: Never true       REVIEW OF SYSTEMS      Allergies   Allergen Reactions    Bactrim [Sulfamethoxazole-Trimethoprim] Hives and Itching    Dicloxacillin Hives    Pcn [Penicillins] Hives       Current Outpatient Medications on File Prior to Encounter   Medication Sig Dispense Refill    buPROPion (WELLBUTRIN XL) 150 MG extended release tablet Take 300 mg by mouth every morning      ondansetron (ZOFRAN) 4 MG tablet Take 1 tablet by mouth every 8 hours as needed for Nausea 20 tablet 0    rOPINIRole (REQUIP) 5 MG tablet Take 7.5 mg by mouth nightly       hydroCHLOROthiazide (HYDRODIURIL) 25 MG tablet Take 25 mg by mouth daily      naproxen (NAPROSYN) 500 MG tablet Take 500 mg by mouth nightly        No current facility-administered medications on file prior to encounter. Review of Systems   Constitutional: Negative. Negative for activity change, appetite change and fatigue. HENT:  Positive for dental problem. Negative for congestion, hearing loss and sinus pain. Pt has full denture    Eyes: Negative. Respiratory: Negative. Cardiovascular: Negative. Gastrointestinal: Negative. Endocrine: Negative. Genitourinary: Negative. Musculoskeletal: Negative. Bilateral knee pain    Skin: Negative. Neurological: Negative. Hematological: Negative. Psychiatric/Behavioral: Negative. GENERAL PHYSICAL EXAM     Vitals: /81   Pulse 77   Temp 98.2 °F (36.8 °C)   Resp 18   Ht 5' 3\" (1.6 m)   Wt 179 lb (81.2 kg)   SpO2 100%   BMI 31.71 kg/m²               Physical Exam  Constitutional:       General: She is not in acute distress. Appearance: Normal appearance. She is obese. She is not ill-appearing. HENT:      Head: Normocephalic. Right Ear: External ear normal.      Left Ear: External ear normal.      Nose: Nose normal.      Mouth/Throat:      Mouth: Mucous membranes are moist.      Comments: Pt has no teeth upper or lower   Eyes:      General:         Right eye: No discharge. Left eye: No discharge. Cardiovascular:      Rate and Rhythm: Normal rate and regular rhythm. Pulses: Normal pulses. Radial pulses are 2+ on the right side and 2+ on the left side. Dorsalis pedis pulses are 2+ on the right side and 2+ on the left side. Posterior tibial pulses are 2+ on the right side and 2+ on the left side. Heart sounds: Normal heart sounds. No murmur heard. No gallop. Pulmonary:      Effort: Pulmonary effort is normal.      Breath sounds: Normal breath sounds. No wheezing or rales. Abdominal:      General: Bowel sounds are normal. There is no distension. Palpations: Abdomen is soft. Tenderness: There is no abdominal tenderness. Musculoskeletal:         General: No swelling or tenderness. Normal range of motion. Cervical back: Normal range of motion and neck supple. Right lower leg: No edema. Left lower leg: No edema. Skin:     General: Skin is warm and dry. Findings: No bruising, erythema, lesion or rash.    Neurological: General: No focal deficit present. Mental Status: She is alert and oriented to person, place, and time. Motor: No weakness.       Gait: Gait normal.   Psychiatric:         Mood and Affect: Mood normal.         Behavior: Behavior normal.       LAB REVIEW     Lab Results   Component Value Date    WBC 8.2 09/19/2022    HGB 14.2 09/19/2022    HCT 41.9 09/19/2022    MCV 94.9 09/19/2022     09/19/2022     Lab Results   Component Value Date/Time     09/19/2022 01:15 PM    K 3.4 09/19/2022 01:15 PM     09/19/2022 01:15 PM    CO2 27 09/19/2022 01:15 PM    BUN 12 09/19/2022 01:15 PM    CREATININE 0.61 09/19/2022 01:15 PM    GLUCOSE 134 09/19/2022 01:15 PM    CALCIUM 9.9 09/19/2022 01:15 PM        PRELIMINARY EKG REVIEW, DATE: 9/19/2022   Normal sinus rhythm  Normal ECG   SURGERY / PROVISIONAL DIAGNOSES:      LEEP  High grade squamous intraepithelial lesion (HGSIL), grade 2 ANNE, on biopsy of cervix [N87.1]    Patient Active Problem List    Diagnosis Date Noted    Dysplasia of cervix, high grade ANNE 2 08/17/2022    ASCUS with positive high risk human papillomavirus of vagina 08/13/2022    Negative pregnancy test 08/13/2022    Breast cancer screening by mammogram 07/07/2022    Acute vaginitis 07/07/2022    Hypertension 08/30/2021    Tobacco abuse 08/30/2021    Anal abscess 08/29/2021    Urinary, incontinence, stress female     Postoperative pain     S/p Bladder Suspension Transobturator Tape Revision 1/18/19 01/18/2019    Bladder Suspension Transobturator Tape 11/29/18 11/29/2018    Pneumonia of both lower lobes due to Mycoplasma pneumoniae 06/25/2018    Multifocal pneumonia 06/23/2018    Menorrhagia 09/11/2014    Left genital labial abscess 09/11/2014    Abnormal uterine bleeding 04/08/2014    Restless leg syndrome     Neuropathy     Anxiety            CLEARANCE:   Based on my personal evaluation of patient including review of patient's chart, lab result ,ECG today and physical exam no clearance requested.      Total time spent on encounter- PAT provider minutes: 31-40 minutes     BRICE Reynaga CNP on 9/19/2022 at 1:24 PM

## 2022-09-18 NOTE — H&P
HISTORY and Juliann Rocha [id number]       NAME:  Junior Hill  MRN: [id number]   YOB: 1975   Date: 9/19/2022   Age: 56 y.o. Gender: female     COMPLAINT AND PRESENT HISTORY:   Junior Hill is 56 y.o.,  female, presents for pre-anesthesia/admission testing for LEEP per Dr. Liz Burk  Primary dx: High grade squamous intraepithelial lesion (HGSIL), grade 2 ANNE, on biopsy of cervix [N87.1]. HPI:  SEE PORTION OF THE NOTE BELOW FROM OFFICE VISIT PER DR Claudell Certain ON 8/16/2022  Patient is here today to discuss the result of her colposcopy. Her pap smear on 7/7/22 was abnormal with ASCUS and positive for other high risk HPV. On 3/13/2018 her pap smear was negative for intraepithelial lesion or malignancy. She had ASCUS with negative HPV on 10/17/2016. Her pap smear was negative for intraepithelial lesion or malignancy on 3/21/2013. Pt present today denies any pelvic pain. Pt has no menstrual period since 2014, she had dilation and curettage on utrs and endometrial ablation, 2014  per Dr Liz Burk. She denies any vaginal bleeding , no vaginal discharge. She has history of tobacco use. She denies any history of sexually transmitted infections. Patient feeling well today, denies recent fever/chills, chest pain, shortness of breath. RECENT IMAGING R/T HPI     Review of additional significant medical hx:    SLEEP APNEA does not use CPAP     HTN  Current medication r/t condition : Hydrochlorothiazide     BP Readings from Last 3 Encounters:   09/19/22 124/81   08/16/22 122/80   08/08/22 110/71     ECG 6/7/2021  Narrative & Impression  Sinus bradycardia  Otherwise normal ECG  When compared with ECG of 29-APR-2021 02:00,  Vent. rate has decreased BY  46 BPM    Activity level:  Functional Capacity per patient:  1. Patient is able to walk 2 city blocks on level ground without SOB. 2. Patient is able to climb 2 flights of stairs without SOB.     Denies hx of MRSA infection. Denies hx of blood clots. Denies hx of any personal or family hx of complications w/anesthesia. PAST MEDICAL HISTORY     Past Medical History:   Diagnosis Date    Anxiety     Arthritis     OA    Depression     post partum    Diabetes, gestational     history of    Edema     Ground glass opacity present on imaging of lung     History of mycoplasma pneumonia     Hypertension     Light cigarette smoker (1-9 cigarettes per day)     Menarche @ 16 y/o    Neuropathy     CTS    Obesity     Obesity (BMI 30.0-34. 9)     Parity       -    Pneumonia 2018    several times this year, had pneumonia last 10-    Restless leg syndrome     Sleep apnea     doesn't use CPAP    UTI (lower urinary tract infection)     with pregnancy    Wears glasses        SURGICAL HISTORY       Past Surgical History:   Procedure Laterality Date    ANKLE SURGERY Right     BLADDER SUSPENSION       SECTION      CHOLECYSTECTOMY  06    COLONOSCOPY      with poly removal    COLONOSCOPY N/A 2022    COLONOSCOPY WITH BIOPSY performed by Aminah Carlson MD at [address]  2014    ENDOMETRIAL ABLATION  14    with novasure    HYSTEROSCOPY  14    D&C    KNEE ARTHROSCOPY Left 2015    total x2 scopes.     KNEE SURGERY      right knee scoped on 17    MYRINGOTOMY Bilateral     with tubes    OTHER SURGICAL HISTORY  2016    removal multiple vulvar inclusion cysts    MI SLING OPER STRES INCONTINENCE N/A 2018    BLADDER SUSPENSION TRANSOBTURATOR TAPE AND CYSTO performed by Jaxon Berg MD at [address] Right 2021    PERIRECTAL ABSCESS INCISION AND DRAINAGE performed by Aminah Carlson MD at [address] [facility] N/A 2019    VAGINAL SLING DISSECTION OF VAGINAL MUCOSA WITH REINSERTION OF  TRANSOBTURATOR TAPE  CYSTOSCOPY performed by Jaxon Berg MD at [address] SOCIAL HISTORY       Social History     Socioeconomic History    Marital status: Legally      Spouse name: None    Number of children: None    Years of education: None    Highest education level: None   Tobacco Use    Smoking status: Every Day     Packs/day: 0.25     Years: 18.00     Pack years: 4.50     Types: Cigarettes     Start date: 13    Smokeless tobacco: Never   Vaping Use    Vaping Use: Never used   Substance and Sexual Activity    Alcohol use: Yes     Comment: very rare    Drug use: Yes     Types: Marijuana (Weed)     Comment: occassionally     Sexual activity: Yes     Partners: Male     Social Determinants of Health     Financial Resource Strain: Low Risk     Difficulty of Paying Living Expenses: Not hard at all   Food Insecurity: No Food Insecurity    Worried About Running Out of Food in the Last Year: Never true    Ran Out of Food in the Last Year: Never true       REVIEW OF SYSTEMS      Allergies   Allergen Reactions    Bactrim [Sulfamethoxazole-Trimethoprim] Hives and Itching    Dicloxacillin Hives    Pcn [Penicillins] Hives       Current Outpatient Medications on File Prior to Encounter   Medication Sig Dispense Refill    buPROPion (WELLBUTRIN XL) 150 MG extended release tablet Take 300 mg by mouth every morning      ondansetron (ZOFRAN) 4 MG tablet Take 1 tablet by mouth every 8 hours as needed for Nausea 20 tablet 0    rOPINIRole (REQUIP) 5 MG tablet Take 7.5 mg by mouth nightly       hydroCHLOROthiazide (HYDRODIURIL) 25 MG tablet Take 25 mg by mouth daily      naproxen (NAPROSYN) 500 MG tablet Take 500 mg by mouth nightly        No current facility-administered medications on file prior to encounter. Review of Systems   Constitutional: Negative. Negative for activity change, appetite change and fatigue. HENT:  Positive for dental problem. Negative for congestion, hearing loss and sinus pain. Pt has full denture    Eyes: Negative. Respiratory: Negative. Cardiovascular: Negative. Gastrointestinal: Negative. Endocrine: Negative. Genitourinary: Negative. Musculoskeletal: Negative. Bilateral knee pain    Skin: Negative. Neurological: Negative. Hematological: Negative. Psychiatric/Behavioral: Negative. GENERAL PHYSICAL EXAM     Vitals: /81   Pulse 77   Temp 98.2 °F (36.8 °C)   Resp 18   Ht 5' 3\" (1.6 m)   Wt 179 lb (81.2 kg)   SpO2 100%   BMI 31.71 kg/m²               Physical Exam  Constitutional:       General: She is not in acute distress. Appearance: Normal appearance. She is obese. She is not ill-appearing. HENT:      Head: Normocephalic. Right Ear: External ear normal.      Left Ear: External ear normal.      Nose: Nose normal.      Mouth/Throat:      Mouth: Mucous membranes are moist.      Comments: Pt has no teeth upper or lower   Eyes:      General:         Right eye: No discharge. Left eye: No discharge. Cardiovascular:      Rate and Rhythm: Normal rate and regular rhythm. Pulses: Normal pulses. Radial pulses are 2+ on the right side and 2+ on the left side. Dorsalis pedis pulses are 2+ on the right side and 2+ on the left side. Posterior tibial pulses are 2+ on the right side and 2+ on the left side. Heart sounds: Normal heart sounds. No murmur heard. No gallop. Pulmonary:      Effort: Pulmonary effort is normal.      Breath sounds: Normal breath sounds. No wheezing or rales. Abdominal:      General: Bowel sounds are normal. There is no distension. Palpations: Abdomen is soft. Tenderness: There is no abdominal tenderness. Musculoskeletal:         General: No swelling or tenderness. Normal range of motion. Cervical back: Normal range of motion and neck supple. Right lower leg: No edema. Left lower leg: No edema. Skin:     General: Skin is warm and dry. Findings: No bruising, erythema, lesion or rash.    Neurological: General: No focal deficit present. Mental Status: She is alert and oriented to person, place, and time. Motor: No weakness.       Gait: Gait normal.   Psychiatric:         Mood and Affect: Mood normal.         Behavior: Behavior normal.       LAB REVIEW     Lab Results   Component Value Date    WBC 8.2 09/19/2022    HGB 14.2 09/19/2022    HCT 41.9 09/19/2022    MCV 94.9 09/19/2022     09/19/2022     Lab Results   Component Value Date/Time     09/19/2022 01:15 PM    K 3.4 09/19/2022 01:15 PM     09/19/2022 01:15 PM    CO2 27 09/19/2022 01:15 PM    BUN 12 09/19/2022 01:15 PM    CREATININE 0.61 09/19/2022 01:15 PM    GLUCOSE 134 09/19/2022 01:15 PM    CALCIUM 9.9 09/19/2022 01:15 PM        PRELIMINARY EKG REVIEW, DATE: 9/19/2022   Normal sinus rhythm  Normal ECG   SURGERY / PROVISIONAL DIAGNOSES:      LEEP  High grade squamous intraepithelial lesion (HGSIL), grade 2 ANNE, on biopsy of cervix [N87.1]    Patient Active Problem List    Diagnosis Date Noted    Dysplasia of cervix, high grade ANNE 2 08/17/2022    ASCUS with positive high risk human papillomavirus of vagina 08/13/2022    Negative pregnancy test 08/13/2022    Breast cancer screening by mammogram 07/07/2022    Acute vaginitis 07/07/2022    Hypertension 08/30/2021    Tobacco abuse 08/30/2021    Anal abscess 08/29/2021    Urinary, incontinence, stress female     Postoperative pain     S/p Bladder Suspension Transobturator Tape Revision 1/18/19 01/18/2019    Bladder Suspension Transobturator Tape 11/29/18 11/29/2018    Pneumonia of both lower lobes due to Mycoplasma pneumoniae 06/25/2018    Multifocal pneumonia 06/23/2018    Menorrhagia 09/11/2014    Left genital labial abscess 09/11/2014    Abnormal uterine bleeding 04/08/2014    Restless leg syndrome     Neuropathy     Anxiety            CLEARANCE:   Based on my personal evaluation of patient including review of patient's chart, lab result ,ECG today and physical exam no clearance requested.      Total time spent on encounter- PAT provider minutes: 31-40 minutes     BIRCE Reynaga CNP on 9/19/2022 at 1:24 PM

## 2022-09-19 ENCOUNTER — HOSPITAL ENCOUNTER (OUTPATIENT)
Dept: PREADMISSION TESTING | Age: 47
Discharge: HOME | End: 2022-09-23
Attending: SPECIALIST | Admitting: SPECIALIST
Payer: COMMERCIAL

## 2022-09-19 VITALS
TEMPERATURE: 98.2 F | HEART RATE: 77 BPM | DIASTOLIC BLOOD PRESSURE: 81 MMHG | WEIGHT: 179 LBS | RESPIRATION RATE: 18 BRPM | SYSTOLIC BLOOD PRESSURE: 124 MMHG | BODY MASS INDEX: 31.71 KG/M2 | OXYGEN SATURATION: 100 % | HEIGHT: 63 IN

## 2022-09-19 DIAGNOSIS — Z01.818 PREOP EXAMINATION: ICD-10-CM

## 2022-09-19 LAB
ABSOLUTE EOS #: 0.2 K/UL (ref 0–0.4)
ABSOLUTE LYMPH #: 2.2 K/UL (ref 1–4.8)
ABSOLUTE MONO #: 0.4 K/UL (ref 0.1–1.3)
ANION GAP SERPL CALCULATED.3IONS-SCNC: 9 MMOL/L (ref 9–17)
BASOPHILS # BLD: 0 % (ref 0–2)
BASOPHILS ABSOLUTE: 0 K/UL (ref 0–0.2)
BUN BLDV-MCNC: 12 MG/DL (ref 6–20)
CALCIUM SERPL-MCNC: 9.9 MG/DL (ref 8.6–10.4)
CHLORIDE BLD-SCNC: 101 MMOL/L (ref 98–107)
CO2: 27 MMOL/L (ref 20–31)
CREAT SERPL-MCNC: 0.61 MG/DL (ref 0.5–0.9)
EOSINOPHILS RELATIVE PERCENT: 3 % (ref 0–4)
GFR AFRICAN AMERICAN: >60 ML/MIN
GFR NON-AFRICAN AMERICAN: >60 ML/MIN
GFR SERPL CREATININE-BSD FRML MDRD: ABNORMAL ML/MIN/{1.73_M2}
GLUCOSE BLD-MCNC: 134 MG/DL (ref 70–99)
HCT VFR BLD CALC: 41.9 % (ref 36–46)
HEMOGLOBIN: 14.2 G/DL (ref 12–16)
LYMPHOCYTES # BLD: 27 % (ref 24–44)
MCH RBC QN AUTO: 32.3 PG (ref 26–34)
MCHC RBC AUTO-ENTMCNC: 34 G/DL (ref 31–37)
MCV RBC AUTO: 94.9 FL (ref 80–100)
MONOCYTES # BLD: 5 % (ref 1–7)
PDW BLD-RTO: 13.4 % (ref 11.5–14.9)
PLATELET # BLD: 205 K/UL (ref 150–450)
PMV BLD AUTO: 9.4 FL (ref 6–12)
POTASSIUM SERPL-SCNC: 3.4 MMOL/L (ref 3.7–5.3)
RBC # BLD: 4.41 M/UL (ref 4–5.2)
SEG NEUTROPHILS: 65 % (ref 36–66)
SEGMENTED NEUTROPHILS ABSOLUTE COUNT: 5.3 K/UL (ref 1.3–9.1)
SODIUM BLD-SCNC: 137 MMOL/L (ref 135–144)
WBC # BLD: 8.2 K/UL (ref 3.5–11)

## 2022-09-19 PROCEDURE — 36415 COLL VENOUS BLD VENIPUNCTURE: CPT

## 2022-09-19 PROCEDURE — 93005 ELECTROCARDIOGRAM TRACING: CPT | Performed by: NURSE PRACTITIONER

## 2022-09-19 PROCEDURE — APPSS45 APP SPLIT SHARED TIME 31-45 MINUTES: Performed by: NURSE PRACTITIONER

## 2022-09-19 PROCEDURE — 85025 COMPLETE CBC W/AUTO DIFF WBC: CPT

## 2022-09-19 PROCEDURE — 80048 BASIC METABOLIC PNL TOTAL CA: CPT

## 2022-09-19 ASSESSMENT — ENCOUNTER SYMPTOMS
GASTROINTESTINAL NEGATIVE: 1
RESPIRATORY NEGATIVE: 1
SINUS PAIN: 0
EYES NEGATIVE: 1

## 2022-09-19 NOTE — DISCHARGE INSTRUCTIONS
Pre-op Instructions For Out-Patient Surgery    Medication Instructions:  Please stop herbs and any supplements now (includes vitamins and minerals). Please contact your surgeon and prescribing physician for pre-op instructions for any blood thinners. Stop Naproxen as directed    If you have inhalers/aerosol treatments at home, please use them the morning of your surgery and bring the inhalers with you to the hospital.    Please take the following medications the morning of your surgery with a sip of water:    None    Surgery Instructions:  After midnight before surgery:  Do not eat or drink anything, including water, mints, gum, and hard candy. You may brush your teeth without swallowing. No smoking, chewing tobacco, or street drugs. Please shower or bathe before surgery. If you were given Surgical Scrub Chlorhexidine Gluconate Liquid (CHG), please shower the night before and the morning of your surgery following the detailed instructions you received during your pre-admission visit. Please do not wear any cologne, lotion, powder, deodorant, jewelry, piercings, perfume, makeup, nail polish, hair accessories, or hair spray on the day of surgery. Wear loose comfortable clothing. Leave your valuables at home. Bring a storage case for any glasses/contacts. An adult who is responsible for you MUST drive you home and should be with you for the first 24 hours after surgery. If having out-patient knee and foot surgeries, please arrange for planned crutches, walker, or wheelchair before arriving to the hospital.    The Day of Surgery:  Arrive at [address] [place] [address] Surgery Entrance at the time directed by your surgeon and check in at the desk. If you have a living will or healthcare power of , please bring a copy. You will be taken to the pre-op holding area where you will be prepared for surgery.   A physical assessment will be performed by a nurse practitioner or house officer. Your IV will be started and you will meet your anesthesiologist.    When you go to surgery, your family will be directed to the surgical waiting room, where the doctor should speak with them after your surgery. After surgery, you will be taken to the recovery room then when you are awake and stable you will go to the short stay unit for preparation to be discharged. If you use a Bi-PAP or C-PAP machine, please bring it with you and leave it in the car in case it is needed in recovery room.

## 2022-09-20 LAB
EKG ATRIAL RATE: 74 BPM
EKG P AXIS: 52 DEGREES
EKG P-R INTERVAL: 132 MS
EKG Q-T INTERVAL: 386 MS
EKG QRS DURATION: 86 MS
EKG QTC CALCULATION (BAZETT): 428 MS
EKG R AXIS: 68 DEGREES
EKG T AXIS: 61 DEGREES
EKG VENTRICULAR RATE: 74 BPM

## 2022-09-20 PROCEDURE — 93010 ELECTROCARDIOGRAM REPORT: CPT | Performed by: INTERNAL MEDICINE

## 2022-09-27 ENCOUNTER — ANESTHESIA EVENT (OUTPATIENT)
Dept: OPERATING ROOM | Age: 47
End: 2022-09-27
Payer: COMMERCIAL

## 2022-09-28 ENCOUNTER — HOSPITAL ENCOUNTER (OUTPATIENT)
Age: 47
Setting detail: OUTPATIENT SURGERY
Discharge: HOME | End: 2022-09-28
Attending: SPECIALIST | Admitting: SPECIALIST
Payer: COMMERCIAL

## 2022-09-28 ENCOUNTER — ANESTHESIA (OUTPATIENT)
Dept: OPERATING ROOM | Age: 47
End: 2022-09-28
Payer: COMMERCIAL

## 2022-09-28 VITALS
DIASTOLIC BLOOD PRESSURE: 83 MMHG | HEIGHT: 63 IN | BODY MASS INDEX: 31.71 KG/M2 | SYSTOLIC BLOOD PRESSURE: 124 MMHG | HEART RATE: 75 BPM | OXYGEN SATURATION: 99 % | TEMPERATURE: 97.5 F | WEIGHT: 179 LBS | RESPIRATION RATE: 16 BRPM

## 2022-09-28 DIAGNOSIS — G89.18 POST-OP PAIN: Primary | ICD-10-CM

## 2022-09-28 DIAGNOSIS — N87.1 HIGH GRADE SQUAMOUS INTRAEPITHELIAL LESION (HGSIL), GRADE 2 CIN, ON BIOPSY OF CERVIX: ICD-10-CM

## 2022-09-28 LAB — HCG, PREGNANCY URINE (POC): NEGATIVE

## 2022-09-28 PROCEDURE — 88342 IMHCHEM/IMCYTCHM 1ST ANTB: CPT

## 2022-09-28 PROCEDURE — 7100000000 HC PACU RECOVERY - FIRST 15 MIN: Performed by: SPECIALIST

## 2022-09-28 PROCEDURE — 7100000001 HC PACU RECOVERY - ADDTL 15 MIN: Performed by: SPECIALIST

## 2022-09-28 PROCEDURE — 3600000012 HC SURGERY LEVEL 2 ADDTL 15MIN: Performed by: SPECIALIST

## 2022-09-28 PROCEDURE — 6360000002 HC RX W HCPCS

## 2022-09-28 PROCEDURE — 7100000010 HC PHASE II RECOVERY - FIRST 15 MIN: Performed by: SPECIALIST

## 2022-09-28 PROCEDURE — 2500000003 HC RX 250 WO HCPCS

## 2022-09-28 PROCEDURE — 2709999900 HC NON-CHARGEABLE SUPPLY: Performed by: SPECIALIST

## 2022-09-28 PROCEDURE — 7100000031 HC ASPR PHASE II RECOVERY - ADDTL 15 MIN: Performed by: SPECIALIST

## 2022-09-28 PROCEDURE — 3700000001 HC ADD 15 MINUTES (ANESTHESIA): Performed by: SPECIALIST

## 2022-09-28 PROCEDURE — 3700000000 HC ANESTHESIA ATTENDED CARE: Performed by: SPECIALIST

## 2022-09-28 PROCEDURE — 57522 CONIZATION OF CERVIX: CPT | Performed by: SPECIALIST

## 2022-09-28 PROCEDURE — 88307 TISSUE EXAM BY PATHOLOGIST: CPT

## 2022-09-28 PROCEDURE — 2580000003 HC RX 258: Performed by: ANESTHESIOLOGY

## 2022-09-28 PROCEDURE — 7100000030 HC ASPR PHASE II RECOVERY - FIRST 15 MIN: Performed by: SPECIALIST

## 2022-09-28 PROCEDURE — 3600000002 HC SURGERY LEVEL 2 BASE: Performed by: SPECIALIST

## 2022-09-28 PROCEDURE — 81025 URINE PREGNANCY TEST: CPT

## 2022-09-28 PROCEDURE — 7100000011 HC PHASE II RECOVERY - ADDTL 15 MIN: Performed by: SPECIALIST

## 2022-09-28 RX ORDER — ONDANSETRON 2 MG/ML
4 INJECTION INTRAMUSCULAR; INTRAVENOUS
Status: DISCONTINUED | OUTPATIENT
Start: 2022-09-28 | End: 2022-09-28 | Stop reason: HOSPADM

## 2022-09-28 RX ORDER — LABETALOL HYDROCHLORIDE 5 MG/ML
10 INJECTION, SOLUTION INTRAVENOUS
Status: DISCONTINUED | OUTPATIENT
Start: 2022-09-28 | End: 2022-09-28 | Stop reason: HOSPADM

## 2022-09-28 RX ORDER — PROPOFOL 10 MG/ML
INJECTION, EMULSION INTRAVENOUS PRN
Status: DISCONTINUED | OUTPATIENT
Start: 2022-09-28 | End: 2022-09-28 | Stop reason: SDUPTHER

## 2022-09-28 RX ORDER — FLUCONAZOLE 100 MG/1
100 TABLET ORAL DAILY
Qty: 1 TABLET | Refills: 0 | Status: SHIPPED | OUTPATIENT
Start: 2022-09-28 | End: 2022-09-29

## 2022-09-28 RX ORDER — LIDOCAINE HYDROCHLORIDE 10 MG/ML
1 INJECTION, SOLUTION EPIDURAL; INFILTRATION; INTRACAUDAL; PERINEURAL
Status: DISCONTINUED | OUTPATIENT
Start: 2022-09-28 | End: 2022-09-28 | Stop reason: HOSPADM

## 2022-09-28 RX ORDER — SODIUM CHLORIDE 0.9 % (FLUSH) 0.9 %
5-40 SYRINGE (ML) INJECTION EVERY 12 HOURS SCHEDULED
Status: DISCONTINUED | OUTPATIENT
Start: 2022-09-28 | End: 2022-09-28 | Stop reason: HOSPADM

## 2022-09-28 RX ORDER — ONDANSETRON 2 MG/ML
INJECTION INTRAMUSCULAR; INTRAVENOUS PRN
Status: DISCONTINUED | OUTPATIENT
Start: 2022-09-28 | End: 2022-09-28 | Stop reason: SDUPTHER

## 2022-09-28 RX ORDER — MIDAZOLAM HYDROCHLORIDE 1 MG/ML
INJECTION INTRAMUSCULAR; INTRAVENOUS PRN
Status: DISCONTINUED | OUTPATIENT
Start: 2022-09-28 | End: 2022-09-28 | Stop reason: SDUPTHER

## 2022-09-28 RX ORDER — FENTANYL CITRATE 50 UG/ML
25 INJECTION, SOLUTION INTRAMUSCULAR; INTRAVENOUS EVERY 5 MIN PRN
Status: DISCONTINUED | OUTPATIENT
Start: 2022-09-28 | End: 2022-09-28 | Stop reason: HOSPADM

## 2022-09-28 RX ORDER — IBUPROFEN 800 MG/1
800 TABLET ORAL EVERY 8 HOURS PRN
Qty: 60 TABLET | Refills: 0 | Status: SHIPPED | OUTPATIENT
Start: 2022-09-28 | End: 2022-10-27

## 2022-09-28 RX ORDER — HYDRALAZINE HYDROCHLORIDE 20 MG/ML
10 INJECTION INTRAMUSCULAR; INTRAVENOUS
Status: DISCONTINUED | OUTPATIENT
Start: 2022-09-28 | End: 2022-09-28 | Stop reason: HOSPADM

## 2022-09-28 RX ORDER — HYDROCODONE BITARTRATE AND ACETAMINOPHEN 5; 325 MG/1; MG/1
1 TABLET ORAL EVERY 6 HOURS PRN
Status: DISCONTINUED | OUTPATIENT
Start: 2022-09-28 | End: 2022-09-28 | Stop reason: HOSPADM

## 2022-09-28 RX ORDER — SODIUM CHLORIDE 0.9 % (FLUSH) 0.9 %
5-40 SYRINGE (ML) INJECTION PRN
Status: DISCONTINUED | OUTPATIENT
Start: 2022-09-28 | End: 2022-09-28 | Stop reason: HOSPADM

## 2022-09-28 RX ORDER — FENTANYL CITRATE 50 UG/ML
INJECTION, SOLUTION INTRAMUSCULAR; INTRAVENOUS PRN
Status: DISCONTINUED | OUTPATIENT
Start: 2022-09-28 | End: 2022-09-28 | Stop reason: SDUPTHER

## 2022-09-28 RX ORDER — SODIUM CHLORIDE 9 MG/ML
INJECTION, SOLUTION INTRAVENOUS PRN
Status: DISCONTINUED | OUTPATIENT
Start: 2022-09-28 | End: 2022-09-28 | Stop reason: HOSPADM

## 2022-09-28 RX ORDER — DIPHENHYDRAMINE HYDROCHLORIDE 50 MG/ML
12.5 INJECTION INTRAMUSCULAR; INTRAVENOUS
Status: DISCONTINUED | OUTPATIENT
Start: 2022-09-28 | End: 2022-09-28 | Stop reason: HOSPADM

## 2022-09-28 RX ORDER — LIDOCAINE HYDROCHLORIDE 10 MG/ML
INJECTION, SOLUTION EPIDURAL; INFILTRATION; INTRACAUDAL; PERINEURAL PRN
Status: DISCONTINUED | OUTPATIENT
Start: 2022-09-28 | End: 2022-09-28 | Stop reason: SDUPTHER

## 2022-09-28 RX ORDER — SODIUM CHLORIDE 9 MG/ML
25 INJECTION, SOLUTION INTRAVENOUS PRN
Status: DISCONTINUED | OUTPATIENT
Start: 2022-09-28 | End: 2022-09-28 | Stop reason: HOSPADM

## 2022-09-28 RX ORDER — METRONIDAZOLE 500 MG/1
500 TABLET ORAL 2 TIMES DAILY
Qty: 14 TABLET | Refills: 0 | Status: SHIPPED | OUTPATIENT
Start: 2022-09-28 | End: 2022-10-05

## 2022-09-28 RX ORDER — FERRIC SUBSULFATE 20-22G/100
SOLUTION, NON-ORAL MISCELLANEOUS PRN
Status: DISCONTINUED | OUTPATIENT
Start: 2022-09-28 | End: 2022-09-28 | Stop reason: ALTCHOICE

## 2022-09-28 RX ORDER — METOCLOPRAMIDE HYDROCHLORIDE 5 MG/ML
10 INJECTION INTRAMUSCULAR; INTRAVENOUS
Status: DISCONTINUED | OUTPATIENT
Start: 2022-09-28 | End: 2022-09-28 | Stop reason: HOSPADM

## 2022-09-28 RX ORDER — DEXAMETHASONE SODIUM PHOSPHATE 4 MG/ML
INJECTION, SOLUTION INTRA-ARTICULAR; INTRALESIONAL; INTRAMUSCULAR; INTRAVENOUS; SOFT TISSUE PRN
Status: DISCONTINUED | OUTPATIENT
Start: 2022-09-28 | End: 2022-09-28 | Stop reason: SDUPTHER

## 2022-09-28 RX ORDER — OXYCODONE HYDROCHLORIDE 5 MG/1
5 TABLET ORAL EVERY 6 HOURS PRN
Qty: 5 TABLET | Refills: 0 | Status: SHIPPED | OUTPATIENT
Start: 2022-09-28 | End: 2022-09-30

## 2022-09-28 RX ORDER — GLYCOPYRROLATE 0.2 MG/ML
INJECTION INTRAMUSCULAR; INTRAVENOUS PRN
Status: DISCONTINUED | OUTPATIENT
Start: 2022-09-28 | End: 2022-09-28 | Stop reason: SDUPTHER

## 2022-09-28 RX ORDER — SODIUM CHLORIDE, SODIUM LACTATE, POTASSIUM CHLORIDE, CALCIUM CHLORIDE 600; 310; 30; 20 MG/100ML; MG/100ML; MG/100ML; MG/100ML
INJECTION, SOLUTION INTRAVENOUS CONTINUOUS
Status: DISCONTINUED | OUTPATIENT
Start: 2022-09-28 | End: 2022-09-28 | Stop reason: HOSPADM

## 2022-09-28 RX ORDER — DOCUSATE SODIUM 100 MG/1
100 CAPSULE, LIQUID FILLED ORAL 2 TIMES DAILY
Qty: 60 CAPSULE | Refills: 0 | Status: SHIPPED | OUTPATIENT
Start: 2022-09-28 | End: 2022-10-27

## 2022-09-28 RX ADMIN — FENTANYL CITRATE 50 MCG: 50 INJECTION, SOLUTION INTRAMUSCULAR; INTRAVENOUS at 08:00

## 2022-09-28 RX ADMIN — ONDANSETRON 4 MG: 2 INJECTION INTRAMUSCULAR; INTRAVENOUS at 08:07

## 2022-09-28 RX ADMIN — GLYCOPYRROLATE 0.2 MG: 0.2 INJECTION, SOLUTION INTRAMUSCULAR; INTRAVENOUS at 08:13

## 2022-09-28 RX ADMIN — PROPOFOL 200 MG: 10 INJECTION, EMULSION INTRAVENOUS at 08:00

## 2022-09-28 RX ADMIN — MIDAZOLAM 2 MG: 1 INJECTION INTRAMUSCULAR; INTRAVENOUS at 07:52

## 2022-09-28 RX ADMIN — DEXAMETHASONE SODIUM PHOSPHATE 8 MG: 4 INJECTION, SOLUTION INTRAMUSCULAR; INTRAVENOUS at 08:07

## 2022-09-28 RX ADMIN — SODIUM CHLORIDE, POTASSIUM CHLORIDE, SODIUM LACTATE AND CALCIUM CHLORIDE: 600; 310; 30; 20 INJECTION, SOLUTION INTRAVENOUS at 07:18

## 2022-09-28 RX ADMIN — LIDOCAINE HYDROCHLORIDE 50 MG: 10 INJECTION, SOLUTION EPIDURAL; INFILTRATION; INTRACAUDAL; PERINEURAL at 08:00

## 2022-09-28 ASSESSMENT — PAIN DESCRIPTION - LOCATION: LOCATION: THROAT

## 2022-09-28 ASSESSMENT — PAIN - FUNCTIONAL ASSESSMENT: PAIN_FUNCTIONAL_ASSESSMENT: 0-10

## 2022-09-28 ASSESSMENT — LIFESTYLE VARIABLES: SMOKING_STATUS: 1

## 2022-09-28 ASSESSMENT — PAIN DESCRIPTION - DESCRIPTORS: DESCRIPTORS: SORE

## 2022-09-28 NOTE — OP NOTE
Operative Note  Department of Obstetrics and Gynecology  [facility]       Patient: Nelson Alanis   : 1975  MRN: [id number]       Acct: [de-identified]   PCP: BRICE Sue  Date of Procedure: 22    Pre-operative Diagnosis: 56 y.o. female   ANNE 2 on ECC  LSIL at noon  BMI 31    Post-operative Diagnosis: same    Procedure: LEEP    Surgeon: Dr. Faith Diaz  Assistants: Kristine Morales DO; PGY3    Anesthesia: general    Indications: Patient is a 56year old female with a history of ANNE 2 on ECC and LSIL at noon on her colposcopy. Patient met criteria for needing an excisional procedure. Procedure Details: The patient was seen in the pre-op room. The risks, benefits, complications, treatment options, and expected outcomes were discussed with the patient. The patient concurred with the proposed plan, giving informed consent. The patient was taken to the Operating Room and identified as Nelson Alanis and the procedure was verified. A Time Out was held and the above information confirmed. After administration of general anesthesia, the patient was placed in the dorsolithotomy position utilizing Yellow fin stirrups. The patient was prepped and draped in the usual sterile fashion. Insulated speculum and side wall retractor was placed into the vagina to visualize the cervix. Four stay sutures were placed on the left, right, anterior and posterior cervix. Using 15 x12 mm loop an approximately 10mm cone biopsy of the cervix with canal was obtained. Ball electrocautery was used for hemostasis at the site of the excision. Monsol's solution was then applied to the excision site for additional hemostasis. All instruments were then removed from the vagina. Instrument, sponge, and needle counts were correct at the conclusion of the case. SCDs for DVT prophylaxis remain in place for the post operative period. Dr. Faith Diaz was present for the entire operation.     Findings:  Normal appearing external genitalia. Normal appearing vaginal mucosa. Normal appearing cervix with no lesions or discharge. On bimanual exam midline anteverted mobile uterus, no adnexal fullness bilaterally. Survey of pelvis reveals no abdominal adhesions.   Estimated Blood Loss: 5 ml  Drains:  None  Total IV Fluids: 700 ml  Urine output: N/A  Specimens:  Cervical biopsy with stitch at noon  Instrument and Sponge Count: Correct  Complications: none  Condition: stable, transfer to post anesthesia recovery    Barrington Doll DO  Ob/Gyn Resident  9/28/2022, 7:46 AM

## 2022-09-28 NOTE — ANESTHESIA PRE PROCEDURE
Department of Anesthesiology  Preprocedure Note       Name:  Markus Bellamy   Age:  56 y.o.  :  1975                                          MRN:  [id number]         Date:  2022      Surgeon: Lonna Frankel):  Aidan Mayes MD    Procedure: Procedure(s):  LEEP    Medications prior to admission:   Prior to Admission medications    Medication Sig Start Date End Date Taking? Authorizing Provider   ibuprofen (ADVIL;MOTRIN) 800 MG tablet Take 1 tablet by mouth every 8 hours as needed for Pain or Fever 22  Yes Abilio Nugent DO   docusate sodium (COLACE) 100 MG capsule Take 1 capsule by mouth 2 times daily 9/28/22 10/28/22 Yes Jennifer Larsen DO   oxyCODONE (ROXICODONE) 5 MG immediate release tablet Take 1 tablet by mouth every 6 hours as needed for Pain for up to 5 doses. Intended supply: 3 days.  Take lowest dose possible to manage pain 22 Yes Jennifer Larsen DO   buPROPion (WELLBUTRIN XL) 150 MG extended release tablet Take 300 mg by mouth every morning    Historical Provider, MD   ondansetron (ZOFRAN) 4 MG tablet Take 1 tablet by mouth every 8 hours as needed for Nausea 6/10/22   Vimal Garcia DO   rOPINIRole (REQUIP) 5 MG tablet Take 7.5 mg by mouth nightly     Historical Provider, MD   hydroCHLOROthiazide (HYDRODIURIL) 25 MG tablet Take 25 mg by mouth daily    Historical Provider, MD   naproxen (NAPROSYN) 500 MG tablet Take 500 mg by mouth nightly     Historical Provider, MD       Current medications:    Current Facility-Administered Medications   Medication Dose Route Frequency Provider Last Rate Last Admin    lidocaine PF 1 % injection 1 mL  1 mL IntraDERmal Once PRN Juan Daniel Cobb MD        lactated ringers infusion   IntraVENous Continuous Juan Daniel Cobb  mL/hr at 22 0746 NoRateChange at 22 0746    sodium chloride flush 0.9 % injection 5-40 mL  5-40 mL IntraVENous 2 times per day Juan Daniel Cobb MD        sodium chloride flush 0.9 % injection 5-40 mL  5-40 mL IntraVENous PRN David Camarillo MD        0.9 % sodium chloride infusion   IntraVENous PRN David aCmarillo MD           Allergies: Allergies   Allergen Reactions    Bactrim [Sulfamethoxazole-Trimethoprim] Hives and Itching    Dicloxacillin Hives    Pcn [Penicillins] Hives       Problem List:    Patient Active Problem List   Diagnosis Code    Neuropathy G62.9    Anxiety F41.9    Restless leg syndrome G25.81    Abnormal uterine bleeding N93.9    Menorrhagia N92.0    Left genital labial abscess N76.4    Multifocal pneumonia J18.9    Pneumonia of both lower lobes due to Mycoplasma pneumoniae J15.7    Bladder Suspension Transobturator Tape 18 R32    S/p Bladder Suspension Transobturator Tape Revision 19 Z98.890    Urinary, incontinence, stress female N39.3    Postoperative pain G89.18    Anal abscess K61.0    Hypertension I10    Tobacco abuse Z72.0    Breast cancer screening by mammogram Z12.31    Acute vaginitis N76.0    ASCUS with positive high risk human papillomavirus of vagina R87.620, R87.811    Negative pregnancy test Z32.02    Dysplasia of cervix, high grade ANNE 2 N87.1    Preop examination Z01.818    LEEP 22 Z98.890       Past Medical History:        Diagnosis Date    Anxiety     Arthritis     OA    Depression     post partum    Diabetes, gestational     history of    Edema     Ground glass opacity present on imaging of lung     History of mycoplasma pneumonia     Hypertension     Light cigarette smoker (1-9 cigarettes per day)     Menarche @ 16 y/o    Neuropathy     CTS    Obesity     Obesity (BMI 30.0-34. 9)     Parity       -    Pneumonia 2018    several times this year, had pneumonia last 10-    Restless leg syndrome     Sleep apnea     doesn't use CPAP    UTI (lower urinary tract infection)     with pregnancy    Wears glasses        Past Surgical History:        Procedure Laterality Date    ANKLE SURGERY Right 1997    BLADDER SUSPENSION       SECTION      CHOLECYSTECTOMY  06    COLONOSCOPY      with poly removal    COLONOSCOPY N/A 2022    COLONOSCOPY WITH BIOPSY performed by Erik Ruffin MD at [address] [facility]  2014    ENDOMETRIAL ABLATION  14    with novasure    HYSTEROSCOPY  14    D&C    KNEE ARTHROSCOPY Left 2015    total x2 scopes.     KNEE SURGERY      right knee scoped on 17    MYRINGOTOMY Bilateral     with tubes    OTHER SURGICAL HISTORY  2016    removal multiple vulvar inclusion cysts    NE SLING OPER STRES INCONTINENCE N/A 2018    BLADDER SUSPENSION TRANSOBTURATOR TAPE AND CYSTO performed by José Antonio Yu MD at [address] [facility] Right 2021    PERIRECTAL ABSCESS INCISION AND DRAINAGE performed by Erik Ruffin MD at [address] N/A 2019    VAGINAL SLING DISSECTION OF VAGINAL MUCOSA WITH REINSERTION OF  TRANSOBTURATOR TAPE  CYSTOSCOPY performed by José Antonio Yu MD at [address] [place] History:    Social History     Tobacco Use    Smoking status: Every Day     Packs/day: 0.25     Years: 18.00     Pack years: 4.50     Types: Cigarettes     Start date:     Smokeless tobacco: Never   Substance Use Topics    Alcohol use: Yes     Comment: very rare                                Ready to quit: Not Answered  Counseling given: Not Answered      Vital Signs (Current):   Vitals:    22 0650   BP: 97/64   Pulse: 63   Resp: 16   Temp: 96.9 °F (36.1 °C)   TempSrc: Infrared   SpO2: 98%   Weight: 179 lb (81.2 kg)   Height: 5' 3\" (1.6 m)                                              BP Readings from Last 3 Encounters:   22 97/64   22 124/81   22 122/80       NPO Status: Time of last liquid consumption:                         Time of last solid consumption:                         Date of last liquid consumption: 09/27/22                        Date of last solid food consumption: 09/27/22    BMI:   Wt Readings from Last 3 Encounters:   09/28/22 179 lb (81.2 kg)   09/19/22 179 lb (81.2 kg)   08/08/22 184 lb (83.5 kg)     Body mass index is 31.71 kg/m². CBC:   Lab Results   Component Value Date/Time    WBC 8.2 09/19/2022 01:15 PM    RBC 4.41 09/19/2022 01:15 PM    HGB 14.2 09/19/2022 01:15 PM    HCT 41.9 09/19/2022 01:15 PM    MCV 94.9 09/19/2022 01:15 PM    RDW 13.4 09/19/2022 01:15 PM     09/19/2022 01:15 PM       CMP:   Lab Results   Component Value Date/Time     09/19/2022 01:15 PM    K 3.4 09/19/2022 01:15 PM     09/19/2022 01:15 PM    CO2 27 09/19/2022 01:15 PM    BUN 12 09/19/2022 01:15 PM    CREATININE 0.61 09/19/2022 01:15 PM    GFRAA >60 09/19/2022 01:15 PM    LABGLOM >60 09/19/2022 01:15 PM    GLUCOSE 134 09/19/2022 01:15 PM    PROT 6.7 06/10/2022 12:48 AM    CALCIUM 9.9 09/19/2022 01:15 PM    BILITOT 0.22 06/10/2022 12:48 AM    ALKPHOS 92 06/10/2022 12:48 AM    AST 20 06/10/2022 12:48 AM    ALT 20 06/10/2022 12:48 AM       POC Tests: No results for input(s): POCGLU, POCNA, POCK, POCCL, POCBUN, POCHEMO, POCHCT in the last 72 hours.     Coags:   Lab Results   Component Value Date/Time    PROTIME 12.9 08/30/2021 06:26 AM    INR 1.0 08/30/2021 06:26 AM    APTT 33.4 08/29/2021 10:21 PM       HCG (If Applicable):   Lab Results   Component Value Date    PREGTESTUR negative 08/02/2022    HCG NEGATIVE 08/08/2022    HCGQUANT <1 12/19/2018        ABGs: No results found for: PHART, PO2ART, BMW8JQE, ILE4NUD, BEART, G3ZLJNMD     Type & Screen (If Applicable):  No results found for: LABABO, LABRH    Drug/Infectious Status (If Applicable):  No results found for: HIV, HEPCAB    COVID-19 Screening (If Applicable):   Lab Results   Component Value Date/Time    COVID19 DETECTED 10/02/2021 08:15 PM           Anesthesia Evaluation  Patient summary reviewed and Nursing notes reviewed no history of

## 2022-09-28 NOTE — PROGRESS NOTES
CLINICAL PHARMACY NOTE: MEDS TO BEDS    Total # of Prescriptions Filled: 5   The following medications were delivered to the patient:  Metronidazole 500mg  Oxycodone 5mg  Fluconazole 100mg  Docusate 100mg  Ibuprofen 800mg    Additional Documentation:  Picked up in pharmacy

## 2022-09-28 NOTE — INTERVAL H&P NOTE
Update History & Physical     The patient's History and Physical of 9/19/2022 was reviewed with the patient. I personally examined the patient, I concur with above findings, there was no change. Physical exam was completed today and unchanged from source note except:    Alert and oriented x 4. Heart rhythm and rate remains regular and normal, all lung fields CTA as noted per source H&P. Abdomen soft, non-tender. Trace non pitting LE edema. Patient states they have been NPO since before midnight. Medications taken TODAY (w/sip of water): none   Patient denies taking any anti-coagulants, including aspirin currently. Last had naproxen last evening. Patient states she was advised she is allowed to take this as she has significant bilateral knee pain. No personal or family hx of complications w/anesthesia. Last smoked cigarettes just prior to midnight last night. Denies personal hx of MRSA. Denies personal hx of blood clots. Denies current CP, palpitations, dizziness, SOB, URI s/s, GI issues, fever/chills. Review current vital signs per RN flow sheet. (Notation: Medications listed are not currently reconciled at the signing of this H&P note, to be reconciled in pre-op per RN)    Most recent lab work reviewed:  Lab Results   Component Value Date     09/19/2022    K 3.4 (L) 09/19/2022     09/19/2022    CO2 27 09/19/2022    BUN 12 09/19/2022    CREATININE 0.61 09/19/2022    GLUCOSE 134 (H) 09/19/2022    CALCIUM 9.9 09/19/2022    PROT 6.7 06/10/2022    LABALBU 4.1 06/10/2022    BILITOT 0.22 (L) 06/10/2022    ALKPHOS 92 06/10/2022    AST 20 06/10/2022    ALT 20 06/10/2022    LABGLOM >60 09/19/2022    GFRAA >60 09/19/2022    GLOB NOT REPORTED 04/29/2021       Lab Results   Component Value Date    WBC 8.2 09/19/2022    HGB 14.2 09/19/2022    HCT 41.9 09/19/2022    MCV 94.9 09/19/2022     09/19/2022       Surgical site was confirmed per myself and the patient.   Electronically signed by

## 2022-09-28 NOTE — DISCHARGE INSTRUCTIONS
DISCHARGE INSTRUCTIONS FOR GENERAL ANESTHESIA    In order to continue your care at home, please follow the instructions below. Anesthesia - Do not drink any alcoholic beverages or make any legal or important decisions for 24 hours. Do not drive or operate any machinery for 24 hours or as restricted by your surgeon. Pain Medications - Please do not drive, operate machinery, or drink alcoholic beverages while taking any prescribed pain medication. Diet -  Start out eating lightly (broth, soup, crackers, toast, etc.) advancing as tolerated to your usual diet. Try to avoid spicy and greasy/fatty foods for 24 hours. Drink plenty of fluids after surgery, unless you are on a fluid restriction. Avoid milk/milk product for several hours.

## 2022-09-28 NOTE — ANESTHESIA POSTPROCEDURE EVALUATION
Department of Anesthesiology  Postprocedure Note    Patient: Brantley Simmonds  MRN: [id number]  YOB: 1975  Date of evaluation: 9/28/2022      Procedure Summary     Date: 09/28/22 Room / Location: [facility] [address] / [facility]: ALIA SNELL    Anesthesia Start: 5718 Anesthesia Stop: 4950    Procedure: LEEP (Cervix) Diagnosis:       High grade squamous intraepithelial lesion (HGSIL), grade 2 ANNE, on biopsy of cervix      (High grade squamous intraepithelial lesion (HGSIL), grade 2 ANNE, on biopsy of cervix [N87.1])    Surgeons: Altagracia Marrufo MD Responsible Provider: Cass Martin MD    Anesthesia Type: general ASA Status: 2          Anesthesia Type: No value filed.     Mila Phase I: Mila Score: 9    Mila Phase II: Mila Score: 10      Anesthesia Post Evaluation    Comments: POST- ANESTHESIA EVALUATION       Pt Name: Brantley Simmonds  MRN: [id number]  YOB: 1975  Date of evaluation: 9/28/2022  Time:  11:24 AM      /83   Pulse 75   Temp 97.5 °F (36.4 °C) (Temporal)   Resp 16   Ht 5' 3\" (1.6 m)   Wt 179 lb (81.2 kg)   SpO2 99%   BMI 31.71 kg/m²      Consciousness Level  Awake  Cardiopulmonary Status  Stable  Pain Adequately Treated YES  Nausea / Vomiting  NO  Adequate Hydration  YES  Anesthesia Related Complications NONE      Electronically signed by Cass Martin MD on 9/28/2022 at 11:24 AM

## 2022-09-30 LAB — SURGICAL PATHOLOGY REPORT: NORMAL

## 2022-10-03 ENCOUNTER — APPOINTMENT (OUTPATIENT)
Dept: CT IMAGING | Age: 47
End: 2022-10-03
Payer: COMMERCIAL

## 2022-10-03 ENCOUNTER — HOSPITAL ENCOUNTER (EMERGENCY)
Age: 47
Discharge: HOME | End: 2022-10-03
Attending: EMERGENCY MEDICINE
Payer: COMMERCIAL

## 2022-10-03 VITALS
OXYGEN SATURATION: 99 % | BODY MASS INDEX: 31.71 KG/M2 | WEIGHT: 179 LBS | TEMPERATURE: 98.3 F | DIASTOLIC BLOOD PRESSURE: 87 MMHG | SYSTOLIC BLOOD PRESSURE: 141 MMHG | HEIGHT: 63 IN | RESPIRATION RATE: 18 BRPM | HEART RATE: 87 BPM

## 2022-10-03 DIAGNOSIS — N30.00 ACUTE CYSTITIS WITHOUT HEMATURIA: ICD-10-CM

## 2022-10-03 DIAGNOSIS — K59.00 CONSTIPATION, UNSPECIFIED CONSTIPATION TYPE: Primary | ICD-10-CM

## 2022-10-03 LAB
ABSOLUTE EOS #: 0.2 K/UL (ref 0–0.4)
ABSOLUTE LYMPH #: 2.2 K/UL (ref 1–4.8)
ABSOLUTE MONO #: 0.3 K/UL (ref 0.1–1.3)
ANION GAP SERPL CALCULATED.3IONS-SCNC: 11 MMOL/L (ref 9–17)
BACTERIA: ABNORMAL
BASOPHILS # BLD: 1 % (ref 0–2)
BASOPHILS ABSOLUTE: 0.1 K/UL (ref 0–0.2)
BILIRUBIN URINE: NEGATIVE
BUN BLDV-MCNC: 11 MG/DL (ref 6–20)
CALCIUM SERPL-MCNC: 10 MG/DL (ref 8.6–10.4)
CASTS UA: ABNORMAL /LPF
CHLORIDE BLD-SCNC: 100 MMOL/L (ref 98–107)
CO2: 25 MMOL/L (ref 20–31)
COLOR: YELLOW
CREAT SERPL-MCNC: 0.72 MG/DL (ref 0.5–0.9)
EOSINOPHILS RELATIVE PERCENT: 1 % (ref 0–4)
EPITHELIAL CELLS UA: ABNORMAL /HPF
GFR AFRICAN AMERICAN: >60 ML/MIN
GFR NON-AFRICAN AMERICAN: >60 ML/MIN
GFR SERPL CREATININE-BSD FRML MDRD: ABNORMAL ML/MIN/{1.73_M2}
GLUCOSE BLD-MCNC: 139 MG/DL (ref 70–99)
GLUCOSE URINE: NEGATIVE
HCT VFR BLD CALC: 40.6 % (ref 36–46)
HEMOGLOBIN: 14.3 G/DL (ref 12–16)
KETONES, URINE: ABNORMAL
LEUKOCYTE ESTERASE, URINE: ABNORMAL
LYMPHOCYTES # BLD: 18 % (ref 24–44)
MCH RBC QN AUTO: 33.1 PG (ref 26–34)
MCHC RBC AUTO-ENTMCNC: 35.2 G/DL (ref 31–37)
MCV RBC AUTO: 94.2 FL (ref 80–100)
MONOCYTES # BLD: 3 % (ref 1–7)
NITRITE, URINE: NEGATIVE
PDW BLD-RTO: 13.6 % (ref 11.5–14.9)
PH UA: 5.5 (ref 5–8)
PLATELET # BLD: 211 K/UL (ref 150–450)
PMV BLD AUTO: 8.8 FL (ref 6–12)
POTASSIUM SERPL-SCNC: 3.7 MMOL/L (ref 3.7–5.3)
PROTEIN UA: ABNORMAL
RBC # BLD: 4.32 M/UL (ref 4–5.2)
RBC UA: ABNORMAL /HPF
SEG NEUTROPHILS: 77 % (ref 36–66)
SEGMENTED NEUTROPHILS ABSOLUTE COUNT: 9.3 K/UL (ref 1.3–9.1)
SODIUM BLD-SCNC: 136 MMOL/L (ref 135–144)
SPECIFIC GRAVITY UA: 1.02 (ref 1–1.03)
TURBIDITY: ABNORMAL
URINE HGB: ABNORMAL
UROBILINOGEN, URINE: NORMAL
WBC # BLD: 12 K/UL (ref 3.5–11)
WBC UA: ABNORMAL /HPF

## 2022-10-03 PROCEDURE — 96374 THER/PROPH/DIAG INJ IV PUSH: CPT

## 2022-10-03 PROCEDURE — 85025 COMPLETE CBC W/AUTO DIFF WBC: CPT

## 2022-10-03 PROCEDURE — 96375 TX/PRO/DX INJ NEW DRUG ADDON: CPT

## 2022-10-03 PROCEDURE — 6370000000 HC RX 637 (ALT 250 FOR IP): Performed by: EMERGENCY MEDICINE

## 2022-10-03 PROCEDURE — 72193 CT PELVIS W/DYE: CPT

## 2022-10-03 PROCEDURE — 99285 EMERGENCY DEPT VISIT HI MDM: CPT

## 2022-10-03 PROCEDURE — 6360000004 HC RX CONTRAST MEDICATION: Performed by: EMERGENCY MEDICINE

## 2022-10-03 PROCEDURE — 2580000003 HC RX 258: Performed by: EMERGENCY MEDICINE

## 2022-10-03 PROCEDURE — 6360000002 HC RX W HCPCS: Performed by: EMERGENCY MEDICINE

## 2022-10-03 PROCEDURE — 36415 COLL VENOUS BLD VENIPUNCTURE: CPT

## 2022-10-03 PROCEDURE — 80048 BASIC METABOLIC PNL TOTAL CA: CPT

## 2022-10-03 PROCEDURE — 81001 URINALYSIS AUTO W/SCOPE: CPT

## 2022-10-03 RX ORDER — 0.9 % SODIUM CHLORIDE 0.9 %
100 INTRAVENOUS SOLUTION INTRAVENOUS ONCE
Status: COMPLETED | OUTPATIENT
Start: 2022-10-03 | End: 2022-10-03

## 2022-10-03 RX ORDER — KETOROLAC TROMETHAMINE 30 MG/ML
15 INJECTION, SOLUTION INTRAMUSCULAR; INTRAVENOUS ONCE
Status: COMPLETED | OUTPATIENT
Start: 2022-10-03 | End: 2022-10-03

## 2022-10-03 RX ORDER — NITROFURANTOIN 25; 75 MG/1; MG/1
100 CAPSULE ORAL ONCE
Status: COMPLETED | OUTPATIENT
Start: 2022-10-03 | End: 2022-10-03

## 2022-10-03 RX ORDER — ONDANSETRON 2 MG/ML
4 INJECTION INTRAMUSCULAR; INTRAVENOUS ONCE
Status: COMPLETED | OUTPATIENT
Start: 2022-10-03 | End: 2022-10-03

## 2022-10-03 RX ORDER — NITROFURANTOIN 25; 75 MG/1; MG/1
100 CAPSULE ORAL 2 TIMES DAILY
Qty: 10 CAPSULE | Refills: 0 | Status: SHIPPED | OUTPATIENT
Start: 2022-10-03 | End: 2022-10-08

## 2022-10-03 RX ORDER — SODIUM CHLORIDE 0.9 % (FLUSH) 0.9 %
10 SYRINGE (ML) INJECTION PRN
Status: COMPLETED | OUTPATIENT
Start: 2022-10-03 | End: 2022-10-03

## 2022-10-03 RX ADMIN — NITROFURANTOIN MONOHYDRATE/MACROCRYSTALLINE 100 MG: 25; 75 CAPSULE ORAL at 05:40

## 2022-10-03 RX ADMIN — SODIUM CHLORIDE 100 ML: 9 INJECTION, SOLUTION INTRAVENOUS at 04:09

## 2022-10-03 RX ADMIN — KETOROLAC TROMETHAMINE 15 MG: 30 INJECTION, SOLUTION INTRAMUSCULAR at 03:03

## 2022-10-03 RX ADMIN — SODIUM CHLORIDE, PRESERVATIVE FREE 10 ML: 5 INJECTION INTRAVENOUS at 04:09

## 2022-10-03 RX ADMIN — ONDANSETRON 4 MG: 2 INJECTION INTRAMUSCULAR; INTRAVENOUS at 04:20

## 2022-10-03 RX ADMIN — IOPAMIDOL 75 ML: 755 INJECTION, SOLUTION INTRAVENOUS at 04:08

## 2022-10-03 ASSESSMENT — PAIN SCALES - GENERAL
PAINLEVEL_OUTOF10: 6
PAINLEVEL_OUTOF10: 3

## 2022-10-03 ASSESSMENT — ENCOUNTER SYMPTOMS
VOMITING: 1
NAUSEA: 1
ABDOMINAL PAIN: 1
SHORTNESS OF BREATH: 0

## 2022-10-03 ASSESSMENT — PAIN DESCRIPTION - LOCATION
LOCATION: ABDOMEN
LOCATION: ABDOMEN

## 2022-10-03 ASSESSMENT — PAIN DESCRIPTION - ORIENTATION: ORIENTATION: LOWER

## 2022-10-03 NOTE — ED NOTES
Mode of arrival (squad #, walk in, police, etc) : walk in         Chief complaint(s): vomiting, abdominal pain        Arrival Note (brief scenario, treatment PTA, etc). : pt reports she had a leep procedure last Wednesday Dr. Vitaly Monet for abnormal cells found during pap for hpv/cancer. Pt states shes had normal brownish discharge with wiping and intermittent mild cramps after procedure. Yesterday she started having mid abdominal pain that is pressure with a constant cramp feeling that is new with a yellowish stringy discharge. Pt reports vomiting x4 times since 1 am. Pt states urinating makes the pain worse in middle of abdomen. Pt is currently taking flagyl post procedure. Pt denies fevers or chills. C= \"Have you ever felt that you should Cut down on your drinking? \"  No  A= \"Have people Annoyed you by criticizing your drinking? \"  No  G= \"Have you ever felt bad or Guilty about your drinking? \"  No  E= \"Have you ever had a drink as an Eye-opener first thing in the morning to steady your nerves or to help a hangover? \"  No      Deferred []      Reason for deferring: N/A    *If yes to two or more: probable alcohol abuse. Matha Gottron, RN  10/03/22 9777

## 2022-10-03 NOTE — ED PROVIDER NOTES
[address] [facility]  Emergency Medicine Department    Pt Name: Dangelo Parker  MRN: [id number]  [place] 1975  Date of evaluation: 10/3/2022  Provider: Carey Emerson MD    CHIEF COMPLAINT     Chief Complaint   Patient presents with    Abdominal Pain    Emesis       HISTORY OF PRESENT ILLNESS  (Location/Symptom, Timing/Onset, Context/Setting,Quality, Duration, Modifying Factors, Severity.)   Dangelo Parker is a 56 y.o. female who presents to the emergency department complaining of lower abdominal pain as well as nausea and vomiting. She had a LEEP procedure this past Wednesday. She developed the symptoms within the last 24 hours. She rates her pain is a 3 out of 10. She has been taking Tylenol and Motrin without significant relief. She was prescribed stronger pain medication but is refusing to take it as she states she does not want to take narcotics. She denies fevers. She has noticed some vaginal discharge but no vaginal bleeding. Nursing Notes were reviewed.     ALLERGIES     Bactrim [sulfamethoxazole-trimethoprim], Dicloxacillin, and Pcn [penicillins]    CURRENT MEDICATIONS       Discharge Medication List as of 10/3/2022  5:36 AM        CONTINUE these medications which have NOT CHANGED    Details   ibuprofen (ADVIL;MOTRIN) 800 MG tablet Take 1 tablet by mouth every 8 hours as needed for Pain or Fever, Disp-60 tablet, R-0Normal      docusate sodium (COLACE) 100 MG capsule Take 1 capsule by mouth 2 times daily, Disp-60 capsule, R-0Normal      metroNIDAZOLE (FLAGYL) 500 MG tablet Take 1 tablet by mouth 2 times daily for 7 days, Disp-14 tablet, R-0Normal      buPROPion (WELLBUTRIN XL) 150 MG extended release tablet Take 300 mg by mouth every morningHistorical Med      ondansetron (ZOFRAN) 4 MG tablet Take 1 tablet by mouth every 8 hours as needed for Nausea, Disp-20 tablet, R-0Print      rOPINIRole (REQUIP) 5 MG tablet Take 7.5 mg by mouth nightly Historical Med      hydroCHLOROthiazide (HYDRODIURIL) 25 MG tablet Take 25 mg by mouth dailyHistorical Med      naproxen (NAPROSYN) 500 MG tablet Take 500 mg by mouth nightly Historical Med             PAST MEDICAL HISTORY         Diagnosis Date    Anxiety     Arthritis     OA    Depression     post partum    Diabetes, gestational     history of    Edema     Ground glass opacity present on imaging of lung     History of mycoplasma pneumonia     Hypertension     Light cigarette smoker (1-9 cigarettes per day)     Menarche @ 16 y/o    Neuropathy     CTS    Obesity     Obesity (BMI 30.0-34. 9)     Parity       -    Pneumonia 2018    several times this year, had pneumonia last 10-    Restless leg syndrome     Sleep apnea     doesn't use CPAP    Status post LEEP (loop electrosurgical excision procedure) of cervix 2022    UTI (lower urinary tract infection)     with pregnancy    Wears glasses        SURGICAL HISTORY           Procedure Laterality Date    ANKLE SURGERY Right     BLADDER SUSPENSION       SECTION      CHOLECYSTECTOMY  06    COLONOSCOPY      with poly removal    COLONOSCOPY N/A 2022    COLONOSCOPY WITH BIOPSY performed by Farhan Lu MD at [address] [place] [address]  2014    ENDOMETRIAL ABLATION  15    with novasure    HYSTEROSCOPY  14    D&C    KNEE ARTHROSCOPY Left 2015    total x2 scopes.     KNEE SURGERY      right knee scoped on 17    LEEP N/A 2022    LEEP performed by Justus Hernandez MD at [facility] Bilateral     with tubes    OTHER SURGICAL HISTORY  2016    removal multiple vulvar inclusion cysts    NV SLING OPER STRES INCONTINENCE N/A 2018    BLADDER SUSPENSION TRANSOBTURATOR TAPE AND CYSTO performed by Justus Hernandez MD at [address] [place] [address] Right 2021    PERIRECTAL ABSCESS INCISION AND DRAINAGE performed by Farhan Lu MD at [place] [address] SURGERY N/A 1/18/2019    VAGINAL SLING DISSECTION OF VAGINAL MUCOSA WITH REINSERTION OF  TRANSOBTURATOR TAPE  CYSTOSCOPY performed by Regulo Cruz MD at [address] [facility] [address]           Problem Relation Age of Onset    Heart Disease Paternal Grandfather     Diabetes Father     Hypertension Father     Stroke Father     Hypertension Mother     Mental Illness Mother     Breast Cancer Maternal Aunt 36     Family Status   Relation Name Status    PGF  (Not Specified)    Father  (Not Specified)    Mother  (Not Specified)    MAunt  (Not Specified)        SOCIAL HISTORY      reports that she has been smoking cigarettes. She started smoking about 31 years ago. She has a 4.50 pack-year smoking history. She has never used smokeless tobacco. She reports current alcohol use. She reports current drug use. Drug: Marijuana Kenard Snooks). REVIEW OF SYSTEMS    (2-9 systems for level 4, 10 or more for level 5)     Review of Systems   Constitutional:  Negative for chills and fever. Respiratory:  Negative for shortness of breath. Cardiovascular:  Negative for chest pain. Gastrointestinal:  Positive for abdominal pain, nausea and vomiting. Genitourinary:  Positive for vaginal discharge. Negative for dysuria and vaginal bleeding. Allergic/Immunologic: Negative for immunocompromised state. PHYSICAL EXAM    (up to 7 for level 4, 8 or more for level 5)     ED Triage Vitals [10/03/22 0231]   BP Temp Temp Source Heart Rate Resp SpO2 Height Weight   (!) 143/91 98.3 °F (36.8 °C) Oral 96 18 98 % 5' 3\" (1.6 m) 179 lb (81.2 kg)       Physical Exam  Vitals and nursing note reviewed. Constitutional:       General: She is not in acute distress. Appearance: She is well-developed. HENT:      Head: Normocephalic and atraumatic. Eyes:      Extraocular Movements: Extraocular movements intact. Cardiovascular:      Rate and Rhythm: Normal rate and regular rhythm. Heart sounds: Normal heart sounds.    Pulmonary: Effort: Pulmonary effort is normal. No respiratory distress. Breath sounds: Normal breath sounds. Abdominal:      Palpations: Abdomen is soft. Tenderness: There is abdominal tenderness in the suprapubic area. There is no guarding. Musculoskeletal:         General: No tenderness or deformity. Normal range of motion. Cervical back: Normal range of motion and neck supple. Lymphadenopathy:      Cervical: No cervical adenopathy. Skin:     General: Skin is warm and dry. Neurological:      Mental Status: She is alert and oriented to person, place, and time. Psychiatric:         Behavior: Behavior normal.         Thought Content: Thought content normal.         Judgment: Judgment normal.       DIAGNOSTIC RESULTS     RADIOLOGY:   Non-plain film images such as CT, Ultrasound and MRI are read by theradiologist. Plain radiographic images are visualized and preliminarily interpreted by the emergency physician with the below findings:    Interpretation per the Radiologist below, if available at the time of this note:    CT PELVIS W CONTRAST Additional Contrast? None   Preliminary Result   No obvious diagnostic finding the uterus, including cervix of uterus or in   vagina. No evidence of focal hemorrhage or hematoma or inflammatory process   within or around vagina,  in the cervix of the uterus or in the rest of the   uterus. Evidence of Essure devices in bilateral fallopian tubes. Normal appendix in pelvic position. Moderate to large amount of stool in the visualized proximal and distal colon. No evidence of any free fluid or inflammatory process in the pelvic   cul-de-sac or in the rest of the pelvic cavity.              ED BEDSIDE ULTRASOUND:   Performed by ED Physician - none    LABS:  Labs Reviewed   CBC WITH AUTO DIFFERENTIAL - Abnormal; Notable for the following components:       Result Value    WBC 12.0 (*)     Seg Neutrophils 77 (*)     Lymphocytes 18 (*)     Segs Absolute 9.30 (*)     All other components within normal limits   BASIC METABOLIC PANEL - Abnormal; Notable for the following components:    Glucose 139 (*)     All other components within normal limits   URINALYSIS WITH MICROSCOPIC - Abnormal; Notable for the following components:    Turbidity UA Cloudy (*)     Ketones, Urine TRACE (*)     Urine Hgb MOD (*)     Protein, UA TRACE (*)     Leukocyte Esterase, Urine MOD (*)     Bacteria, UA MODERATE (*)     All other components within normal limits       All other labs were within normal range or not returned as of this dictation. EMERGENCYDEPARTMENT COURSE and DIFFERENTIAL DIAGNOSIS/MDM:   Vitals:    Vitals:    10/03/22 0231 10/03/22 0416   BP: (!) 143/91 (!) 141/87   Pulse: 96 87   Resp: 18 18   Temp: 98.3 °F (36.8 °C)    TempSrc: Oral    SpO2: 98% 99%   Weight: 179 lb (81.2 kg)    Height: 5' 3\" (1.6 m)      16-year-old female presenting status post LEEP procedure about 4 to 5 days ago now with lower abdominal pain with nausea and vomiting. Discussed with the patient's gynecological surgeon, Dr. Calixto woodward who agrees with obtaining a CT scan of the pelvis to evaluate for any acute pathology. We will treat with a dose of Toradol and Zofran and await imaging results. Labs reveal evidence of possible UTI. CT scan reveals only evidence of constipation. Will give recommendations for treatment of constipation and will also treat with a short course of Macrobid. She verbalized understanding of these recommendations. Encouraged to follow-up with her OB/GYN as an outpatient. CONSULTS:  IP CONSULT TO OB GYN    PROCEDURES:  None indicated    FINAL IMPRESSION     1. Constipation, unspecified constipation type    2.  Acute cystitis without hematuria          DISPOSITION/PLAN   DISPOSITION Decision To Discharge 10/03/2022 05:32:51 AM    PATIENT REFERRED TO:   Kaylen Sesay MD  [address]  [address] [place] [address] [place] [address]  [phone]    Schedule an appointment as soon as possible for a visit in 1 week  For Follow up  DISCHARGE MEDICATIONS:     Discharge Medication List as of 10/3/2022  5:36 AM        START taking these medications    Details   nitrofurantoin, macrocrystal-monohydrate, (MACROBID) 100 MG capsule Take 1 capsule by mouth 2 times daily for 5 days, Disp-10 capsule, R-0Normal           (Please note that portions of this note were completed with a voice recognition program.  Efforts were made to edit the dictations butoccasionally words are mis-transcribed.)    Virgene Holstein, MD  Attending Emergency Physician          Virgene Holstein, MD  10/03/22 0784

## 2022-10-06 ENCOUNTER — HOSPITAL ENCOUNTER (OUTPATIENT)
Dept: NEUROLOGY | Age: 47
Discharge: HOME | End: 2022-10-06
Payer: COMMERCIAL

## 2022-10-06 DIAGNOSIS — M25.531 BILATERAL WRIST PAIN: ICD-10-CM

## 2022-10-06 DIAGNOSIS — M25.532 BILATERAL WRIST PAIN: ICD-10-CM

## 2022-10-06 PROCEDURE — 95912 NRV CNDJ TEST 11-12 STUDIES: CPT | Performed by: PHYSICAL MEDICINE & REHABILITATION

## 2022-10-06 PROCEDURE — 95886 MUSC TEST DONE W/N TEST COMP: CPT | Performed by: PHYSICAL MEDICINE & REHABILITATION

## 2022-10-11 ENCOUNTER — TELEPHONE (OUTPATIENT)
Dept: ORTHOPEDIC SURGERY | Age: 47
End: 2022-10-11

## 2022-10-11 NOTE — TELEPHONE ENCOUNTER
----- Message from Pam Coleman MD sent at 10/11/2022  4:07 PM EDT -----  Patient is shows evidence for bilateral carpal tunnel syndrome, try nocturnal splints first and then consider surgical intervention if failed  ----- Message -----  From: Yordan Metz MA  Sent: 10/11/2022   3:51 PM EDT  To:  Pam Coleman MD

## 2022-10-11 NOTE — TELEPHONE ENCOUNTER
Called pt with EMG results and she stated that she has tried night time splinting but that causes her pain and she would like to proceed with scheduling sx. Dr. Morgan Hylton, are you ok with with sx or do you advise another course of treatment since splinting has not helped pt?

## 2022-10-12 ENCOUNTER — OFFICE VISIT (OUTPATIENT)
Dept: OBGYN CLINIC | Age: 47
End: 2022-10-12

## 2022-10-12 VITALS
DIASTOLIC BLOOD PRESSURE: 84 MMHG | HEART RATE: 92 BPM | SYSTOLIC BLOOD PRESSURE: 128 MMHG | BODY MASS INDEX: 30.93 KG/M2 | WEIGHT: 174.6 LBS

## 2022-10-12 DIAGNOSIS — N87.1 DYSPLASIA OF CERVIX, HIGH GRADE CIN 2: Primary | ICD-10-CM

## 2022-10-12 DIAGNOSIS — N76.0 ACUTE VAGINITIS: ICD-10-CM

## 2022-10-12 DIAGNOSIS — Z09 POSTOPERATIVE EXAMINATION: ICD-10-CM

## 2022-10-12 PROCEDURE — 99024 POSTOP FOLLOW-UP VISIT: CPT | Performed by: SPECIALIST

## 2022-10-12 RX ORDER — METRONIDAZOLE 500 MG/1
500 TABLET ORAL 2 TIMES DAILY
Qty: 14 TABLET | Refills: 0 | Status: SHIPPED | OUTPATIENT
Start: 2022-10-12 | End: 2022-10-19

## 2022-10-12 RX ORDER — GABAPENTIN 300 MG/1
1 CAPSULE ORAL NIGHTLY
COMMUNITY
Start: 2022-10-10

## 2022-10-12 RX ORDER — GABAPENTIN 100 MG/1
1 CAPSULE ORAL 2 TIMES DAILY
COMMUNITY
Start: 2022-10-10

## 2022-10-12 ASSESSMENT — ENCOUNTER SYMPTOMS
ABDOMINAL DISTENTION: 0
COUGH: 0
ABDOMINAL PAIN: 0
CONSTIPATION: 0
DIARRHEA: 0
NAUSEA: 0
APNEA: 0
EYE PAIN: 0
VOMITING: 0

## 2022-10-12 NOTE — PROGRESS NOTES
Subjective:      Patient ID: Lottie Swan is a 56 y.o. female. Chief Complaint   Patient presents with    Post-Op Check     2 week post op LEEP     /84 (Site: Left Upper Arm, Position: Sitting)   Pulse 92   Wt 174 lb 9.6 oz (79.2 kg)   BMI 30.93 kg/m²   No LMP recorded. Patient has had an ablation. [id number]    Past Medical History:   Diagnosis Date    Anxiety     Arthritis     OA    Depression     post partum    Diabetes, gestational     history of    Edema     Ground glass opacity present on imaging of lung     History of mycoplasma pneumonia     Hypertension     Light cigarette smoker (1-9 cigarettes per day)     Menarche @ 16 y/o    Neuropathy     CTS    Obesity     Obesity (BMI 30.0-34. 9)     Parity       -    Pneumonia 2018    several times this year, had pneumonia last 10-    Restless leg syndrome     Sleep apnea     doesn't use CPAP    Status post LEEP (loop electrosurgical excision procedure) of cervix 2022    UTI (lower urinary tract infection)     with pregnancy    Wears glasses      Current Outpatient Medications Ordered in Epic   Medication Sig Dispense Refill    metroNIDAZOLE (FLAGYL) 500 MG tablet Take 1 tablet by mouth in the morning and 1 tablet in the evening. Do all this for 7 days. 14 tablet 0    buPROPion (WELLBUTRIN XL) 150 MG extended release tablet Take 300 mg by mouth every morning      rOPINIRole (REQUIP) 5 MG tablet Take 7.5 mg by mouth nightly       naproxen (NAPROSYN) 500 MG tablet Take 500 mg by mouth nightly       gabapentin (NEURONTIN) 100 MG capsule Take 1 capsule by mouth in the morning and at bedtime. gabapentin (NEURONTIN) 300 MG capsule Take 1 capsule by mouth at bedtime.       ibuprofen (ADVIL;MOTRIN) 800 MG tablet Take 1 tablet by mouth every 8 hours as needed for Pain or Fever (Patient not taking: Reported on 10/12/2022) 60 tablet 0    docusate sodium (COLACE) 100 MG capsule Take 1 capsule by mouth 2 times daily (Patient not taking: Reported on 10/12/2022) 60 capsule 0    ondansetron (ZOFRAN) 4 MG tablet Take 1 tablet by mouth every 8 hours as needed for Nausea (Patient not taking: Reported on 10/12/2022) 20 tablet 0    hydroCHLOROthiazide (HYDRODIURIL) 25 MG tablet Take 25 mg by mouth daily (Patient not taking: Reported on 10/12/2022)       No current [facility]-ordered facility-administered medications on file. Problem List Items Addressed This Visit       Acute vaginitis    Dysplasia of cervix, high grade ANNE 2 - Primary     Other Visit Diagnoses       Postoperative examination              Allergies   Allergen Reactions    Bactrim [Sulfamethoxazole-Trimethoprim] Hives and Itching    Dicloxacillin Hives    Pcn [Penicillins] Hives     No orders of the defined types were placed in this encounter. HPI  Patient is here following LEEP 2 weeks ago. She complains of a vaginal odor. Patient also notes that she is having some issues with her partner. She denies any domestic violence. Her pap smear on 7/7/22 was abnormal with ASCUS and positive for other high risk HPV. A colposcopy was done following this pap smear with pathology showing HSIL (ANNE-2) on 9/28/2022. On 3/13/2018 her pap smear was negative for intraepithelial lesion or malignancy. She had ASCUS with negative HPV on 10/17/2016. Her pap smear was negative for intraepithelial lesion or malignancy on 3/21/2013. Review of Systems   Constitutional:  Negative for activity change, appetite change and fever. HENT:  Negative for ear discharge and ear pain. Eyes:  Negative for pain and visual disturbance. Respiratory:  Negative for apnea and cough. Cardiovascular:  Negative for chest pain, palpitations and leg swelling. Gastrointestinal:  Negative for abdominal distention, abdominal pain, constipation, diarrhea, nausea and vomiting. Endocrine: Negative. Genitourinary:  Negative for difficulty urinating, dysuria, menstrual problem and pelvic pain.         Vaginal odor jet/9/29/2022       Clinical Information  Pre-op Diagnosis:  HIGH GRADE SQUAMOUS INTRAEPITHELIAL LESION, GRADE  II ANNE ON BIOPSY OF CERVIX   Operative Findings:  ENDOCERVICAL CANAL  STITCH AT 12:00  Operation Performed:  LEEP     Source of Specimen  A: ENDOCERVICAL CANAL, STITCH AT 12 O'CLOCK    Gross Description  \"MIKE REAGAN, ENDOCERVICAL CANAL  STITCH AT 12:00\" 2.5 x 2.0 x 0.5 cm  cervical cone with a suture designating 12:00. The endocervical  margin is inked blue and the ectocervical/stromal margins are inked  black. Cassette summary:  \"1\" 12-3, \"2\" 3-6, \"3\" 6-9, \"4\" 9-12. tm      Microscopic Description  Sections squamocolumnar mucosa show a focus of enlarged keratinocyte  nuclei wit h some nuclear irregularity extending to the surface, with  surrounding acute and chronic inflammation. A p16 immunostain is  strongly block positive in the focus of interest, consistent with  high-grade dysplasia. This focus extends to the endocervical inked  margin. Controls stain appropriately. SURGICAL PATHOLOGY CONSULTATION       Patient Name: Shanelle Richardson [facility] Rec: [id number]  Path Number: [id number]    [address] [place] [address].   [facility], [address]  [phone]  Fax: [phone]     CBC with Auto Differential    Collection Time: 10/03/22  3:00 AM   Result Value Ref Range    WBC 12.0 (H) 3.5 - 11.0 k/uL    RBC 4.32 4.0 - 5.2 m/uL    Hemoglobin 14.3 12.0 - 16.0 g/dL    Hematocrit 40.6 36 - 46 %    MCV 94.2 80 - 100 fL    MCH 33.1 26 - 34 pg    MCHC 35.2 31 - 37 g/dL    RDW 13.6 11.5 - 14.9 %    Platelets 870 554 - 690 k/uL    MPV 8.8 6.0 - 12.0 fL    Seg Neutrophils 77 (H) 36 - 66 %    Lymphocytes 18 (L) 24 - 44 %    Monocytes 3 1 - 7 %    Eosinophils % 1 0 - 4 %    Basophils 1 0 - 2 %    Segs Absolute 9.30 (H) 1.3 - 9.1 k/uL    Absolute Lymph # 2.20 1.0 - 4.8 k/uL    Absolute Mono # 0.30 0.1 - 1.3 k/uL    Absolute Eos # 0.20 0.0 - 0.4 k/uL    Basophils Absolute 0.10 0.0 - 0.2 k/uL   Basic Metabolic Panel    Collection Time: 10/03/22  3:00 AM   Result Value Ref Range    Glucose 139 (H) 70 - 99 mg/dL    BUN 11 6 - 20 mg/dL    Creatinine 0.72 0.50 - 0.90 mg/dL    Calcium 10.0 8.6 - 10.4 mg/dL    Sodium 136 135 - 144 mmol/L    Potassium 3.7 3.7 - 5.3 mmol/L    Chloride 100 98 - 107 mmol/L    CO2 25 20 - 31 mmol/L    Anion Gap 11 9 - 17 mmol/L    GFR Non-African American >60 >60 mL/min    GFR African American >60 >60 mL/min    GFR Comment         Urinalysis with Microscopic    Collection Time: 10/03/22  4:47 AM   Result Value Ref Range    Color, UA Yellow Yellow    Turbidity UA Cloudy (A) Clear    Glucose, Ur NEGATIVE NEGATIVE    Bilirubin Urine NEGATIVE NEGATIVE    Ketones, Urine TRACE (A) NEGATIVE    Specific Gravity, UA 1.025 1.000 - 1.030    Urine Hgb MOD (A) NEGATIVE    pH, UA 5.5 5.0 - 8.0    Protein, UA TRACE (A) NEGATIVE    Urobilinogen, Urine Normal Normal    Nitrite, Urine NEGATIVE NEGATIVE    Leukocyte Esterase, Urine MOD (A) NEGATIVE    WBC, UA 10 TO 20 /HPF    RBC, UA 6 TO 9 /HPF    Casts UA 0 TO 2 /LPF    Epithelial Cells UA 21 TO 50 /HPF    Bacteria, UA MODERATE (A) None           Plan:      Orders Placed This Encounter   Medications    metroNIDAZOLE (FLAGYL) 500 MG tablet     Sig: Take 1 tablet by mouth in the morning and 1 tablet in the evening. Do all this for 7 days. Dispense:  14 tablet     Refill:  0        Appointment in 6 months for repeat pap smear    Denzel RIVERA am scribing for, and in the presence of Dr. Rica Mendez. Electronically signed by: Denzel Fan 10/12/22 2:18 PM         I agree to the above documentation placed by my scribe Denzel Fan. I reviewed the scribe's note and agree with the documented findings and plan of care. Any areas of disagreement are noted on the chart. I have personally evaluated this patient. Additional findings are as noted.  I agree with the chief complaint, past medical history, past surgical history, allergies, medications, social and family history as documented unless otherwise noted below.      Electronically signed by Jose Ramon Bacon MD on 10/13/2022 at 2:02 PM

## 2022-10-27 ENCOUNTER — HOSPITAL ENCOUNTER (OUTPATIENT)
Dept: PREADMISSION TESTING | Age: 47
Discharge: HOME | End: 2022-10-31
Attending: ORTHOPAEDIC SURGERY | Admitting: ORTHOPAEDIC SURGERY
Payer: COMMERCIAL

## 2022-10-27 VITALS
HEART RATE: 89 BPM | BODY MASS INDEX: 31.71 KG/M2 | TEMPERATURE: 97.4 F | OXYGEN SATURATION: 100 % | SYSTOLIC BLOOD PRESSURE: 132 MMHG | WEIGHT: 179 LBS | RESPIRATION RATE: 16 BRPM | HEIGHT: 63 IN | DIASTOLIC BLOOD PRESSURE: 82 MMHG

## 2022-10-27 DIAGNOSIS — Z01.818 PREOP EXAMINATION: ICD-10-CM

## 2022-10-27 LAB
ABSOLUTE EOS #: 0.2 K/UL (ref 0–0.4)
ABSOLUTE LYMPH #: 2.1 K/UL (ref 1–4.8)
ABSOLUTE MONO #: 0.6 K/UL (ref 0.1–1.3)
ANION GAP SERPL CALCULATED.3IONS-SCNC: 9 MMOL/L (ref 9–17)
BASOPHILS # BLD: 1 % (ref 0–2)
BASOPHILS ABSOLUTE: 0 K/UL (ref 0–0.2)
BUN BLDV-MCNC: 9 MG/DL (ref 6–20)
CALCIUM SERPL-MCNC: 9.5 MG/DL (ref 8.6–10.4)
CHLORIDE BLD-SCNC: 102 MMOL/L (ref 98–107)
CO2: 27 MMOL/L (ref 20–31)
CREAT SERPL-MCNC: 0.72 MG/DL (ref 0.5–0.9)
EOSINOPHILS RELATIVE PERCENT: 2 % (ref 0–4)
GFR SERPL CREATININE-BSD FRML MDRD: >60 ML/MIN/1.73M2
GLUCOSE BLD-MCNC: 120 MG/DL (ref 70–99)
HCT VFR BLD CALC: 43.7 % (ref 36–46)
HEMOGLOBIN: 14.8 G/DL (ref 12–16)
LYMPHOCYTES # BLD: 22 % (ref 24–44)
MCH RBC QN AUTO: 32.5 PG (ref 26–34)
MCHC RBC AUTO-ENTMCNC: 33.8 G/DL (ref 31–37)
MCV RBC AUTO: 96.2 FL (ref 80–100)
MONOCYTES # BLD: 6 % (ref 1–7)
PDW BLD-RTO: 13.6 % (ref 11.5–14.9)
PLATELET # BLD: 230 K/UL (ref 150–450)
PMV BLD AUTO: 8.1 FL (ref 6–12)
POTASSIUM SERPL-SCNC: 3.9 MMOL/L (ref 3.7–5.3)
RBC # BLD: 4.54 M/UL (ref 4–5.2)
SEG NEUTROPHILS: 69 % (ref 36–66)
SEGMENTED NEUTROPHILS ABSOLUTE COUNT: 6.6 K/UL (ref 1.3–9.1)
SODIUM BLD-SCNC: 138 MMOL/L (ref 135–144)
WBC # BLD: 9.6 K/UL (ref 3.5–11)

## 2022-10-27 PROCEDURE — 80048 BASIC METABOLIC PNL TOTAL CA: CPT

## 2022-10-27 PROCEDURE — APPSS45 APP SPLIT SHARED TIME 31-45 MINUTES: Performed by: NURSE PRACTITIONER

## 2022-10-27 PROCEDURE — 36415 COLL VENOUS BLD VENIPUNCTURE: CPT

## 2022-10-27 PROCEDURE — 85025 COMPLETE CBC W/AUTO DIFF WBC: CPT

## 2022-10-27 ASSESSMENT — ENCOUNTER SYMPTOMS
CONSTIPATION: 0
COUGH: 0
SHORTNESS OF BREATH: 0
APNEA: 1
WHEEZING: 0
NAUSEA: 0
TROUBLE SWALLOWING: 0
ABDOMINAL PAIN: 0
RHINORRHEA: 1
SORE THROAT: 0
BLOOD IN STOOL: 0
ANAL BLEEDING: 0
VOMITING: 0

## 2022-10-27 NOTE — DISCHARGE INSTRUCTIONS
Pre-op Instructions For Out-Patient Surgery    Medication Instructions:  Please stop herbs and any supplements now (includes vitamins and minerals). Please contact your surgeon and prescribing physician for pre-op instructions for any blood thinners. Stop Naproxen as directed    If you have inhalers/aerosol treatments at home, please use them the morning of your surgery and bring the inhalers with you to the hospital.    Please take the following medications the morning of your surgery with a sip of water:    None    Surgery Instructions:  After midnight before surgery:  Do not eat or drink anything, including water, mints, gum, and hard candy. You may brush your teeth without swallowing. No smoking, chewing tobacco, or street drugs. Please shower or bathe before surgery. If you were given Surgical Scrub Chlorhexidine Gluconate Liquid (CHG), please shower the night before and the morning of your surgery following the detailed instructions you received during your pre-admission visit. Please do not wear any cologne, lotion, powder, deodorant, jewelry, piercings, perfume, makeup, nail polish, hair accessories, or hair spray on the day of surgery. Wear loose comfortable clothing. Leave your valuables at home. Bring a storage case for any glasses/contacts. An adult who is responsible for you MUST drive you home and should be with you for the first 24 hours after surgery. If having out-patient knee and foot surgeries, please arrange for planned crutches, walker, or wheelchair before arriving to the hospital.    The Day of Surgery:  Arrive at [address] [place] [address] Surgery Entrance at the time directed by your surgeon and check in at the desk. If you have a living will or healthcare power of , please bring a copy. You will be taken to the pre-op holding area where you will be prepared for surgery.   A physical assessment will be performed by a nurse practitioner or house officer. Your IV will be started and you will meet your anesthesiologist.    When you go to surgery, your family will be directed to the surgical waiting room, where the doctor should speak with them after your surgery. After surgery, you will be taken to the recovery room then when you are awake and stable you will go to the short stay unit for preparation to be discharged. If you use a Bi-PAP or C-PAP machine, please bring it with you and leave it in the car in case it is needed in recovery room.

## 2022-10-27 NOTE — H&P
HISTORY and Treinta EDD Rocha [id number]       NAME:  Feliz Padgett  MRN: [id number]   YOB: 1975   Date: 10/27/2022   Age: 56 y.o. Gender: female     COMPLAINT AND PRESENT HISTORY:   Feliz aPdgett is 56 y.o.,  female, presents for pre-anesthesia/admission testing for CARPAL TUNNEL RELEASE- LEFT per Dr. Bob Mcfarland. Primary dx: Left carpal tunnel syndrome [G56.02]. HPI:  SEE PORTION OF NOTE BELOW PER DR. Rojas , 22 (REVIEWED):  History of Present Illness: This is a 56 y.o. female who presents to the clinic today for evaluation / follow up of bilateral hand pain and numbness. Patient is a 60-year-old right-hand-dominant patient who works as a cash register attendant. She states that she had EMGs and NCV's done many years ago/greater than 5 years ago she has tried nocturnal splints this is actually makes her symptoms worse. Patient states that her hand falls asleep at nighttime when on a cell phone and/or driving. Plan  Informed the patient that more likely not she does have carpal tunnel syndrome but with her EMG studies being so dated that we should repeat these. We took liberty already bilateral upper extremity EMGs and NCV's and we will wait for the results     Update: No major changes since above OV. She does state that both hands bother her about the same amount, but does want to con't to work as long as possible, will do left hand first. Denies any prior hand surgeries. Patient w/recent LEEP on 22, states she did well, did not require any type of clearance prior to this procedure. RECENT IMAGING R/T HPI   EMG DONE 10-6-22 (SEE CHART). Review of additional significant medical hx:  GESTATIONAL DM: States not an issue outside of pregnancy.   Hemoglobin A1C   Date Value Ref Range Status   2021 5.5 4.0 - 6.0 % Final     HX OF MYCOPLASMA PNA: Patient states PNA in beginning of 2019- states admitted to the hospital x3 days, after PNA vaccine, also was hospitalized after receiving flu shot w/influenza. Denies current SOB, wheezing, cough. SLEEP APNEA: Patient states she was rx'd CPAP \"years ago\", but no longer has it d/t issue when moving. HTN: States BP typically stable. Denies current /recent chest pain, palpitations, SOB, dizziness, leg swelling (+ hx of), headache. Current medications r/t condition: HCTZ  BP Readings from Last 3 Encounters:   10/27/22 132/82   10/12/22 128/84   10/03/22 (!) 141/87      Type of Study     TTE procedure:2D Echocardiogram, M-Mode, Doppler, Color Doppler. Procedure Date  Date: 05/14/2020 Start: 10:57 AM     Study Location: [address] [place] [address]  Technical Quality: Adequate visualization     Indications:Edema. History / Tech. Comments:  Procedure explained to patient. Patient Status: Outpatient     Height: 63 inches Weight: 200 pounds BSA: 1.93 m^2 BMI: 35.43 kg/m^2     Rhythm: Within normal limits HR: 70 bpm     CONCLUSIONS     Summary  Left ventricle is normal in size and wall thickness. Global left ventricular systolic function is normal with an estimated  ejection fraction of 60 % . No obvious wall motion abnormality seen. Mild mitral regurgitation. No significant pericardial effusion is seen. Signature  ----------------------------------------------------------------------------   Electronically signed by Eileen Hart(Sonographer) on 05/14/2020 02:04   PM  ----------------------------------------------------------------------------     ----------------------------------------------------------------------------   Electronically signed by Larry Oliveira(Interpreting physician) on 05/14/2020   09:13 PM  ----------------------------------------------------------------------------    Activity level: Patient is not very active, but does ADL's without issue. Functional Capacity per patient:              1. Patient IS able to walk 2 city blocks on level ground without SOB.               2. Patient IS able to climb 2 flights of stairs without SOB. Denies hx of MRSA infection. Denies hx of blood clots. Denies hx of any personal or family hx of complications w/anesthesia. PAST MEDICAL HISTORY     Past Medical History:   Diagnosis Date    Anxiety     Arthritis     OA    Carpal tunnel syndrome     COVID-19     Depression     post partum    Diabetes, gestational     history of    Edema     Fracture     Right leg- ? Ground glass opacity present on imaging of lung     History of mycoplasma pneumonia     Hypertension     Influenza     Light cigarette smoker (1-9 cigarettes per day)     Marijuana use     Menarche @ 16 y/o    Neuropathy     CTS    Obesity     Obesity (BMI 30.0-34. 9)     Parity       -    Pneumonia 2018    several times this year, had pneumonia last 10-    Restless leg syndrome     Sleep apnea     doesn't use CPAP    Status post LEEP (loop electrosurgical excision procedure) of cervix 2022    UTI (lower urinary tract infection)     with pregnancy    Wears glasses        SURGICAL HISTORY       Past Surgical History:   Procedure Laterality Date    ANKLE SURGERY Right 1997    BLADDER SUSPENSION       SECTION      CHOLECYSTECTOMY  2006    COLONOSCOPY      with polyp removal    COLONOSCOPY N/A 2022    COLONOSCOPY WITH BIOPSY performed by Farhan Lu MD at [address] [place] [address]  2014    ENDOMETRIAL ABLATION  2014    with novasure    HYSTEROSCOPY  2014    D&C    KNEE ARTHROSCOPY Left 2015    total x2 scopes. KNEE SURGERY Left 2017    Procedure: ARTHROSCOPY KNEE LEFT, CHONDROPLASTY OR MFC, TROCHLEA, AND PATELLA; Surgeon: Dick Cast MD; Location: [address] [place] [address]; Service: Orthopedics;  Laterality: Left;    KNEE SURGERY Right 10/23/2017    Procedure: ARTHROSCOPY KNEE, CHONDROPLASTY PATELLA, CHONDROPLASTY TROCHLEA; Surgeon: Dick Cast MD; Location: [address] [place] [address]; Service: Orthopedics; Laterality: Right;    LEEP N/A 09/28/2022    LEEP performed by Veronica Lopez MD at [address] Bilateral     with tubes    OTHER SURGICAL HISTORY  12/27/2016    removal multiple vulvar inclusion cysts    [place] SLING OPER STRES INCONTINENCE N/A 11/29/2018    BLADDER SUSPENSION TRANSOBTURATOR TAPE AND CYSTO performed by Veronica Lopez MD at [address] [facility] [address] Right 08/30/2021    PERIRECTAL ABSCESS INCISION AND DRAINAGE performed by Anton Rizo MD at [facility] N/A 01/18/2019    VAGINAL SLING DISSECTION OF VAGINAL MUCOSA WITH REINSERTION OF  TRANSOBTURATOR TAPE  CYSTOSCOPY performed by Veronica Lopez MD at [place] [address] History     Socioeconomic History    Marital status: Legally      Spouse name: None    Number of children: None    Years of education: None    Highest education level: None   Tobacco Use    Smoking status: Every Day     Packs/day: 0.25     Years: 18.00     Pack years: 4.50     Types: Cigarettes     Start date: 1991    Smokeless tobacco: Never   Vaping Use    Vaping Use: Every day   Substance and Sexual Activity    Alcohol use: Yes     Comment: very rare    Drug use: Yes     Frequency: 3.0 times per week     Types: Marijuana Aloma Monique)     Comment: x3 uses per week (advised against on 10-27-22, SP)    Sexual activity: Yes     Partners: Male     Social Determinants of Health     Financial Resource Strain: Low Risk     Difficulty of Paying Living Expenses: Not hard at all   Food Insecurity: No Food Insecurity    Worried About Running Out of Food in the Last Year: Never true    Ran Out of Food in the Last Year: Never true       REVIEW OF SYSTEMS      Allergies   Allergen Reactions    Bactrim [Sulfamethoxazole-Trimethoprim] Hives and Itching    Dicloxacillin Hives    Pcn [Penicillins] Hives       Current Outpatient Medications on File Prior to Encounter   Medication Sig Dispense Refill    gabapentin (NEURONTIN) 100 MG capsule Take 1 capsule by mouth in the morning and at bedtime. gabapentin (NEURONTIN) 300 MG capsule Take 1 capsule by mouth at bedtime. buPROPion (WELLBUTRIN XL) 150 MG extended release tablet Take 300 mg by mouth every morning      rOPINIRole (REQUIP) 5 MG tablet Take 7.5 mg by mouth nightly       hydroCHLOROthiazide (HYDRODIURIL) 25 MG tablet Take 25 mg by mouth daily      naproxen (NAPROSYN) 500 MG tablet Take 500 mg by mouth nightly        No current facility-administered medications on file prior to encounter. Review of Systems   Constitutional:  Negative for chills and fever. HENT:  Positive for congestion (Feels r/t weather change, does not feel ill- experiences w/weather changes, denies any known recent + contact w/COVID-19) and rhinorrhea (Feels r/t weather change, does not feel ill- experiences w/weather changes). Negative for ear pain, sore throat and trouble swallowing. Respiratory:  Positive for apnea (Hx of sleep apnea). Negative for cough, shortness of breath and wheezing. Cardiovascular:  Positive for leg swelling (Hx of if on her feet for long periods of time). Negative for chest pain and palpitations. Gastrointestinal:  Negative for abdominal pain, anal bleeding, blood in stool, constipation (Hx of- see ED visit 10-3-22 per chart), nausea and vomiting. Genitourinary:  Negative for dysuria and frequency. Musculoskeletal:  Positive for arthralgias (Also pain to right great toe/toenail- trying to get in w/podiatry- states she tried to fix her toenail herself- advised not to do so). Negative for neck stiffness. See HPI. Skin:  Negative for rash and wound. Neurological:  Negative for dizziness and headaches. Hematological:  Does not bruise/bleed easily.      GENERAL PHYSICAL EXAM     Vitals: /82   Pulse 89   Temp 97.4 °F (36.3 °C)   Resp 16   Ht 5' 3\" (1.6 m)   Wt 179 lb (81.2 kg)   SpO2 100%   BMI slight weakness noted to right foot compared to left as result, patient states issue since ankle surgery). Comments: Negative Candy's sign b/l (performed in sitting position). Negative Phalen's test, Tinel's sign b/l. Tiny tattoo to volar aspect of left wrist.  Patient states she can feel me touch both hands. Feet:      Right foot:      Toenail Condition: Right toenails are abnormally thick. Comments: Thickened/yellow toenails to right foot- possible ingrown toenail to right great toenail, no significant erythema surrounding nail bed, no drainage. Encouraged f/u with podiatry. Lymphadenopathy:      Cervical: No cervical adenopathy. Skin:     General: Skin is warm and dry. Neurological:      Mental Status: She is alert and oriented to person, place, and time. Cranial Nerves: No dysarthria. Gait: Gait normal.      Comments: B/l upper arm, leg strength is equal and normal (assessed in sitting position). Psychiatric:         Behavior: Behavior normal.       LAB REVIEW     Lab Results   Component Value Date     10/27/2022    K 3.9 10/27/2022     10/27/2022    CO2 27 10/27/2022    BUN 9 10/27/2022    CREATININE 0.72 10/27/2022    GLUCOSE 120 (H) 10/27/2022    CALCIUM 9.5 10/27/2022    PROT 6.7 06/10/2022    LABALBU 4.1 06/10/2022    BILITOT 0.22 (L) 06/10/2022    ALKPHOS 92 06/10/2022    AST 20 06/10/2022    ALT 20 06/10/2022    LABGLOM >60 10/27/2022    GFRAA >60 10/03/2022    GLOB NOT REPORTED 04/29/2021     Lab Results   Component Value Date    WBC 9.6 10/27/2022    HGB 14.8 10/27/2022    HCT 43.7 10/27/2022    MCV 96.2 10/27/2022     10/27/2022     EKG REVIEW, DATE: 9-19-22     EKG 12 Lead  Order: [id number]  Status: Final result    Visible to patient: Yes (not seen)    Next appt:  Today at 12:45 PM in [facility] 1)    0 Result Notes  Component Ref Range & Units 9/19/22 1308 6/17/21 1609 4/29/21 0200 5/24/20 2313 6/6/19 1332 12/6/18 0043 10/25/18 0109   Ventricular Rate BPM 74  57 VC  103  123  67  87  79    Atrial Rate BPM 74  57 VC  103  123  67  87  79    P-R Interval ms 132  132 VC  130  130  130  132  136    QRS Duration ms 86  82 VC  94  88  84  84  86    Q-T Interval ms 386  436 VC  322  308  394  350  380    QTc Calculation (Bazett) ms 428  424 VC  421  440  416  421  435    P Axis degrees 52  24 VC  27  43  26  25  23    R Axis degrees 68  26 VC  39  42  39  21  36    T Axis degrees 61  33 VC  40  23  29  31  29    Resulting Agency  [facility]           Narrative & Impression    Normal sinus rhythm  Normal ECG  When compared with ECG of 17-JUN-2021 16:09,  No significant change was found      Specimen Collected: 09/19/22 13:08 EDT Last Resulted: 09/20/22 18:36 EDT           SURGERY / PROVISIONAL DIAGNOSES:      CARPAL TUNNEL RELEASE- LEFT    Left carpal tunnel syndrome [G56.02]    Patient Active Problem List    Diagnosis Date Noted    Preop examination 10/27/2022    Postoperative examination 10/13/2022    LEEP 9/28/22 09/28/2022    Dysplasia of cervix, high grade ANNE 2 08/17/2022    ASCUS with positive high risk human papillomavirus of vagina 08/13/2022    Negative pregnancy test 08/13/2022    Breast cancer screening by mammogram 07/07/2022    Acute vaginitis 07/07/2022    Major depressive disorder 05/31/2020    Chondromalacia of right knee 10/12/2017    Hypertension 08/30/2021    Tobacco abuse 08/30/2021    Anal abscess 08/29/2021    Urinary, incontinence, stress female     Post-op pain     S/p Bladder Suspension Transobturator Tape Revision 1/18/19 01/18/2019    Bladder Suspension Transobturator Tape 11/29/18 11/29/2018    Pneumonia of both lower lobes due to Mycoplasma pneumoniae 06/25/2018    Multifocal pneumonia 06/23/2018    Menorrhagia 09/11/2014    Left genital labial abscess 09/11/2014    Abnormal uterine bleeding 04/08/2014    Restless leg syndrome     Neuropathy     Anxiety          Multiple problems were reconciled today from outside sources (see updated \"Patient Active Problem List\" above). See Care Everywhere for additional detail. CLEARANCE: Based on my personal evaluation of patient including review of patient's chart, NO clearance required for scheduled surgery.     Total time spent on encounter- PAT provider minutes: 31-40 minutes     BRICE Castro CNP on 10/27/2022 at 2:23 PM

## 2022-10-27 NOTE — H&P (VIEW-ONLY)
HISTORY and [facility] [id number]       NAME:  Kim Lamar  MRN: [id number]   YOB: 1975   Date: 10/27/2022   Age: 56 y.o. Gender: female     COMPLAINT AND PRESENT HISTORY:   Kim Lamar is 56 y.o.,  female, presents for pre-anesthesia/admission testing for CARPAL TUNNEL RELEASE- LEFT per Dr. Brigido Paul. Primary dx: Left carpal tunnel syndrome [G56.02]. HPI:  SEE PORTION OF NOTE BELOW PER DR. Brigido Paul, 7-20-22 (REVIEWED):  History of Present Illness: This is a 56 y.o. female who presents to the clinic today for evaluation / follow up of bilateral hand pain and numbness. Patient is a 78-year-old right-hand-dominant patient who works as a cash register attendant. She states that she had EMGs and NCV's done many years ago/greater than 5 years ago she has tried nocturnal splints this is actually makes her symptoms worse. Patient states that her hand falls asleep at nighttime when on a cell phone and/or driving. Plan  Informed the patient that more likely not she does have carpal tunnel syndrome but with her EMG studies being so dated that we should repeat these. We took liberty already bilateral upper extremity EMGs and NCV's and we will wait for the results     Update: No major changes since above OV. She does state that both hands bother her about the same amount, but does want to con't to work as long as possible, will do left hand first. Denies any prior hand surgeries. Patient w/recent LEEP on 9-28-22, states she did well, did not require any type of clearance prior to this procedure. RECENT IMAGING R/T HPI   EMG DONE 10-6-22 (SEE CHART). Review of additional significant medical hx:  GESTATIONAL DM: States not an issue outside of pregnancy.   Hemoglobin A1C   Date Value Ref Range Status   05/28/2021 5.5 4.0 - 6.0 % Final     HX OF MYCOPLASMA PNA: Patient states PNA in beginning of 2019- states admitted to the hospital x3 days, after PNA vaccine, also was hospitalized after receiving flu shot w/influenza. Denies current SOB, wheezing, cough. SLEEP APNEA: Patient states she was rx'd CPAP \"years ago\", but no longer has it d/t issue when moving. HTN: States BP typically stable. Denies current /recent chest pain, palpitations, SOB, dizziness, leg swelling (+ hx of), headache. Current medications r/t condition: HCTZ  BP Readings from Last 3 Encounters:   10/27/22 132/82   10/12/22 128/84   10/03/22 (!) 141/87      Type of Study     TTE procedure:2D Echocardiogram, M-Mode, Doppler, Color Doppler. Procedure Date  Date: 05/14/2020 Start: 10:57 AM     Study Location: [facility]  Technical Quality: Adequate visualization     Indications:Edema. History / Tech. Comments:  Procedure explained to patient. Patient Status: Outpatient     Height: 63 inches Weight: 200 pounds BSA: 1.93 m^2 BMI: 35.43 kg/m^2     Rhythm: Within normal limits HR: 70 bpm     CONCLUSIONS     Summary  Left ventricle is normal in size and wall thickness. Global left ventricular systolic function is normal with an estimated  ejection fraction of 60 % . No obvious wall motion abnormality seen. Mild mitral regurgitation. No significant pericardial effusion is seen. Signature  ----------------------------------------------------------------------------   Electronically signed by Eileen Hart(Sonographer) on 05/14/2020 02:04   PM  ----------------------------------------------------------------------------     ----------------------------------------------------------------------------   Electronically signed by Larry Oliveira(Interpreting physician) on 05/14/2020   09:13 PM  ----------------------------------------------------------------------------    Activity level: Patient is not very active, but does ADL's without issue. Functional Capacity per patient:              1. Patient IS able to walk 2 city blocks on level ground without SOB.               2. Patient IS able to climb 2 flights of stairs without SOB. Denies hx of MRSA infection. Denies hx of blood clots. Denies hx of any personal or family hx of complications w/anesthesia. PAST MEDICAL HISTORY     Past Medical History:   Diagnosis Date    Anxiety     Arthritis     OA    Carpal tunnel syndrome     COVID-19     Depression     post partum    Diabetes, gestational     history of    Edema     Fracture     Right leg- ? Ground glass opacity present on imaging of lung     History of mycoplasma pneumonia     Hypertension     Influenza     Light cigarette smoker (1-9 cigarettes per day)     Marijuana use     Menarche @ 16 y/o    Neuropathy     CTS    Obesity     Obesity (BMI 30.0-34. 9)     Parity       -    Pneumonia 2018    several times this year, had pneumonia last 10-    Restless leg syndrome     Sleep apnea     doesn't use CPAP    Status post LEEP (loop electrosurgical excision procedure) of cervix 2022    UTI (lower urinary tract infection)     with pregnancy    Wears glasses        SURGICAL HISTORY       Past Surgical History:   Procedure Laterality Date    ANKLE SURGERY Right     BLADDER SUSPENSION       SECTION      CHOLECYSTECTOMY  2006    COLONOSCOPY      with polyp removal    COLONOSCOPY N/A 2022    COLONOSCOPY WITH BIOPSY performed by Alec Roberts MD at [address] [place] [address]  2014    ENDOMETRIAL ABLATION  2014    with novasure    HYSTEROSCOPY  2014    D&C    KNEE ARTHROSCOPY Left 2015    total x2 scopes. KNEE SURGERY Left 2017    Procedure: ARTHROSCOPY KNEE LEFT, CHONDROPLASTY OR MFC, TROCHLEA, AND PATELLA; Surgeon: Jorge Anderson MD; Location: [address] [place] [address]; Service: Orthopedics;  Laterality: Left;    KNEE SURGERY Right 10/23/2017    Procedure: ARTHROSCOPY KNEE, CHONDROPLASTY PATELLA, CHONDROPLASTY TROCHLEA; Surgeon: Jorge Anderson MD; Location: [address] [place] [address]; Service: Orthopedics; Laterality: Right;    LEEP N/A 09/28/2022    LEEP performed by Amanda Coe MD at [address] Bilateral     with tubes    OTHER SURGICAL HISTORY  12/27/2016    removal multiple vulvar inclusion cysts    MN SLING OPER STRES INCONTINENCE N/A 11/29/2018    BLADDER SUSPENSION TRANSOBTURATOR TAPE AND CYSTO performed by Amanda Coe MD at [address] Right 08/30/2021    PERIRECTAL ABSCESS INCISION AND DRAINAGE performed by Brynn Diaz MD at [address] [facility] N/A 01/18/2019    VAGINAL SLING DISSECTION OF VAGINAL MUCOSA WITH REINSERTION OF  TRANSOBTURATOR TAPE  CYSTOSCOPY performed by Amanda Coe MD at [place] [address] History     Socioeconomic History    Marital status: Legally      Spouse name: None    Number of children: None    Years of education: None    Highest education level: None   Tobacco Use    Smoking status: Every Day     Packs/day: 0.25     Years: 18.00     Pack years: 4.50     Types: Cigarettes     Start date: 1991    Smokeless tobacco: Never   Vaping Use    Vaping Use: Every day   Substance and Sexual Activity    Alcohol use: Yes     Comment: very rare    Drug use: Yes     Frequency: 3.0 times per week     Types: Marijuana Jensalena Cha)     Comment: x3 uses per week (advised against on 10-27-22, SP)    Sexual activity: Yes     Partners: Male     Social Determinants of Health     Financial Resource Strain: Low Risk     Difficulty of Paying Living Expenses: Not hard at all   Food Insecurity: No Food Insecurity    Worried About Running Out of Food in the Last Year: Never true    Ran Out of Food in the Last Year: Never true       REVIEW OF SYSTEMS      Allergies   Allergen Reactions    Bactrim [Sulfamethoxazole-Trimethoprim] Hives and Itching    Dicloxacillin Hives    Pcn [Penicillins] Hives       Current Outpatient Medications on File Prior to Encounter   Medication Sig Dispense Refill    gabapentin (NEURONTIN) 100 MG capsule Take 1 capsule by mouth in the morning and at bedtime. gabapentin (NEURONTIN) 300 MG capsule Take 1 capsule by mouth at bedtime. buPROPion (WELLBUTRIN XL) 150 MG extended release tablet Take 300 mg by mouth every morning      rOPINIRole (REQUIP) 5 MG tablet Take 7.5 mg by mouth nightly       hydroCHLOROthiazide (HYDRODIURIL) 25 MG tablet Take 25 mg by mouth daily      naproxen (NAPROSYN) 500 MG tablet Take 500 mg by mouth nightly        No current facility-administered medications on file prior to encounter. Review of Systems   Constitutional:  Negative for chills and fever. HENT:  Positive for congestion (Feels r/t weather change, does not feel ill- experiences w/weather changes, denies any known recent + contact w/COVID-19) and rhinorrhea (Feels r/t weather change, does not feel ill- experiences w/weather changes). Negative for ear pain, sore throat and trouble swallowing. Respiratory:  Positive for apnea (Hx of sleep apnea). Negative for cough, shortness of breath and wheezing. Cardiovascular:  Positive for leg swelling (Hx of if on her feet for long periods of time). Negative for chest pain and palpitations. Gastrointestinal:  Negative for abdominal pain, anal bleeding, blood in stool, constipation (Hx of- see ED visit 10-3-22 per chart), nausea and vomiting. Genitourinary:  Negative for dysuria and frequency. Musculoskeletal:  Positive for arthralgias (Also pain to right great toe/toenail- trying to get in w/podiatry- states she tried to fix her toenail herself- advised not to do so). Negative for neck stiffness. See HPI. Skin:  Negative for rash and wound. Neurological:  Negative for dizziness and headaches. Hematological:  Does not bruise/bleed easily.      GENERAL PHYSICAL EXAM     Vitals: /82   Pulse 89   Temp 97.4 °F (36.3 °C)   Resp 16   Ht 5' 3\" (1.6 m)   Wt 179 lb (81.2 kg)   SpO2 100%   BMI slight weakness noted to right foot compared to left as result, patient states issue since ankle surgery). Comments: Negative Candy's sign b/l (performed in sitting position). Negative Phalen's test, Tinel's sign b/l. Tiny tattoo to volar aspect of left wrist.  Patient states she can feel me touch both hands. Feet:      Right foot:      Toenail Condition: Right toenails are abnormally thick. Comments: Thickened/yellow toenails to right foot- possible ingrown toenail to right great toenail, no significant erythema surrounding nail bed, no drainage. Encouraged f/u with podiatry. Lymphadenopathy:      Cervical: No cervical adenopathy. Skin:     General: Skin is warm and dry. Neurological:      Mental Status: She is alert and oriented to person, place, and time. Cranial Nerves: No dysarthria. Gait: Gait normal.      Comments: B/l upper arm, leg strength is equal and normal (assessed in sitting position). Psychiatric:         Behavior: Behavior normal.       LAB REVIEW     Lab Results   Component Value Date     10/27/2022    K 3.9 10/27/2022     10/27/2022    CO2 27 10/27/2022    BUN 9 10/27/2022    CREATININE 0.72 10/27/2022    GLUCOSE 120 (H) 10/27/2022    CALCIUM 9.5 10/27/2022    PROT 6.7 06/10/2022    LABALBU 4.1 06/10/2022    BILITOT 0.22 (L) 06/10/2022    ALKPHOS 92 06/10/2022    AST 20 06/10/2022    ALT 20 06/10/2022    LABGLOM >60 10/27/2022    GFRAA >60 10/03/2022    GLOB NOT REPORTED 04/29/2021     Lab Results   Component Value Date    WBC 9.6 10/27/2022    HGB 14.8 10/27/2022    HCT 43.7 10/27/2022    MCV 96.2 10/27/2022     10/27/2022     EKG REVIEW, DATE: 9-19-22     EKG 12 Lead  Order: [id number]  Status: Final result    Visible to patient: Yes (not seen)    Next appt:  Today at 12:45 PM in [facility] PAT [facility] 1)    0 Result Notes  Component Ref Range & Units 9/19/22 1308 6/17/21 1609 4/29/21 0200 5/24/20 2313 6/6/19 1332 12/6/18 0043 10/25/18 0109   Ventricular Rate BPM 74  57 VC  103  123  67  87  79    Atrial Rate BPM 74  57 VC  103  123  67  87  79    P-R Interval ms 132  132 VC  130  130  130  132  136    QRS Duration ms 86  82 VC  94  88  84  84  86    Q-T Interval ms 386  436 VC  322  308  394  350  380    QTc Calculation (Bazett) ms 428  424 VC  421  440  416  421  435    P Axis degrees 52  24 VC  27  43  26  25  23    R Axis degrees 68  26 VC  39  42  39  21  36    T Axis degrees 61  33 VC  40  23  29  31  29    Resulting Agency  [facility]           Narrative & Impression    Normal sinus rhythm  Normal ECG  When compared with ECG of 17-JUN-2021 16:09,  No significant change was found      Specimen Collected: 09/19/22 13:08 EDT Last Resulted: 09/20/22 18:36 EDT           SURGERY / PROVISIONAL DIAGNOSES:      CARPAL TUNNEL RELEASE- LEFT    Left carpal tunnel syndrome [G56.02]    Patient Active Problem List    Diagnosis Date Noted    Preop examination 10/27/2022    Postoperative examination 10/13/2022    LEEP 9/28/22 09/28/2022    Dysplasia of cervix, high grade ANNE 2 08/17/2022    ASCUS with positive high risk human papillomavirus of vagina 08/13/2022    Negative pregnancy test 08/13/2022    Breast cancer screening by mammogram 07/07/2022    Acute vaginitis 07/07/2022    Major depressive disorder 05/31/2020    Chondromalacia of right knee 10/12/2017    Hypertension 08/30/2021    Tobacco abuse 08/30/2021    Anal abscess 08/29/2021    Urinary, incontinence, stress female     Post-op pain     S/p Bladder Suspension Transobturator Tape Revision 1/18/19 01/18/2019    Bladder Suspension Transobturator Tape 11/29/18 11/29/2018    Pneumonia of both lower lobes due to Mycoplasma pneumoniae 06/25/2018    Multifocal pneumonia 06/23/2018    Menorrhagia 09/11/2014    Left genital labial abscess 09/11/2014    Abnormal uterine bleeding 04/08/2014    Restless leg syndrome     Neuropathy     Anxiety          Multiple problems were reconciled today from outside sources (see updated \"Patient Active Problem List\" above). See Care Everywhere for additional detail. CLEARANCE: Based on my personal evaluation of patient including review of patient's chart, NO clearance required for scheduled surgery.     Total time spent on encounter- PAT provider minutes: 31-40 minutes     BRICE Morrison CNP on 10/27/2022 at 2:23 PM

## 2022-11-09 ENCOUNTER — ANESTHESIA EVENT (OUTPATIENT)
Dept: OPERATING ROOM | Age: 47
End: 2022-11-09
Payer: COMMERCIAL

## 2022-11-09 NOTE — PRE-PROCEDURE INSTRUCTIONS
No answer, left message ? Unable to leave message ? When were you told to arrive at hospital ?  [address]    Do you have a  ?yes    Are you on any blood thinners ? yes                 If yes when did you stop taking ? Stopped Naproxen on 11/6/22    Do you have your prep Rx filled and instruction ?  n/a    Nothing to eat the day before , only clear liquids. n/a    Are you experiencing any covid symptoms ? no    Do you have any infections or rash we should be aware of ?no      Do you have the Hibiclens soap to use the night before and the morning of surgery ? yes    Nothing to eat or drink after midnight, only a sip of water to take any medication instructed to take the night before. yes  Wear comfortable clothing, leave any valuables at home, remove any jewelry and body piercing .  yes

## 2022-11-10 ENCOUNTER — HOSPITAL ENCOUNTER (OUTPATIENT)
Age: 47
Setting detail: OUTPATIENT SURGERY
Discharge: HOME | End: 2022-11-10
Attending: ORTHOPAEDIC SURGERY | Admitting: ORTHOPAEDIC SURGERY
Payer: COMMERCIAL

## 2022-11-10 ENCOUNTER — ANESTHESIA (OUTPATIENT)
Dept: OPERATING ROOM | Age: 47
End: 2022-11-10
Payer: COMMERCIAL

## 2022-11-10 VITALS
RESPIRATION RATE: 18 BRPM | HEIGHT: 63 IN | SYSTOLIC BLOOD PRESSURE: 99 MMHG | TEMPERATURE: 97.9 F | HEART RATE: 64 BPM | OXYGEN SATURATION: 100 % | DIASTOLIC BLOOD PRESSURE: 58 MMHG | BODY MASS INDEX: 31.71 KG/M2 | WEIGHT: 179 LBS

## 2022-11-10 DIAGNOSIS — G56.02 CARPAL TUNNEL SYNDROME, LEFT: Primary | ICD-10-CM

## 2022-11-10 PROCEDURE — 6370000000 HC RX 637 (ALT 250 FOR IP): Performed by: ANESTHESIOLOGY

## 2022-11-10 PROCEDURE — 2580000003 HC RX 258: Performed by: ANESTHESIOLOGY

## 2022-11-10 PROCEDURE — 7100000011 HC PHASE II RECOVERY - ADDTL 15 MIN: Performed by: ORTHOPAEDIC SURGERY

## 2022-11-10 PROCEDURE — 64721 CARPAL TUNNEL SURGERY: CPT | Performed by: ORTHOPAEDIC SURGERY

## 2022-11-10 PROCEDURE — 3700000001 HC ADD 15 MINUTES (ANESTHESIA): Performed by: ORTHOPAEDIC SURGERY

## 2022-11-10 PROCEDURE — 6360000002 HC RX W HCPCS: Performed by: ORTHOPAEDIC SURGERY

## 2022-11-10 PROCEDURE — 2709999900 HC NON-CHARGEABLE SUPPLY: Performed by: ORTHOPAEDIC SURGERY

## 2022-11-10 PROCEDURE — 7100000030 HC ASPR PHASE II RECOVERY - FIRST 15 MIN: Performed by: ORTHOPAEDIC SURGERY

## 2022-11-10 PROCEDURE — 7100000031 HC ASPR PHASE II RECOVERY - ADDTL 15 MIN: Performed by: ORTHOPAEDIC SURGERY

## 2022-11-10 PROCEDURE — 7100000010 HC PHASE II RECOVERY - FIRST 15 MIN: Performed by: ORTHOPAEDIC SURGERY

## 2022-11-10 PROCEDURE — 6360000002 HC RX W HCPCS: Performed by: NURSE ANESTHETIST, CERTIFIED REGISTERED

## 2022-11-10 PROCEDURE — 7100000001 HC PACU RECOVERY - ADDTL 15 MIN: Performed by: ORTHOPAEDIC SURGERY

## 2022-11-10 PROCEDURE — 3700000000 HC ANESTHESIA ATTENDED CARE: Performed by: ORTHOPAEDIC SURGERY

## 2022-11-10 PROCEDURE — 7100000000 HC PACU RECOVERY - FIRST 15 MIN: Performed by: ORTHOPAEDIC SURGERY

## 2022-11-10 PROCEDURE — 3600000002 HC SURGERY LEVEL 2 BASE: Performed by: ORTHOPAEDIC SURGERY

## 2022-11-10 PROCEDURE — 3600000012 HC SURGERY LEVEL 2 ADDTL 15MIN: Performed by: ORTHOPAEDIC SURGERY

## 2022-11-10 PROCEDURE — 2500000003 HC RX 250 WO HCPCS: Performed by: NURSE ANESTHETIST, CERTIFIED REGISTERED

## 2022-11-10 RX ORDER — SODIUM CHLORIDE 0.9 % (FLUSH) 0.9 %
5-40 SYRINGE (ML) INJECTION PRN
Status: DISCONTINUED | OUTPATIENT
Start: 2022-11-10 | End: 2022-11-10 | Stop reason: HOSPADM

## 2022-11-10 RX ORDER — SODIUM CHLORIDE 0.9 % (FLUSH) 0.9 %
5-40 SYRINGE (ML) INJECTION EVERY 12 HOURS SCHEDULED
Status: DISCONTINUED | OUTPATIENT
Start: 2022-11-10 | End: 2022-11-10 | Stop reason: HOSPADM

## 2022-11-10 RX ORDER — LIDOCAINE HYDROCHLORIDE 10 MG/ML
1 INJECTION, SOLUTION EPIDURAL; INFILTRATION; INTRACAUDAL; PERINEURAL
Status: DISCONTINUED | OUTPATIENT
Start: 2022-11-10 | End: 2022-11-10 | Stop reason: HOSPADM

## 2022-11-10 RX ORDER — ONDANSETRON 2 MG/ML
4 INJECTION INTRAMUSCULAR; INTRAVENOUS
Status: DISCONTINUED | OUTPATIENT
Start: 2022-11-10 | End: 2022-11-10 | Stop reason: HOSPADM

## 2022-11-10 RX ORDER — ROPIVACAINE HYDROCHLORIDE 5 MG/ML
INJECTION, SOLUTION EPIDURAL; INFILTRATION; PERINEURAL PRN
Status: DISCONTINUED | OUTPATIENT
Start: 2022-11-10 | End: 2022-11-10 | Stop reason: ALTCHOICE

## 2022-11-10 RX ORDER — GLYCOPYRROLATE 0.2 MG/ML
INJECTION INTRAMUSCULAR; INTRAVENOUS PRN
Status: DISCONTINUED | OUTPATIENT
Start: 2022-11-10 | End: 2022-11-10 | Stop reason: SDUPTHER

## 2022-11-10 RX ORDER — EPHEDRINE SULFATE 50 MG/ML
10 INJECTION INTRAVENOUS EVERY 10 MIN PRN
Status: CANCELLED | OUTPATIENT
Start: 2022-11-10

## 2022-11-10 RX ORDER — SODIUM CHLORIDE, SODIUM LACTATE, POTASSIUM CHLORIDE, CALCIUM CHLORIDE 600; 310; 30; 20 MG/100ML; MG/100ML; MG/100ML; MG/100ML
INJECTION, SOLUTION INTRAVENOUS CONTINUOUS
Status: DISCONTINUED | OUTPATIENT
Start: 2022-11-10 | End: 2022-11-10 | Stop reason: HOSPADM

## 2022-11-10 RX ORDER — ACETAMINOPHEN 500 MG
1000 TABLET ORAL ONCE
Status: COMPLETED | OUTPATIENT
Start: 2022-11-10 | End: 2022-11-10

## 2022-11-10 RX ORDER — GABAPENTIN 300 MG/1
300 CAPSULE ORAL ONCE
Status: COMPLETED | OUTPATIENT
Start: 2022-11-10 | End: 2022-11-10

## 2022-11-10 RX ORDER — FENTANYL CITRATE 50 UG/ML
INJECTION, SOLUTION INTRAMUSCULAR; INTRAVENOUS PRN
Status: DISCONTINUED | OUTPATIENT
Start: 2022-11-10 | End: 2022-11-10 | Stop reason: SDUPTHER

## 2022-11-10 RX ORDER — DEXAMETHASONE SODIUM PHOSPHATE 4 MG/ML
INJECTION, SOLUTION INTRA-ARTICULAR; INTRALESIONAL; INTRAMUSCULAR; INTRAVENOUS; SOFT TISSUE PRN
Status: DISCONTINUED | OUTPATIENT
Start: 2022-11-10 | End: 2022-11-10 | Stop reason: SDUPTHER

## 2022-11-10 RX ORDER — ONDANSETRON 2 MG/ML
INJECTION INTRAMUSCULAR; INTRAVENOUS PRN
Status: DISCONTINUED | OUTPATIENT
Start: 2022-11-10 | End: 2022-11-10 | Stop reason: SDUPTHER

## 2022-11-10 RX ORDER — KETOROLAC TROMETHAMINE 30 MG/ML
INJECTION, SOLUTION INTRAMUSCULAR; INTRAVENOUS PRN
Status: DISCONTINUED | OUTPATIENT
Start: 2022-11-10 | End: 2022-11-10 | Stop reason: SDUPTHER

## 2022-11-10 RX ORDER — SODIUM CHLORIDE 9 MG/ML
INJECTION, SOLUTION INTRAVENOUS PRN
Status: DISCONTINUED | OUTPATIENT
Start: 2022-11-10 | End: 2022-11-10 | Stop reason: HOSPADM

## 2022-11-10 RX ORDER — DIPHENHYDRAMINE HYDROCHLORIDE 50 MG/ML
12.5 INJECTION INTRAMUSCULAR; INTRAVENOUS
Status: DISCONTINUED | OUTPATIENT
Start: 2022-11-10 | End: 2022-11-10 | Stop reason: HOSPADM

## 2022-11-10 RX ORDER — PROPOFOL 10 MG/ML
INJECTION, EMULSION INTRAVENOUS PRN
Status: DISCONTINUED | OUTPATIENT
Start: 2022-11-10 | End: 2022-11-10 | Stop reason: SDUPTHER

## 2022-11-10 RX ORDER — MIDAZOLAM HYDROCHLORIDE 1 MG/ML
INJECTION INTRAMUSCULAR; INTRAVENOUS PRN
Status: DISCONTINUED | OUTPATIENT
Start: 2022-11-10 | End: 2022-11-10 | Stop reason: SDUPTHER

## 2022-11-10 RX ORDER — LIDOCAINE HYDROCHLORIDE 10 MG/ML
INJECTION, SOLUTION EPIDURAL; INFILTRATION; INTRACAUDAL; PERINEURAL PRN
Status: DISCONTINUED | OUTPATIENT
Start: 2022-11-10 | End: 2022-11-10 | Stop reason: SDUPTHER

## 2022-11-10 RX ORDER — METOCLOPRAMIDE HYDROCHLORIDE 5 MG/ML
INJECTION INTRAMUSCULAR; INTRAVENOUS PRN
Status: DISCONTINUED | OUTPATIENT
Start: 2022-11-10 | End: 2022-11-10 | Stop reason: SDUPTHER

## 2022-11-10 RX ORDER — FENTANYL CITRATE 0.05 MG/ML
25 INJECTION, SOLUTION INTRAMUSCULAR; INTRAVENOUS EVERY 5 MIN PRN
Status: DISCONTINUED | OUTPATIENT
Start: 2022-11-10 | End: 2022-11-10 | Stop reason: HOSPADM

## 2022-11-10 RX ORDER — FENTANYL CITRATE 0.05 MG/ML
50 INJECTION, SOLUTION INTRAMUSCULAR; INTRAVENOUS EVERY 5 MIN PRN
Status: DISCONTINUED | OUTPATIENT
Start: 2022-11-10 | End: 2022-11-10 | Stop reason: HOSPADM

## 2022-11-10 RX ORDER — HYDROCODONE BITARTRATE AND ACETAMINOPHEN 5; 325 MG/1; MG/1
1 TABLET ORAL EVERY 6 HOURS PRN
Qty: 20 TABLET | Refills: 0 | Status: SHIPPED | OUTPATIENT
Start: 2022-11-10 | End: 2022-11-15

## 2022-11-10 RX ADMIN — LIDOCAINE HYDROCHLORIDE 40 MG: 10 INJECTION, SOLUTION EPIDURAL; INFILTRATION; INTRACAUDAL; PERINEURAL at 12:01

## 2022-11-10 RX ADMIN — PHENYLEPHRINE HYDROCHLORIDE 100 MCG: 10 INJECTION INTRAVENOUS at 12:12

## 2022-11-10 RX ADMIN — METOCLOPRAMIDE 10 MG: 5 INJECTION, SOLUTION INTRAMUSCULAR; INTRAVENOUS at 12:04

## 2022-11-10 RX ADMIN — SODIUM CHLORIDE, POTASSIUM CHLORIDE, SODIUM LACTATE AND CALCIUM CHLORIDE: 600; 310; 30; 20 INJECTION, SOLUTION INTRAVENOUS at 10:41

## 2022-11-10 RX ADMIN — ONDANSETRON 4 MG: 2 INJECTION INTRAMUSCULAR; INTRAVENOUS at 12:20

## 2022-11-10 RX ADMIN — KETOROLAC TROMETHAMINE 30 MG: 30 INJECTION, SOLUTION INTRAMUSCULAR; INTRAVENOUS at 12:04

## 2022-11-10 RX ADMIN — GABAPENTIN 300 MG: 300 CAPSULE ORAL at 10:47

## 2022-11-10 RX ADMIN — PHENYLEPHRINE HYDROCHLORIDE 100 MCG: 10 INJECTION INTRAVENOUS at 12:29

## 2022-11-10 RX ADMIN — GLYCOPYRROLATE 0.2 MG: 0.2 INJECTION INTRAMUSCULAR; INTRAVENOUS at 11:55

## 2022-11-10 RX ADMIN — MIDAZOLAM 2 MG: 1 INJECTION INTRAMUSCULAR; INTRAVENOUS at 11:55

## 2022-11-10 RX ADMIN — PROPOFOL 350 MG: 10 INJECTION, EMULSION INTRAVENOUS at 12:01

## 2022-11-10 RX ADMIN — DEXAMETHASONE SODIUM PHOSPHATE 4 MG: 4 INJECTION, SOLUTION INTRAMUSCULAR; INTRAVENOUS at 12:04

## 2022-11-10 RX ADMIN — ACETAMINOPHEN 1000 MG: 500 TABLET, FILM COATED ORAL at 10:47

## 2022-11-10 RX ADMIN — FENTANYL CITRATE 50 MCG: 50 INJECTION, SOLUTION INTRAMUSCULAR; INTRAVENOUS at 12:01

## 2022-11-10 ASSESSMENT — PAIN - FUNCTIONAL ASSESSMENT: PAIN_FUNCTIONAL_ASSESSMENT: 0-10

## 2022-11-10 ASSESSMENT — PAIN SCALES - GENERAL: PAINLEVEL_OUTOF10: 0

## 2022-11-10 ASSESSMENT — LIFESTYLE VARIABLES: SMOKING_STATUS: 1

## 2022-11-10 NOTE — INTERVAL H&P NOTE
Update History & Physical    The patient's History and Physical of October 27, 2022 was reviewed with the patient and I examined the patient. There was no change. The surgical site was confirmed by the patient and me. Patient will be having : CARPAL TUNNEL RELEASE - Left. Patient has been NPO since midnight. No blood thinners in the past 5-7 days. ( Naprosyn)    Patient denies any personal or family problems with anesthesia. Patient did have any clearance. Patient was physically assessed, including cardiovascular and respiratory.        Electronically signed by BRICE Gamboa CNP on 11/10/2022 at 9:53 AM

## 2022-11-10 NOTE — OP NOTE
Operative Note      Patient: Fior Castillo  YOB: 1975  MRN: [id number]    Date of Procedure: 11/10/2022    Pre-Op Diagnosis: Left carpal tunnel syndrome [G56.02]    Post-Op Diagnosis: Same       Procedure(s):  CARPAL TUNNEL RELEASE left    Surgeon(s):  Trip Delgado MD    Assistant:   Resident: Wendy Foy DO    Anesthesia: General    Estimated Blood Loss (mL): Minimal    Complications: None    Specimens:   * No specimens in log *    Implants:  * No implants in log *      Drains: * No LDAs found *    Findings: Carpal tunnel syndrome left    Detailed Description of Procedure: This patient has presented with a history of carpal tunnel symptoms of the left hand including pain, numbness, weakness, and nocturnal paraesthesias. The patient has EMG/NCV findings consistent with carpal tunnel syndrome. The patient has failed a trail of nocturnal splinting and, therefore, it was advised the patient would benefit from carpal tunnel release. Consent was obtained with a  good comprehension of all risks and benefits. The patient was taken to the operative suite and the above anesthesia was administered. A tourniquet was applied to the operative arm which was then prepped and draped in the usual sterile fashion. Time-out was called to verify the appropriate side for surgery. The arm was exsanguinated and the tourniquet inflated to 250mmHg. An incision was made at the junction of Garcia's line and the radial side of the ring finger. It was extended proximally for 1.5cm and curved slightly ulnarly. The Sub-Q and the palmar fascia was spread with a mosquito hemostat and retractors positioned. The transverse carpal ligament was identified puncture with the hemostat, which was used to protect the underlying structures of the carpal tunnel while the TCL was divided with a #15 blade proximally and distally.  The wrist was hyperextended to facilitate complete release of the TCL into the deep volar forearm fascia with blunt Metzenbaum scissor. Complete distal release was carried out with complete release verified. , the wound was irrigated and then injected with 6cc of 0.5% ropivacaine. The wound was then closed with4-0 nylon sutures in a vertical mattress pattern. A sterile bulky bandage was applied and wrapped with a 2inch ace bandage. The tourniquet was deflated at less than 20 minutes. The patient was awaken and taken to the PACU on good condition.        Electronically signed by Liliana Frey MD on 11/10/2022 at 12:23 PM

## 2022-11-10 NOTE — ANESTHESIA POSTPROCEDURE EVALUATION
Department of Anesthesiology  Postprocedure Note    Patient: Liz Levine  MRN: [id number]  YOB: 1975  Date of evaluation: 11/10/2022      Procedure Summary     Date: 11/10/22 Room / Location: [address] [place] [address] Shawna Conroy 01 / [facility]: ALIA SNELL    Anesthesia Start: 6167 Anesthesia Stop: 4432    Procedure: CARPAL TUNNEL RELEASE (Left: Hand) Diagnosis:       Left carpal tunnel syndrome      (Left carpal tunnel syndrome [G56.02])    Surgeons: Dion Sheppard MD Responsible Provider: John Pineda MD    Anesthesia Type: general ASA Status: 3          Anesthesia Type: No value filed.     Mila Phase I: Mila Score: 10    Mila Phase II:        Anesthesia Post Evaluation    Comments: POST- ANESTHESIA EVALUATION       Pt Name: Liz Levine  MRN: [id number]  YOB: 1975  Date of evaluation: 11/10/2022  Time:  1:48 PM      BP 98/65   Pulse 73   Temp 98.6 °F (37 °C)   Resp 15   Ht 5' 3\" (1.6 m)   Wt 179 lb (81.2 kg)   SpO2 97%   BMI 31.71 kg/m²      Consciousness Level  Awake  Cardiopulmonary Status  Stable  Pain Adequately Treated YES  Nausea / Vomiting  NO  Adequate Hydration  YES  Anesthesia Related Complications NONE      Electronically signed by John Pineda MD on 11/10/2022 at 1:48 PM

## 2022-11-10 NOTE — DISCHARGE INSTRUCTIONS
DISCHARGE INSTRUCTIONS FOR HAND/WRIST SURGERY    In order to continue your care at home, please follow the instructions below. For General Anesthesia  Do not drink any alcoholic beverages or make any legal or important decisions for 24 hours. Diet    After general anesthesia, start out eating lightly (broth, soup, crackers, toast, etc.) advancing as tolerated to your usual diet. Try to avoid spicy or greasy/fatty foods for 24 hours. Drink plenty of fluids after surgery, unless you are on a fluid restriction. Avoid milk/milk product for several hours. Medications  Take medications as instructed by your surgeon. Please do not take prescribed pain medication with alcoholic beverages. When cleared to drive by your surgeon, please do not drive or operate machinery while taking any prescribed pain medication. Activities  As instructed by your surgeon  Limit your activities for 24 hours  Avoid heavy work or sports until surgeon approves. No lifting, pushing, pulling, twisting, or pressing down on hand or wrist.  No driving or operating machinery until cleared by surgeon. May shower as long as dressings stay dry with plastic bag coverage. Surgery Area  Always keep dressings/incisions clean, dry. Do not remove dressings until seen in office, unless instructed otherwise by your surgeon. To reduce swelling and pain, apply an ice pack for 20-30 min 4 times a day for 48 hours and keep surgical hand/wrist elevated above heart level with pillows. Call your surgeon for the following: You have pain that does not get better after you take pain medicine. For an oral temperature (by mouth) is 101 degrees or higher, chills, or excessive sweating. You have increasing and progressive bleeding or drainage from surgery site. Signs of an infection:  increased swelling, redness, warmth, or hardness around surgery area or yellow or green drainage from incision.   If your fingers are pale, blue or cold to touch.  If swelling increases while elevated. Persistent nausea or vomiting and cant keep fluids down. If you are unable to urinate within 8 hours of surgery. Redness or swelling at IV site. For any questions or concerns you may have.

## 2022-11-10 NOTE — ANESTHESIA PRE PROCEDURE
Department of Anesthesiology  Preprocedure Note       Name:  Katie Johnson   Age:  56 y.o.  :  1975                                          MRN:  [id number]         Date:  11/10/2022      Surgeon: Radha Li):  Johanna Wakefield MD    Procedure: Procedure(s):  CARPAL TUNNEL RELEASE    Medications prior to admission:   Prior to Admission medications    Medication Sig Start Date End Date Taking? Authorizing Provider   HYDROcodone-acetaminophen (NORCO) 5-325 MG per tablet Take 1 tablet by mouth every 6 hours as needed for Pain for up to 5 days. Intended supply: 5 days. Take lowest dose possible to manage pain 11/10/22 11/15/22 Yes Johanna Wakefield MD   gabapentin (NEURONTIN) 100 MG capsule Take 1 capsule by mouth in the morning and at bedtime. 10/10/22   Historical Provider, MD   gabapentin (NEURONTIN) 300 MG capsule Take 1 capsule by mouth at bedtime.  10/10/22   Historical Provider, MD   buPROPion (WELLBUTRIN XL) 150 MG extended release tablet Take 300 mg by mouth every morning    Historical Provider, MD   rOPINIRole (REQUIP) 5 MG tablet Take 7.5 mg by mouth nightly     Historical Provider, MD   hydroCHLOROthiazide (HYDRODIURIL) 25 MG tablet Take 25 mg by mouth daily    Historical Provider, MD   naproxen (NAPROSYN) 500 MG tablet Take 500 mg by mouth nightly     Historical Provider, MD       Current medications:    Current Facility-Administered Medications   Medication Dose Route Frequency Provider Last Rate Last Admin    lidocaine PF 1 % injection 1 mL  1 mL IntraDERmal Once PRN Stewart Bob MD        lactated ringers infusion   IntraVENous Continuous Stewart Bob  mL/hr at 11/10/22 1041 New Bag at 11/10/22 1041    sodium chloride flush 0.9 % injection 5-40 mL  5-40 mL IntraVENous 2 times per day Stewart Bob MD        sodium chloride flush 0.9 % injection 5-40 mL  5-40 mL IntraVENous PRN Stewart Bob MD        0.9 % sodium chloride infusion   IntraVENous PRN Stewart Bob MD           Allergies: Allergies   Allergen Reactions    Bactrim [Sulfamethoxazole-Trimethoprim] Hives and Itching    Dicloxacillin Hives    Pcn [Penicillins] Hives       Problem List:    Patient Active Problem List   Diagnosis Code    Neuropathy G62.9    Anxiety F41.9    Restless leg syndrome G25.81    Abnormal uterine bleeding N93.9    Menorrhagia N92.0    Left genital labial abscess N76.4    Multifocal pneumonia J18.9    Pneumonia of both lower lobes due to Mycoplasma pneumoniae J15.7    Bladder Suspension Transobturator Tape 18 R32    S/p Bladder Suspension Transobturator Tape Revision 19 Z98.890    Urinary, incontinence, stress female N39.3    Post-op pain G89.18    Anal abscess K61.0    Hypertension I10    Tobacco abuse Z72.0    Breast cancer screening by mammogram Z12.31    Acute vaginitis N76.0    ASCUS with positive high risk human papillomavirus of vagina R87.620, R87.811    Negative pregnancy test Z32.02    Dysplasia of cervix, 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  Ground glass opacity present on imaging of lung     History of mycoplasma pneumonia     Hypertension     Influenza     Light cigarette smoker (1-9 cigarettes per day)     Marijuana use     Menarche @ 16 y/o    Neuropathy     CTS    Obesity     Obesity (BMI 30.0-34. 9)     Parity       -    Pneumonia 2018    several times this year, had pneumonia last     Restless leg syndrome     Sleep apnea     doesn't use CPAP    Status post LEEP (loop electrosurgical excision procedure) of cervix 2022    UTI (lower urinary tract infection)     with pregnancy    Wears glasses        Past Surgical History:        Procedure Laterality Date    ANKLE SURGERY Right     BLADDER SUSPENSION       SECTION      CHOLECYSTECTOMY  2006    COLONOSCOPY      with polyp removal    COLONOSCOPY N/A 2022    COLONOSCOPY WITH BIOPSY performed by Rinku Baumann MD at [address] [facility]  2014    ENDOMETRIAL ABLATION  2014    with novasure    HYSTEROSCOPY  2014    D&C    KNEE ARTHROSCOPY Left 2015    total x2 scopes.  KNEE SURGERY Left 2017    Procedure: ARTHROSCOPY KNEE LEFT, CHONDROPLASTY OR MFC, TROCHLEA, AND PATELLA; Surgeon: Heather Ellis MD; Location: [address] [place] [address]; Service: Orthopedics; Laterality: Left;    KNEE SURGERY Right 10/23/2017    Procedure: ARTHROSCOPY KNEE, CHONDROPLASTY PATELLA, CHONDROPLASTY TROCHLEA; Surgeon: Heather Ellis MD; Location: [address] [place] [address]; Service: Orthopedics;  Laterality: Right;    LEEP N/A 2022    LEEP performed by Richy Bella MD at [address] [place] [address] Bilateral     with tubes    OTHER SURGICAL HISTORY  2016    removal multiple vulvar inclusion cysts    [place] SLING OPER STRES INCONTINENCE N/A 2018    BLADDER SUSPENSION TRANSOBTURATOR TAPE AND CYSTO performed by Richy Bella MD at [address] [facility] Right 2021    PERIRECTAL ABSCESS INCISION AND DRAINAGE performed by Rinku Baumann MD at [address] N/A 2019    VAGINAL SLING DISSECTION OF VAGINAL MUCOSA WITH REINSERTION OF  TRANSOBTURATOR TAPE  CYSTOSCOPY performed by Richy Bella MD at [place] [address] History:    Social History     Tobacco Use    Smoking status: Every Day     Packs/day: 0.25     Years: 18.00     Pack years: 4.50     Types: Cigarettes     Start date:     Smokeless tobacco: Never   Substance Use Topics    Alcohol use: Yes     Comment: very rare                                Ready to quit: Not Answered  Counseling given: Not Answered      Vital Signs (Current):   Vitals:    11/10/22 1016   BP: 109/71   Pulse: 80   Resp: 16   Temp: 97.5 °F (36.4 °C)   TempSrc: Infrared   SpO2: 98%   Weight: 179 lb (81.2 kg)   Height: 5' 3\" (1.6 m)                                              BP Readings from Last 3 Encounters:   11/10/22 109/71   10/27/22 132/82   10/12/22 128/84       NPO Status: Time of last liquid consumption: 2130                        Time of last solid consumption: 2130                        Date of last liquid consumption: 11/09/22                        Date of last solid food consumption: 11/09/22    BMI:   Wt Readings from Last 3 Encounters:   11/10/22 179 lb (81.2 kg)   10/27/22 179 lb (81.2 kg)   10/12/22 174 lb 9.6 oz (79.2 kg)     Body mass index is 31.71 kg/m². CBC:   Lab Results   Component Value Date/Time    WBC 9.6 10/27/2022 01:10 PM    RBC 4.54 10/27/2022 01:10 PM    HGB 14.8 10/27/2022 01:10 PM    HCT 43.7 10/27/2022 01:10 PM    MCV 96.2 10/27/2022 01:10 PM    RDW 13.6 10/27/2022 01:10 PM     10/27/2022 01:10 PM       CMP:   Lab Results   Component Value Date/Time     10/27/2022 01:10 PM    K 3.9 10/27/2022 01:10 PM     10/27/2022 01:10 PM    CO2 27 10/27/2022 01:10 PM    BUN 9 10/27/2022 01:10 PM    CREATININE 0.72 10/27/2022 01:10 PM    GFRAA >60 10/03/2022 03:00 AM    LABGLOM >60 10/27/2022 01:10 PM    GLUCOSE 120 10/27/2022 01:10 PM    PROT 6.7 06/10/2022 12:48 AM    CALCIUM 9.5 10/27/2022 01:10 PM    BILITOT 0.22 06/10/2022 12:48 AM    ALKPHOS 92 06/10/2022 12:48 AM    AST 20 06/10/2022 12:48 AM    ALT 20 06/10/2022 12:48 AM       POC Tests: No results for input(s): POCGLU, POCNA, POCK, POCCL, POCBUN, POCHEMO, POCHCT in the last 72 hours.     Coags:   Lab Results   Component Value Date/Time    PROTIME 12.9 08/30/2021 06:26 AM    INR 1.0 08/30/2021 06:26 AM APTT 33.4 08/29/2021 10:21 PM       HCG (If Applicable):   Lab Results   Component Value Date    PREGTESTUR negative 08/02/2022    HCG NEGATIVE 09/28/2022    HCGQUANT <1 12/19/2018        ABGs: No results found for: PHART, PO2ART, WES3BMK, ECX0YZI, BEART, H8YVOEFN     Type & Screen (If Applicable):  No results found for: LABABO, LABRH    Drug/Infectious Status (If Applicable):  No results found for: HIV, HEPCAB    COVID-19 Screening (If Applicable):   Lab Results   Component Value Date/Time    COVID19 DETECTED 10/02/2021 08:15 PM           Anesthesia Evaluation  Patient summary reviewed and Nursing notes reviewed no history of anesthetic complications:   Airway: Mallampati: II  TM distance: >3 FB   Neck ROM: full  Mouth opening: > = 3 FB   Dental:    (+) edentulous      Pulmonary:normal exam  breath sounds clear to auscultation  (+) sleep apnea (doesn't use CPAP):  current smoker                           Cardiovascular:    (+) hypertension:, orthopnea,       ECG reviewed  Rhythm: regular  Rate: normal  Echocardiogram reviewed         Beta Blocker:  Not on Beta Blocker         Neuro/Psych:   (+) neuromuscular disease:, psychiatric history:depression/anxiety              ROS comment: Neuropathy  Restless leg syndrome GI/Hepatic/Renal: Neg GI/Hepatic/Renal ROS            Endo/Other:    (+) DiabetesType II DM, , .                  ROS comment:  Left carpal tunnel syndrome     : Marijuana (Weed); Times per week: 3 Abdominal:             Vascular: negative vascular ROS. Other Findings:           Anesthesia Plan      general     ASA 3       Induction: intravenous. MIPS: Postoperative opioids intended and Prophylactic antiemetics administered. Anesthetic plan and risks discussed with patient. Plan discussed with CRNA.                     Adelita Quispe MD   11/10/2022

## 2022-11-12 PROBLEM — Z09 POSTOPERATIVE EXAMINATION: Status: RESOLVED | Noted: 2022-10-13 | Resolved: 2022-11-12

## 2022-11-18 ENCOUNTER — HOSPITAL ENCOUNTER (EMERGENCY)
Age: 47
Discharge: HOME | End: 2022-11-18
Attending: EMERGENCY MEDICINE
Payer: COMMERCIAL

## 2022-11-18 VITALS
TEMPERATURE: 97.9 F | DIASTOLIC BLOOD PRESSURE: 109 MMHG | RESPIRATION RATE: 18 BRPM | HEART RATE: 100 BPM | OXYGEN SATURATION: 98 % | SYSTOLIC BLOOD PRESSURE: 158 MMHG

## 2022-11-18 DIAGNOSIS — F32.A DEPRESSION, UNSPECIFIED DEPRESSION TYPE: Primary | ICD-10-CM

## 2022-11-18 PROCEDURE — 99283 EMERGENCY DEPT VISIT LOW MDM: CPT

## 2022-11-18 PROCEDURE — 6370000000 HC RX 637 (ALT 250 FOR IP): Performed by: STUDENT IN AN ORGANIZED HEALTH CARE EDUCATION/TRAINING PROGRAM

## 2022-11-18 RX ORDER — LORAZEPAM 0.5 MG/1
0.5 TABLET ORAL ONCE
Status: COMPLETED | OUTPATIENT
Start: 2022-11-18 | End: 2022-11-18

## 2022-11-18 RX ADMIN — LORAZEPAM 0.5 MG: 0.5 TABLET ORAL at 21:52

## 2022-11-18 ASSESSMENT — PAIN - FUNCTIONAL ASSESSMENT: PAIN_FUNCTIONAL_ASSESSMENT: NONE - DENIES PAIN

## 2022-11-19 ASSESSMENT — ENCOUNTER SYMPTOMS
DIARRHEA: 0
COUGH: 0
SORE THROAT: 0
BACK PAIN: 0
TROUBLE SWALLOWING: 0
SHORTNESS OF BREATH: 0
CHEST TIGHTNESS: 0
VOMITING: 0

## 2022-11-19 NOTE — ED PROVIDER NOTES
[facility] ED  eMERGENCY dEPARTMENT eNCOUnter   Attending Attestation     Pt Name: Robin Cardenas  MRN: [id number]  Sherongfgarry 1975  Date of evaluation: 11/18/22       Robin Cardenas is a 56 y.o. female who presents with Depression (Denies SI/HI, pt states depression is ongoing for years )      History: Patient presents with feeling overwhelmed and depression. Patient says that she is not feeling like shortness harm her self or anyone else. Patient says that she is going through a divorce and she is concerned that he does not want one of their children however he does want to see that individual and would like to have partial custody of the other. Exam: Heart rate and rhythm are regular. Lungs are clear to auscultation bilaterally. Abdomen is soft, nontender. Patient is awake alert and emotionally distraught. Plan for social work eval and anxiolysis. I performed a history and physical examination of the patient and discussed management with the resident. I reviewed the residents note and agree with the documented findings and plan of care. Any areas of disagreement are noted on the chart. I was personally present for the key portions of any procedures. I have documented in the chart those procedures where I was not present during the key portions. I have personally reviewed all images and agree with the resident's interpretation. I have reviewed the emergency nurses triage note. I agree with the chief complaint, past medical history, past surgical history, allergies, medications, social and family history as documented unless otherwise noted below. Documentation of the HPI, Physical Exam and Medical Decision Making performed by medical students or scribes is based on my personal performance of the HPI, PE and MDM.  For Phys Assistant/ Nurse Practitioner cases/documentation I have had a face to face evaluation of this patient and have completed at least one if not all key elements of the E/M (history, physical exam, and MDM). Additional findings are as noted. For APC cases I have personally evaluated and examined the patient in conjunction with the APC and agree with the treatment plan and disposition of the patient as recorded by the APC.     Dave Quiñones MD  Attending Emergency  Physician       Robert To MD  11/18/22 8664

## 2022-11-19 NOTE — ED NOTES
Pt reports to the ED with complaints of depression. Pt states she is going through a divorce and custody vanegas at this time and it has been very hard on her. Pt denies SI or HI at this time. Pt states her PCP placed her on 150 mg of welbutrin and she tried that for two months but states it wasn't working. Pt then states they increased her dose to 300 mg and she's been on that for about one month now but states that is not working either. Pt does not have any other complaints at this time. Pt is A&O and speaking in complete sentences. Pt is resting in bed comfortably, NAD noted. Pt denies chest pain, SOB.  Will continue to monitor         Jessica Kaur RN  11/18/22 [id number]

## 2022-11-19 NOTE — ED PROVIDER NOTES
I did not see, evaluate, or staff this patient. Patient seen by Dr. Garrett Packer.        Taylor Escobar MD  11/18/22 3837

## 2022-11-19 NOTE — ED NOTES
Writer set up transportation services for patient through her insurance at discharge. Trip number: [id number].      HARJEET Rodriguez  11/18/22 8553

## 2022-11-19 NOTE — ED NOTES
Writer met with patient at bedside regarding symptoms of depression. Patient is sobbing upon writer entering the room. She reports going through a divorce, custody vanegas, stressors at work and with her current relationship. Patient states that she is always the strong one and feels that at times she just wants to lay in bed and do nothing, \"but I can't because I have to work to support my kids. \"  Patient states recent increase in depression medication prescribed by her PCP without relief. Writer validated patients feelings, normalized feeling anger and frustration with divorce and custody concerns. As she reports her boyfriend is upset about her lashing out in anger towards him, \"I don't mean to and it isn't fair to him, I just feel like I can't help it. \"    Attempted to do deep breathing exercises and grounding exercises with patient to stop racing thoughts, patient not able to participate at this time. Patient repeats, \"I just don't want to feel all these feelings. \"  Writer assessed for suicidal/homicidal ideations. Patient denies thoughts of harm towards self and others. She does reports suicidal ideations 3 years ago, \"I don't feel that way now and I don't want to hurt myself. \"  Patient agrees that seeking mental health treatment with a psychiatrist and therapist is the next step. Writer reviewed resources for mental health agencies and [facility]. Patient reports understanding. She is in agreement that if she were to develop thoughts of harm to self or others she would return to the ER, call 911 or go to [facility].       HARJEET Santiago  11/18/22 0207

## 2022-11-19 NOTE — DISCHARGE INSTRUCTIONS
You were seen in the emergency department today due to depression and anxiety. Social work is provided you with several resources, please follow-up with those resources. Please return if you have any suicidal or homicidal ideations.

## 2022-11-19 NOTE — ED PROVIDER NOTES
[facility]  ED  Emergency Department Encounter  EmergencyMedicine Resident     Pt Aleks Quintin Centeno  MRN: [id number]  Cori 1975  Date of evaluation: 22  PCP:  BRICE Ahn    CHIEF COMPLAINT       Chief Complaint   Patient presents with    Depression     Denies SI/HI, pt states depression is ongoing for years        HISTORY OF PRESENT ILLNESS  (Location/Symptom, Timing/Onset, Context/Setting, Quality, Duration, Modifying Factors, Severity.)      Dasia Burciaga is a 56 y.o. female who presents with depression. Patient states that she has a history of depression anxiety that is normally managed by her family doctor. Is on Wellbutrin daily, has been taking her medication. States that she has been going through a particularly difficult divorce. She states that her anxiety has been increasing significantly. She states that she occasionally feels fine but then will break out in tears and feels like her body is shutting down from anxiety. Denies any suicidal ideation or homicidal ideation. Does have a history of suicide attempts but denies any attempts at this time. PAST MEDICAL / SURGICAL / SOCIAL / FAMILY HISTORY      has a past medical history of Anxiety, Arthritis, Carpal tunnel syndrome, COVID-19, Depression, Diabetes, gestational, Edema, Fracture, Ground glass opacity present on imaging of lung, History of mycoplasma pneumonia, Hypertension, Influenza, Light cigarette smoker (1-9 cigarettes per day), Marijuana use, Menarche, Neuropathy, Obesity, Obesity (BMI 30.0-34.9), Parity, Pneumonia, Restless leg syndrome, Sleep apnea, Status post LEEP (loop electrosurgical excision procedure) of cervix, UTI (lower urinary tract infection), and Wears glasses. has a past surgical history that includes Cholecystectomy (2006); Tubal ligation;  section; Colonoscopy; hysteroscopy (2014);  Dilation and curettage of uterus (2014); myringotomy (Bilateral); Tonsillectomy and adenoidectomy; Ankle surgery (Right, 1997); Endometrial ablation (09/11/2014); Knee arthroscopy (Left, 06/29/2015); other surgical history (12/27/2016); knee surgery (Left, 01/02/2017); pr sling oper stres incontinence (N/A, 11/29/2018); bladder suspension; Vagina surgery (N/A, 01/18/2019); Rectal surgery (Right, 08/30/2021); Colonoscopy (N/A, 08/08/2022); LEEP (N/A, 09/28/2022); knee surgery (Right, 10/23/2017); and Carpal tunnel release (Left, 11/10/2022).       Social History     Socioeconomic History    Marital status: Legally      Spouse name: Not on file    Number of children: Not on file    Years of education: Not on file    Highest education level: Not on file   Occupational History    Not on file   Tobacco Use    Smoking status: Every Day     Packs/day: 0.25     Years: 18.00     Pack years: 4.50     Types: Cigarettes     Start date: 13    Smokeless tobacco: Never   Vaping Use    Vaping Use: Every day   Substance and Sexual Activity    Alcohol use: Yes     Comment: very rare    Drug use: Yes     Frequency: 3.0 times per week     Types: Marijuana (Weed)     Comment: x3 uses per week (advised against on 10-27-22, SP)    Sexual activity: Yes     Partners: Male   Other Topics Concern    Not on file   Social History Narrative    Not on file     Social Determinants of Health     Financial Resource Strain: Low Risk     Difficulty of Paying Living Expenses: Not hard at all   Food Insecurity: No Food Insecurity    Worried About Running Out of Food in the Last Year: Never true    Ran Out of Food in the Last Year: Never true   Transportation Needs: Not on file   Physical Activity: Not on file   Stress: Not on file   Social Connections: Not on file   Intimate Partner Violence: Not on file   Housing Stability: Not on file       Family History   Problem Relation Age of Onset    Hypertension Mother     Mental Illness Mother     Diabetes Father     Hypertension Father     Stroke Father     Heart Disease Paternal Grandfather     Breast Cancer Maternal Aunt 40       Allergies:  Bactrim [sulfamethoxazole-trimethoprim], Dicloxacillin, and Pcn [penicillins]    Home Medications:  Prior to Admission medications    Medication Sig Start Date End Date Taking? Authorizing Provider   gabapentin (NEURONTIN) 100 MG capsule Take 1 capsule by mouth in the morning and at bedtime. 10/10/22   Historical Provider, MD   gabapentin (NEURONTIN) 300 MG capsule Take 1 capsule by mouth at bedtime. 10/10/22   Historical Provider, MD   buPROPion (WELLBUTRIN XL) 150 MG extended release tablet Take 300 mg by mouth every morning    Historical Provider, MD   rOPINIRole (REQUIP) 5 MG tablet Take 7.5 mg by mouth nightly     Historical Provider, MD   hydroCHLOROthiazide (HYDRODIURIL) 25 MG tablet Take 25 mg by mouth daily    Historical Provider, MD   naproxen (NAPROSYN) 500 MG tablet Take 500 mg by mouth nightly     Historical Provider, MD       REVIEW OF SYSTEMS    (2-9 systems for level 4, 10 or more for level 5)      Review of Systems   Constitutional:  Negative for chills and fever. HENT:  Negative for ear pain, postnasal drip, sore throat and trouble swallowing. Respiratory:  Negative for cough, chest tightness and shortness of breath. Cardiovascular:  Negative for chest pain. Gastrointestinal:  Negative for diarrhea and vomiting. Musculoskeletal:  Negative for back pain and neck pain. Neurological:  Negative for weakness, numbness and headaches. Psychiatric/Behavioral:  Positive for decreased concentration and dysphoric mood. Negative for agitation, confusion, self-injury, sleep disturbance and suicidal ideas. PHYSICAL EXAM   (up to 7 for level 4, 8 or more for level 5)      INITIAL VITALS:   BP (!) 158/109   Pulse 100   Temp 97.9 °F (36.6 °C) (Oral)   Resp 18   LMP 09/15/2014   SpO2 98%     Physical Exam  Constitutional:       Appearance: Normal appearance.    HENT:      Head: Normocephalic and atraumatic. Right Ear: External ear normal.      Left Ear: External ear normal.   Eyes:      Extraocular Movements: Extraocular movements intact. Cardiovascular:      Rate and Rhythm: Normal rate. Pulses: Normal pulses. Pulmonary:      Effort: Pulmonary effort is normal.      Breath sounds: Normal breath sounds. Abdominal:      Palpations: Abdomen is soft. Tenderness: There is no abdominal tenderness. Musculoskeletal:         General: Normal range of motion. Cervical back: Normal range of motion. Neurological:      General: No focal deficit present. Mental Status: She is alert and oriented to person, place, and time. Psychiatric:         Mood and Affect: Mood normal.      Comments: Tearful, flat affect       DIFFERENTIAL  DIAGNOSIS     PLAN (LABS / IMAGING / EKG):  No orders of the defined types were placed in this encounter. MEDICATIONS ORDERED:  Orders Placed This Encounter   Medications    LORazepam (ATIVAN) tablet 0.5 mg       DDX: Depression, suicidal ideation, anxiety, homicidal ideation    MDM: 56 y.o. female presents today with depression. Patient is tearful and upset on evaluation. Social work is consulted to provide patient with resources, patient is given 0.5 mg Ativan to help calm her down and discussed these options with social work. Patient is agreeable and will be following up with mental health services. She is cleared for discharge. EMERGENCY DEPARTMENT COURSE:  ED Course as of 11/19/22 0526 Fri Nov 18, 2022 2112 will consult social work for mental health resources. Patient is tearful and actively sobbing on evaluation. Denies any physical symptoms. [SS]      ED Course User Index  [SS] Bijal Henao MD               DIAGNOSTIC RESULTS / EMERGENCY DEPARTMENT COURSE / MDM   LAB RESULTS:  No results found for this visit on 11/18/22.         RADIOLOGY:  No orders to display        PROCEDURES:  None    CONSULTS:  None    CRITICAL CARE:  None    FINAL IMPRESSION      1.  Depression, unspecified depression type          DISPOSITION / PLAN     DISPOSITION Decision To Discharge 11/18/2022 10:49:42 PM      PATIENT REFERRED TO:  Maine Mistry [address] [phone]          DISCHARGE MEDICATIONS:  Discharge Medication List as of 11/18/2022 10:50 PM          Alexandre Jin MD  Emergency Medicine Resident    (Please note that portions of thisnote were completed with a voice recognition program.  Efforts were made to edit the dictations but occasionally words are mis-transcribed.)       Alexandre Jin MD  Resident  11/19/22 [id number]

## 2022-11-23 ENCOUNTER — OFFICE VISIT (OUTPATIENT)
Dept: ORTHOPEDIC SURGERY | Age: 47
End: 2022-11-23

## 2022-11-23 DIAGNOSIS — Z98.890 S/P CARPAL TUNNEL RELEASE: Primary | ICD-10-CM

## 2022-11-23 PROCEDURE — 99024 POSTOP FOLLOW-UP VISIT: CPT | Performed by: ORTHOPAEDIC SURGERY

## 2022-11-23 NOTE — PROGRESS NOTES
Patient returns today status post left carpal tunnel release on 11/10/2022. She says the numbness and tingling significant proved    Physical examination notes her incisions pristine. Covered with Steri-Strips she is moving her fingers well sensations intact. Impression  Status post left carpal tunnel release  Carpal tunnel syndrome right      Plan  Patient given home PT exercise program. No heavy lifting. Carefull when pushing from chair. Discussed with patient about not immersing hand in sink or tub. Showers are okay. Retain steri-strips until fall off. Return to work as stated in documentation.   Patient returned back here in February 2023 as she is considering scheduling her for her right side at some time in March

## 2023-03-06 ENCOUNTER — OFFICE VISIT (OUTPATIENT)
Dept: ORTHOPEDIC SURGERY | Age: 48
End: 2023-03-06

## 2023-03-06 DIAGNOSIS — M79.641 PAIN OF RIGHT HAND: Primary | ICD-10-CM

## 2023-03-06 NOTE — PROGRESS NOTES
Hector Dempsey M.D.            [address] [facility] [address] [facility] ([place] [address] [facility]           Dept Phone: [phone]           Dept Fax:  [phone]           [place] N[place]          Dept Phone: [phone]           Dept Fax:  [phone]      Chief Compliant:  Chief Complaint   Patient presents with    Pain     Rt hand        History of Present Illness: This is a 56 y.o. female who presents to the clinic today for evaluation / follow up of her hand pain. Patient is a 72-year-old right-hand-dominant female who had her left carpal tunnel release done at the end of 2022. She is doing very well the left side. She had a previous EMG and NCV of her right upper extremity in the past and has failed conservative treatment she is back today basically just to schedule her right side. Review of Systems   Constitutional: Negative for fever, chills, sweats. Eyes: Negative for changes in vision, or pain. HENT: Negative for ear ache, epistaxis, or sore throat. Respiratory/Cardio: Negative for Chest pain, palpitations, SOB, or cough. Gastrointestinal: Negative for abdominal pain, N/V/D. Genitourinary: Negative for dysuria, frequency, urgency, or hematuria. Neurological: Negative for headache, numbness, or weakness. Integumentary: Negative for rash, itching, laceration, or abrasion. Musculoskeletal: Positive for Pain (Rt hand)       Physical Exam:  Constitutional: Patient is oriented to person, place, and time. Patient appears well-developed and well nourished. HENT: Negative otherwise noted  Head: Normocephalic and Atraumatic  Nose: Normal  Eyes: Conjunctivae and EOM are normal  Neck: Normal range of motion Neck supple. Respiratory/Cardio: Effort normal. No respiratory distress.   Musculoskeletal: Examination of her right hand notes that she has positive Tinel's. She has slightly decreased  strength. She has full range of motion of her wrist and her fingers negative Finkelstein's -4 months no ulnar-sided symptoms or findings. Neurological: Patient is alert and oriented to person, place, and time. Normal strength. No sensory deficit. Skin: Skin is warm and dry  Psychiatric: Behavior is normal. Thought content normal.  Nursing note and vitals reviewed. Labs and Imaging:     XR taken today: None taken today  No results found. No orders of the defined types were placed in this encounter. Assessment and Plan:  Status post left carpal tunnel release  Carpal tunnel syndrome right        This is a 56 y.o. female who presents to the clinic today for evaluation / follow up of carpal tunnel syndrome right. Past History:    Current Outpatient Medications:     gabapentin (NEURONTIN) 100 MG capsule, Take 1 capsule by mouth in the morning and at bedtime. , Disp: , Rfl:     gabapentin (NEURONTIN) 300 MG capsule, Take 1 capsule by mouth at bedtime. , Disp: , Rfl:     buPROPion (WELLBUTRIN XL) 150 MG extended release tablet, Take 300 mg by mouth every morning, Disp: , Rfl:     rOPINIRole (REQUIP) 5 MG tablet, Take 7.5 mg by mouth nightly , Disp: , Rfl:     hydroCHLOROthiazide (HYDRODIURIL) 25 MG tablet, Take 25 mg by mouth daily, Disp: , Rfl:     naproxen (NAPROSYN) 500 MG tablet, Take 500 mg by mouth nightly , Disp: , Rfl:   Allergies   Allergen Reactions    Bactrim [Sulfamethoxazole-Trimethoprim] Hives and Itching    Dicloxacillin Hives    Pcn [Penicillins] Hives     Social History     Socioeconomic History    Marital status: Legally      Spouse name: Not on file    Number of children: Not on file    Years of education: Not on file    Highest education level: Not on file   Occupational History    Not on file   Tobacco Use    Smoking status: Every Day     Packs/day: 0.25     Years: 18.00     Pack years: 4.50     Types: Cigarettes     Start date: 13    Smokeless tobacco: Never   Vaping Use    Vaping Use: Every day   Substance and Sexual Activity    Alcohol use: Yes     Comment: very rare    Drug use: Yes     Frequency: 3.0 times per week     Types: Marijuana Matilde Brian)     Comment: x3 uses per week (advised against on 10-27-22, SP)    Sexual activity: Yes     Partners: Male   Other Topics Concern    Not on file   Social History Narrative    Not on file     Social Determinants of Health     Financial Resource Strain: Low Risk     Difficulty of Paying Living Expenses: Not hard at all   Food Insecurity: No Food Insecurity    Worried About Running Out of Food in the Last Year: Never true    Ran Out of Food in the Last Year: Never true   Transportation Needs: Not on file   Physical Activity: Not on file   Stress: Not on file   Social Connections: Not on file   Intimate Partner Violence: Not on file   Housing Stability: Not on file     Past Medical History:   Diagnosis Date    Anxiety     Arthritis     OA    Carpal tunnel syndrome     COVID-19     Depression     post partum    Diabetes, gestational     history of    Edema     Fracture     Right leg- ? Ground glass opacity present on imaging of lung     History of mycoplasma pneumonia     Hypertension     Influenza     Light cigarette smoker (1-9 cigarettes per day)     Marijuana use     Menarche @ 16 y/o    Neuropathy     CTS    Obesity     Obesity (BMI 30.0-34. 9)     Parity       -    Pneumonia 2018    several times this year, had pneumonia last 10-    Restless leg syndrome     Sleep apnea     doesn't use CPAP    Status post LEEP (loop electrosurgical excision procedure) of cervix 2022    UTI (lower urinary tract infection)     with pregnancy    Wears glasses      Past Surgical History:   Procedure Laterality Date    ANKLE SURGERY Right [address] Left 11/10/2022    CARPAL TUNNEL RELEASE performed by Lino Maguire MD at [address] [place] [address] SECTION      CHOLECYSTECTOMY  12/07/2006    COLONOSCOPY      with polyp removal    COLONOSCOPY N/A 08/08/2022    COLONOSCOPY WITH BIOPSY performed by Aminah Carlson MD at [address]  04/06/2014    ENDOMETRIAL ABLATION  09/11/2014    with novasure    HYSTEROSCOPY  04/08/2014    D&C    KNEE ARTHROSCOPY Left 06/29/2015    total x2 scopes. KNEE SURGERY Left 01/02/2017    Procedure: ARTHROSCOPY KNEE LEFT, CHONDROPLASTY OR MFC, TROCHLEA, AND PATELLA; Surgeon: Leida Goodman MD; Location: [address] [place] [address]; Service: Orthopedics; Laterality: Left;    KNEE SURGERY Right 10/23/2017    Procedure: ARTHROSCOPY KNEE, CHONDROPLASTY PATELLA, CHONDROPLASTY TROCHLEA; Surgeon: Leida Goodman MD; Location: [address] [place] [address]; Service: Orthopedics; Laterality: Right;    LEEP N/A 09/28/2022    LEEP performed by Jaxon Berg MD at [address] Bilateral     with tubes    OTHER SURGICAL HISTORY  12/27/2016    removal multiple vulvar inclusion cysts    NH SLING OPER STRES INCONTINENCE N/A 11/29/2018    BLADDER SUSPENSION TRANSOBTURATOR TAPE AND CYSTO performed by Jaxon Berg MD at [address] Right 08/30/2021    PERIRECTAL ABSCESS INCISION AND DRAINAGE performed by Aminah Carlson MD at [address] [facility] N/A 01/18/2019    VAGINAL SLING DISSECTION OF VAGINAL MUCOSA WITH REINSERTION OF  TRANSOBTURATOR TAPE  CYSTOSCOPY performed by Jaxon Berg MD at [address]     Family History   Problem Relation Age of Onset    Hypertension Mother     Mental Illness Mother     Diabetes Father     Hypertension Father     Stroke Father     Heart Disease Paternal Grandfather     Breast Cancer Maternal Aunt 36   Plan  Patient was to be scheduled for right carpal tunnel release we will go ahead and do this for her.   She is well aware of the risk and benefits having been through this before we will see her back here postoperatively      Provider Attestation:  Terrence Mosley, personally performed the services described in this documentation. All medical record entries made by the scribe were at my direction and in my presence. I have reviewed the chart and discharge instructions and agree that the records reflect my personal performance and is accurate and complete. Juventino Aguilar MD. 03/06/23      Please note that this chart was generated using voice recognition Dragon dictation software. Although every effort was made to ensure the accuracy of this automated transcription, some errors in transcription may have occurred.

## 2023-12-29 NOTE — PROGRESS NOTES
Subjective:      Patient ID: Nica Hilton is a 44 y.o. female. Chief Complaint   Patient presents with   [facility] Surgical Consult     Patient is here today for a surgical consult. /85 (Site: Right Upper Arm, Position: Sitting, Cuff Size: Large Adult)   Pulse 78   Ht 5' 3\" (1.6 m)   Wt 197 lb 6.4 oz (89.5 kg)   BMI 34.97 kg/m²   No LMP recorded. Patient has had an ablation.     Past Medical History:   Diagnosis Date    Anxiety     Arthritis     OA    Depression     post partum    Diabetes, gestational     history of    Menarche @ 16 y/o    Neuropathy     CTS    Obesity     Parity       -    Restless leg syndrome     Sleep apnea     CPAP    UTI (lower urinary tract infection)     with pregnancy    Wears glasses      Current Outpatient Prescriptions Ordered in [facility]   Medication Sig Dispense Refill    albuterol sulfate  (90 Base) MCG/ACT inhaler Inhale 2 puffs into the lungs every 6 hours as needed for Wheezing 1 Inhaler 3    rOPINIRole (REQUIP) 1 MG tablet Take 1 mg by mouth daily       FLUoxetine (PROZAC) 40 MG capsule Take 80 mg by mouth daily       busPIRone (BUSPAR) 10 MG tablet Take 10 mg by mouth 2 times daily      naproxen (NAPROSYN) 500 MG tablet Take 500 mg by mouth daily       rOPINIRole (REQUIP) 5 MG tablet Take 7.5 mg by mouth nightly       clotrimazole-betamethasone (LOTRISONE) 1-0.05 % cream Apply topically 2 times daily. 45 g 0    ibuprofen (ADVIL;MOTRIN) 800 MG tablet Take 1 tablet by mouth every 8 hours as needed for Pain 20 tablet 0    ibuprofen (ADVIL;MOTRIN) 600 MG tablet Take 1 tablet by mouth every 6 hours as needed for Pain 30 tablet 0    tolterodine (DETROL LA) 4 MG extended release capsule Take 1 capsule by mouth daily 30 capsule 2     No current Epic-ordered facility-administered medications on file.       Problem List Items Addressed This Visit     None        Allergies   Allergen Reactions    Bactrim [Sulfamethoxazole-Trimethoprim] Hives and Itching    Dicloxacillin Hives    Pcn [Penicillins] Hives     No orders of the defined types were placed in this encounter. Patient is here today complaining of leaking urine all the time. Patient states that she leaks when she coughs or sneezes. She even leaks urine when she has to go. She says that she cannot even make it upstairs to the bathroom, and has to hold a garbage can under her. She has been taking Detrol LA without any relief of her problem. She wants to know what kind of surgery she can have done to fix the issue. She denies nausea, vomiting and fever. Review of Systems   Constitutional: Negative for activity change, appetite change and fever. HENT: Negative for ear discharge and ear pain. Eyes: Negative for pain and visual disturbance. Respiratory: Negative for apnea and cough. Cardiovascular: Negative for chest pain, palpitations and leg swelling. Gastrointestinal: Negative for abdominal distention, abdominal pain, constipation, diarrhea, nausea and vomiting. Endocrine: Negative. Genitourinary: Negative for difficulty urinating, dysuria, menstrual problem and pelvic pain. Urinary incontinence    Musculoskeletal: Negative for neck pain and neck stiffness. Skin: Negative. Neurological: Negative for light-headedness and numbness. Hematological: Negative. Does not bruise/bleed easily. Objective:   Physical Exam   Constitutional: She is oriented to person, place, and time. Vital signs are normal. She appears well-developed and well-nourished. HENT:   Head: Normocephalic and atraumatic. Neck: Normal range of motion. Neck supple. No thyromegaly present. Cardiovascular: Normal rate and regular rhythm. Pulmonary/Chest: Effort normal and breath sounds normal. She has no wheezes. Abdominal: Soft. Bowel sounds are normal. She exhibits no distension and no mass. There is no tenderness. There is no guarding.    Musculoskeletal: Normal range of motion. Neurological: She is alert and oriented to person, place, and time. Skin: Skin is dry. Psychiatric: She has a normal mood and affect. Her behavior is normal. Thought content normal.   Nursing note and vitals reviewed. Assessment:      Patient with urinary incontinence. Explained to patient that all the surgical options are not permanent and sometimes, do not work. Discussed TOT in detail with patient. Explained that there is no guarantee that she will be able to tolerate the tape. Patient was advised to lose weight and to stop smoking to increase the success rate of the procedure. Schedule patient for TOT. Discussed surgical procedure, risks and benefits, and all questions were answered. Plan:      Schedule for TOT. Elle Caban am scribing for, and in the presence of Dr. Ailyn Garcia. Electronically signed by: Serenity Richey 10/22/18 2:31 PM       I agree to the above documentation placed by my scribe Serenity Richey. I reviewed the scribe's note and agree with the documented findings and plan of care. Any areas of disagreement are noted on the chart. I have personally evaluated this patient. Additional findings are as noted. I agree with the chief complaint, past medical history, past surgical history, allergies, medications, social and family history as documented unless otherwise noted below.      Electronically signed by Ailyn Garcia MD on 10/23/2018 at 2:39 AM DISPLAY PLAN FREE TEXT DISPLAY PLAN FREE TEXT

## 2025-04-09 NOTE — DISCHARGE INSTRUCTIONS
For constipation, we would recommend taking MiraLAX 17 g twice daily. You may also purchase a bottle of magnesium citrate to take once daily until bowel movements have normalized. Positioned the right ventricular lead. Mid septal

## (undated) DEVICE — POUCH INSTR W6.75XL11.5IN FRST 2 PKT ADH FOR ORTH AND

## (undated) DEVICE — DRESSING,GAUZE,XEROFORM,CURAD,1"X8",ST: Brand: CURAD

## (undated) DEVICE — ELECTRODE ES L11CM DIA5MM BALL SHFT RED DISP UTAHLOOP

## (undated) DEVICE — ELECTRODE PT RET AD L9FT HI MOIST COND ADH HYDRGEL CORDED

## (undated) DEVICE — Z INACTIVE USE 2635503 SOLUTION IRRIG 3000ML ST H2O USP UROMATIC PLAS CONT

## (undated) DEVICE — SUTURE VCRL SZ 0 L36IN ABSRB UD CT-1 L36MM 1/2 CIR TAPR PNT VCP946H

## (undated) DEVICE — ZIMMER® STERILE DISPOSABLE TOURNIQUET CUFF WITH PLC, DUAL PORT, SINGLE BLADDER, 18 IN. (46 CM)

## (undated) DEVICE — ELECTRODE ARTHSCP 15X11MM SHFT 11CM MPLR LOOP GRN DISP W/

## (undated) DEVICE — YANKAUER,BULB TIP,W/O VENT,RIGID,STERILE: Brand: MEDLINE

## (undated) DEVICE — GLOVE ORTHO 7 1/2   MSG9475

## (undated) DEVICE — INTENDED FOR TISSUE SEPARATION, AND OTHER PROCEDURES THAT REQUIRE A SHARP SURGICAL BLADE TO PUNCTURE OR CUT.: Brand: BARD-PARKER ® CARBON RIB-BACK BLADES

## (undated) DEVICE — Z DUP USE 2526982 TUBING IRRIG L90IN DIA0.19IN 1 LD W/ CYL DRIP CHMBR CYSTO

## (undated) DEVICE — Device

## (undated) DEVICE — GOWN,AURORA,NONREINFORCED,LARGE: Brand: MEDLINE

## (undated) DEVICE — GLOVE ORANGE PI 7   MSG9070

## (undated) DEVICE — ST CHARLES PERI-GYN PACK: Brand: MEDLINE INDUSTRIES, INC.

## (undated) DEVICE — PAD,ABDOMINAL,8"X7.5",ST,LF,20/BX: Brand: MEDLINE INDUSTRIES, INC.

## (undated) DEVICE — SOLUTION IV IRRIG WATER 1000ML POUR BRL 2F7114

## (undated) DEVICE — SINGLE PORT MANIFOLD: Brand: NEPTUNE 2

## (undated) DEVICE — YANKAUER,POOLE TIP,STERILE,50/CS: Brand: MEDLINE

## (undated) DEVICE — GLOVE SURG BEAD CUF 7 STD PF WHT STRL TRIUMPH LT LTX

## (undated) DEVICE — SYRINGE IRRIG 60ML SFT PLIABLE BLB EZ TO GRP 1 HND USE W/

## (undated) DEVICE — SKIN AFFIX SURG ADHESIVE 72/CS 0.55ML: Brand: MEDLINE

## (undated) DEVICE — TOTAL TRAY, DB, 100% SILI FOLEY, 16FR 10: Brand: MEDLINE

## (undated) DEVICE — STRIP,CLOSURE,WOUND,MEDI-STRIP,1/2X4: Brand: MEDLINE

## (undated) DEVICE — COUNTER NDL 10 COUNT HLD 20 FOAM BLK SGL MAG

## (undated) DEVICE — 4-PORT MANIFOLD: Brand: NEPTUNE 2

## (undated) DEVICE — SPONGE LAP W18XL18IN WHT COT 4 PLY FLD STRUNG RADPQ DISP ST

## (undated) DEVICE — SUTURE VCRL + SZ 3-0 L27IN ABSRB WHT CT-1 1/2 CIR VCP258H

## (undated) DEVICE — Z DUP USE 2522782 SOLUTION IRRIG 1000ML STRL H2O PLAS CONTAINER UROMATIC

## (undated) DEVICE — LEGGINGS, PAIR, CLEAR, STERILE: Brand: MEDLINE

## (undated) DEVICE — FORCEPS BX L240CM JAW DIA2.2MM RAD JAW 4 HOT DISP

## (undated) DEVICE — NEPTUNE E-SEP SMOKE EVACUATION PENCIL, COATED, 70MM BLADE, PUSH BUTTON SWITCH: Brand: NEPTUNE E-SEP

## (undated) DEVICE — CYSTO/BLADDER IRRIGATION SET, REGULATING CLAMP

## (undated) DEVICE — GAUZE,SPONGE,FLUFF,6"X6.75",STRL,5/TRAY: Brand: MEDLINE

## (undated) DEVICE — ERBE NESSY® OMEGA PLATE USA (85+23)CM² , WITH CABLE 3 M: Brand: ERBE

## (undated) DEVICE — DRAPE,REIN 53X77,STERILE: Brand: MEDLINE

## (undated) DEVICE — TUBING, SUCTION, 3/16" X 10', STRAIGHT: Brand: MEDLINE

## (undated) DEVICE — 1016 S-DRAPE IRRIG POUCH 10/BOX: Brand: STERI-DRAPE™

## (undated) DEVICE — GLOVE ORANGE PI 7 1/2   MSG9075

## (undated) DEVICE — GLOVE ORTHO 8   MSG9480

## (undated) DEVICE — SUTURE ETHLN SZ 3-0 L18IN NONABSORBABLE BLK FS-1 L24MM 3/8 663H

## (undated) DEVICE — SUTURE VCRL + SZ 4-0 L27IN ABSRB WHT FS-2 3/8 CIR REV CUT VCP422H

## (undated) DEVICE — PENCIL ES L3M BTTN SWCH HOLSTER W/ BLDE ELECTRD EDGE

## (undated) DEVICE — PROCTO SWABS: Brand: DEROYAL

## (undated) DEVICE — ENDO KIT W/SYRINGE: Brand: MEDLINE INDUSTRIES, INC.

## (undated) DEVICE — GOWN,SIRUS,NONRNF,SETINSLV,XL,20/CS: Brand: MEDLINE

## (undated) DEVICE — SOLUTION IRRIG 1000ML STRL H2O USP PLAS POUR BTL

## (undated) DEVICE — MERCY HEALTH ST CHARLES: Brand: MEDLINE INDUSTRIES, INC.

## (undated) DEVICE — DRESSING TRNSPAR W2XL2.75IN FLM SHT SEMIPERMEABLE WIND

## (undated) DEVICE — PADDING CAST W2INXL4YD COT LO LINTING WYTEX

## (undated) DEVICE — BANDAGE,ELASTIC,ESMARK,STERILE,4"X9',LF: Brand: MEDLINE

## (undated) DEVICE — BNDG,ELSTC,MATRIX,STRL,2"X5YD,LF,HOOK&LP: Brand: MEDLINE

## (undated) DEVICE — DEFENDO AIR WATER SUCTION AND BIOPSY VALVE KIT FOR  OLYMPUS: Brand: DEFENDO AIR/WATER/SUCTION AND BIOPSY VALVE

## (undated) DEVICE — 3M™ STERI-STRIP™ COMPOUND BENZOIN TINCTURE 40 BAGS/CARTON 4 CARTONS/CASE C1544: Brand: 3M™ STERI-STRIP™

## (undated) DEVICE — STRAP,POSITIONING,KNEE/BODY,FOAM,4X60": Brand: MEDLINE